# Patient Record
Sex: FEMALE | Race: WHITE | NOT HISPANIC OR LATINO | Employment: OTHER | URBAN - METROPOLITAN AREA
[De-identification: names, ages, dates, MRNs, and addresses within clinical notes are randomized per-mention and may not be internally consistent; named-entity substitution may affect disease eponyms.]

---

## 2017-03-08 ENCOUNTER — AMBULATORY - HEALTHEAST (OUTPATIENT)
Dept: CARDIOLOGY | Facility: CLINIC | Age: 82
End: 2017-03-08

## 2017-03-08 DIAGNOSIS — Z95.0 PACEMAKER: ICD-10-CM

## 2017-04-03 ENCOUNTER — COMMUNICATION - HEALTHEAST (OUTPATIENT)
Dept: INTERNAL MEDICINE | Facility: CLINIC | Age: 82
End: 2017-04-03

## 2017-04-03 DIAGNOSIS — Z51.81 MEDICATION MONITORING ENCOUNTER: ICD-10-CM

## 2017-04-06 ENCOUNTER — COMMUNICATION - HEALTHEAST (OUTPATIENT)
Dept: INTERNAL MEDICINE | Facility: CLINIC | Age: 82
End: 2017-04-06

## 2017-04-09 ENCOUNTER — AMBULATORY - HEALTHEAST (OUTPATIENT)
Dept: INTERNAL MEDICINE | Facility: CLINIC | Age: 82
End: 2017-04-09

## 2017-04-09 DIAGNOSIS — Z51.81 MEDICATION MONITORING ENCOUNTER: ICD-10-CM

## 2017-04-09 DIAGNOSIS — E03.9 HYPOTHYROIDISM: ICD-10-CM

## 2017-04-09 DIAGNOSIS — N18.30 CHRONIC RENAL INSUFFICIENCY, STAGE 3 (MODERATE) (H): ICD-10-CM

## 2017-04-09 DIAGNOSIS — E11.9 TYPE 2 DIABETES MELLITUS (H): ICD-10-CM

## 2017-04-09 DIAGNOSIS — E78.2 MIXED HYPERLIPIDEMIA: ICD-10-CM

## 2017-04-20 ENCOUNTER — AMBULATORY - HEALTHEAST (OUTPATIENT)
Dept: LAB | Facility: CLINIC | Age: 82
End: 2017-04-20

## 2017-04-20 DIAGNOSIS — E03.9 HYPOTHYROIDISM: ICD-10-CM

## 2017-04-20 DIAGNOSIS — E11.9 TYPE 2 DIABETES MELLITUS (H): ICD-10-CM

## 2017-04-20 DIAGNOSIS — Z51.81 MEDICATION MONITORING ENCOUNTER: ICD-10-CM

## 2017-04-20 DIAGNOSIS — E78.2 MIXED HYPERLIPIDEMIA: ICD-10-CM

## 2017-04-20 DIAGNOSIS — N18.30 CHRONIC RENAL INSUFFICIENCY, STAGE 3 (MODERATE) (H): ICD-10-CM

## 2017-04-20 LAB
CHOLEST SERPL-MCNC: 136 MG/DL
FASTING STATUS PATIENT QL REPORTED: YES
HBA1C MFR BLD: 6.2 % (ref 3.5–6)
HDLC SERPL-MCNC: 29 MG/DL
LDLC SERPL CALC-MCNC: 76 MG/DL
TRIGL SERPL-MCNC: 153 MG/DL

## 2017-04-21 ENCOUNTER — COMMUNICATION - HEALTHEAST (OUTPATIENT)
Dept: INTERNAL MEDICINE | Facility: CLINIC | Age: 82
End: 2017-04-21

## 2017-04-27 ENCOUNTER — RECORDS - HEALTHEAST (OUTPATIENT)
Dept: MAMMOGRAPHY | Facility: CLINIC | Age: 82
End: 2017-04-27

## 2017-04-27 ENCOUNTER — OFFICE VISIT - HEALTHEAST (OUTPATIENT)
Dept: INTERNAL MEDICINE | Facility: CLINIC | Age: 82
End: 2017-04-27

## 2017-04-27 DIAGNOSIS — Z51.81 MEDICATION MONITORING ENCOUNTER: ICD-10-CM

## 2017-04-27 DIAGNOSIS — J30.0 VASOMOTOR RHINITIS: ICD-10-CM

## 2017-04-27 DIAGNOSIS — Z12.31 ENCOUNTER FOR SCREENING MAMMOGRAM FOR MALIGNANT NEOPLASM OF BREAST: ICD-10-CM

## 2017-04-27 ASSESSMENT — MIFFLIN-ST. JEOR: SCORE: 1059.72

## 2017-05-01 ENCOUNTER — COMMUNICATION - HEALTHEAST (OUTPATIENT)
Dept: INTERNAL MEDICINE | Facility: CLINIC | Age: 82
End: 2017-05-01

## 2017-05-01 DIAGNOSIS — C50.919 BREAST CA (H): ICD-10-CM

## 2017-05-01 DIAGNOSIS — M85.80 OSTEOPENIA: ICD-10-CM

## 2017-05-01 DIAGNOSIS — R25.1 TREMOR: ICD-10-CM

## 2017-05-01 DIAGNOSIS — I10 UNSPECIFIED ESSENTIAL HYPERTENSION: ICD-10-CM

## 2017-06-14 ENCOUNTER — AMBULATORY - HEALTHEAST (OUTPATIENT)
Dept: CARDIOLOGY | Facility: CLINIC | Age: 82
End: 2017-06-14

## 2017-06-14 DIAGNOSIS — Z95.0 CARDIAC PACEMAKER IN SITU: ICD-10-CM

## 2017-06-14 LAB — HCC DEVICE COMMENTS: NORMAL

## 2017-09-19 ENCOUNTER — COMMUNICATION - HEALTHEAST (OUTPATIENT)
Dept: LAB | Facility: CLINIC | Age: 82
End: 2017-09-19

## 2017-09-19 DIAGNOSIS — E78.5 HYPERLIPIDEMIA: ICD-10-CM

## 2017-09-19 DIAGNOSIS — E11.9 TYPE 2 DIABETES MELLITUS (H): ICD-10-CM

## 2017-09-20 ENCOUNTER — AMBULATORY - HEALTHEAST (OUTPATIENT)
Dept: CARDIOLOGY | Facility: CLINIC | Age: 82
End: 2017-09-20

## 2017-09-20 DIAGNOSIS — Z95.0 CARDIAC PACEMAKER IN SITU: ICD-10-CM

## 2017-09-21 ENCOUNTER — AMBULATORY - HEALTHEAST (OUTPATIENT)
Dept: LAB | Facility: CLINIC | Age: 82
End: 2017-09-21

## 2017-09-21 DIAGNOSIS — E78.5 HYPERLIPIDEMIA: ICD-10-CM

## 2017-09-21 DIAGNOSIS — E11.9 TYPE 2 DIABETES MELLITUS (H): ICD-10-CM

## 2017-09-21 LAB
CHOLEST SERPL-MCNC: 136 MG/DL
FASTING STATUS PATIENT QL REPORTED: YES
HBA1C MFR BLD: 6.1 % (ref 3.5–6)
HDLC SERPL-MCNC: 28 MG/DL
LDLC SERPL CALC-MCNC: 76 MG/DL
TRIGL SERPL-MCNC: 158 MG/DL

## 2017-09-22 LAB — HCC DEVICE COMMENTS: NORMAL

## 2017-09-27 ENCOUNTER — COMMUNICATION - HEALTHEAST (OUTPATIENT)
Dept: INTERNAL MEDICINE | Facility: CLINIC | Age: 82
End: 2017-09-27

## 2017-09-28 ENCOUNTER — OFFICE VISIT - HEALTHEAST (OUTPATIENT)
Dept: INTERNAL MEDICINE | Facility: CLINIC | Age: 82
End: 2017-09-28

## 2017-09-28 DIAGNOSIS — Z23 NEED FOR INFLUENZA VACCINATION: ICD-10-CM

## 2017-09-28 ASSESSMENT — MIFFLIN-ST. JEOR: SCORE: 1052.46

## 2017-12-07 ENCOUNTER — AMBULATORY - HEALTHEAST (OUTPATIENT)
Dept: CARDIOLOGY | Facility: CLINIC | Age: 82
End: 2017-12-07

## 2017-12-07 DIAGNOSIS — Z95.0 CARDIAC PACEMAKER IN SITU: ICD-10-CM

## 2017-12-07 LAB — HCC DEVICE COMMENTS: NORMAL

## 2017-12-07 ASSESSMENT — MIFFLIN-ST. JEOR: SCORE: 1058.36

## 2018-02-13 ENCOUNTER — COMMUNICATION - HEALTHEAST (OUTPATIENT)
Dept: INTERNAL MEDICINE | Facility: CLINIC | Age: 83
End: 2018-02-13

## 2018-02-13 DIAGNOSIS — C50.919 BREAST CA (H): ICD-10-CM

## 2018-02-13 DIAGNOSIS — Z51.81 MEDICATION MONITORING ENCOUNTER: ICD-10-CM

## 2018-02-13 DIAGNOSIS — M85.80 OSTEOPENIA: ICD-10-CM

## 2018-02-13 DIAGNOSIS — R25.1 TREMOR: ICD-10-CM

## 2018-02-13 DIAGNOSIS — I10 ESSENTIAL HYPERTENSION: ICD-10-CM

## 2018-03-08 ENCOUNTER — AMBULATORY - HEALTHEAST (OUTPATIENT)
Dept: CARDIOLOGY | Facility: CLINIC | Age: 83
End: 2018-03-08

## 2018-03-08 DIAGNOSIS — Z95.0 CARDIAC PACEMAKER IN SITU: ICD-10-CM

## 2018-03-08 LAB — HCC DEVICE COMMENTS: NORMAL

## 2018-04-03 ENCOUNTER — AMBULATORY - HEALTHEAST (OUTPATIENT)
Dept: INTERNAL MEDICINE | Facility: CLINIC | Age: 83
End: 2018-04-03

## 2018-04-03 ENCOUNTER — COMMUNICATION - HEALTHEAST (OUTPATIENT)
Dept: LAB | Facility: CLINIC | Age: 83
End: 2018-04-03

## 2018-04-03 DIAGNOSIS — E78.1 HYPERTRIGLYCERIDEMIA WITHOUT HYPERCHOLESTEROLEMIA: ICD-10-CM

## 2018-04-03 DIAGNOSIS — N18.30 CHRONIC RENAL INSUFFICIENCY, STAGE III (MODERATE) (H): ICD-10-CM

## 2018-04-03 DIAGNOSIS — C50.919 BREAST CANCER IN FEMALE (H): ICD-10-CM

## 2018-04-03 DIAGNOSIS — E04.2 MULTINODULAR NON-TOXIC GOITER: ICD-10-CM

## 2018-04-25 ENCOUNTER — AMBULATORY - HEALTHEAST (OUTPATIENT)
Dept: LAB | Facility: CLINIC | Age: 83
End: 2018-04-25

## 2018-04-25 ENCOUNTER — HOSPITAL ENCOUNTER (OUTPATIENT)
Dept: LAB | Age: 83
Setting detail: SPECIMEN
Discharge: HOME | End: 2018-04-25

## 2018-04-25 DIAGNOSIS — E78.1 HYPERTRIGLYCERIDEMIA WITHOUT HYPERCHOLESTEROLEMIA: ICD-10-CM

## 2018-04-25 DIAGNOSIS — N18.30 CHRONIC RENAL INSUFFICIENCY, STAGE III (MODERATE) (H): ICD-10-CM

## 2018-04-25 DIAGNOSIS — E04.2 MULTINODULAR NON-TOXIC GOITER: ICD-10-CM

## 2018-04-25 DIAGNOSIS — C50.919 BREAST CANCER IN FEMALE (H): ICD-10-CM

## 2018-04-25 LAB
ALBUMIN SERPL-MCNC: 3.8 G/DL (ref 3.5–5)
ALBUMIN UR-MCNC: NEGATIVE MG/DL
ALP SERPL-CCNC: 36 U/L (ref 45–120)
ALT SERPL W P-5'-P-CCNC: 12 U/L (ref 0–45)
ANION GAP SERPL CALCULATED.3IONS-SCNC: 9 MMOL/L (ref 5–18)
APPEARANCE UR: CLEAR
AST SERPL W P-5'-P-CCNC: 20 U/L (ref 0–40)
BILIRUB SERPL-MCNC: 0.5 MG/DL (ref 0–1)
BILIRUB UR QL STRIP: NEGATIVE
BUN SERPL-MCNC: 34 MG/DL (ref 8–28)
CALCIUM SERPL-MCNC: 9.7 MG/DL (ref 8.5–10.5)
CHLORIDE BLD-SCNC: 106 MMOL/L (ref 98–107)
CHOLEST SERPL-MCNC: 156 MG/DL
CO2 SERPL-SCNC: 25 MMOL/L (ref 22–31)
COLOR UR AUTO: YELLOW
CREAT SERPL-MCNC: 1.21 MG/DL (ref 0.6–1.1)
ERYTHROCYTE [DISTWIDTH] IN BLOOD BY AUTOMATED COUNT: 11.4 % (ref 11–14.5)
FASTING STATUS PATIENT QL REPORTED: YES
GFR SERPL CREATININE-BSD FRML MDRD: 42 ML/MIN/1.73M2
GLUCOSE BLD-MCNC: 90 MG/DL (ref 70–125)
GLUCOSE UR STRIP-MCNC: NEGATIVE MG/DL
HCT VFR BLD AUTO: 37.4 % (ref 35–47)
HDLC SERPL-MCNC: 36 MG/DL
HGB BLD-MCNC: 12.6 G/DL (ref 12–16)
HGB UR QL STRIP: NEGATIVE
KETONES UR STRIP-MCNC: NEGATIVE MG/DL
LDLC SERPL CALC-MCNC: 91 MG/DL
LEUKOCYTE ESTERASE UR QL STRIP: NEGATIVE
MCH RBC QN AUTO: 31.9 PG (ref 27–34)
MCHC RBC AUTO-ENTMCNC: 33.6 G/DL (ref 32–36)
MCV RBC AUTO: 95 FL (ref 80–100)
NITRATE UR QL: NEGATIVE
PH UR STRIP: 6.5 [PH] (ref 5–8)
PLATELET # BLD AUTO: 256 THOU/UL (ref 140–440)
PMV BLD AUTO: 6.7 FL (ref 7–10)
POTASSIUM BLD-SCNC: 5.5 MMOL/L (ref 3.5–5)
PROT SERPL-MCNC: 7.4 G/DL (ref 6–8)
RBC # BLD AUTO: 3.93 MILL/UL (ref 3.8–5.4)
SODIUM SERPL-SCNC: 140 MMOL/L (ref 136–145)
SP GR UR STRIP: 1.01 (ref 1–1.03)
TRIGL SERPL-MCNC: 145 MG/DL
TSH SERPL DL<=0.005 MIU/L-ACNC: 1.11 UIU/ML (ref 0.3–5)
UROBILINOGEN UR STRIP-ACNC: NORMAL
WBC: 5.6 THOU/UL (ref 4–11)

## 2018-05-02 ENCOUNTER — COMMUNICATION - HEALTHEAST (OUTPATIENT)
Dept: INTERNAL MEDICINE | Facility: CLINIC | Age: 83
End: 2018-05-02

## 2018-05-03 ENCOUNTER — OFFICE VISIT - HEALTHEAST (OUTPATIENT)
Dept: INTERNAL MEDICINE | Facility: CLINIC | Age: 83
End: 2018-05-03

## 2018-05-03 ENCOUNTER — RECORDS - HEALTHEAST (OUTPATIENT)
Dept: MAMMOGRAPHY | Facility: CLINIC | Age: 83
End: 2018-05-03

## 2018-05-03 ENCOUNTER — COMMUNICATION - HEALTHEAST (OUTPATIENT)
Dept: INTERNAL MEDICINE | Facility: CLINIC | Age: 83
End: 2018-05-03

## 2018-05-03 DIAGNOSIS — Z12.31 ENCOUNTER FOR SCREENING MAMMOGRAM FOR MALIGNANT NEOPLASM OF BREAST: ICD-10-CM

## 2018-05-03 DIAGNOSIS — E11.9 TYPE 2 DIABETES MELLITUS (H): ICD-10-CM

## 2018-05-03 DIAGNOSIS — Z12.31 VISIT FOR SCREENING MAMMOGRAM: ICD-10-CM

## 2018-05-03 LAB — HBA1C MFR BLD: 6 % (ref 3.5–6)

## 2018-05-11 ENCOUNTER — COMMUNICATION - HEALTHEAST (OUTPATIENT)
Dept: INTERNAL MEDICINE | Facility: CLINIC | Age: 83
End: 2018-05-11

## 2018-06-12 ENCOUNTER — AMBULATORY - HEALTHEAST (OUTPATIENT)
Dept: CARDIOLOGY | Facility: CLINIC | Age: 83
End: 2018-06-12

## 2018-06-12 DIAGNOSIS — Z95.0 CARDIAC PACEMAKER IN SITU: ICD-10-CM

## 2018-06-12 LAB
HCC DEVICE COMMENTS: NORMAL
HCC DEVICE IMPLANTING PROVIDER: NORMAL
HCC DEVICE MANUFACTURE: NORMAL
HCC DEVICE MODEL: NORMAL
HCC DEVICE SERIAL NUMBER: NORMAL
HCC DEVICE TYPE: NORMAL

## 2018-09-17 ENCOUNTER — AMBULATORY - HEALTHEAST (OUTPATIENT)
Dept: CARDIOLOGY | Facility: CLINIC | Age: 83
End: 2018-09-17

## 2018-09-17 DIAGNOSIS — Z95.0 CARDIAC PACEMAKER IN SITU: ICD-10-CM

## 2018-10-01 ENCOUNTER — COMMUNICATION - HEALTHEAST (OUTPATIENT)
Dept: INTERNAL MEDICINE | Facility: CLINIC | Age: 83
End: 2018-10-01

## 2018-10-01 DIAGNOSIS — I10 ESSENTIAL HYPERTENSION: ICD-10-CM

## 2018-10-12 ENCOUNTER — AMBULATORY - HEALTHEAST (OUTPATIENT)
Dept: INTERNAL MEDICINE | Facility: CLINIC | Age: 83
End: 2018-10-12

## 2018-10-12 ENCOUNTER — COMMUNICATION - HEALTHEAST (OUTPATIENT)
Dept: LAB | Facility: CLINIC | Age: 83
End: 2018-10-12

## 2018-10-12 DIAGNOSIS — E11.9 DIABETES MELLITUS, TYPE 2 (H): ICD-10-CM

## 2018-10-12 DIAGNOSIS — E78.2 MIXED HYPERLIPIDEMIA: ICD-10-CM

## 2018-10-31 ENCOUNTER — AMBULATORY - HEALTHEAST (OUTPATIENT)
Dept: LAB | Facility: CLINIC | Age: 83
End: 2018-10-31

## 2018-10-31 DIAGNOSIS — E78.2 MIXED HYPERLIPIDEMIA: ICD-10-CM

## 2018-10-31 DIAGNOSIS — E11.9 DIABETES MELLITUS, TYPE 2 (H): ICD-10-CM

## 2018-10-31 LAB
ANION GAP SERPL CALCULATED.3IONS-SCNC: 11 MMOL/L (ref 5–18)
BUN SERPL-MCNC: 34 MG/DL (ref 8–28)
CALCIUM SERPL-MCNC: 9.8 MG/DL (ref 8.5–10.5)
CHLORIDE BLD-SCNC: 106 MMOL/L (ref 98–107)
CHOLEST SERPL-MCNC: 151 MG/DL
CO2 SERPL-SCNC: 21 MMOL/L (ref 22–31)
CREAT SERPL-MCNC: 1.16 MG/DL (ref 0.6–1.1)
FASTING STATUS PATIENT QL REPORTED: YES
GFR SERPL CREATININE-BSD FRML MDRD: 44 ML/MIN/1.73M2
GLUCOSE BLD-MCNC: 97 MG/DL (ref 70–125)
HBA1C MFR BLD: 6.3 % (ref 3.5–6)
HDLC SERPL-MCNC: 33 MG/DL
LDLC SERPL CALC-MCNC: 88 MG/DL
POTASSIUM BLD-SCNC: 5.1 MMOL/L (ref 3.5–5)
SODIUM SERPL-SCNC: 138 MMOL/L (ref 136–145)
TRIGL SERPL-MCNC: 152 MG/DL

## 2018-11-02 ENCOUNTER — COMMUNICATION - HEALTHEAST (OUTPATIENT)
Dept: INTERNAL MEDICINE | Facility: CLINIC | Age: 83
End: 2018-11-02

## 2018-11-08 ENCOUNTER — OFFICE VISIT - HEALTHEAST (OUTPATIENT)
Dept: INTERNAL MEDICINE | Facility: CLINIC | Age: 83
End: 2018-11-08

## 2018-11-08 DIAGNOSIS — Z95.0 CARDIAC PACEMAKER IN SITU: ICD-10-CM

## 2018-11-08 DIAGNOSIS — E11.9 TYPE 2 DIABETES MELLITUS WITHOUT COMPLICATION, WITHOUT LONG-TERM CURRENT USE OF INSULIN (H): ICD-10-CM

## 2018-11-08 DIAGNOSIS — E78.1 HYPERTRIGLYCERIDEMIA WITHOUT HYPERCHOLESTEROLEMIA: ICD-10-CM

## 2018-11-08 DIAGNOSIS — M81.0 AGE-RELATED OSTEOPOROSIS WITHOUT CURRENT PATHOLOGICAL FRACTURE: ICD-10-CM

## 2018-11-08 DIAGNOSIS — I10 ESSENTIAL HYPERTENSION: ICD-10-CM

## 2018-11-08 DIAGNOSIS — M85.80 OSTEOPENIA: ICD-10-CM

## 2018-11-08 DIAGNOSIS — N18.30 CHRONIC RENAL INSUFFICIENCY, STAGE III (MODERATE) (H): ICD-10-CM

## 2018-11-08 DIAGNOSIS — C50.919 MALIGNANT NEOPLASM OF FEMALE BREAST (H): ICD-10-CM

## 2018-11-08 DIAGNOSIS — R25.1 TREMOR: ICD-10-CM

## 2018-12-04 ENCOUNTER — AMBULATORY - HEALTHEAST (OUTPATIENT)
Dept: CARDIOLOGY | Facility: CLINIC | Age: 83
End: 2018-12-04

## 2018-12-04 DIAGNOSIS — Z95.0 CARDIAC PACEMAKER IN SITU: ICD-10-CM

## 2018-12-04 ASSESSMENT — MIFFLIN-ST. JEOR: SCORE: 1050.65

## 2019-02-07 ENCOUNTER — COMMUNICATION - HEALTHEAST (OUTPATIENT)
Dept: INTERNAL MEDICINE | Facility: CLINIC | Age: 84
End: 2019-02-07

## 2019-02-07 DIAGNOSIS — M85.80 OSTEOPENIA: ICD-10-CM

## 2019-02-07 DIAGNOSIS — Z51.81 MEDICATION MONITORING ENCOUNTER: ICD-10-CM

## 2019-02-07 DIAGNOSIS — C50.919 BREAST CA (H): ICD-10-CM

## 2019-03-06 ENCOUNTER — AMBULATORY - HEALTHEAST (OUTPATIENT)
Dept: CARDIOLOGY | Facility: CLINIC | Age: 84
End: 2019-03-06

## 2019-03-06 DIAGNOSIS — Z95.0 CARDIAC PACEMAKER IN SITU: ICD-10-CM

## 2019-04-22 ENCOUNTER — COMMUNICATION - HEALTHEAST (OUTPATIENT)
Dept: LAB | Facility: CLINIC | Age: 84
End: 2019-04-22

## 2019-04-22 DIAGNOSIS — E11.9 TYPE 2 DIABETES MELLITUS WITHOUT COMPLICATION, WITH LONG-TERM CURRENT USE OF INSULIN (H): ICD-10-CM

## 2019-04-22 DIAGNOSIS — N18.30 CHRONIC RENAL INSUFFICIENCY, STAGE III (MODERATE) (H): ICD-10-CM

## 2019-04-22 DIAGNOSIS — E78.1 HYPERTRIGLYCERIDEMIA WITHOUT HYPERCHOLESTEROLEMIA: ICD-10-CM

## 2019-04-22 DIAGNOSIS — Z17.0 MALIGNANT NEOPLASM OF RIGHT BREAST IN FEMALE, ESTROGEN RECEPTOR POSITIVE, UNSPECIFIED SITE OF BREAST (H): ICD-10-CM

## 2019-04-22 DIAGNOSIS — Z79.4 TYPE 2 DIABETES MELLITUS WITHOUT COMPLICATION, WITH LONG-TERM CURRENT USE OF INSULIN (H): ICD-10-CM

## 2019-04-22 DIAGNOSIS — C50.911 MALIGNANT NEOPLASM OF RIGHT BREAST IN FEMALE, ESTROGEN RECEPTOR POSITIVE, UNSPECIFIED SITE OF BREAST (H): ICD-10-CM

## 2019-05-08 ENCOUNTER — RECORDS - HEALTHEAST (OUTPATIENT)
Dept: BONE DENSITY | Facility: CLINIC | Age: 84
End: 2019-05-08

## 2019-05-08 ENCOUNTER — RECORDS - HEALTHEAST (OUTPATIENT)
Dept: ADMINISTRATIVE | Facility: OTHER | Age: 84
End: 2019-05-08

## 2019-05-08 ENCOUNTER — COMMUNICATION - HEALTHEAST (OUTPATIENT)
Dept: INTERNAL MEDICINE | Facility: CLINIC | Age: 84
End: 2019-05-08

## 2019-05-08 ENCOUNTER — RECORDS - HEALTHEAST (OUTPATIENT)
Dept: MAMMOGRAPHY | Facility: CLINIC | Age: 84
End: 2019-05-08

## 2019-05-08 ENCOUNTER — AMBULATORY - HEALTHEAST (OUTPATIENT)
Dept: LAB | Facility: CLINIC | Age: 84
End: 2019-05-08

## 2019-05-08 DIAGNOSIS — E11.9 TYPE 2 DIABETES MELLITUS WITHOUT COMPLICATION, WITH LONG-TERM CURRENT USE OF INSULIN (H): ICD-10-CM

## 2019-05-08 DIAGNOSIS — Z17.0 MALIGNANT NEOPLASM OF RIGHT BREAST IN FEMALE, ESTROGEN RECEPTOR POSITIVE, UNSPECIFIED SITE OF BREAST (H): ICD-10-CM

## 2019-05-08 DIAGNOSIS — M81.0 AGE-RELATED OSTEOPOROSIS WITHOUT CURRENT PATHOLOGICAL FRACTURE: ICD-10-CM

## 2019-05-08 DIAGNOSIS — C50.911 MALIGNANT NEOPLASM OF RIGHT BREAST IN FEMALE, ESTROGEN RECEPTOR POSITIVE, UNSPECIFIED SITE OF BREAST (H): ICD-10-CM

## 2019-05-08 DIAGNOSIS — M85.80 OTHER SPECIFIED DISORDERS OF BONE DENSITY AND STRUCTURE, UNSPECIFIED SITE: ICD-10-CM

## 2019-05-08 DIAGNOSIS — Z79.4 TYPE 2 DIABETES MELLITUS WITHOUT COMPLICATION, WITH LONG-TERM CURRENT USE OF INSULIN (H): ICD-10-CM

## 2019-05-08 DIAGNOSIS — E78.1 HYPERTRIGLYCERIDEMIA WITHOUT HYPERCHOLESTEROLEMIA: ICD-10-CM

## 2019-05-08 DIAGNOSIS — N18.30 CHRONIC RENAL INSUFFICIENCY, STAGE III (MODERATE) (H): ICD-10-CM

## 2019-05-08 DIAGNOSIS — Z12.31 ENCOUNTER FOR SCREENING MAMMOGRAM FOR MALIGNANT NEOPLASM OF BREAST: ICD-10-CM

## 2019-05-08 LAB
ALBUMIN SERPL-MCNC: 3.9 G/DL (ref 3.5–5)
ALP SERPL-CCNC: 36 U/L (ref 45–120)
ALT SERPL W P-5'-P-CCNC: 11 U/L (ref 0–45)
ANION GAP SERPL CALCULATED.3IONS-SCNC: 11 MMOL/L (ref 5–18)
AST SERPL W P-5'-P-CCNC: 18 U/L (ref 0–40)
BILIRUB SERPL-MCNC: 0.4 MG/DL (ref 0–1)
BUN SERPL-MCNC: 54 MG/DL (ref 8–28)
CALCIUM SERPL-MCNC: 10.1 MG/DL (ref 8.5–10.5)
CHLORIDE BLD-SCNC: 108 MMOL/L (ref 98–107)
CHOLEST SERPL-MCNC: 144 MG/DL
CO2 SERPL-SCNC: 23 MMOL/L (ref 22–31)
CREAT SERPL-MCNC: 1.48 MG/DL (ref 0.6–1.1)
CREAT UR-MCNC: 68 MG/DL
ERYTHROCYTE [DISTWIDTH] IN BLOOD BY AUTOMATED COUNT: 11.2 % (ref 11–14.5)
FASTING STATUS PATIENT QL REPORTED: YES
GFR SERPL CREATININE-BSD FRML MDRD: 33 ML/MIN/1.73M2
GLUCOSE BLD-MCNC: 108 MG/DL (ref 70–125)
HBA1C MFR BLD: 6.1 % (ref 3.5–6)
HCT VFR BLD AUTO: 37 % (ref 35–47)
HDLC SERPL-MCNC: 27 MG/DL
HGB BLD-MCNC: 12.2 G/DL (ref 12–16)
LDLC SERPL CALC-MCNC: 85 MG/DL
MCH RBC QN AUTO: 31.8 PG (ref 27–34)
MCHC RBC AUTO-ENTMCNC: 33 G/DL (ref 32–36)
MCV RBC AUTO: 96 FL (ref 80–100)
MICROALBUMIN UR-MCNC: 1.36 MG/DL (ref 0–1.99)
MICROALBUMIN/CREAT UR: 20 MG/G
PLATELET # BLD AUTO: 247 THOU/UL (ref 140–440)
PMV BLD AUTO: 6.5 FL (ref 7–10)
POTASSIUM BLD-SCNC: 5 MMOL/L (ref 3.5–5)
PROT SERPL-MCNC: 7.3 G/DL (ref 6–8)
RBC # BLD AUTO: 3.84 MILL/UL (ref 3.8–5.4)
SODIUM SERPL-SCNC: 142 MMOL/L (ref 136–145)
TRIGL SERPL-MCNC: 162 MG/DL
WBC: 4 THOU/UL (ref 4–11)

## 2019-05-13 ENCOUNTER — COMMUNICATION - HEALTHEAST (OUTPATIENT)
Dept: INTERNAL MEDICINE | Facility: CLINIC | Age: 84
End: 2019-05-13

## 2019-05-14 ENCOUNTER — OFFICE VISIT - HEALTHEAST (OUTPATIENT)
Dept: INTERNAL MEDICINE | Facility: CLINIC | Age: 84
End: 2019-05-14

## 2019-05-14 DIAGNOSIS — N18.30 CHRONIC RENAL INSUFFICIENCY, STAGE III (MODERATE) (H): ICD-10-CM

## 2019-05-14 DIAGNOSIS — M94.9 DISORDER OF BONE AND CARTILAGE: ICD-10-CM

## 2019-05-14 DIAGNOSIS — E11.9 TYPE 2 DIABETES MELLITUS WITHOUT COMPLICATION, WITHOUT LONG-TERM CURRENT USE OF INSULIN (H): ICD-10-CM

## 2019-05-14 DIAGNOSIS — Z95.0 CARDIAC PACEMAKER IN SITU: ICD-10-CM

## 2019-05-14 DIAGNOSIS — E78.1 HYPERTRIGLYCERIDEMIA WITHOUT HYPERCHOLESTEROLEMIA: ICD-10-CM

## 2019-05-14 DIAGNOSIS — M89.9 DISORDER OF BONE AND CARTILAGE: ICD-10-CM

## 2019-05-14 DIAGNOSIS — I10 ESSENTIAL HYPERTENSION: ICD-10-CM

## 2019-05-14 DIAGNOSIS — E04.9 GOITER: ICD-10-CM

## 2019-05-14 ASSESSMENT — MIFFLIN-ST. JEOR: SCORE: 1032.05

## 2019-06-10 ENCOUNTER — AMBULATORY - HEALTHEAST (OUTPATIENT)
Dept: CARDIOLOGY | Facility: CLINIC | Age: 84
End: 2019-06-10

## 2019-06-10 ENCOUNTER — COMMUNICATION - HEALTHEAST (OUTPATIENT)
Dept: INTERNAL MEDICINE | Facility: CLINIC | Age: 84
End: 2019-06-10

## 2019-06-10 DIAGNOSIS — Z95.0 CARDIAC PACEMAKER IN SITU: ICD-10-CM

## 2019-09-11 ENCOUNTER — AMBULATORY - HEALTHEAST (OUTPATIENT)
Dept: CARDIOLOGY | Facility: CLINIC | Age: 84
End: 2019-09-11

## 2019-09-11 DIAGNOSIS — Z95.0 CARDIAC PACEMAKER IN SITU: ICD-10-CM

## 2019-11-04 ENCOUNTER — COMMUNICATION - HEALTHEAST (OUTPATIENT)
Dept: LAB | Facility: CLINIC | Age: 84
End: 2019-11-04

## 2019-11-04 DIAGNOSIS — N18.30 CHRONIC RENAL INSUFFICIENCY, STAGE III (MODERATE) (H): ICD-10-CM

## 2019-11-04 DIAGNOSIS — E11.9 TYPE 2 DIABETES MELLITUS WITHOUT COMPLICATION, WITHOUT LONG-TERM CURRENT USE OF INSULIN (H): ICD-10-CM

## 2019-11-04 DIAGNOSIS — C50.912 MALIGNANT NEOPLASM OF LEFT BREAST IN FEMALE, ESTROGEN RECEPTOR POSITIVE, UNSPECIFIED SITE OF BREAST (H): ICD-10-CM

## 2019-11-04 DIAGNOSIS — Z17.0 MALIGNANT NEOPLASM OF LEFT BREAST IN FEMALE, ESTROGEN RECEPTOR POSITIVE, UNSPECIFIED SITE OF BREAST (H): ICD-10-CM

## 2019-11-04 DIAGNOSIS — E78.1 HYPERTRIGLYCERIDEMIA WITHOUT HYPERCHOLESTEROLEMIA: ICD-10-CM

## 2019-11-04 DIAGNOSIS — I10 ESSENTIAL HYPERTENSION: ICD-10-CM

## 2019-11-20 ENCOUNTER — AMBULATORY - HEALTHEAST (OUTPATIENT)
Dept: LAB | Facility: CLINIC | Age: 84
End: 2019-11-20

## 2019-11-20 DIAGNOSIS — E11.9 TYPE 2 DIABETES MELLITUS WITHOUT COMPLICATION, WITHOUT LONG-TERM CURRENT USE OF INSULIN (H): ICD-10-CM

## 2019-11-20 DIAGNOSIS — I10 ESSENTIAL HYPERTENSION: ICD-10-CM

## 2019-11-20 DIAGNOSIS — N18.30 CHRONIC RENAL INSUFFICIENCY, STAGE III (MODERATE) (H): ICD-10-CM

## 2019-11-20 DIAGNOSIS — E78.1 HYPERTRIGLYCERIDEMIA WITHOUT HYPERCHOLESTEROLEMIA: ICD-10-CM

## 2019-11-20 LAB
ANION GAP SERPL CALCULATED.3IONS-SCNC: 10 MMOL/L (ref 5–18)
BUN SERPL-MCNC: 42 MG/DL (ref 8–28)
CALCIUM SERPL-MCNC: 9.9 MG/DL (ref 8.5–10.5)
CHLORIDE BLD-SCNC: 106 MMOL/L (ref 98–107)
CHOLEST SERPL-MCNC: 139 MG/DL
CO2 SERPL-SCNC: 25 MMOL/L (ref 22–31)
CREAT SERPL-MCNC: 1.28 MG/DL (ref 0.6–1.1)
FASTING STATUS PATIENT QL REPORTED: YES
GFR SERPL CREATININE-BSD FRML MDRD: 39 ML/MIN/1.73M2
GLUCOSE BLD-MCNC: 102 MG/DL (ref 70–125)
HBA1C MFR BLD: 6.1 % (ref 3.5–6)
HDLC SERPL-MCNC: 28 MG/DL
LDLC SERPL CALC-MCNC: 78 MG/DL
POTASSIUM BLD-SCNC: 5 MMOL/L (ref 3.5–5)
SODIUM SERPL-SCNC: 141 MMOL/L (ref 136–145)
TRIGL SERPL-MCNC: 165 MG/DL

## 2019-11-25 ENCOUNTER — COMMUNICATION - HEALTHEAST (OUTPATIENT)
Dept: INTERNAL MEDICINE | Facility: CLINIC | Age: 84
End: 2019-11-25

## 2019-11-27 ENCOUNTER — AMBULATORY - HEALTHEAST (OUTPATIENT)
Dept: CARDIOLOGY | Facility: CLINIC | Age: 84
End: 2019-11-27

## 2019-11-27 DIAGNOSIS — I49.5 SSS (SICK SINUS SYNDROME) (H): ICD-10-CM

## 2019-11-27 DIAGNOSIS — Z95.0 CARDIAC PACEMAKER IN SITU: ICD-10-CM

## 2019-11-27 DIAGNOSIS — I49.5 BRADY-TACHY SYNDROME (H): ICD-10-CM

## 2019-11-27 ASSESSMENT — MIFFLIN-ST. JEOR: SCORE: 1049.74

## 2019-11-29 ENCOUNTER — SURGERY - HEALTHEAST (OUTPATIENT)
Dept: CARDIOLOGY | Facility: CLINIC | Age: 84
End: 2019-11-29

## 2019-11-29 ENCOUNTER — AMBULATORY - HEALTHEAST (OUTPATIENT)
Dept: CARDIOLOGY | Facility: CLINIC | Age: 84
End: 2019-11-29

## 2019-11-29 DIAGNOSIS — I49.5 SICK SINUS SYNDROME (H): ICD-10-CM

## 2019-11-29 DIAGNOSIS — Z45.010 PACEMAKER BATTERY DEPLETION: ICD-10-CM

## 2019-12-03 ENCOUNTER — COMMUNICATION - HEALTHEAST (OUTPATIENT)
Dept: CARDIOLOGY | Facility: CLINIC | Age: 84
End: 2019-12-03

## 2019-12-06 ENCOUNTER — OFFICE VISIT - HEALTHEAST (OUTPATIENT)
Dept: INTERNAL MEDICINE | Facility: CLINIC | Age: 84
End: 2019-12-06

## 2019-12-06 DIAGNOSIS — Z95.0 CARDIAC PACEMAKER IN SITU: ICD-10-CM

## 2019-12-06 DIAGNOSIS — E78.1 HYPERTRIGLYCERIDEMIA WITHOUT HYPERCHOLESTEROLEMIA: ICD-10-CM

## 2019-12-06 DIAGNOSIS — E11.9 TYPE 2 DIABETES MELLITUS WITHOUT COMPLICATION, WITHOUT LONG-TERM CURRENT USE OF INSULIN (H): ICD-10-CM

## 2019-12-06 DIAGNOSIS — I10 ESSENTIAL HYPERTENSION: ICD-10-CM

## 2019-12-06 DIAGNOSIS — N18.30 CHRONIC RENAL INSUFFICIENCY, STAGE III (MODERATE) (H): ICD-10-CM

## 2019-12-06 DIAGNOSIS — R25.1 TREMOR: ICD-10-CM

## 2019-12-06 ASSESSMENT — MIFFLIN-ST. JEOR: SCORE: 1036.58

## 2019-12-17 ENCOUNTER — SURGERY - HEALTHEAST (OUTPATIENT)
Dept: CARDIOLOGY | Facility: CLINIC | Age: 84
End: 2019-12-17

## 2019-12-17 ASSESSMENT — MIFFLIN-ST. JEOR: SCORE: 1030.69

## 2019-12-26 ENCOUNTER — AMBULATORY - HEALTHEAST (OUTPATIENT)
Dept: CARDIOLOGY | Facility: CLINIC | Age: 84
End: 2019-12-26

## 2019-12-26 DIAGNOSIS — Z95.0 CARDIAC PACEMAKER IN SITU: ICD-10-CM

## 2019-12-26 DIAGNOSIS — I49.5 SICK SINUS SYNDROME (H): ICD-10-CM

## 2019-12-26 ASSESSMENT — MIFFLIN-ST. JEOR: SCORE: 1041.12

## 2020-02-13 ENCOUNTER — COMMUNICATION - HEALTHEAST (OUTPATIENT)
Dept: INTERNAL MEDICINE | Facility: CLINIC | Age: 85
End: 2020-02-13

## 2020-02-13 DIAGNOSIS — M85.80 OSTEOPENIA: ICD-10-CM

## 2020-02-13 DIAGNOSIS — C50.919 BREAST CA (H): ICD-10-CM

## 2020-03-25 ENCOUNTER — AMBULATORY - HEALTHEAST (OUTPATIENT)
Dept: CARDIOLOGY | Facility: CLINIC | Age: 85
End: 2020-03-25

## 2020-03-25 DIAGNOSIS — Z95.0 CARDIAC PACEMAKER IN SITU: ICD-10-CM

## 2020-03-25 DIAGNOSIS — I49.5 SICK SINUS SYNDROME (H): ICD-10-CM

## 2020-06-11 ENCOUNTER — COMMUNICATION - HEALTHEAST (OUTPATIENT)
Dept: SCHEDULING | Facility: CLINIC | Age: 85
End: 2020-06-11

## 2020-06-11 ENCOUNTER — AMBULATORY - HEALTHEAST (OUTPATIENT)
Dept: INTERNAL MEDICINE | Facility: CLINIC | Age: 85
End: 2020-06-11

## 2020-06-11 ENCOUNTER — COMMUNICATION - HEALTHEAST (OUTPATIENT)
Dept: LAB | Facility: CLINIC | Age: 85
End: 2020-06-11

## 2020-06-11 DIAGNOSIS — E11.9 TYPE 2 DIABETES MELLITUS WITHOUT COMPLICATION, WITHOUT LONG-TERM CURRENT USE OF INSULIN (H): ICD-10-CM

## 2020-06-11 DIAGNOSIS — E04.2 MULTINODULAR GOITER (NONTOXIC): ICD-10-CM

## 2020-06-11 DIAGNOSIS — N18.30 CHRONIC RENAL INSUFFICIENCY, STAGE 3 (MODERATE) (H): ICD-10-CM

## 2020-06-11 DIAGNOSIS — C50.912: ICD-10-CM

## 2020-06-11 DIAGNOSIS — E78.1 HYPERTRIGLYCERIDEMIA: ICD-10-CM

## 2020-06-17 ENCOUNTER — AMBULATORY - HEALTHEAST (OUTPATIENT)
Dept: LAB | Facility: CLINIC | Age: 85
End: 2020-06-17

## 2020-06-17 DIAGNOSIS — E04.2 MULTINODULAR GOITER (NONTOXIC): ICD-10-CM

## 2020-06-17 DIAGNOSIS — E78.1 HYPERTRIGLYCERIDEMIA: ICD-10-CM

## 2020-06-17 DIAGNOSIS — C50.912: ICD-10-CM

## 2020-06-17 DIAGNOSIS — N18.30 CHRONIC RENAL INSUFFICIENCY, STAGE 3 (MODERATE) (H): ICD-10-CM

## 2020-06-17 DIAGNOSIS — E11.9 TYPE 2 DIABETES MELLITUS WITHOUT COMPLICATION, WITHOUT LONG-TERM CURRENT USE OF INSULIN (H): ICD-10-CM

## 2020-06-17 LAB
ALBUMIN SERPL-MCNC: 3.7 G/DL (ref 3.5–5)
ALP SERPL-CCNC: 75 U/L (ref 45–120)
ALT SERPL W P-5'-P-CCNC: 29 U/L (ref 0–45)
ANION GAP SERPL CALCULATED.3IONS-SCNC: 11 MMOL/L (ref 5–18)
AST SERPL W P-5'-P-CCNC: 30 U/L (ref 0–40)
BILIRUB SERPL-MCNC: 0.3 MG/DL (ref 0–1)
BUN SERPL-MCNC: 29 MG/DL (ref 8–28)
CALCIUM SERPL-MCNC: 9.6 MG/DL (ref 8.5–10.5)
CHLORIDE BLD-SCNC: 106 MMOL/L (ref 98–107)
CHOLEST SERPL-MCNC: 178 MG/DL
CO2 SERPL-SCNC: 23 MMOL/L (ref 22–31)
CREAT SERPL-MCNC: 1.03 MG/DL (ref 0.6–1.1)
CREAT UR-MCNC: 117.2 MG/DL
ERYTHROCYTE [DISTWIDTH] IN BLOOD BY AUTOMATED COUNT: 13 % (ref 11–14.5)
FASTING STATUS PATIENT QL REPORTED: YES
GFR SERPL CREATININE-BSD FRML MDRD: 50 ML/MIN/1.73M2
GLUCOSE BLD-MCNC: 103 MG/DL (ref 70–125)
HBA1C MFR BLD: 6.1 % (ref 3.5–6)
HCT VFR BLD AUTO: 37.5 % (ref 35–47)
HDLC SERPL-MCNC: 37 MG/DL
HGB BLD-MCNC: 12.8 G/DL (ref 12–16)
LDLC SERPL CALC-MCNC: 102 MG/DL
MCH RBC QN AUTO: 31.4 PG (ref 27–34)
MCHC RBC AUTO-ENTMCNC: 34.1 G/DL (ref 32–36)
MCV RBC AUTO: 92 FL (ref 80–100)
MICROALBUMIN UR-MCNC: 2.32 MG/DL (ref 0–1.99)
MICROALBUMIN/CREAT UR: 19.8 MG/G
PLATELET # BLD AUTO: 263 THOU/UL (ref 140–440)
PMV BLD AUTO: 7 FL (ref 7–10)
POTASSIUM BLD-SCNC: 4.8 MMOL/L (ref 3.5–5)
PROT SERPL-MCNC: 7.1 G/DL (ref 6–8)
RBC # BLD AUTO: 4.07 MILL/UL (ref 3.8–5.4)
SODIUM SERPL-SCNC: 140 MMOL/L (ref 136–145)
TRIGL SERPL-MCNC: 194 MG/DL
TSH SERPL DL<=0.005 MIU/L-ACNC: 0.77 UIU/ML (ref 0.3–5)
WBC: 5.2 THOU/UL (ref 4–11)

## 2020-06-24 ENCOUNTER — AMBULATORY - HEALTHEAST (OUTPATIENT)
Dept: CARDIOLOGY | Facility: CLINIC | Age: 85
End: 2020-06-24

## 2020-06-24 DIAGNOSIS — I49.5 SICK SINUS SYNDROME (H): ICD-10-CM

## 2020-06-24 DIAGNOSIS — Z95.0 CARDIAC PACEMAKER IN SITU: ICD-10-CM

## 2020-06-29 ENCOUNTER — OFFICE VISIT - HEALTHEAST (OUTPATIENT)
Dept: INTERNAL MEDICINE | Facility: CLINIC | Age: 85
End: 2020-06-29

## 2020-06-29 ENCOUNTER — COMMUNICATION - HEALTHEAST (OUTPATIENT)
Dept: INTERNAL MEDICINE | Facility: CLINIC | Age: 85
End: 2020-06-29

## 2020-06-29 DIAGNOSIS — E11.9 TYPE 2 DIABETES MELLITUS WITHOUT COMPLICATION, WITHOUT LONG-TERM CURRENT USE OF INSULIN (H): ICD-10-CM

## 2020-06-29 DIAGNOSIS — Z17.0 MALIGNANT NEOPLASM OF LEFT BREAST IN FEMALE, ESTROGEN RECEPTOR POSITIVE, UNSPECIFIED SITE OF BREAST (H): ICD-10-CM

## 2020-06-29 DIAGNOSIS — E04.9 GOITER: ICD-10-CM

## 2020-06-29 DIAGNOSIS — E78.1 HYPERTRIGLYCERIDEMIA WITHOUT HYPERCHOLESTEROLEMIA: ICD-10-CM

## 2020-06-29 DIAGNOSIS — Z95.0 CARDIAC PACEMAKER IN SITU: ICD-10-CM

## 2020-06-29 DIAGNOSIS — C50.912 MALIGNANT NEOPLASM OF LEFT BREAST IN FEMALE, ESTROGEN RECEPTOR POSITIVE, UNSPECIFIED SITE OF BREAST (H): ICD-10-CM

## 2020-06-29 DIAGNOSIS — N18.30 CHRONIC RENAL INSUFFICIENCY, STAGE III (MODERATE) (H): ICD-10-CM

## 2020-06-29 DIAGNOSIS — I10 ESSENTIAL HYPERTENSION: ICD-10-CM

## 2020-09-23 ENCOUNTER — AMBULATORY - HEALTHEAST (OUTPATIENT)
Dept: CARDIOLOGY | Facility: CLINIC | Age: 85
End: 2020-09-23

## 2020-09-23 DIAGNOSIS — I49.5 SICK SINUS SYNDROME (H): ICD-10-CM

## 2020-09-23 DIAGNOSIS — Z95.0 CARDIAC PACEMAKER IN SITU: ICD-10-CM

## 2020-09-26 ENCOUNTER — COMMUNICATION - HEALTHEAST (OUTPATIENT)
Dept: INTERNAL MEDICINE | Facility: CLINIC | Age: 85
End: 2020-09-26

## 2020-09-26 DIAGNOSIS — I10 ESSENTIAL HYPERTENSION: ICD-10-CM

## 2020-09-26 DIAGNOSIS — R25.1 TREMOR: ICD-10-CM

## 2020-09-28 RX ORDER — LISINOPRIL 20 MG/1
20 TABLET ORAL DAILY
Qty: 90 TABLET | Refills: 3 | Status: SHIPPED | OUTPATIENT
Start: 2020-09-28 | End: 2021-09-23

## 2020-09-28 RX ORDER — PROPRANOLOL HYDROCHLORIDE 20 MG/1
20 TABLET ORAL 2 TIMES DAILY
Qty: 180 TABLET | Refills: 3 | Status: SHIPPED | OUTPATIENT
Start: 2020-09-28 | End: 2021-12-03 | Stop reason: DRUGHIGH

## 2020-12-15 ENCOUNTER — AMBULATORY - HEALTHEAST (OUTPATIENT)
Dept: CARDIOLOGY | Facility: CLINIC | Age: 85
End: 2020-12-15

## 2020-12-15 ENCOUNTER — OFFICE VISIT - HEALTHEAST (OUTPATIENT)
Dept: CARDIOLOGY | Facility: CLINIC | Age: 85
End: 2020-12-15

## 2020-12-15 DIAGNOSIS — I10 ESSENTIAL HYPERTENSION: ICD-10-CM

## 2020-12-15 DIAGNOSIS — N18.30 CHRONIC RENAL INSUFFICIENCY, STAGE III (MODERATE) (H): ICD-10-CM

## 2020-12-15 DIAGNOSIS — I49.5 SICK SINUS SYNDROME (H): ICD-10-CM

## 2020-12-15 DIAGNOSIS — E78.1 HYPERTRIGLYCERIDEMIA WITHOUT HYPERCHOLESTEROLEMIA: ICD-10-CM

## 2020-12-15 DIAGNOSIS — Z95.0 CARDIAC PACEMAKER IN SITU: ICD-10-CM

## 2020-12-15 DIAGNOSIS — Z17.0 MALIGNANT NEOPLASM OF LEFT BREAST IN FEMALE, ESTROGEN RECEPTOR POSITIVE, UNSPECIFIED SITE OF BREAST (H): ICD-10-CM

## 2020-12-15 DIAGNOSIS — E11.9 TYPE 2 DIABETES MELLITUS WITHOUT COMPLICATION, WITHOUT LONG-TERM CURRENT USE OF INSULIN (H): ICD-10-CM

## 2020-12-15 DIAGNOSIS — C50.912 MALIGNANT NEOPLASM OF LEFT BREAST IN FEMALE, ESTROGEN RECEPTOR POSITIVE, UNSPECIFIED SITE OF BREAST (H): ICD-10-CM

## 2020-12-28 ENCOUNTER — COMMUNICATION - HEALTHEAST (OUTPATIENT)
Dept: INTERNAL MEDICINE | Facility: CLINIC | Age: 85
End: 2020-12-28

## 2020-12-28 DIAGNOSIS — C50.919 BREAST CA (H): ICD-10-CM

## 2020-12-28 DIAGNOSIS — M85.80 OSTEOPENIA: ICD-10-CM

## 2021-03-15 ENCOUNTER — AMBULATORY - HEALTHEAST (OUTPATIENT)
Dept: CARDIOLOGY | Facility: CLINIC | Age: 86
End: 2021-03-15

## 2021-03-15 DIAGNOSIS — I49.5 SICK SINUS SYNDROME (H): ICD-10-CM

## 2021-03-15 DIAGNOSIS — Z95.0 CARDIAC PACEMAKER IN SITU: ICD-10-CM

## 2021-04-26 ENCOUNTER — COMMUNICATION - HEALTHEAST (OUTPATIENT)
Dept: INTERNAL MEDICINE | Facility: CLINIC | Age: 86
End: 2021-04-26

## 2021-05-04 ENCOUNTER — OFFICE VISIT - HEALTHEAST (OUTPATIENT)
Dept: INTERNAL MEDICINE | Facility: CLINIC | Age: 86
End: 2021-05-04

## 2021-05-04 DIAGNOSIS — N18.32 CHRONIC RENAL IMPAIRMENT, STAGE 3B (H): ICD-10-CM

## 2021-05-04 DIAGNOSIS — C50.912 MALIGNANT NEOPLASM OF LEFT BREAST IN FEMALE, ESTROGEN RECEPTOR POSITIVE, UNSPECIFIED SITE OF BREAST (H): ICD-10-CM

## 2021-05-04 DIAGNOSIS — E78.1 HYPERTRIGLYCERIDEMIA WITHOUT HYPERCHOLESTEROLEMIA: ICD-10-CM

## 2021-05-04 DIAGNOSIS — Z95.0 CARDIAC PACEMAKER IN SITU: ICD-10-CM

## 2021-05-04 DIAGNOSIS — M94.9 DISORDER OF BONE AND CARTILAGE: ICD-10-CM

## 2021-05-04 DIAGNOSIS — E04.9 GOITER: ICD-10-CM

## 2021-05-04 DIAGNOSIS — Z17.0 MALIGNANT NEOPLASM OF LEFT BREAST IN FEMALE, ESTROGEN RECEPTOR POSITIVE, UNSPECIFIED SITE OF BREAST (H): ICD-10-CM

## 2021-05-04 DIAGNOSIS — M89.9 DISORDER OF BONE AND CARTILAGE: ICD-10-CM

## 2021-05-04 DIAGNOSIS — R25.9 ABNORMAL INVOLUNTARY MOVEMENT: ICD-10-CM

## 2021-05-04 DIAGNOSIS — Z78.0 MENOPAUSE PRESENT: ICD-10-CM

## 2021-05-04 DIAGNOSIS — E11.9 TYPE 2 DIABETES MELLITUS WITHOUT COMPLICATION, WITHOUT LONG-TERM CURRENT USE OF INSULIN (H): ICD-10-CM

## 2021-05-04 LAB
ANION GAP SERPL CALCULATED.3IONS-SCNC: 13 MMOL/L (ref 5–18)
BUN SERPL-MCNC: 31 MG/DL (ref 8–28)
CALCIUM SERPL-MCNC: 9.3 MG/DL (ref 8.5–10.5)
CHLORIDE BLD-SCNC: 104 MMOL/L (ref 98–107)
CHOLEST SERPL-MCNC: 202 MG/DL
CO2 SERPL-SCNC: 24 MMOL/L (ref 22–31)
CREAT SERPL-MCNC: 0.9 MG/DL (ref 0.6–1.1)
ERYTHROCYTE [DISTWIDTH] IN BLOOD BY AUTOMATED COUNT: 12.2 % (ref 11–14.5)
FASTING STATUS PATIENT QL REPORTED: YES
GFR SERPL CREATININE-BSD FRML MDRD: 58 ML/MIN/1.73M2
GLUCOSE BLD-MCNC: 92 MG/DL (ref 70–125)
HBA1C MFR BLD: 6 %
HCT VFR BLD AUTO: 39.9 % (ref 35–47)
HDLC SERPL-MCNC: 39 MG/DL
HGB BLD-MCNC: 13.2 G/DL (ref 12–16)
LDLC SERPL CALC-MCNC: 125 MG/DL
MCH RBC QN AUTO: 31.1 PG (ref 27–34)
MCHC RBC AUTO-ENTMCNC: 33.1 G/DL (ref 32–36)
MCV RBC AUTO: 94 FL (ref 80–100)
PLATELET # BLD AUTO: 197 THOU/UL (ref 140–440)
PMV BLD AUTO: 8.3 FL (ref 7–10)
POTASSIUM BLD-SCNC: 4.7 MMOL/L (ref 3.5–5)
RBC # BLD AUTO: 4.25 MILL/UL (ref 3.8–5.4)
SODIUM SERPL-SCNC: 141 MMOL/L (ref 136–145)
TRIGL SERPL-MCNC: 192 MG/DL
TSH SERPL DL<=0.005 MIU/L-ACNC: 0.71 UIU/ML (ref 0.3–5)
WBC: 5.9 THOU/UL (ref 4–11)

## 2021-05-05 ENCOUNTER — COMMUNICATION - HEALTHEAST (OUTPATIENT)
Dept: INTERNAL MEDICINE | Facility: CLINIC | Age: 86
End: 2021-05-05

## 2021-05-10 ENCOUNTER — RECORDS - HEALTHEAST (OUTPATIENT)
Dept: ADMINISTRATIVE | Facility: OTHER | Age: 86
End: 2021-05-10

## 2021-05-10 ENCOUNTER — RECORDS - HEALTHEAST (OUTPATIENT)
Dept: BONE DENSITY | Facility: CLINIC | Age: 86
End: 2021-05-10

## 2021-05-10 DIAGNOSIS — M89.9 DISORDER OF BONE, UNSPECIFIED: ICD-10-CM

## 2021-05-10 DIAGNOSIS — Z78.0 ASYMPTOMATIC MENOPAUSAL STATE: ICD-10-CM

## 2021-05-10 DIAGNOSIS — M94.9 DISORDER OF CARTILAGE, UNSPECIFIED: ICD-10-CM

## 2021-05-24 ENCOUNTER — RECORDS - HEALTHEAST (OUTPATIENT)
Dept: ADMINISTRATIVE | Facility: CLINIC | Age: 86
End: 2021-05-24

## 2021-05-25 ENCOUNTER — RECORDS - HEALTHEAST (OUTPATIENT)
Dept: ADMINISTRATIVE | Facility: CLINIC | Age: 86
End: 2021-05-25

## 2021-05-26 ENCOUNTER — RECORDS - HEALTHEAST (OUTPATIENT)
Dept: ADMINISTRATIVE | Facility: CLINIC | Age: 86
End: 2021-05-26

## 2021-05-27 ENCOUNTER — RECORDS - HEALTHEAST (OUTPATIENT)
Dept: ADMINISTRATIVE | Facility: CLINIC | Age: 86
End: 2021-05-27

## 2021-05-27 VITALS
SYSTOLIC BLOOD PRESSURE: 138 MMHG | DIASTOLIC BLOOD PRESSURE: 68 MMHG | WEIGHT: 143.9 LBS | OXYGEN SATURATION: 94 % | TEMPERATURE: 97.6 F | HEART RATE: 64 BPM

## 2021-05-28 NOTE — TELEPHONE ENCOUNTER
Patient has lab appt on 5/8/19 for fasting labs prior to OV on 5/14/19. No orders in, please advise.    Thanks

## 2021-05-28 NOTE — PATIENT INSTRUCTIONS - HE
1.  Check insurance for Prolia as an alternate to Evista.    2.  Eye exam advised    3.  See in six months or as needed

## 2021-05-28 NOTE — PROGRESS NOTES
ASSESSMENT:  1. Type 2 diabetes mellitus without complication, without long-term current use of insulin (H)  Diabetic control is excellent with a previsit glucose of 108 and hemoglobin A1c of 6.1    2. Chronic renal insufficiency, stage III (moderate) (H)   BUN had increased to 54 with creatinine of 1.48.  Renal function will be rechecked again at next appointment    3. Osteopenia  She has experienced a significant decline in bone density despite taking raloxifene.  Predicted future fracture risk is high.  She is not a good candidate for bisphosphonate since her GFR is under 35.  Insurance coverage for Prolia will be investigated    4. Hypertriglyceridemia without hypercholesterolemia  She is on fenofibrate.  Triglycerides are acceptable at 162.  Total cholesterol is good at 144    5. Essential hypertension  Blood pressure is good on current regimen.    6. Colloid Nontoxic Multinodular Goiter  She is clinically euthyroid.  TSH should be monitored yearly    7. Cardiac pacemaker, dual, in situ  Doing well.  Cardiac visits were reviewed    8.  History of right mastectomy for breast cancer  No evidence for recurrence.  She has continued getting left-sided mammograms    PLAN:  Patient Instructions   1.  Check insurance for Prolia as an alternate to Evista.    2.  Eye exam advised    3.  See in six months or as needed    4.  Continue other medications at current doses    There are no discontinued medications.    Return in about 6 months (around 11/14/2019) for Recheck.      ASSESSED PROBLEMS:  1. Type 2 diabetes mellitus without complication, without long-term current use of insulin (H)     2. Chronic renal insufficiency, stage III (moderate) (H)     3. Osteopenia     4. Hypertriglyceridemia without hypercholesterolemia     5. Essential hypertension     6. Colloid Nontoxic Multinodular Goiter     7. Cardiac pacemaker, dual, in situ         CHIEF COMPLAINT:  Chief Complaint   Patient presents with     Follow-up     6  "months       HISTORY OF PRESENT ILLNESS:  Vickie is a 89 y.o. female presenting to the clinic today to follow up on her osteopenia and diabetes.     Osteopenia: She had a DXA on 5/8/019 that showed low bone density with a low T-score of -1.9 in the left femoral neck with a decrease in bone density from her 2016 scan. She has been taking Evista, but it was discussed that therapy could be changed to something like Prolia.     Diabetes: Her hemoglobin A1c was 6.1% when checked on 5/8/2019. She thinks it has been quite a while since she last had her eyes checked.     Renal Insufficiency: There was a worsening of renal function seen on recent labs with her creatinine increasing from 1.16 to 1.48.     REVIEW OF SYSTEMS:   She tolerates her medications well. All other systems are negative.    PFSH:  She does not have plans to go anywhere this summer. She is no longer driving.     TOBACCO USE:  Social History     Tobacco Use   Smoking Status Never Smoker   Smokeless Tobacco Never Used       VITALS:  Vitals:    05/14/19 1046   BP: 124/72   Patient Site: Left Arm   Patient Position: Sitting   Cuff Size: Adult Large   Pulse: 60   SpO2: 97%   Weight: 142 lb (64.4 kg)   Height: 5' 4.5\" (1.638 m)     Wt Readings from Last 3 Encounters:   05/14/19 142 lb (64.4 kg)   12/04/18 146 lb 1.6 oz (66.3 kg)   11/08/18 144 lb (65.3 kg)     Body mass index is 24 kg/m .    PHYSICAL EXAM:  Constitutional:   Reveals an alert, talkative, elderly woman. Affect appropriate. Does not appear acutely ill.  Vitals: per nursing notes.  HEENT: Atraumatic.   Neck:  No carotid bruits, thyromegaly, or adenopathy.  Back:  No spine or CVA pain.  Thorax: Pacer left upper chest.  Lungs: Clear to A&P without rales or wheezes.  Respiratory effort normal.  Cardiac:   Regular rate and rhythm, normal S1, S2, no murmur or gallop.  Abdomen:  Soft, moderately obese, active bowel sounds without bruits, mass, or tenderness.  Extremities:   No peripheral edema, pulses " in the feet intact.    Skin:  No lesions to suggest malignancy.  Neuro:  Prominent resting tremor. Ambulates easily without assistance. No gross focal deficits. Detailed exam not done  Psychiatric:  Memory intact, mood appropriate.    ADDITIONAL HISTORY SUMMARIZED (2): None.  DECISION TO OBTAIN EXTRA INFORMATION (1): None.   RADIOLOGY TESTS (1): Reviewed 5/8/2019 DXA showing low bone density with decrease from previous  LABS (1): Labs from 5/8/2019 reviewed - creatinine 1.48, A1c 6.1%  MEDICINE TESTS (1): None.  INDEPENDENT REVIEW (2 each): None.     The visit lasted a total of 12 minutes face to face with the patient. Over 50% of the time was spent counseling and educating the patient about her osteopenia.    IYousif, am scribing for and in the presence of, Dr. Boswell.    IJj, personally performed the services described in this documentation, as scribed by Yousif Samano in my presence, and it is both accurate and complete.    Dragon dictation was used for this note.  Speech recognition errors are a possibility.    MEDICATIONS:  Current Outpatient Medications   Medication Sig Dispense Refill     aspirin 81 MG EC tablet Take 81 mg by mouth daily.       calcium carbonate-vitamin D3 (CALCIUM 600 + D,3,) 600 mg(1,500mg) -200 unit per tablet Take 1 tablet by mouth daily. As directed.       cyanocobalamin (VITAMIN B-12) 2000 MCG tablet Take 2,000 mcg by mouth daily.       fenofibrate (TRIGLIDE) 160 MG tablet TAKE 1 TABLET BY MOUTH  DAILY 90 tablet 3     lisinopril (PRINIVIL,ZESTRIL) 20 MG tablet Take 1 tablet (20 mg total) by mouth daily. 90 tablet 3     multivitamin-iron, hematinic, Tab Take 1 tablet by mouth once daily.       propranolol (INDERAL) 20 MG tablet Take 1 tablet (20 mg total) by mouth 2 (two) times a day. 180 tablet 3     raloxifene (EVISTA) 60 mg tablet TAKE 1 TABLET BY MOUTH  DAILY AS DIRECTED 90 tablet 3     No current facility-administered medications for this visit.        Total  data points: 2

## 2021-05-29 NOTE — TELEPHONE ENCOUNTER
Prolia insurance check mentioned in last OV note, but was not sent to Yasmin to be checked.    Current diagnosis: M80.0  Last Prolia Injection Date: New  Requested action for Yasmin:  Insurance verification and schedule appt

## 2021-05-29 NOTE — TELEPHONE ENCOUNTER
Please notify Lety of lack of coverage.  I would not advise a bisphosphonate since her calculated GFR is under 35.  We will just recheck her bone density in 2 years.

## 2021-05-29 NOTE — TELEPHONE ENCOUNTER
There isn't a dx for Osteoporosis (M81.0).  If Osteopenia is the only dx, can you provide the code and I will submit it to [employer]?

## 2021-05-29 NOTE — TELEPHONE ENCOUNTER
Please check on coverage and let me know.  She is not a good candidate for alendronate or Reclast since her GFR is under 35

## 2021-05-29 NOTE — TELEPHONE ENCOUNTER
Who is calling:  The patient  Reason for Call:  The patient would like to know the status of starting the prolia that she discussed with Dr. Boswell at her last visit. The patient would like a return phone call.  Date of last appointment with primary care: 5/14/2019  Okay to leave a detailed message: No

## 2021-05-30 ENCOUNTER — RECORDS - HEALTHEAST (OUTPATIENT)
Dept: ADMINISTRATIVE | Facility: CLINIC | Age: 86
End: 2021-05-30

## 2021-05-30 VITALS — HEIGHT: 65 IN | BODY MASS INDEX: 24.68 KG/M2 | WEIGHT: 148.1 LBS

## 2021-05-31 VITALS — BODY MASS INDEX: 24.62 KG/M2 | HEIGHT: 65 IN | WEIGHT: 147.8 LBS

## 2021-05-31 VITALS — WEIGHT: 146.5 LBS | HEIGHT: 65 IN | BODY MASS INDEX: 24.41 KG/M2

## 2021-06-01 ENCOUNTER — RECORDS - HEALTHEAST (OUTPATIENT)
Dept: ADMINISTRATIVE | Facility: CLINIC | Age: 86
End: 2021-06-01

## 2021-06-01 VITALS — WEIGHT: 145 LBS | BODY MASS INDEX: 24.5 KG/M2

## 2021-06-02 VITALS — WEIGHT: 142 LBS | BODY MASS INDEX: 23.66 KG/M2 | HEIGHT: 65 IN

## 2021-06-02 VITALS — HEIGHT: 65 IN | WEIGHT: 146.1 LBS | BODY MASS INDEX: 24.34 KG/M2

## 2021-06-02 VITALS — BODY MASS INDEX: 24.34 KG/M2 | WEIGHT: 144 LBS

## 2021-06-03 VITALS
DIASTOLIC BLOOD PRESSURE: 58 MMHG | HEART RATE: 60 BPM | OXYGEN SATURATION: 98 % | WEIGHT: 143 LBS | SYSTOLIC BLOOD PRESSURE: 120 MMHG | BODY MASS INDEX: 23.82 KG/M2 | HEIGHT: 65 IN

## 2021-06-03 VITALS
BODY MASS INDEX: 24.31 KG/M2 | SYSTOLIC BLOOD PRESSURE: 140 MMHG | HEART RATE: 68 BPM | WEIGHT: 145.9 LBS | DIASTOLIC BLOOD PRESSURE: 60 MMHG | RESPIRATION RATE: 24 BRPM | HEIGHT: 65 IN

## 2021-06-03 VITALS — BODY MASS INDEX: 23.61 KG/M2 | WEIGHT: 141.7 LBS | HEIGHT: 65 IN

## 2021-06-03 VITALS
BODY MASS INDEX: 23.99 KG/M2 | WEIGHT: 144 LBS | RESPIRATION RATE: 16 BRPM | DIASTOLIC BLOOD PRESSURE: 70 MMHG | HEART RATE: 60 BPM | TEMPERATURE: 98.3 F | SYSTOLIC BLOOD PRESSURE: 134 MMHG | HEIGHT: 65 IN

## 2021-06-03 NOTE — PROGRESS NOTES
Patient called back, was informed of early signs of battery depletion. Agrees to having change out. Advised to call with any troublesome symptoms. Denies any other question/concerns.   Deborah Hoffmann RN

## 2021-06-03 NOTE — PROGRESS NOTES
In clinic device check.  Please see link for full device report.  Patient was informed of results and next follow up during today's visit.

## 2021-06-03 NOTE — PROGRESS NOTES
12/27/1925  Home:[phone] (home) Cell:There is no such number on file (mobile).  Emergency Contact: Lisa Sarkar [phone]    +++Important patient information for [facility]/Cath Lab staff : None+++    [facility] EP Cath Lab Procedure Order     Device Implant/Revision:  Procedure: Generator Change Out  Device Type: Dual Pacemaker  Device Company/Device Rep Needed for Procedure: [employer]    Diagnosis:  Generator Change- Device at BRENDA  Anticipated Case Duration:  Standard  Scheduling Needs/Timeframe:  Early battery depletion/Needs changeout by 1-25-19  Anesthesia:  Conscious Sedation  Research Protocol:  No    [facility] EP Cath Lab Prep   Ordering Provider: Dr Blankenship  Ordering Date: 11/28/2019  Orders Status: Intial order placed and Order set placed    H&P:  Pt to schedule with PMD to complete  PCP: Jj Boswell MD, [phone]    Pre-op Labs: Ordered AM of procedure    Medical Records Pertinent for Procedure:  Holter 8-14-15, Echo 8-26-15 and Device Implant Record 9-2-15 implant w/ DND    Patient Education:    Teach with Patient: Will be completed via phone prior to procedure, and letter was also sent to pt via mail/[employer] with written pre-procedural instructions.    Risks Reviewed:     Pacemaker Insertion    <1% for each of the following:  infection, bleeding, hematoma, pneumothorax, subclavian vein thrombosis, cardiac perforation, cardiac tamponade, arrhythmias, pectoral or diaphragmatic stimulation, air embolus, pocket erosion, device interactions.    <4% lead dislodgment, <1% lead fracture or generator  malfunction.    <0.5% CVA or MI.    <0.1% death.    If external defibrillation or CV is needed, 25% risk for superficial burn.    For patients on anticoagulation, the risk of bleeding, hematoma and tamponade are increased.      Pre-Procedure Instructions that were Reviewed with Patient:  NPO after midnight, Remove all jewelry prior to coming in for procedure, Shower prior to arrival, Notified patient of time  and date of procedure by CV , Transportation arrangements needed s/p procedure, Post-procedure follow up process, Sedation plan/orders and Pre-procedure letter was sent to pt by CV     Pre-Procedure Medication Instructions:  Instructions given to pt regarding anticoagulants: Pt is not on an anticoagulant- N/A  Instructions given to pt regarding antiarrhythmic medication: N/A for this type of procedure; N/A  Instructions for medication, other than anticoagulants/antiarrhythmics listed above, given to pt: to take all morning medications with small sips of water, with the exception of OTC supplements and MVI      Allergies   Allergen Reactions     Tamoxifen Analogues      Annotation: hepatitis         Current Outpatient Medications:      aspirin 81 MG EC tablet, Take 81 mg by mouth daily., Disp: , Rfl:      calcium carbonate-vitamin D3 (CALCIUM 600 + D,3,) 600 mg(1,500mg) -200 unit per tablet, Take 1 tablet by mouth daily. As directed., Disp: , Rfl:      cyanocobalamin (VITAMIN B-12) 2000 MCG tablet, Take 2,000 mcg by mouth daily., Disp: , Rfl:      fenofibrate (TRIGLIDE) 160 MG tablet, TAKE 1 TABLET BY MOUTH  DAILY, Disp: 90 tablet, Rfl: 3     lisinopril (PRINIVIL,ZESTRIL) 20 MG tablet, Take 1 tablet (20 mg total) by mouth daily., Disp: 90 tablet, Rfl: 3     multivitamin-iron, hematinic, Tab, Take 1 tablet by mouth once daily., Disp: , Rfl:      propranolol (INDERAL) 20 MG tablet, Take 1 tablet (20 mg total) by mouth 2 (two) times a day., Disp: 180 tablet, Rfl: 3     raloxifene (EVISTA) 60 mg tablet, TAKE 1 TABLET BY MOUTH  DAILY AS DIRECTED, Disp: 90 tablet, Rfl: 3    Documentation Date:11/29/2019 9:02 AM  Yuko Salas RN

## 2021-06-03 NOTE — PROGRESS NOTES
"Heart Care Device Change-Out Checklist (BRENDA Checklist)    Device Data  Pacemaker and Dual    :  [employer]  Model:  L212 Essentio EL  Serial Number:  [id number]    Implant Date: 9/2/16  BRENDA Date:  Premature battery depletion, hardware issue needing to be replaced within 60 days  Device Diagnosis:  SSS    Device Alert(s):  No    Lead Data  (Print Device History and attach to this document. Enter each lead  and model number.)  Last Interrogation Date: 11/27/19      Atrium: [employer] 4470   Lead Imp:  556Ohms, Pacing Threshold:  0.7V @ 0.4ms and Sensing Threshold:  8mV      Right Ventricle: [employer] 4469   Lead Imp:  670Ohms, Pacing Threshold:  0.8V @ 0.4ms and Sensing Threshold:  7.2mV      Old Leads Present/Abandoned: No and See scanned Device Summary Data sheets for Model & Serial Number    Lead Alert(s):  No    Diagnostic Information  Intrinsic Rhythm:  SB w/ pauses    Atrial Fibrillation:  None noted  Takes Anticoagulant? No    Pacing Percentages  Atrial Pacing 83%% and Ventricle Pacing <1%%  Pacemaker Dependent? Yes      Ejection Fraction  Last EF Date:  8/26/15    By Echocardiogram  Last EF Measurement:  \"normal\"      Special Instructions/Timeframe for change-out:  Due to premature battery depletion device needs changed out within 60 days    Routed to EP:  Yes, Dr. Sp Blankenship    [facility] for patient to call back and discuss need for change-out.       Device RN: Mariella Mix, RN BSN  [facility]       "

## 2021-06-04 NOTE — PROGRESS NOTES
Pre-Intra-Post education and instructions as listed below were reviewed with patient via phone.     Pt states she plans on taking a cab to and from the hospital.  Explained that a cab is not considered a safe  and that she needs to have a  bring her home.  She states she has no one to help and she doesn't feel comfortable asking her neighbors.  Reviewed with Dr. Blankenship.  Ok to do generator change with Lidocaine to site only, no sedation if unable to find a .    Phone call to patient and explained the above information from Dr. Blankenship.  She can have sedation with a reliable  available or no sedation with a cab ride home.  Pt states understanding.  Phone call to [facility] and reviewed the above information.    H&P w/ PMD on 12-6-19.    Yuko Salas RN 12/16/2019 2:40 PM

## 2021-06-04 NOTE — PATIENT INSTRUCTIONS - HE
1.  Pacer battery change as planned.    2.  Continue current medications    3.  Left mammogram due after 5/8/19    4.  See in six monrhs with fasting labs

## 2021-06-04 NOTE — PATIENT INSTRUCTIONS - HE
Pacemaker Post-operative Checklist      The Device Registered Nurse (RN) reviewed the pacemaker function.      The Device RN did a wound assessment and wound care teaching.    Please call the Device Nurses with any signs of infection or questions regarding wound healing. Device Nurse Line: [phone], Option #3      The Device RN demonstrated and displayed the specific remote monitoring system for your pacemaker.      The Device RN reviewed the Partnership Agreement Form.    Patient Instructions      To reduce the risk of infection, try to avoid any dental procedures for the first 6 weeks after your pacemaker implant. If you have an emergent or urgent dental need during this time, contact the device clinic for a prescription for an antibiotic. January 28th.      You will receive a device identification (ID) card in the mail from the device  within 6 weeks to replace the temporary ID card you were given in the hospital.      You may travel by any mode of transportation; just show your pacemaker ID card. You may be subject to a hand search or use of a handheld wand, but official should not keep the wand over the implant site for greater than 5-10 seconds.      For any surgery, let your doctor know you have a pacemaker.       Your pacemaker is MRI safe       Most household appliances, including a microwave, will not interfere with your pacemaker function. If you suspect interference, simply move away from the source. Cell phones do not cause a problem. Please refer to the device booklet from the  or their website under the section on electromagnetic interference (SEAN) for further guidelines on things that may interfere with your pacemaker.       Device Clinic Contact Information  Device Nurses: [phone], Option #3    Device Remote Specialists: [phone], Option #2. For questions about your Remote Transmission or Transmission Schedule  Device Schedulers: [phone], Option #1

## 2021-06-04 NOTE — PROGRESS NOTES
DEVICE CLINIC RN POST-OP NOTE    Reason for visit: Post-op dual chamber pacemaker check and wound assessment  S/p generator change.  Device: Personaling L331 Accolade MRI EL  Procedure date: 12/17/19  Implant Diagnosis: SSS  Implanting Physician: Dr. Sp Blankenship      Assessment  Subjective: Patient is feeling well since the implant and has no concerns or complaints.  Vitals:   Vitals:    12/26/19 1514   BP: 134/70   Pulse: 60   Resp: 16   Temp: 98.3  F (36.8  C)     Incision/device pocket: The steri-strips were removed from the incision and it was cleansed with adhesive remover and alcohol wipes. The incision is clean, dry and intact. There are no signs of infection present.      Device Findings  Interrogation of device reveals normal sensing and capture thresholds  See the Paceart Report for a full summary of the device information.      Patient Education  Vickie Gonzalez was unaccompanied today.    [facility]'s Partnership Agreement for Device Patients and Post-operative Checklist were presented and reviewed with patient at today's appointment.    Signs and symptoms of infection, poor wound healing, and device function were reviewed. She will use the same [employer] 2 [employer] NXT remote monitor as before.    Plan  Remote on 3/25/20    Mariella Mix RN BSN  [facility] [place]

## 2021-06-04 NOTE — PROGRESS NOTES
ASSESSMENT:  1. Essential hypertension  Pressure is under good control with lisinopril.  She denies cough or other adverse effects  - lisinopril (PRINIVIL,ZESTRIL) 20 MG tablet; Take 1 tablet (20 mg total) by mouth daily.  Dispense: 90 tablet; Refill: 3    2. Tremor  She takes propranolol 20 mg twice daily.  Tremor has gotten somewhat worse over the past few years, but she still manages well  - propranolol (INDERAL) 20 MG tablet; Take 1 tablet (20 mg total) by mouth 2 (two) times a day.  Dispense: 180 tablet; Refill: 3    3.  Type 2 diabetes mellitus  This is managed with diet and lifestyle.  Hemoglobin A1c is excellent at 6.1 with a blood sugar of 102.    4.  History of pacemaker placement:  She will require battery change out in the near future    5.  Chronic renal insufficiency:  Overall renal function is stable with a GFR of 39    6.  Hypertriglyceridemia:  Tolerates fenofibrate well.  Cholesterol is excellent at 139.  Triglycerides are acceptable at 165    7.  History of right mastectomy for breast cancer:  I still would advise getting periodic left mammograms.    8.  Low bone mass:  Insurance coverage was poor for Prolia.  She remains on raloxifene      PLAN:  Patient Instructions   1.  Pacer battery change as planned.    2.  Continue current medications    3.  Left mammogram due after 5/8/19    4.  See in six mons with fasting labs    5.  Previsit labs were reviewed      Medications Discontinued During This Encounter   Medication Reason     lisinopril (PRINIVIL,ZESTRIL) 20 MG tablet Reorder     propranolol (INDERAL) 20 MG tablet Reorder       Return in about 6 months (around 6/6/2020) for Recheck.      ASSESSED PROBLEMS:  1. Essential hypertension  lisinopril (PRINIVIL,ZESTRIL) 20 MG tablet   2. Tremor  propranolol (INDERAL) 20 MG tablet       CHIEF COMPLAINT:  Chief Complaint   Patient presents with     Follow-up     routine 6 months       HISTORY OF PRESENT ILLNESS:  Vickie is a 89 y.o. female presenting  "to the clinic today for a routine follow up.     Pacemaker In Situ: She is scheduled to have her pacemaker battery replaced later this month. She does not think she needs a pre-op appointment prior.     Tremor: Her tremor is getting worse, and she is not sure if the propranolol is helping or not. She never tries skipping the medication.     Health Maintenance: She got her flu shot this year.     REVIEW OF SYSTEMS:   She has been feeling well, overall. She tolerates her medications well. She had her eyes checked recently and was told they were doing well and that she did not need to follow up for a couple years. She has not had any falls or trouble with ambulation lately. All other systems are negative.    PFSH:  Reviewed, as below.     TOBACCO USE:  Social History     Tobacco Use   Smoking Status Never Smoker   Smokeless Tobacco Never Used       VITALS:  Vitals:    12/06/19 0937   BP: 120/58   Patient Site: Left Arm   Patient Position: Sitting   Cuff Size: Adult Regular   Pulse: 60   SpO2: 98%   Weight: 143 lb (64.9 kg)   Height: 5' 4.5\" (1.638 m)     Wt Readings from Last 3 Encounters:   12/06/19 143 lb (64.9 kg)   11/27/19 145 lb 14.4 oz (66.2 kg)   05/14/19 142 lb (64.4 kg)     Body mass index is 24.17 kg/m .    PHYSICAL EXAM:  Constitutional:   Reveals an alert, pleasant, elderly woman. Affect appropriate. Does not appear acutely ill. Prominent resting tremor.  Vitals: per nursing notes.  HEENT: Atraumatic.   Oropharynx:   Mouth and throat clear, no thrush or exudate.  Neck:  Supple, no carotid bruits or adenopathy.  Back:  No spine or CVA pain.  Thorax: Pacer left upper chest.   Breasts: Previous right mastectomy, no masses of chest wall. Left breast has no masses.   Lungs: Clear to A&P without rales or wheezes.  Respiratory effort normal.  Cardiac:   Regular rate and rhythm, normal S1, S2, no murmur or gallop.  Abdomen:  Soft, moderately obese, no bruits, mass, or tenderness.  Extremities:   No peripheral " edema.    Skin:  No lesions to suggest malignancy.  Neuro: Prominent resting tremor, no focal deficits, ambulates easily without assistance.   Psychiatric:  Memory intact, mood appropriate.      ADDITIONAL HISTORY SUMMARIZED (2): None.  DECISION TO OBTAIN EXTRA INFORMATION (1): None.   RADIOLOGY TESTS (1): Reviewed 5/8/2019 mammogram   LABS (1): Reviewed labs from 11/20/2019; A1c 6.1%  MEDICINE TESTS (1): None.  INDEPENDENT REVIEW (2 each): None.     The visit lasted a total of 23 minutes face to face with the patient. Over 50% of the time was spent counseling and educating the patient about her medications and general health.    IYousif, am scribing for and in the presence of, Dr. Boswell.    IJj, personally performed the services described in this documentation, as scribed by Yousif Samano in my presence, and it is both accurate and complete.    Dragon dictation was used for this note.  Speech recognition errors are a possibility.    MEDICATIONS:  Current Outpatient Medications   Medication Sig Dispense Refill     aspirin 81 MG EC tablet Take 81 mg by mouth daily.       calcium carbonate-vitamin D3 (CALCIUM 600 + D,3,) 600 mg(1,500mg) -200 unit per tablet Take 1 tablet by mouth daily. As directed.       cyanocobalamin (VITAMIN B-12) 2000 MCG tablet Take 2,000 mcg by mouth daily.       fenofibrate (TRIGLIDE) 160 MG tablet TAKE 1 TABLET BY MOUTH  DAILY 90 tablet 3     lisinopril (PRINIVIL,ZESTRIL) 20 MG tablet Take 1 tablet (20 mg total) by mouth daily. 90 tablet 3     multivitamin-iron, hematinic, Tab Take 1 tablet by mouth once daily.       propranolol (INDERAL) 20 MG tablet Take 1 tablet (20 mg total) by mouth 2 (two) times a day. 180 tablet 3     raloxifene (EVISTA) 60 mg tablet TAKE 1 TABLET BY MOUTH  DAILY AS DIRECTED 90 tablet 3     No current facility-administered medications for this visit.        Total data points: 2

## 2021-06-05 VITALS
SYSTOLIC BLOOD PRESSURE: 130 MMHG | WEIGHT: 142 LBS | RESPIRATION RATE: 18 BRPM | BODY MASS INDEX: 24 KG/M2 | HEART RATE: 63 BPM | OXYGEN SATURATION: 91 % | DIASTOLIC BLOOD PRESSURE: 56 MMHG

## 2021-06-05 VITALS
SYSTOLIC BLOOD PRESSURE: 130 MMHG | BODY MASS INDEX: 24 KG/M2 | HEART RATE: 63 BPM | OXYGEN SATURATION: 91 % | DIASTOLIC BLOOD PRESSURE: 56 MMHG | RESPIRATION RATE: 18 BRPM | WEIGHT: 142 LBS

## 2021-06-06 NOTE — TELEPHONE ENCOUNTER
RN cannot approve Refill Request    RN can NOT refill this medication med is not covered by policy/route to provider. Last office visit: 12/6/2019 Jj Boswell MD Last Physical: Visit date not found Last MTM visit: Visit date not found Last visit same specialty: 12/6/2019 Jj Boswell MD.  Next visit within 3 mo: Visit date not found  Next physical within 3 mo: Visit date not found      Jami Hutchison, [facility] Triage/Med Refill 2/15/2020    Requested Prescriptions   Pending Prescriptions Disp Refills     raloxifene (EVISTA) 60 mg tablet [Pharmacy Med Name: RALOXIFENE  60MG  TAB] 90 tablet 3     Sig: TAKE 1 TABLET BY MOUTH  DAILY AS DIRECTED       There is no refill protocol information for this order

## 2021-06-08 NOTE — TELEPHONE ENCOUNTER
Referral Request  Type of referral: Mammogram & Bone Density  Who s requesting: Vickie Gonzalez  Why the request: Patient requested appointments for screenings  Have you been seen for this request: Yes, Last Mammogram was in 2019 and Last Bone Density a few years ago  Does patient have a preference on a group/provider? Patient requested to  Have Mammogram and Bone Density both at [facility]  Okay to leave a detailed message? Yes

## 2021-06-09 NOTE — PROGRESS NOTES
Patient would like to be contacted via the following phone number [phone]    ASSESSMENT:  1. Type 2 diabetes mellitus without complication, without long-term current use of insulin (H)  This is now managed with diet and lifestyle.  Previsit labs were excellent with hemoglobin A1c of 6.1 and a blood sugar of 103    2. Chronic renal insufficiency, stage III (moderate) (H)  Renal function actually is better now than it was a number of years ago with a BUN of 29 and creatinine of 1.03    3. Colloid Nontoxic Multinodular Goiter  TSH remains in the normal range at 0.77    4. Essential hypertension  She reports recent blood pressures of 122/67 and 135/60.  She remains on lisinopril 20 mg daily without adverse effects.  She has also on propranolol 20 mg twice daily primarily for tremor    5. Hypertriglyceridemia without hypercholesterolemia  She stopped taking fenofibrate after discussing with Dr. Blankenship.  Triglycerides have increased mildly to 194 with total cholesterol 178 and an LDL of 102.  She will continue diet and lifestyle management    6. Cardiac pacemaker, dual, in situ  Most recently had a pacer check on 6/24 with normal function noted    7. Malignant neoplasm of left breast in female, estrogen receptor positive, unspecified site of breast (H)  Has distant history of right mastectomy with no evidence for recurrence.  Last left-sided mammogram was in 5/19    8.  Low bone mass:  She has been on raloxifene largely due to the history of breast cancer.  Consideration could be given to going off of this in the future        Preventive Health Care: Flu shot in the fall is advised    PLAN:  1.  Previsit labs were reviewed.  All were excellent    2.  See in 6 months with labs prior to appointment    3.  Flu shot in the fall      CHIEF COMPLAINT:  Chief Complaint   Patient presents with     Diabetes     6 mo f/u - pt reports she's doing well      Lab Result Follow Up     discuss recent lab work completed on 6/17/20        HISTORY OF PRESENT ILLNESS:  Vickie is a 89 y.o. female calling in to the clinic today for follow-up of type 2 diabetes and other concerns.  Diabetes now is managed with diet and lifestyle.  Hemoglobin A1c pre-visit was excellent at 6.1.  She recently saw Dr. Blankenship for follow-up of pacemaker placement.  She was advised to stop aspirin and fenofibrate.  Triglycerides have increased mildly to 194.  Total cholesterol is slightly higher but acceptable at 178    She has a history of renal insufficiency.  Renal function studies are actually improved compared to years ago with a BUN of 29 and creatinine 1.03.    TSH was checked due to history of multinodular goiter.   This remains in the normal range.    She has been raloxifene due to a history of low bone mass and breast cancer.    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  She feels the tremor is under acceptable control        REVIEW OF SYSTEMS:    All other systems are negative.    PFSH:  She lives in independent senior living at the [facility].  She reports that she went to [facility]'s last night.  Otherwise, she has not been out of her apartment for months aside from medical appointments.    TOBACCO USE:  Social History     Tobacco Use   Smoking Status Never Smoker   Smokeless Tobacco Never Used       VITALS:  Stated Blood [id number] /67  Stated Pulse  60  Stated Weight N/A     PHYSICAL EXAM:  (observations via phone)  Alert pleasant talkative woman who is in good spirits.  Affect is appropriate.        The visit lasted a total of 18 minutes   CA intake time  2 minutes    Telephone Consent was completed by  staff and confirmed by BRANDY Murray    I have reviewed and updated the patient's Past Medical History, Social History, Family History as appropriate.    MEDICATIONS: Reviewed and updated per CA and MD  Current Outpatient Medications   Medication Sig Dispense Refill     calcium carbonate-vitamin D3 (CALCIUM 600 + D,3,) 600 mg(1,500mg) -200  "unit per tablet Take 1 tablet by mouth daily. As directed.       cyanocobalamin (VITAMIN B-12) 2000 MCG tablet Take 2,000 mcg by mouth daily.       lisinopril (PRINIVIL,ZESTRIL) 20 MG tablet Take 1 tablet (20 mg total) by mouth daily. 90 tablet 3     multivitamin-iron, hematinic, Tab Take 1 tablet by mouth once daily.       propranolol (INDERAL) 20 MG tablet Take 1 tablet (20 mg total) by mouth 2 (two) times a day. 180 tablet 3     raloxifene (EVISTA) 60 mg tablet TAKE 1 TABLET BY MOUTH  DAILY AS DIRECTED 90 tablet 3     No current facility-administered medications for this visit.            The patient has been notified of following:     \"This telephone visit will be conducted via a call between you and your physician/provider. We have found that certain health care needs can be provided without the need for a physical exam.  This service lets us provide the care you need with a short phone conversation.  If a prescription is necessary we can send it directly to your pharmacy.  If lab work is needed we can place an order for that and you can then stop by our lab to have the test done at a later time.    If during the course of the call the physician/provider feels a telephone visit is not appropriate, you will not be charged for this service.\"     "

## 2021-06-09 NOTE — PATIENT INSTRUCTIONS - HE
1.  Pre-visit labs from 6/17 were reviewed.  All look good.    2.  Continue current medications.  OK to stay off fenofibrate and aspirin.    3.  Flu shot in the fall.  See in six months or as needed.

## 2021-06-10 NOTE — PROGRESS NOTES
ASSESSMENT:  1.  Type 2 diabetes mellitus: Control is excellent with a hemoglobin A1c of 6.2 on dietary management  2.  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  TSH is in the desired range  3.  History of breast cancer: She has had a previous right mastectomy noted.  No masses over the chest wall or in the left breast are noted.  She does have a mammogram of the left breast scheduled for today.  Since she has had a malignancy and has a high functional status, I would advise continuing yearly mammograms.  4.  Renal insufficiency: Renal function studies are in the desired range.  They actually are lower than years ago.  Kidney function will be rechecked in 1 year  5.  Morning rhinitis: I suspect this is vasomotor in etiology rather than allergic.  She was given a prescription of fluticasone to try if helpful this could be continued.  If not helpful she should discontinue    PLAN:  1.  Previsit labs were reviewed.  All were asked  2.  Try fluticasone nasal spray.  2 sprays in the a.m.  Call if symptoms do not improve  3.  Continue other medications at current doses.  Follow-up in 6 months or as needed.      There are no discontinued medications.        ASSESSED PROBLEMS:  1. Vasomotor rhinitis  fluticasone (FLONASE) 50 mcg/actuation nasal spray   2. Medication monitoring encounter         CHIEF COMPLAINT:  Chief Complaint   Patient presents with     Follow-up     6 month follow up       HISTORY OF PRESENT ILLNESS:  Vickie is a 89 y.o. female presenting to the clinic today for a diabetic check up. She now manages diabetes with diet alone. Her last A1c was 6.1 in October. She denies any low blood sugar reactions.     Osteopenia:  She is on raloxifene both for low bone mass and a history of breast cancer. Her last DXA showed a low T-score of -1.6 as measured at the left femoral neck.     Health maintenance:  She wonders if she should be getting an annual mammogram. She was advised to have a yearly mammogram  "given her history of breast cancer.     Hypertension:  She continues lisinopril 20 mg and propanolol 20 mg twice daily. She denies any low heart rate or light headedness.     REVIEW OF SYSTEMS:   Hypertriglyceridemia: She is on fenofibrate.   All other systems are negative.    PFSH:  She has no upcoming vacations.     TOBACCO USE:  History   Smoking Status     Never Smoker   Smokeless Tobacco     Not on file       VITALS:  Vitals:    04/27/17 0903   BP: 122/60   Patient Site: Left Arm   Patient Position: Sitting   Cuff Size: Adult Regular   Pulse: 64   Weight: 148 lb 1.6 oz (67.2 kg)   Height: 5' 4.5\" (1.638 m)     Wt Readings from Last 3 Encounters:   04/27/17 148 lb 1.6 oz (67.2 kg)   10/06/16 152 lb 4.8 oz (69.1 kg)   04/19/16 151 lb 3.2 oz (68.6 kg)       PHYSICAL EXAM:  Constitutional:   Reveals an alert, pleasant, elderly woman with fairly prominent resting tremor. Ambulates without assistance. Does not appear acutely ill. Affect appropriate  Vitals: per nursing notes.  HEENT: Atraumatic.  Ears:  External canals, TMs clear.    Eyes:  EOMs full, PERRL.  Oropharynx:   Mouth and throat clear, no thrush or exudate.  Neck:  Thyroid is fairly prominent. No carotid bruits. No adenopathy.  Back:  No spine or CVA pain.  Thorax:  Pacer left upper chest.   Breasts: Right breast surgically absent, left breast normal.   Lungs: Clear to A&P without rales or wheezes.  Respiratory effort normal.  Cardiac:   Regular rate and rhythm, normal S1, S2, no murmur or gallop.  Abdomen:  Soft, moderately obese  active bowel sounds without bruits, mass, or tenderness.  Extremities:   No peripheral edema, pulses in the feet intact.    Skin:  No jaundice, peripheral cyanosis or lesions to suggest malignancy.  Neuro:  Alert and oriented. Tremor, as noted above.   Psychiatric:  Memory intact, mood appropriate.    QUALITY MEASURES:  The following high BMI interventions were performed this visit: weight monitoring    ADDITIONAL HISTORY " SUMMARIZED (2): None.  DECISION TO OBTAIN EXTRA INFORMATION (1): None.   RADIOLOGY TESTS (1): DXA from 4/2016 reviewed.  LABS (1): Labs reviewed from 4/20/2017.  MEDICINE TESTS (1): None.  INDEPENDENT REVIEW (2 each): None.     The visit lasted a total of 18 minutes face to face with the patient. Over 50% of the time was spent counseling and educating the patient about diabetic management.    IQuinn, am scribing for and in the presence of, Dr. Boswell.    I, Dr. Boswell, personally performed the services described in this documentation, as scribed by Quinn Lemus in my presence, and it is both accurate and complete.    Dragon dictation was used for this note.  Speech recognition errors are a possibility.    MEDICATIONS:  Current Outpatient Prescriptions   Medication Sig Dispense Refill     aspirin 81 MG EC tablet Take 81 mg by mouth daily.       calcium carbonate-vitamin D3 (CALCIUM 600 + D,3,) 600 mg(1,500mg) -200 unit per tablet Take 1 tablet by mouth daily. As directed.       cyanocobalamin (VITAMIN B-12) 2000 MCG tablet Take 2,000 mcg by mouth daily.       fenofibrate (TRIGLIDE) 160 MG tablet Take 1 tablet by mouth  daily 90 tablet 3     lisinopril (PRINIVIL,ZESTRIL) 20 MG tablet Take 1 tablet (20 mg total) by mouth daily. 90 tablet 3     multivitamin-iron, hematinic, Tab Take 1 tablet by mouth once daily.       propranolol (INDERAL) 20 MG tablet Take 1 tablet (20 mg total) by mouth 2 (two) times a day. 180 tablet 3     raloxifene (EVISTA) 60 mg tablet Take 1 tablet (60 mg total) by mouth daily. As directed. 90 tablet 3     fluticasone (FLONASE) 50 mcg/actuation nasal spray 2 sprays into each nostril daily. 10 g 11     No current facility-administered medications for this visit.        Total data points: 2

## 2021-06-11 NOTE — TELEPHONE ENCOUNTER
Refill Approved    Rx renewed per Medication Renewal Policy. Medication was last renewed on 12/6/19, last OV 6/29/20.    Jami Hutchison, [facility] Triage/Med Refill 9/28/2020     Requested Prescriptions   Pending Prescriptions Disp Refills     lisinopriL (PRINIVIL,ZESTRIL) 20 MG tablet [Pharmacy Med Name: LISINOPRIL  20MG  TAB] 90 tablet 3     Sig: TAKE 1 TABLET BY MOUTH  DAILY       Ace Inhibitors Refill Protocol Passed - 9/26/2020  3:19 AM        Passed - PCP or prescribing provider visit in past 12 months       Last office visit with prescriber/PCP: 12/6/2019 Jj Boswell MD OR same dept: 12/6/2019 Jj Boswell MD OR same specialty: 12/6/2019 Jj Boswell MD  Last physical: Visit date not found Last MTM visit: Visit date not found   Next visit within 3 mo: Visit date not found  Next physical within 3 mo: Visit date not found  Prescriber OR PCP: Jj Boswell MD  Last diagnosis associated with med order: 1. Essential hypertension  - lisinopriL (PRINIVIL,ZESTRIL) 20 MG tablet [Pharmacy Med Name: LISINOPRIL  20MG  TAB]; TAKE 1 TABLET BY MOUTH  DAILY  Dispense: 90 tablet; Refill: 3    2. Tremor  - propranoloL (INDERAL) 20 MG tablet [Pharmacy Med Name: PROPRANOLOL  20MG  TAB]; TAKE 1 TABLET BY MOUTH TWO  TIMES DAILY  Dispense: 180 tablet; Refill: 3    If protocol passes may refill for 12 months if within 3 months of last provider visit (or a total of 15 months).             Passed - Serum Potassium in past 12 months     Lab Results   Component Value Date    Potassium 4.8 06/17/2020             Passed - Blood pressure filed in past 12 months     BP Readings from Last 1 Encounters:   12/26/19 134/70             Passed - Serum Creatinine in past 12 months     Creatinine   Date Value Ref Range Status   06/17/2020 1.03 0.60 - 1.10 mg/dL Final                propranoloL (INDERAL) 20 MG tablet [Pharmacy Med Name: PROPRANOLOL  20MG  TAB] 180 tablet 3     Sig: TAKE 1 TABLET BY MOUTH TWO  TIMES DAILY        Beta-Blockers Refill Protocol Passed - 9/26/2020  3:19 AM        Passed - PCP or prescribing provider visit in past 12 months or next 3 months     Last office visit with prescriber/PCP: 12/6/2019 Jj Boswell MD OR same dept: 12/6/2019 Jj Boswell MD OR same specialty: 12/6/2019 Jj Boswell MD  Last physical: Visit date not found Last MTM visit: Visit date not found   Next visit within 3 mo: Visit date not found  Next physical within 3 mo: Visit date not found  Prescriber OR PCP: Jj Boswell MD  Last diagnosis associated with med order: 1. Essential hypertension  - lisinopriL (PRINIVIL,ZESTRIL) 20 MG tablet [Pharmacy Med Name: LISINOPRIL  20MG  TAB]; TAKE 1 TABLET BY MOUTH  DAILY  Dispense: 90 tablet; Refill: 3    2. Tremor  - propranoloL (INDERAL) 20 MG tablet [Pharmacy Med Name: PROPRANOLOL  20MG  TAB]; TAKE 1 TABLET BY MOUTH TWO  TIMES DAILY  Dispense: 180 tablet; Refill: 3    If protocol passes may refill for 12 months if within 3 months of last provider visit (or a total of 15 months).             Passed - Blood pressure filed in past 12 months     BP Readings from Last 1 Encounters:   12/26/19 134/70

## 2021-06-13 NOTE — PATIENT INSTRUCTIONS - HE
Ms. Vickie S Lisa,  It certainly was nice to meet you today.  Per our conversation you're doing real well.   I will plan on seeing you in 1 year or sooner if need be.  Margarito Fernández

## 2021-06-13 NOTE — PROGRESS NOTES
In clinic device check with Device RN and consult with Dr. Fernández.  Please see link for full device report.  Patient was informed of results and next follow up during today's visit.

## 2021-06-14 NOTE — TELEPHONE ENCOUNTER
RN cannot approve Refill Request    RN can NOT refill this medication med is not covered by policy/route to provider. Last office visit: 12/6/2019 Jj Boswell MD Last Physical: Visit date not found Last MTM visit: Visit date not found Last visit same specialty: 12/6/2019 Jj Boswell MD.  Next visit within 3 mo: Visit date not found  Next physical within 3 mo: Visit date not found      Rhea Nina, [facility] Triage/Med Refill 12/28/2020    Requested Prescriptions   Pending Prescriptions Disp Refills     raloxifene (EVISTA) 60 mg tablet [Pharmacy Med Name: RALOXIFENE  60MG  TAB] 90 tablet 3     Sig: TAKE 1 TABLET BY MOUTH  DAILY AS DIRECTED       There is no refill protocol information for this order

## 2021-06-15 ENCOUNTER — AMBULATORY - HEALTHEAST (OUTPATIENT)
Dept: CARDIOLOGY | Facility: CLINIC | Age: 86
End: 2021-06-15

## 2021-06-15 DIAGNOSIS — Z95.0 CARDIAC PACEMAKER IN SITU: ICD-10-CM

## 2021-06-15 DIAGNOSIS — I49.5 SICK SINUS SYNDROME (H): ICD-10-CM

## 2021-06-17 NOTE — TELEPHONE ENCOUNTER
She could do lab the day of her appointment if this is more convenient.  Otherwise, I could put in orders for previsit labs as in the past.

## 2021-06-17 NOTE — TELEPHONE ENCOUNTER
FYI - Status Update  Who is Calling: Patient  Update: Patient has scheduled an appointment for Monday 5/03/21, resuming in-person f/u visits with Dr Boswell. In the past he would have her come in before the appointment to have blood drawn. Please call back to patient to let her know if she should come in before this appointment for bloodwork.  Okay to leave a detailed message?:  Yes

## 2021-06-17 NOTE — PROGRESS NOTES
ASSESSMENT:  1.  Tremor:    Vickie has noted worsening tremor despite propranolol 20 mg twice daily.  After discussion, we will increase propranolol to 40 mg daily.  She should report the effect on symptoms in 1 week.  He does have a pacemaker so we are not worried about bradycardia    2.  Low bone mass:  She has been on raloxifene for years due to low bone mass with a history of breast cancer.  I feel it would be reasonable to go off the raloxifene at this time.  Bone density is actually fairly good for age    3.  Multinodular goiter:  TSH will be checked    4.  Type 2 diabetes mellitus:  Hemoglobin A1c was 6.1 when checked last June.  She is currently managed only with diet and lifestyle    5.  Hypertriglyceridemia:   Now off of lipid-lowering medications    6.  Chronic renal insufficiency:  Renal function will be rechecked    PLAN:  1.  Increase propranolol to 40 mg twice daily.  Report effect on tremor in 1 week.  If tremor is less troublesome a prescription for higher dose propranolol will be sent.    2.  Stop raloxifene when out of current supply    3.  Recheck bone density later this year    4.  See in 6 months or as needed        Orders Placed This Encounter   Procedures     DXA Bone Density Scan     Standing Status:   Future     Standing Expiration Date:   5/4/2022     Order Specific Question:   Can the procedure be changed per Radiologist protocol?     Answer:   Yes     Glycosylated Hemoglobin A1c     Lipid Cascade     Order Specific Question:   Fasting is required?     Answer:   Unknown     Basic Metabolic Panel     Thyroid Stimulating Hormone (TSH)     HM2(CBC w/o Differential)     Medications Discontinued During This Encounter   Medication Reason     raloxifene (EVISTA) 60 mg tablet Therapy completed       Return in about 6 months (around 11/4/2021) for Recheck.    ASSESSED PROBLEMS:  1. Colloid Nontoxic Multinodular Goiter  Thyroid Stimulating Hormone (TSH)   2. Type 2 diabetes mellitus without  complication, without long-term current use of insulin (H)  Glycosylated Hemoglobin A1c    Basic Metabolic Panel   3. Chronic renal impairment, stage 3b     4. Hypertriglyceridemia without hypercholesterolemia  Lipid Cascade   5. Osteopenia  DXA Bone Density Scan   6. Malignant neoplasm of left breast in female, estrogen receptor positive, unspecified site of breast (H)  HM2(CBC w/o Differential)   7. Menopause present  DXA Bone Density Scan   8. Cardiac pacemaker, dual, in situ     9. Abnormal involuntary movement         CHIEF COMPLAINT:  Chief Complaint   Patient presents with     Diabetes     6 mo f/u- pt is fasting       HISTORY OF PRESENT ILLNESS:  Vickie is a 89 y.o. female seen for follow-up of tremor, type 2 diabetes, and other concerns.  She lives in an independent living senior building.  She makes her own meals.  She does denies any problems with this.  She no longer drives a car.  She had 2 sisters who  at 99, 1 sister several days before her 89th birthday.    She has a past history of right mastectomy for breast cancer.    Last hemoglobin A1c was 6.1.  She is no longer on hypoglycemic agents, and manages with diet alone    She formerly was on an agent for hypertriglyceridemia, this has been discontinued.  She has no known history of atherosclerotic disease    Has been on raloxifene due to a history of low bone mass and breast cancer.  Her bone density study from 2019 was reviewed.  It actually is in a fairly good range for her age    REVIEW OF SYSTEMS:    Comprehensive review of systems is otherwise negative.    PFSH:  Lives in independent senior living.  She reports her niece, who was her major traveling , has  of ovarian cancer.    TOBACCO USE:  Social History     Tobacco Use   Smoking Status Never Smoker   Smokeless Tobacco Never Used       VITALS:  Vitals:    21 1236   BP: 138/68   Patient Site: Left Arm   Patient Position: Sitting   Cuff Size: Adult Regular   Pulse: 64    Temp: 97.6  F (36.4  C)   TempSrc: Tympanic   SpO2: 94%   Weight: 143 lb 14.4 oz (65.3 kg)     Wt Readings from Last 3 Encounters:   05/04/21 143 lb 14.4 oz (65.3 kg)   12/15/20 142 lb (64.4 kg)   12/15/20 142 lb (64.4 kg)       PHYSICAL EXAM:  Constitutional:   Reveals an alert pleasant elderly woman who has a prominent resting tremor.  She does not seem in acute distress.  She ambulates easily without use of a cane, and can get on the exam table without assistance.   Vitals: per nursing notes.  HEENT: Atraumatic  Eyes:  EOMs full, PERRL.  Oropharynx:   Mouth and throat clear, no thrush or exudate.  Neck:  Supple, no carotid bruits or adenopathy.  Back:  No spine or CVA pain.  Thorax:  No bony deformities.  Pacer left chest  Lungs: Clear to A&P without rales or wheezes.  Respiratory effort normal.  Cardiac:   Regular rate and rhythm, normal S1, S2, no murmur or gallop.  Breasts: Devious right mastectomy.  No masses over the chest wall.  No left breast masses  Abdomen:  Soft, active bowel sounds without bruits, mass, or tenderness.  Extremities:   No peripheral edema, pulses in the feet intact.    Skin:  No jaundice, peripheral cyanosis or lesions to suggest malignancy.  Neuro:  Alert and oriented.  Prominent resting tremor.  No gross focal deficits.  Psychiatric:  Memory intact, mood appropriate.    DATA REVIEWED:  Additional History from Old Records Summarized (2): Chart reviewed  Decision to Obtain Records (1): None.   Radiology Tests Summarized or Ordered (1): DEXA scan and mammogram reviewed  Labs Reviewed or Ordered (1): Labs reviewed and ordered.  Medicine Test Summarized or Ordered (1): None.   Independent Review of EKG or X-RAY(2 each): None.    The visit lasted a total of 25 minutes face to face with the patient. Over 50% of the time was spent counseling and educating the patient about tremor and chronic medical issues.    Dragon dictation was used for this note. Speech recognition errors are a  possibility.     MEDICATIONS:  Current Outpatient Medications   Medication Sig Dispense Refill     calcium carbonate-vitamin D3 (CALCIUM 600 + D,3,) 600 mg(1,500mg) -200 unit per tablet Take 1 tablet by mouth daily. As directed.       cyanocobalamin (VITAMIN B-12) 2000 MCG tablet Take 2,000 mcg by mouth daily.       lisinopriL (PRINIVIL,ZESTRIL) 20 MG tablet Take 1 tablet (20 mg total) by mouth daily. 90 tablet 3     multivitamin-iron, hematinic, Tab Take 1 tablet by mouth once daily.       propranoloL (INDERAL) 20 MG tablet Take 1 tablet (20 mg total) by mouth 2 (two) times a day. 180 tablet 3     No current facility-administered medications for this visit.

## 2021-06-17 NOTE — PATIENT INSTRUCTIONS - HE
1.  Increase Propranolol to 40 mg twice daily.      2.  Note if tremor is less troublesome on the higher dose after one week.    3. Stop Raloxifene when out of current supply.    4.  Bone density later in year    5.  I will notify you of test results.  See in six months or as needed.

## 2021-06-17 NOTE — PROGRESS NOTES
ASSESSMENT:  1.  Type 2 diabetes mellitus managed with diet and lifestyle: Blood sugar is excellent at 90.  Hemoglobin A1c is pending.  Overall, she has done very well    2.  Essential hypertension: Good control on current regimen.  Her potassium is mildly elevated at 5.5.  Will be monitored.  If it gets higher, she would need to switch off of an ACE inhibitor to an alternate agent.    3.  History of right mastectomy for breast cancer: She wonders if she should continue left-sided mammograms.  Since she is overall in good health, and does have a past history of cancer this seems reasonable.    4.  Low bone mass: Her bone density study from 2016 was reviewed.  She is on raloxifene.  This will be continued    5.  Renal insufficiency stage III: Renal function is stable    6.  Hypertriglyceridemia: Lipids are excellent with fenofibrate    7.  Health maintenance:  Shingrix vaccine was advised.    8.  Euthyroid multinodular goiter: No change in size by exam.  TSH is in the desired range    9.  History of pacemaker placement for sick sinus syndrome: Doing well    PLAN:  1.  Previsit labs were reviewed.  As noted above, if potassium gets higher, she should switch from an ACE inhibitor to an alternate agent.  2.  Check insurance for Shingrix vaccine  3.  Glycosylated hemoglobin today  4.  Left mammogram  5.  Continue current medications with clinic follow-up in 6 months    Orders Placed This Encounter   Procedures     Mammo Screening Left     Standing Status:   Future     Standing Expiration Date:   8/3/2019     Order Specific Question:   Patient's previous breast density:     Answer:   Scattered fibroglandular density [2]     Order Specific Question:   Can the procedure be changed per Radiologist protocol?     Answer:   Yes     Glycosylated Hemoglobin A1c     There are no discontinued medications.        ASSESSED PROBLEMS:  1. Visit for screening mammogram  Mammo Screening Left   2. Type 2 diabetes mellitus  Glycosylated  Hemoglobin A1c       CHIEF COMPLAINT:  Chief Complaint   Patient presents with     Follow-up     6 month       HISTORY OF PRESENT ILLNESS:  Vickie is a 89 y.o. female seen for follow-up of hypertension, type 2 diabetes, and hypertriglyceridemia.  She has felt well since last visit.  He visited a friend [facility] over the winter.  She lives in a seniors residence.  She is no longer driving.  She has no major health concerns.  She denies adverse effects from medication    REVIEW OF SYSTEMS:    Comprehensive review of systems is negative.  She does have a tremor.  This does not trouble her greatly.  She denies shortness of breath, palpitations, GI upset, or numbness in her feet.  Spirits are good    PFSH:  .  Lives independently.  No longer is driving.    TOBACCO USE:  History   Smoking Status     Never Smoker   Smokeless Tobacco     Never Used       VITALS:  Vitals:    05/03/18 0935   BP: 122/58   Patient Site: Left Arm   Patient Position: Sitting   Cuff Size: Adult Regular   Pulse: 60   SpO2: 90%   Weight: 145 lb (65.8 kg)     Wt Readings from Last 3 Encounters:   05/03/18 145 lb (65.8 kg)   12/07/17 147 lb 12.8 oz (67 kg)   09/28/17 146 lb 8 oz (66.5 kg)       PHYSICAL EXAM:  Constitutional:   Reveals an alert, pleasant elderly female she has a prominent resting tremor.  She does not appear in acute distress.  Vitals: per nursing notes.  HEENT: Atraumatic  Eyes:  EOMs full, PERRL.  Oropharynx:   Mouth and throat clear, no thrush or exudate.  Neck:  Supple, no carotid bruits or adenopathy.  Thyroid does not seem larger than when last checked  Back:  No spine or CVA pain.  Thorax: Pacer left chest   lungs: Clear to A&P without rales or wheezes.  Respiratory effort normal.  Cardiac:   Regular rate and rhythm, normal S1, S2, no murmur or gallop.  Breasts:   Right breast surgically absent.  No masses on the left  Abdomen:  Soft, active bowel sounds without bruits, mass, or tenderness..  Extremities:    No peripheral edema, pulses in the feet intact.    Skin:  No jaundice, peripheral cyanosis or lesions to suggest malignancy.  Neuro:  Alert and oriented. Cranial nerves, motor, sensory exams are intact.  Fairly prominent resting tremor  Psychiatric:  Memory intact, mood appropriate.    DATA REVIEWED:  Additional History from Old Records Summarized (2): None.  Decision to Obtain Records (1): None.   Radiology Tests Summarized or Ordered (1): None.  Labs Reviewed or Ordered (1): None.  Medicine Test Summarized or Ordered (1): None.   Independent Review of EKG or X-RAY(2 each): None.    The visit lasted a total of 23 minutes face to face with the patient. Over 50% of the time was spent counseling and educating the patient about hypertension and diabetic management.        I, Dr. Boswell, personally performed the services described in this documentation.    Dragon dictation was used for this note. Speech recognition errors are a possibility.     MEDICATIONS:  Current Outpatient Prescriptions   Medication Sig Dispense Refill     aspirin 81 MG EC tablet Take 81 mg by mouth daily.       calcium carbonate-vitamin D3 (CALCIUM 600 + D,3,) 600 mg(1,500mg) -200 unit per tablet Take 1 tablet by mouth daily. As directed.       cyanocobalamin (VITAMIN B-12) 2000 MCG tablet Take 2,000 mcg by mouth daily.       fenofibrate (TRIGLIDE) 160 MG tablet TAKE 1 TABLET BY MOUTH  DAILY 90 tablet 3     lisinopril (PRINIVIL,ZESTRIL) 20 MG tablet TAKE 1 TABLET BY MOUTH  DAILY 90 tablet 1     multivitamin-iron, hematinic, Tab Take 1 tablet by mouth once daily.       propranolol (INDERAL) 20 MG tablet TAKE 1 TABLET BY MOUTH TWO  TIMES DAILY 180 tablet 1     raloxifene (EVISTA) 60 mg tablet TAKE 1 TABLET BY MOUTH  DAILY AS DIRECTED 90 tablet 3     fluticasone (FLONASE) 50 mcg/actuation nasal spray 2 sprays into each nostril daily. (Patient not taking: Reported on 5/3/2018) 10 g 11     No current facility-administered medications for this visit.

## 2021-06-18 NOTE — LETTER
Letter by Shirley Odom RDCS at      Author: Shirley Odom RDCS Service: -- Author Type: --    Filed:  Encounter Date: 3/6/2019 Status: (Other)       Vickie Gonzalez  [address]  [place] [address]      March 6, 2019      Dear MsSvetlana Carmichaele,    RE: Remote Results    We are writing to you regarding your recent Remote Pacemaker check from home. Your transmission was received successfully. Battery status is satisfactory at this time.     Your results are within normal limits.    Your next device appointment will be a remote check on Danitza 10, 2019; this will occur automatically.    To schedule or reschedule, please call [phone] and press 1.    NOTE: If you would like to do an extra transmission, please call [phone] and press 3 to speak to a nurse BEFORE transmitting. This ensures that the Device Clinic staff is aware of the reason you are sending a transmission, and can follow-up with you after it has been reviewed.    We will be checking your implanted device from home (remotely) every three months unless otherwise instructed. We will need to see you in the clinic at least once a year. You may need to be seen in the clinic sooner depending on the results of your check.    Please be aware:    The follow-up schedule is like a Physician prescription.    Your remote monitor is paired to your specific implanted device.      Sincerely,    [facility]

## 2021-06-19 NOTE — LETTER
Letter by Yuko Salas RN at      Author: Yuko Salas RN Service: -- Author Type: --    Filed:  Encounter Date: 12/3/2019 Status: Signed       Vickie Gonzalez  [address]  [place] [address]                          Dear Vickie Gonzalez,    Important Information for Your Procedure    You are having a Pacemaker Battery Change Procedure:    Your procedure is scheduled for Tuesday 12-17-19    Please arrive at 7:00 am for registration and preporation for your procedure.   Getting Ready for Your Procedure:    You will need to contact your primary doctor Jj Boswell MD ([phone]), and schedule a pre-operative history within 30 days of your procedure.    You will need to bring a current list of your medications with you for our admissions process the day of your procedure, please do not bring any of your own medications  with you to the hospital    The hospital cannot store your valuables, so please leave them at home    You will need to take off your jewelry prior to your procedure, we advise leaving your jewelry at home, as we cannot store your valuables    Please shower the morning of your procedure, or the night before    This is a same day procedure, in which you can expect to go home the same day. In any unforseen circumstances this plan can change, but is unlikely.    Please make arrangements for transportation home, as you will not be able to drive following your procedure  The Night Prior to Your Procedure:    Please do not have anything to eat or drink after Midnight (12:00am) prior to your procedure    It is important to stay well hydrated, and consume balanced meals the day prior to your procedure  Arriving for Your Procedure:    Please arrive at [facility], located at: [address] [address]      parking is available after 5:00am for your convenience, parking is also available in the parking ramp located off of [address] street attached to  the hospital    If you park in the ramp located on [address], take the elevator to level one. Enter the doors to the atrium/lobby area and check in at the main reception desk.     Please check in at the main reception desk in the lobby    You will be assisted to [facility] on the third floor.  Going Home:    The physician and/or nurse will review any restrictions, follow-up requirements, and medication instructions prior to going home from the hospital in detail    Listed below are the restrictions that you can expect after your procedure:  o You will not be able to shower for 3 days after your procedure, to reduce the risk for infection and disrupting your incision site.  o You will not be allowed to drive for 24 hours after your procedure.  Clinic Contact Information:    You can contact our main clinic line at any time with questions, concerns, or if you need further information: [facility] [phone]    If you have further questions in regards to the scheduling of you procedure, please contact [facility] Proced[facility] Schedulers at [phone]    Medication prior to your procedure:    Please take your morning medications the day of your procedure with sips of water, except any multivitamins or supplements.    If you have any questions regarding your medication prior to your procedure please contact me at the number listed below.        Sincerely,  Yuko Salas RN  Please call with any questions or concerns regarding the procedure at : [phone]

## 2021-06-19 NOTE — LETTER
Letter by Jj Boswell MD at      Author: Jj Boswell MD Service: -- Author Type: --    Filed:  Encounter Date: 5/13/2019 Status: (Other)         Vickie Gonzalez  [address]  [place] [address]             May 13, 2019         Dear Ms. Gonzalez,    Below are the results from your recent visit:    Resulted Orders   Glycosylated Hemoglobin A1c   Result Value Ref Range    Hemoglobin A1c 6.1 (H) 3.5 - 6.0 %   Lipid Cascade   Result Value Ref Range    Cholesterol 144 <=199 mg/dL    Triglycerides 162 (H) <=149 mg/dL    HDL Cholesterol 27 (L) >=50 mg/dL    LDL Calculated 85 <=129 mg/dL    Patient Fasting > 8hrs? Yes    Comprehensive Metabolic Panel   Result Value Ref Range    Sodium 142 136 - 145 mmol/L    Potassium 5.0 3.5 - 5.0 mmol/L    Chloride 108 (H) 98 - 107 mmol/L    CO2 23 22 - 31 mmol/L    Anion Gap, Calculation 11 5 - 18 mmol/L    Glucose 108 70 - 125 mg/dL    BUN 54 (H) 8 - 28 mg/dL    Creatinine 1.48 (H) 0.60 - 1.10 mg/dL    GFR MDRD Af Amer 40 (L) >60 mL/min/1.73m2    GFR MDRD Non Af Amer 33 (L) >60 mL/min/1.73m2    Bilirubin, Total 0.4 0.0 - 1.0 mg/dL    Calcium 10.1 8.5 - 10.5 mg/dL    Protein, Total 7.3 6.0 - 8.0 g/dL    Albumin 3.9 3.5 - 5.0 g/dL    Alkaline Phosphatase 36 (L) 45 - 120 U/L    AST 18 0 - 40 U/L    ALT 11 0 - 45 U/L    Narrative    Fasting Glucose reference range is 70-99 mg/dL per  American Diabetes Association (ADA) guidelines.   HM2(CBC w/o Differential)   Result Value Ref Range    WBC 4.0 4.0 - 11.0 thou/uL    RBC 3.84 3.80 - 5.40 mill/uL    Hemoglobin 12.2 12.0 - 16.0 g/dL    Hematocrit 37.0 35.0 - 47.0 %    MCV 96 80 - 100 fL    MCH 31.8 27.0 - 34.0 pg    MCHC 33.0 32.0 - 36.0 g/dL    RDW 11.2 11.0 - 14.5 %    Platelets 247 140 - 440 thou/uL    MPV 6.5 (L) 7.0 - 10.0 fL   Microalbumin, Random Urine   Result Value Ref Range    Microalbumin, Random Urine 1.36 0.00 - 1.99 mg/dL    Creatinine, Urine 68.0 mg/dL    Microalbumin/Creatinine Ratio Random Urine 20.0  (H) <=19.9 mg/g    Narrative    Microalbumin, Random Urine  <2.0 mg/dL . . . . . . . . Normal  3.0-30.0 mg/dL . . . . . . Microalbuminuria  >30.0 mg/dL . . . . . .  . Clinical Proteinuria  Microalbumin/Creatinine Ratio, Random Urine  <20 mg/g . . . . .. . . . Normal   mg/g . . . . . . . Microalbuminuria  >300 mg/g . . . . . . . . Clinical Proteinuria       Vickie: Diabetic control remains good with a blood sugar 108 and a hemoglobin A1c of 6.1.  Kidney function is slightly worse than when last checked with a BUN of 54 and a creatinine of 1.48.  This will be monitored in the future.  Your cholesterol is good with a total value of 144 and an LDL of 85.  Triglycerides are mildly elevated for fasting at 162.  Other labs including your hemoglobin, potassium, and liver tests are normal.  Results will be reviewed in greater detail at your upcoming appointment.    Please call with questions or contact us using [employer]    Sincerely,        Electronically signed by Jj Boswell MD

## 2021-06-19 NOTE — LETTER
Letter by Eladia Edwards EPS at      Author: Eladia Edwards EPS Service: -- Author Type: --    Filed:  Encounter Date: 6/10/2019 Status: (Other)         Vickie Gonzalez  [address]  [place] [address]      Danitza 10, 2019      Dear MsSvetlana Lisa,    RE: Remote Results    We are writing to you regarding your recent Remote Pacemaker check from home. Your transmission was received successfully. Battery status is satisfactory at this time.     Your results are within normal limits.    Your next device appointment will be a remote check on September 11th, 2019; this will occur automatically.    To schedule or reschedule, please call [phone] and press 1.    NOTE: If you would like to do an extra transmission, please call [phone] and press 3 to speak to a nurse BEFORE transmitting. This ensures that the Device Clinic staff is aware of the reason you are sending a transmission, and can follow-up with you after it has been reviewed.    We will be checking your implanted device from home (remotely) every three months unless otherwise instructed. We will need to see you in the clinic at least once a year. You may need to be seen in the clinic sooner depending on the results of your check.    Please be aware:    The follow-up schedule is like a Physician prescription.    Your remote monitor is paired to your specific implanted device.      Sincerely,    [facility]

## 2021-06-19 NOTE — LETTER
Letter by Jj Boswell MD at      Author: Jj Boswell MD Service: -- Author Type: --    Filed:  Encounter Date: 11/25/2019 Status: Signed         Vickie Gonzalez  [address]  [place] [address]             November 25, 2019         Dear Ms. Gonzalez,    Below are the results from your recent visit:    Resulted Orders   Lipid Cascade   Result Value Ref Range    Cholesterol 139 <=199 mg/dL    Triglycerides 165 (H) <=149 mg/dL    HDL Cholesterol 28 (L) >=50 mg/dL    LDL Calculated 78 <=129 mg/dL    Patient Fasting > 8hrs? Yes    Basic Metabolic Panel   Result Value Ref Range    Sodium 141 136 - 145 mmol/L    Potassium 5.0 3.5 - 5.0 mmol/L    Chloride 106 98 - 107 mmol/L    CO2 25 22 - 31 mmol/L    Anion Gap, Calculation 10 5 - 18 mmol/L    Glucose 102 70 - 125 mg/dL    Calcium 9.9 8.5 - 10.5 mg/dL    BUN 42 (H) 8 - 28 mg/dL    Creatinine 1.28 (H) 0.60 - 1.10 mg/dL    GFR MDRD Af Amer 47 (L) >60 mL/min/1.73m2    GFR MDRD Non Af Amer 39 (L) >60 mL/min/1.73m2    Narrative    Fasting Glucose reference range is 70-99 mg/dL per  American Diabetes Association (ADA) guidelines.   Glycosylated Hemoglobin A1c   Result Value Ref Range    Hemoglobin A1c 6.1 (H) 3.5 - 6.0 %       Vickie: Diabetic control is great with a blood sugar of 102 and a hemoglobin A1c of 6.1.  Kidney function is diminished, but in the same range as previous checks with a BUN of 42 and a creatinine of 1.28.  Your cholesterol is good with a total value of 139 and fraction of 178.  Triglycerides remain mildly elevated at 165.  Other labs all are good.  Results will be reviewed in greater detail at your upcoming appointment.    Please call with questions or contact us using [employer].    Sincerely,        Electronically signed by Jj Boswell MD

## 2021-06-19 NOTE — LETTER
Letter by Jj Boswell MD at      Author: Jj Boswell MD Service: -- Author Type: --    Filed:  Encounter Date: 5/8/2019 Status: (Other)         Vickie Gonzalez  [address]  [place] [address]             May 8, 2019         Dear Ms. Gonzalez,    Below are the results from your recent visit:    Resulted Orders   DXA Bone Density Scan    Narrative    5/8/2019      RE: Vickie Gonzalez  YOB: 1925        Dear Jj Boswell,    Patient Profile:  89 y.o. female, postmenopausal, is here for the follow up bone density   test.   History of fractures - Yes;  Radius. Family history of osteoporosis -   None.  Family history of hip fracture: None. Smoking history - No.   Osteoporosis treatment past -  No. Osteoporosis treatment current - Yes;    [facility].  Chronic medical problems - Breast cancer, Height loss and   Malignancy. High risk medications -  Chemotherapy;  No.    Assessment:    1. The spine bone density is best assessed using L1-L3 with T-score of   -1.0.  2. Femoral bone densities show low bone mass with a left femoral neck T-   score of -1.9, and right total hip T-score of -1.8.  3.  Since the scan in 2016, bone density has decreased a significant 4.8%   in the left total hip, 9.9% in the right total hip.    89 y.o. female with LOW BONE DENSITY (OSTEOPENIA) and HIGH predicted   fracture risk based on age and measured bone density.      Recommendations:  Given the decline in bone density in the hip since last scan, a   reassessment of current management would be advised with a recheck in 2   years.      Bone densitometry was performed on your patient using our [employer]   densitometer. The results are summarized and a copy of the actual scans   are included for your review. In conformity with the International Society   of Clinical Densitometry's most recent position statement for DXA   interpretation (2015), the diagnosis will be made on the lowest measured   T-score of the  lumbar spine, femoral neck, total proximal femur or 33%   radius. Note the change in terminology for diagnostic classification from   OSTEOPENIA to LOW BONE MASS. All trending for sequential exams will be   done using multiple vertebrae or the total proximal femur. Fracture risk   is based on the WHO Fracture Risk Assessment Tool (FRAX). If additional   information is needed or if you would like to discuss the results, please   do not hesitate to call me.       Thank you for referring this patient to [facility].   We are happy to be of service in support of you and your practice. If you   have any questions or suggestions to improve our service, please call me   at [phone].     Sincerely,     Jj Boswell M.D. C.C.D.  Osteoporosis Services, [facility]: Bone density has declined in the hip since last scan.  We will discuss further management options at your next appointment.    Please call with questions or contact us using [employer].    Sincerely,        Electronically signed by Jj Boswell MD

## 2021-06-19 NOTE — LETTER
Letter by Taryn Hodgson at      Author: Taryn Hodgson Service: -- Author Type: --    Filed:  Encounter Date: 9/11/2019 Status: (Other)         Vickie Gonzalez  [address]  [place] [address]      September 11, 2019      Dear Ms. Carmichaele,    RE: Remote Results    We are writing to you regarding your recent Remote Pacemaker check from home. Your transmission was received successfully. Battery status is satisfactory at this time.     Your results are within normal limits.    Your next device appointment will be a clinic visit on November 27, 2019 at 10:20am at [facility], [address]    To schedule or reschedule, please call [phone] and press 1.    NOTE: If you would like to do an extra transmission, please call [phone] and press 3 to speak to a nurse BEFORE transmitting. This ensures that the Device Clinic staff is aware of the reason you are sending a transmission, and can follow-up with you after it has been reviewed.    We will be checking your implanted device from home (remotely) every three months unless otherwise instructed. We will need to see you in the clinic at least once a year. You may need to be seen in the clinic sooner depending on the results of your check.    Please be aware:    The follow-up schedule is like a Physician prescription.    Your remote monitor is paired to your specific implanted device.      Sincerely,    [facility]

## 2021-06-20 NOTE — LETTER
Letter by Jj Boswell MD at      Author: Jj Boswell MD Service: -- Author Type: --    Filed:  Encounter Date: 6/29/2020 Status: (Other)         June 29, 2020     Patient: Vickie Gonzalez   YOB: 1925   Date of Visit: 6/29/2020       Dear Vickie:      Your labs are great!   Diabetic control is good with a blood sugar of 103 and a hemoglobin A1c of 6.1.  Lipids are acceptable off fenofibrate with a total cholesterol of 178 and triglycerides of 194.  The TSH, (thyroid stimulating hormone), is in the normal range.  Kidney function is mildly reduced, but stable.  Other labs all are good.  I am glad that you are feeling well.    If you have any questions or concerns, please don't hesitate to call.    Sincerely,        Electronically signed by Jj Boswell MD

## 2021-06-20 NOTE — LETTER
Letter by Shirley Odom RDCS at      Author: Shirley Odom RDCS Service: -- Author Type: --    Filed:  Encounter Date: 9/23/2020 Status: (Other)         Vickie Gonzalez  [address]  [place] [address]      September 23, 2020      Dear MsSvetlana Carmichaele,    RE: Remote Results    We are writing to you regarding your recent Remote Pacemaker check from home. Your transmission was received successfully. Battery status is satisfactory at this time.     Your results are showing no significant changes.    Your next device appointment will be a clinic visit.  Please call in late October to schedule.      To schedule or reschedule, please call [phone] and press 1.    NOTE: If you would like to do an extra transmission, please call [phone] and press 3 to speak to a nurse BEFORE transmitting. This ensures that the Device Clinic staff is aware of the reason you are sending a transmission, and can follow-up with you after it has been reviewed.    We will be checking your implanted device from home (remotely) every three months unless otherwise instructed. We will need to see you in the clinic at least once a year. You may need to be seen in the clinic sooner depending on the results of your check.    Please be aware:    The follow-up schedule is like a Physician prescription.    Your remote monitor is paired to your specific implanted device.      Sincerely,    [facility]

## 2021-06-20 NOTE — LETTER
Letter by Shirley Odom RDCS at      Author: Shirley Odom RDCS Service: -- Author Type: --    Filed:  Encounter Date: 3/25/2020 Status: (Other)         Vickie Gonzalez  [address]  [place] [address]      March 25, 2020      Dear MsSvetlana Carmichaele,    RE: Remote Results    We are writing to you regarding your recent Remote Pacemaker check from home. Your transmission was received successfully. Battery status is satisfactory at this time.     Your results are within normal limits.    Your next device appointment will be a remote check on June 24, 2020; this will occur automatically.    To schedule or reschedule, please call [phone] and press 1.    NOTE: If you would like to do an extra transmission, please call [phone] and press 3 to speak to a nurse BEFORE transmitting. This ensures that the Device Clinic staff is aware of the reason you are sending a transmission, and can follow-up with you after it has been reviewed.    We will be checking your implanted device from home (remotely) every three months unless otherwise instructed. We will need to see you in the clinic at least once a year. You may need to be seen in the clinic sooner depending on the results of your check.    Please be aware:    The follow-up schedule is like a Physician prescription.    Your remote monitor is paired to your specific implanted device.      Sincerely,    [facility]

## 2021-06-20 NOTE — LETTER
Letter by Shirley Odom RDCS at      Author: Shirley Odom RDCS Service: -- Author Type: --    Filed:  Encounter Date: 6/24/2020 Status: (Other)         Vickie Gonzalez  [address]  [place] [address]      June 24, 2020      Dear MsSvetlana Carmichaele,    RE: Remote Results    We are writing to you regarding your recent Remote Pacemaker check from home. Your transmission was received successfully. Battery status is satisfactory at this time.     Your results are showing no significant changes.    Your next device appointment will be a remote check on September 23, 2020; this will occur automatically.    To schedule or reschedule, please call [phone] and press 1.    NOTE: If you would like to do an extra transmission, please call [phone] and press 3 to speak to a nurse BEFORE transmitting. This ensures that the Device Clinic staff is aware of the reason you are sending a transmission, and can follow-up with you after it has been reviewed.    We will be checking your implanted device from home (remotely) every three months unless otherwise instructed. We will need to see you in the clinic at least once a year. You may need to be seen in the clinic sooner depending on the results of your check.    Please be aware:    The follow-up schedule is like a Physician prescription.    Your remote monitor is paired to your specific implanted device.      Sincerely,    [facility]

## 2021-06-21 NOTE — PROGRESS NOTES
ASSESSMENT:  1. Essential hypertension  Control is excellent with lisinopril.  - lisinopril (PRINIVIL,ZESTRIL) 20 MG tablet; Take 1 tablet (20 mg total) by mouth daily.  Dispense: 90 tablet; Refill: 3    2. Tremor  She uses propranolol 20 mg twice daily.  She is not sure if it is truly of much benefit.  - propranolol (INDERAL) 20 MG tablet; Take 1 tablet (20 mg total) by mouth 2 (two) times a day.  Dispense: 180 tablet; Refill: 3    3. Osteopenia  She is currently on raloxifene.  A recheck of bone density would be advised  - DXA Bone Density Scan; Future    4. Age-related osteoporosis without current pathological fracture  See above  - DXA Bone Density Scan; Future    5.  History of right mastectomy for breast cancer  Left-sided mammogram would be advised after 5/3/19    6.  Type 2 diabetes mellitus:  Previsit hemoglobin A1c was excellent at 6.3    7.  Mixed hyperlipidemia:  On fenofibrate.  Previsit lipids were good with total cholesterol 151 triglycerides 152    8.  Renal insufficiency stage III:  Renal function is stable with a GFR of 44    9.  History of pacemaker placement  Doing well    PLAN:  Patient Instructions   1.  Schedule eye exam for diabetic eye check    2.  Schedule bone density check    3.  Same medications    $.  See in six months with fasting labs      Orders Placed This Encounter   Procedures     DXA Bone Density Scan     Standing Status:   Future     Standing Expiration Date:   11/8/2019     Order Specific Question:   Can the procedure be changed per Radiologist protocol?     Answer:   Yes     Medications Discontinued During This Encounter   Medication Reason     propranolol (INDERAL) 20 MG tablet Duplicate order     lisinopril (PRINIVIL,ZESTRIL) 20 MG tablet Reorder     propranolol (INDERAL) 20 MG tablet Reorder             ASSESSED PROBLEMS:  1. Osteopenia  DXA Bone Density Scan   2. Essential hypertension  lisinopril (PRINIVIL,ZESTRIL) 20 MG tablet   3. Tremor  propranolol (INDERAL) 20 MG  tablet   4. Age-related osteoporosis without current pathological fracture  DXA Bone Density Scan       CHIEF COMPLAINT:  Chief Complaint   Patient presents with     Diabetes     6 months follow up     Routine Health Maintenance     Due for DXa. CA pend the order     Medication Refill       HISTORY OF PRESENT ILLNESS:  Vickie is a 89 y.o. female presenting to the clinic today for her 6 month DM check up.     DM: Her last hemoglobin A1c was 6.3% on 10/31/2018. She hasn't had a diabetic eye check in years.    She is seeking counseling to see if it is okay for her to take low-dose aspirin.     Patient has a continuous tremor and has not noticed a difference in symptoms while taking Propranolol.     Health Maintenance: She has had her first Shringrix vaccine and her flu shot this year. Setting up an eye exam discussed. Setting up another bone density scan later this year discussed.     REVIEW OF SYSTEMS:   Numbness/burning in feet negative  Lower extremity edema negative  Tremor positive  All other systems are negative.    PFSH:  SHx: Vickie goes to yoga and another exercise group twice weekly. Patient may be going to [place] this winter.    TOBACCO USE:  History   Smoking Status     Never Smoker   Smokeless Tobacco     Never Used       VITALS:  Vitals:    11/08/18 1048   BP: 110/60   Patient Site: Left Arm   Patient Position: Sitting   Cuff Size: Adult Regular   Pulse: 60   SpO2: 96%   Weight: 144 lb (65.3 kg)     Wt Readings from Last 3 Encounters:   11/08/18 144 lb (65.3 kg)   05/03/18 145 lb (65.8 kg)   12/07/17 147 lb 12.8 oz (67 kg)     Body mass index is 24.34 kg/(m^2).    PHYSICAL EXAM:    Constitutional:   Reveals an alert, pleasant, elderly woman. Affect appropriate. Does not appear acutely ill.  Vitals: per nursing notes.  HEENT: unremarkable.    Neck: multinodular goiter unchanged from past. No carotid bruits or adenopathy.  Back:  No spine or CVA pain.  Lungs: Clear to A&P without rales or wheezes.   Respiratory effort normal.  Cardiac:   Regular rate and rhythm, normal S1, S2, no murmur or gallop.  Abdomen:  Soft, active bowel sounds without bruits, mass, or tenderness.  Extremities:   No peripheral edema.    Skin:  No ulcers or lesions to suggest malignancy.  Neuro: Prominent resting tremor.  Alert and pleasant. Ambulates easily.  Psychiatric:  Memory intact, mood appropriate.          ADDITIONAL HISTORY SUMMARIZED (2): None.  DECISION TO OBTAIN EXTRA INFORMATION (1): None.   RADIOLOGY TESTS (1): Reviewed April 2016 Dexa showing low bone mass. Dexa ordered.   LABS (1): Labs reviewed from 10/31/2018.  MEDICINE TESTS (1): None.  INDEPENDENT REVIEW (2 each): None.     The visit lasted a total of 11  minutes face to face with the patient. Over 50% of the time was spent counseling and educating the patient about patient's DM and tremor.    Erum LEWIS, am scribing for and in the presence of, Dr. Boswell.    Jj LEWIS, personally performed the services described in this documentation, as scribed by Erum Salas in my presence, and it is both accurate and complete.    Dragon dictation was used for this note.  Speech recognition errors are a possibility.    MEDICATIONS:  Current Outpatient Prescriptions   Medication Sig Dispense Refill     aspirin 81 MG EC tablet Take 81 mg by mouth daily.       calcium carbonate-vitamin D3 (CALCIUM 600 + D,3,) 600 mg(1,500mg) -200 unit per tablet Take 1 tablet by mouth daily. As directed.       cyanocobalamin (VITAMIN B-12) 2000 MCG tablet Take 2,000 mcg by mouth daily.       fenofibrate (TRIGLIDE) 160 MG tablet TAKE 1 TABLET BY MOUTH  DAILY 90 tablet 3     lisinopril (PRINIVIL,ZESTRIL) 20 MG tablet Take 1 tablet (20 mg total) by mouth daily. 90 tablet 3     multivitamin-iron, hematinic, Tab Take 1 tablet by mouth once daily.       propranolol (INDERAL) 20 MG tablet Take 1 tablet (20 mg total) by mouth 2 (two) times a day. 180 tablet 3     raloxifene (EVISTA) 60 mg tablet  TAKE 1 TABLET BY MOUTH  DAILY AS DIRECTED 90 tablet 3     No current facility-administered medications for this visit.        Total data points: 2

## 2021-06-21 NOTE — LETTER
Letter by Taryn Hodgson at      Author: Taryn Hodgson Service: -- Author Type: --    Filed:  Encounter Date: 3/15/2021 Status: (Other)         Vickie Gonzalez  [address]  [place] [address]      March 15, 2021      Dear MsSvetlana Carmichaele,    RE: Remote Results    We are writing to you regarding your recent Remote Pacemaker check from home. Your transmission was received successfully. Battery status is satisfactory at this time.     Your results are within normal limits.    Your next device appointment will be a remote check on Danitza 15, 2021; this will occur automatically.    To schedule or reschedule, please call [phone] and press 1.    NOTE: If you would like to do an extra transmission, please call [phone] and press 3 to speak to a nurse BEFORE transmitting. This ensures that the Device Clinic staff is aware of the reason you are sending a transmission, and can follow-up with you after it has been reviewed.    We will be checking your implanted device from home (remotely) every three months unless otherwise instructed. We will need to see you in the clinic at least once a year. You may need to be seen in the clinic sooner depending on the results of your check.    Please be aware:    The follow-up schedule is like a Physician prescription.    Your remote monitor is paired to your specific implanted device.      Sincerely,    [facility]

## 2021-06-21 NOTE — LETTER
Letter by Jj Boswell MD at      Author: Jj Boswell MD Service: -- Author Type: --    Filed:  Encounter Date: 5/5/2021 Status: (Other)         Vickie Gonzalez  [address]  [place] [address]             May 5, 2021         Dear Ms. Gonzalez,    Below are the results from your recent visit:    Resulted Orders   Glycosylated Hemoglobin A1c   Result Value Ref Range    Hemoglobin A1c 6.0 (H) <=5.6 %   Lipid Cascade   Result Value Ref Range    Cholesterol 202 (H) <=199 mg/dL    Triglycerides 192 (H) <=149 mg/dL    HDL Cholesterol 39 (L) >=50 mg/dL    LDL Calculated 125 <=129 mg/dL    Patient Fasting > 8hrs? Yes    Basic Metabolic Panel   Result Value Ref Range    Sodium 141 136 - 145 mmol/L    Potassium 4.7 3.5 - 5.0 mmol/L    Chloride 104 98 - 107 mmol/L    CO2 24 22 - 31 mmol/L    Anion Gap, Calculation 13 5 - 18 mmol/L    Glucose 92 70 - 125 mg/dL    Calcium 9.3 8.5 - 10.5 mg/dL    BUN 31 (H) 8 - 28 mg/dL    Creatinine 0.90 0.60 - 1.10 mg/dL    GFR MDRD Af Amer >60 >60 mL/min/1.73m2    GFR MDRD Non Af Amer 58 (L) >60 mL/min/1.73m2    Narrative    Fasting Glucose reference range is 70-99 mg/dL per  American Diabetes Association (ADA) guidelines.   Thyroid Stimulating Hormone (TSH)   Result Value Ref Range    TSH 0.71 0.30 - 5.00 uIU/mL   HM2(CBC w/o Differential)   Result Value Ref Range    WBC 5.9 4.0 - 11.0 thou/uL    RBC 4.25 3.80 - 5.40 mill/uL    Hemoglobin 13.2 12.0 - 16.0 g/dL    Hematocrit 39.9 35.0 - 47.0 %    MCV 94 80 - 100 fL    MCH 31.1 27.0 - 34.0 pg    MCHC 33.1 32.0 - 36.0 g/dL    RDW 12.2 11.0 - 14.5 %    Platelets 197 140 - 440 thou/uL    MPV 8.3 7.0 - 10.0 fL       Vickie: Diabetic control remains good with a blood sugar 92 and a hemoglobin A1c of 6.0.  The TSH, (thyroid-stimulating hormone), is in the normal range at 0.71.  Cholesterol slightly higher than last check with a total value of 202 and an LDL fraction of 125.  Kidney function is mildly reduced with a BUN of 31  and a creatinine of 0.9.  This is actually better than many of your readings from years ago.  Other labs including your hemoglobin and potassium are normal.  It was nice to see you.    Please call with questions or contact us using [employer].    Sincerely,        Electronically signed by Jj Boswell MD

## 2021-06-23 NOTE — TELEPHONE ENCOUNTER
RN cannot approve Refill Request    RN can NOT refill this medication med is not covered by policy/route to provider. Last office visit: 11/8/2018 Jj Boswell MD Last Physical: Visit date not found Last MTM visit: Visit date not found Last visit same specialty: 11/8/2018 Jj Boswell MD.  Next visit within 3 mo: Visit date not found  Next physical within 3 mo: Visit date not found      Jose Daniel Calle, [facility] Triage/Med Refill 2/9/2019    Requested Prescriptions   Pending Prescriptions Disp Refills     raloxifene (EVISTA) 60 mg tablet [Pharmacy Med Name: RALOXIFENE  60MG  TAB] 90 tablet 3     Sig: TAKE 1 TABLET BY MOUTH  DAILY AS DIRECTED    There is no refill protocol information for this order      Signed Prescriptions Disp Refills     fenofibrate (TRIGLIDE) 160 MG tablet 90 tablet 3     Sig: TAKE 1 TABLET BY MOUTH  DAILY    Fenofibrate Refill Protocol Passed - 2/7/2019  3:18 AM       Passed - Renal status in last 6 months    Creatinine   Date Value Ref Range Status   10/31/2018 1.16 (H) 0.60 - 1.10 mg/dL Final            Passed - Fasting lipid cascade in last 12 months    Cholesterol   Date Value Ref Range Status   10/31/2018 151 <=199 mg/dL Final     Triglycerides   Date Value Ref Range Status   10/31/2018 152 (H) <=149 mg/dL Final     HDL Cholesterol   Date Value Ref Range Status   10/31/2018 33 (L) >=50 mg/dL Final     LDL Calculated   Date Value Ref Range Status   10/31/2018 88 <=129 mg/dL Final     Patient Fasting > 8hrs?   Date Value Ref Range Status   10/31/2018 Yes  Final            Passed - AST or ALT in last 12 months    AST   Date Value Ref Range Status   04/25/2018 20 0 - 40 U/L Final     ALT   Date Value Ref Range Status   04/25/2018 12 0 - 45 U/L Final              Passed - PCP or prescribing provider visit in past 12 months      Last office visit with prescriber/PCP: 11/8/2018 Jj Boswell MD OR same dept: 11/8/2018 Jj Boswell MD OR same specialty: 11/8/2018 Andreia  Jj BOWERS MD  Last physical: Visit date not found Last MTM visit: Visit date not found   Next visit within 3 mo: Visit date not found  Next physical within 3 mo: Visit date not found  Prescriber OR PCP: Jj Boswell MD  Last diagnosis associated with med order: 1. Breast CA (H)  - raloxifene (EVISTA) 60 mg tablet [Pharmacy Med Name: RALOXIFENE  60MG  TAB]; TAKE 1 TABLET BY MOUTH  DAILY AS DIRECTED  Dispense: 90 tablet; Refill: 3    2. Osteopenia  - raloxifene (EVISTA) 60 mg tablet [Pharmacy Med Name: RALOXIFENE  60MG  TAB]; TAKE 1 TABLET BY MOUTH  DAILY AS DIRECTED  Dispense: 90 tablet; Refill: 3    3. Medication monitoring encounter  - fenofibrate (TRIGLIDE) 160 MG tablet; TAKE 1 TABLET BY MOUTH  DAILY  Dispense: 90 tablet; Refill: 3    If protocol passes may refill for 12 months if within 3 months of last provider visit (or a total of 15 months).

## 2021-06-23 NOTE — TELEPHONE ENCOUNTER
Refill Approved (fenofibrate)     Rx renewed per Medication Renewal Policy. Medication was last renewed on 2/13/18    Jose Daniel Calle, [facility] Triage/Med Refill 2/9/2019     Requested Prescriptions   Pending Prescriptions Disp Refills     raloxifene (EVISTA) 60 mg tablet [Pharmacy Med Name: RALOXIFENE  60MG  TAB] 90 tablet 3     Sig: TAKE 1 TABLET BY MOUTH  DAILY AS DIRECTED    There is no refill protocol information for this order        fenofibrate (TRIGLIDE) 160 MG tablet [Pharmacy Med Name: FENOFIBRATE  160MG  TAB] 90 tablet 3     Sig: TAKE 1 TABLET BY MOUTH  DAILY    Fenofibrate Refill Protocol Passed - 2/7/2019  3:18 AM       Passed - Renal status in last 6 months    Creatinine   Date Value Ref Range Status   10/31/2018 1.16 (H) 0.60 - 1.10 mg/dL Final            Passed - Fasting lipid cascade in last 12 months    Cholesterol   Date Value Ref Range Status   10/31/2018 151 <=199 mg/dL Final     Triglycerides   Date Value Ref Range Status   10/31/2018 152 (H) <=149 mg/dL Final     HDL Cholesterol   Date Value Ref Range Status   10/31/2018 33 (L) >=50 mg/dL Final     LDL Calculated   Date Value Ref Range Status   10/31/2018 88 <=129 mg/dL Final     Patient Fasting > 8hrs?   Date Value Ref Range Status   10/31/2018 Yes  Final            Passed - AST or ALT in last 12 months    AST   Date Value Ref Range Status   04/25/2018 20 0 - 40 U/L Final     ALT   Date Value Ref Range Status   04/25/2018 12 0 - 45 U/L Final              Passed - PCP or prescribing provider visit in past 12 months      Last office visit with prescriber/PCP: 11/8/2018 Jj Boswell MD OR same dept: 11/8/2018 jJ Boswell MD OR same specialty: 11/8/2018 Jj Boswell MD  Last physical: Visit date not found Last MTM visit: Visit date not found   Next visit within 3 mo: Visit date not found  Next physical within 3 mo: Visit date not found  Prescriber OR PCP: Jj Boswell MD  Last diagnosis associated with med order: 1. Breast  CA (H)  - raloxifene (EVISTA) 60 mg tablet [Pharmacy Med Name: RALOXIFENE  60MG  TAB]; TAKE 1 TABLET BY MOUTH  DAILY AS DIRECTED  Dispense: 90 tablet; Refill: 3    2. Osteopenia  - raloxifene (EVISTA) 60 mg tablet [Pharmacy Med Name: RALOXIFENE  60MG  TAB]; TAKE 1 TABLET BY MOUTH  DAILY AS DIRECTED  Dispense: 90 tablet; Refill: 3    3. Medication monitoring encounter  - fenofibrate (TRIGLIDE) 160 MG tablet [Pharmacy Med Name: FENOFIBRATE  160MG  TAB]; TAKE 1 TABLET BY MOUTH  DAILY  Dispense: 90 tablet; Refill: 3    If protocol passes may refill for 12 months if within 3 months of last provider visit (or a total of 15 months).

## 2021-06-28 NOTE — PROGRESS NOTES
Progress Notes by Yuko Salas, RN at 11/27/2019 12:22 PM     Author: Yuko Salas, RN Service: -- Author Type: Registered Nurse    Filed: 11/29/2019  8:56 AM Encounter Date: 11/27/2019 Status: Signed    : Yuko Salas, RN (Registered Nurse)       Sp Blankenship MD  P [facility] Ep Rn Support Pool             Ok for replacement.  dd

## 2021-06-30 NOTE — PROGRESS NOTES
Progress Notes by Kristy Fernández MD at 12/15/2020  1:30 PM     Author: Kristy Fernández MD Service: -- Author Type: Physician    Filed: 12/15/2020  1:55 PM Encounter Date: 12/15/2020 Status: Signed    : Kristy Fernández MD (Physician)         [facility] Office Consult     Assessment:     1. Sick sinus syndrome (H) -secondary to symptomatic bradycardia she had a [employer] device placed in 2015 with [employer] right atrial right ventricular leads.  She had premature battery depletion and had generator change out December 2019.  Current device function shows 83% atrial pacing with less than 1% ventricular pacing and greater than 85% of heart rates at 60 to 70 bpm.  Rate response was updated for better rate support.  Follow-up with me in 1 year or sooner if needed in our [place] office.   2. Essential hypertension -blood pressure running very good on current medications.  She is on lisinopril and propranolol.   3. Chronic renal insufficiency, stage III (moderate) -creatinine stable at 1.03.   4. Malignant neoplasm of left breast in female, estrogen receptor positive, unspecified site of breast (H) -still getting E Vista.   5. Type 2 diabetes mellitus without complication, without long-term current use of insulin (H) -diet controlled.  Most recent hemoglobin A1c 6.1.   6. Hypertriglyceridemia without hypercholesterolemia -fairly good levels with LDL of 102 and cholesterol 178 without any medications.      Plan:   1.  Continue current medications.  2.  If symptoms worsen following right adjustment of responsive pacemaker let us know.  3.  Follow-up me in 1 year or sooner if needed.        History of Present Illness:   Thank you for asking the [facility] team to see Vickie Gonzalez a 89 y.o.  female  in consultation  to evaluate sick sinus syndrome.   Patient lives alone independently in senior housing, she does not drive anymore and has a nephew and niece who  look in on her occasionally.  Niece is dealing with cancer and nephew is blind.  She states the senior housing does her food shopping for her, she is however self-sufficient.  She is getting along well without any syncope, dizziness, chest discomfort, palpitations, shortness of breath, PND, with apnea or peripheral edema.    Past Medical History:     Past Medical History:   Diagnosis Date   ? Chronic kidney disease    ? Diabetes (H) diet-controlled    ? Goiter, nontoxic, multinodular    ? HTN (hypertension)    ? Hx antineoplastic chemotherapy     breast cancer    ? Hypertriglyceridemia without hypercholesterolemia    ? Osteopenia      Past history is negative for tuberculosis, myocardial infarction,  rheumatic fever, cerebrovascular accident, peptic ulcer disease, chronic obstructive pulmonary disease.    Past Surgical History:     Past Surgical History:   Procedure Laterality Date   ? BREAST BIOPSY Right    ? MASTECTOMY Right      Family History:   Family history positive for coronary artery bypass grafting in her brother in his 60s.    Social History:   She is , lives independently in senior housing, has no children.  Reports that she has never smoked. She has never used smokeless tobacco. She reports current light alcohol use. She reports that she does not use drugs. The primary care physician is Jj Boswell MD    Meds:   Scheduled Meds:  Current Outpatient Medications   Medication Sig Dispense Refill   ? calcium carbonate-vitamin D3 (CALCIUM 600 + D,3,) 600 mg(1,500mg) -200 unit per tablet Take 1 tablet by mouth daily. As directed.     ? cyanocobalamin (VITAMIN B-12) 2000 MCG tablet Take 2,000 mcg by mouth daily.     ? lisinopriL (PRINIVIL,ZESTRIL) 20 MG tablet Take 1 tablet (20 mg total) by mouth daily. 90 tablet 3   ? multivitamin-iron, hematinic, Tab Take 1 tablet by mouth once daily.     ? propranoloL (INDERAL) 20 MG tablet Take 1 tablet (20 mg total) by mouth 2 (two) times a day. 180 tablet 3    ? raloxifene (EVISTA) 60 mg tablet TAKE 1 TABLET BY MOUTH  DAILY AS DIRECTED 90 tablet 3     No current facility-administered medications for this visit.      PRN Meds:.    Allergies:   Tamoxifen analogues    Objective:      Physical Exam  142 lb (64.4 kg)     Body mass index is 24 kg/m .  /56 (Patient Site: Left Arm, Patient Position: Sitting, Cuff Size: Adult Regular)   Pulse 63   Resp 18   Wt 142 lb (64.4 kg)   SpO2 91%   BMI 24.00 kg/m      General Appearance:   Alert, cooperative and in no acute distress.   HEENT:  No scleral icterus; the mucous membranes were pink and moist.   Neck: JVP normal. No thyromegaly. No HJR   Chest: The spine was straight. The chest was symmetric.  Left-sided pacemaker   Lungs:   Respirations unlabored; the lungs are clear to auscultation.   Cardiovascular:   S1 and S2 without murmur, clicks or rubs. Brachial, radial, carotid and posterior tibial pulses are intact and symetrical.  No carotid bruits noted   Abdomen:  No organomegaly, masses, bruits, or tenderness. Bowels sounds are present   Extremities: No cyanosis, clubbing, or edema.   Skin: No xanthelasma.   Neurologic: Mood and affect are appropriate.         Lab Reviewed Personally by myself  Lab Results   Component Value Date     06/17/2020    K 4.8 06/17/2020     06/17/2020    CO2 23 06/17/2020    BUN 29 (H) 06/17/2020    CREATININE 1.03 06/17/2020    CALCIUM 9.6 06/17/2020     Lab Results   Component Value Date    WBC 5.2 06/17/2020    WBC 5.1 09/17/2015    HGB 12.8 06/17/2020    HCT 37.5 06/17/2020    MCV 92 06/17/2020     06/17/2020     Lab Results   Component Value Date    CHOL 178 06/17/2020    TRIG 194 (H) 06/17/2020    HDL 37 (L) 06/17/2020    LDLDIRECT 78.0 04/05/2010     No results found for: BNP    ECG personally reviewed by myself shows sinus rhythm.     Review of Systems:     Review of Systems:   General: WNL  Eyes: WNL  Ears/Nose/Throat: WNL  Lungs: WNL  Heart: WNL  Stomach:  WNL  Bladder: WNL  Muscle/Joints: WNL  Skin: WNL  Nervous System: WNL  Mental Health: WNL     Blood: WNL

## 2021-07-04 NOTE — LETTER
Letter by Taryn Hodgson at      Author: Taryn Hodgson Service: -- Author Type: --    Filed:  Encounter Date: 6/15/2021 Status: (Other)         Vickie Gonzalez  [address]  [place] [address]      Danitza 15, 2021      Dear MsSvetlana Carmichaele,    RE: Remote Results    We are writing to you regarding your recent Remote Pacemaker check from home. Your transmission was received successfully. Battery status is satisfactory at this time.     Your results are within normal limits.    Your next device appointment will be a remote check on September 21, 2021; this will occur automatically.    To schedule or reschedule, please call [phone] and press 1.    NOTE: If you would like to do an extra transmission, please call [phone] and press 3 to speak to a nurse BEFORE transmitting. This ensures that the Device Clinic staff is aware of the reason you are sending a transmission, and can follow-up with you after it has been reviewed.    We will be checking your implanted device from home (remotely) every three months unless otherwise instructed. We will need to see you in the clinic at least once a year. You may need to be seen in the clinic sooner depending on the results of your check.    Please be aware:    The follow-up schedule is like a Physician prescription.    Your remote monitor is paired to your specific implanted device.      Sincerely,    [facility]

## 2021-07-09 ENCOUNTER — COMMUNICATION - HEALTHEAST (OUTPATIENT)
Dept: INTERNAL MEDICINE | Facility: CLINIC | Age: 86
End: 2021-07-09

## 2021-07-13 ENCOUNTER — RECORDS - HEALTHEAST (OUTPATIENT)
Dept: ADMINISTRATIVE | Facility: CLINIC | Age: 86
End: 2021-07-13

## 2021-07-22 NOTE — LETTER
Letter by Jj Boswell MD at      Author: Jj Boswell MD Service: -- Author Type: --    Filed:  Encounter Date: 7/9/2021 Status: (Other)         Vickie Gonzalez  [address]  [place] [address]             July 9, 2021         Dear Ms. Gonzalez,    Below are the results from your recent visit:    Resulted Orders   DXA Bone Density Scan    Narrative    5/10/2021      RE: Vickie Gonzalez  YOB: 1925        Dear Jj Boswell,    Patient Profile:  89 y.o. female, postmenopausal, is here for the follow up bone density   test.   History of fractures - None. Family history of osteoporosis - None.    Family history of hip fracture: None. Smoking history - No. Osteoporosis   treatment past -  Yes;  Bisphosphonates. Osteoporosis treatment current -   Raloxifene.  Chronic medical problems - Breast cancer. High risk   medications -  Chemotherapy;  Yes, in the Past.    Assessment:    1. The spine bone density is assessed using L1-L3 with T-score of -1.7.  2. Femoral bone densities show left total hip T- score of -1.8, and right   total hip T-score of -1.9..  3.  Since the scan in 2019, bone density has declined a significant 8.7%   at L1-L3 of the AP spine, and 4.4% in the left total hip.  Bone density   has not significantly changed in the right total hip.  4.  The trabecular bone score suggests moderate micro architecture.    89 y.o. female with LOW BONE DENSITY (OSTEOPENIA) .  FRAX cannot be   calculated due to age.    Recommendations:  Given the significant decline in bone density of 2 of 3 measured sites, a   reassessment of current management is advised with a recheck in 2 years.      Bone densitometry was performed on your patient using our "[employer]   densitometer. The results are summarized and a copy of the actual scans   are included for your review. In conformity with the International Society   of Clinical Densitometry's most recent position statement for DXA    interpretation (2015), the diagnosis will be made on the lowest measured   T-score of the lumbar spine, femoral neck, total proximal femur or 33%   radius. Note the change in terminology for diagnostic classification from   OSTEOPENIA to LOW BONE MASS. All trending for sequential exams will be   done using multiple vertebrae or the total proximal femur. Fracture risk   is based on the WHO Fracture Risk Assessment Tool (FRAX). If additional   information is needed or if you would like to discuss the results, please   do not hesitate to call me.       Thank you for referring this patient to [facility]   [facility]. We are happy to be of service in support of you and your   practice. If you have any questions or suggestions to improve our service,   please call me at [phone].     Sincerely,     Jj Boswell M.D. C.C.RENEE.  [facility]       Vickie:   Sorry about the delay in getting back to you and your bone density test.  Bone density has declined at 2 3 measured sites, but is actually fairly good for age.  We will discuss further management at your next appointment.    Please call with questions or contact us using [employer].    Sincerely,        Electronically signed by Jj Boswell MD

## 2021-09-21 ENCOUNTER — ANCILLARY PROCEDURE (OUTPATIENT)
Dept: CARDIOLOGY | Facility: CLINIC | Age: 86
End: 2021-09-21
Attending: INTERNAL MEDICINE
Payer: MEDICARE

## 2021-09-21 DIAGNOSIS — Z95.0 CARDIAC PACEMAKER IN SITU: ICD-10-CM

## 2021-09-21 LAB
MDC_IDC_EPISODE_DTM: NORMAL
MDC_IDC_EPISODE_ID: NORMAL
MDC_IDC_EPISODE_TYPE: NORMAL
MDC_IDC_LEAD_IMPLANT_DT: NORMAL
MDC_IDC_LEAD_IMPLANT_DT: NORMAL
MDC_IDC_LEAD_LOCATION: NORMAL
MDC_IDC_LEAD_LOCATION: NORMAL
MDC_IDC_LEAD_LOCATION_DETAIL_1: NORMAL
MDC_IDC_LEAD_LOCATION_DETAIL_1: NORMAL
MDC_IDC_LEAD_MFG: NORMAL
MDC_IDC_LEAD_MFG: NORMAL
MDC_IDC_LEAD_MODEL: NORMAL
MDC_IDC_LEAD_MODEL: NORMAL
MDC_IDC_LEAD_POLARITY_TYPE: NORMAL
MDC_IDC_LEAD_POLARITY_TYPE: NORMAL
MDC_IDC_LEAD_SERIAL: NORMAL
MDC_IDC_LEAD_SERIAL: NORMAL
MDC_IDC_MSMT_BATTERY_DTM: NORMAL
MDC_IDC_MSMT_BATTERY_REMAINING_LONGEVITY: 156 MO
MDC_IDC_MSMT_BATTERY_REMAINING_PERCENTAGE: 100 %
MDC_IDC_MSMT_BATTERY_STATUS: NORMAL
MDC_IDC_MSMT_LEADCHNL_RA_IMPEDANCE_VALUE: 547 OHM
MDC_IDC_MSMT_LEADCHNL_RA_PACING_THRESHOLD_AMPLITUDE: 0.4 V
MDC_IDC_MSMT_LEADCHNL_RA_PACING_THRESHOLD_PULSEWIDTH: 0.4 MS
MDC_IDC_MSMT_LEADCHNL_RV_IMPEDANCE_VALUE: 657 OHM
MDC_IDC_MSMT_LEADCHNL_RV_PACING_THRESHOLD_AMPLITUDE: 0.8 V
MDC_IDC_MSMT_LEADCHNL_RV_PACING_THRESHOLD_PULSEWIDTH: 0.4 MS
MDC_IDC_PG_IMPLANT_DTM: NORMAL
MDC_IDC_PG_MFG: NORMAL
MDC_IDC_PG_MODEL: NORMAL
MDC_IDC_PG_SERIAL: NORMAL
MDC_IDC_PG_TYPE: NORMAL
MDC_IDC_SESS_CLINIC_NAME: NORMAL
MDC_IDC_SESS_DTM: NORMAL
MDC_IDC_SESS_TYPE: NORMAL
MDC_IDC_SET_BRADY_AT_MODE_SWITCH_MODE: NORMAL
MDC_IDC_SET_BRADY_AT_MODE_SWITCH_RATE: 170 {BEATS}/MIN
MDC_IDC_SET_BRADY_LOWRATE: 60 {BEATS}/MIN
MDC_IDC_SET_BRADY_MAX_SENSOR_RATE: 130 {BEATS}/MIN
MDC_IDC_SET_BRADY_MAX_TRACKING_RATE: 130 {BEATS}/MIN
MDC_IDC_SET_BRADY_MODE: NORMAL
MDC_IDC_SET_BRADY_PAV_DELAY_HIGH: 150 MS
MDC_IDC_SET_BRADY_PAV_DELAY_LOW: 180 MS
MDC_IDC_SET_BRADY_SAV_DELAY_HIGH: 150 MS
MDC_IDC_SET_BRADY_SAV_DELAY_LOW: 180 MS
MDC_IDC_SET_LEADCHNL_RA_PACING_AMPLITUDE: 1.5 V
MDC_IDC_SET_LEADCHNL_RA_PACING_CAPTURE_MODE: NORMAL
MDC_IDC_SET_LEADCHNL_RA_PACING_POLARITY: NORMAL
MDC_IDC_SET_LEADCHNL_RA_PACING_PULSEWIDTH: 0.4 MS
MDC_IDC_SET_LEADCHNL_RA_SENSING_ADAPTATION_MODE: NORMAL
MDC_IDC_SET_LEADCHNL_RA_SENSING_POLARITY: NORMAL
MDC_IDC_SET_LEADCHNL_RA_SENSING_SENSITIVITY: 0.25 MV
MDC_IDC_SET_LEADCHNL_RV_PACING_AMPLITUDE: 1.3 V
MDC_IDC_SET_LEADCHNL_RV_PACING_CAPTURE_MODE: NORMAL
MDC_IDC_SET_LEADCHNL_RV_PACING_POLARITY: NORMAL
MDC_IDC_SET_LEADCHNL_RV_PACING_PULSEWIDTH: 0.4 MS
MDC_IDC_SET_LEADCHNL_RV_SENSING_ADAPTATION_MODE: NORMAL
MDC_IDC_SET_LEADCHNL_RV_SENSING_POLARITY: NORMAL
MDC_IDC_SET_LEADCHNL_RV_SENSING_SENSITIVITY: 1.5 MV
MDC_IDC_SET_ZONE_DETECTION_INTERVAL: 375 MS
MDC_IDC_SET_ZONE_TYPE: NORMAL
MDC_IDC_SET_ZONE_VENDOR_TYPE: NORMAL
MDC_IDC_STAT_AT_BURDEN_PERCENT: 0 %
MDC_IDC_STAT_AT_DTM_END: NORMAL
MDC_IDC_STAT_AT_DTM_START: NORMAL
MDC_IDC_STAT_BRADY_DTM_END: NORMAL
MDC_IDC_STAT_BRADY_DTM_START: NORMAL
MDC_IDC_STAT_BRADY_RA_PERCENT_PACED: 84 %
MDC_IDC_STAT_BRADY_RV_PERCENT_PACED: 0 %
MDC_IDC_STAT_EPISODE_RECENT_COUNT: 0
MDC_IDC_STAT_EPISODE_RECENT_COUNT_DTM_END: NORMAL
MDC_IDC_STAT_EPISODE_RECENT_COUNT_DTM_START: NORMAL
MDC_IDC_STAT_EPISODE_TYPE: NORMAL
MDC_IDC_STAT_EPISODE_VENDOR_TYPE: NORMAL

## 2021-09-21 PROCEDURE — 93294 REM INTERROG EVL PM/LDLS PM: CPT | Performed by: INTERNAL MEDICINE

## 2021-09-21 PROCEDURE — 93296 REM INTERROG EVL PM/IDS: CPT | Performed by: INTERNAL MEDICINE

## 2021-09-23 DIAGNOSIS — I10 ESSENTIAL HYPERTENSION: ICD-10-CM

## 2021-09-23 RX ORDER — LISINOPRIL 20 MG/1
20 TABLET ORAL DAILY
Qty: 90 TABLET | Refills: 3 | Status: SHIPPED | OUTPATIENT
Start: 2021-09-23 | End: 2022-09-12

## 2021-09-23 NOTE — TELEPHONE ENCOUNTER
Reason for Call:  Medication or medication refill:    Do you use a [facility]?  Name of the pharmacy and phone number for the current request:  [facility]    Name of the medication requested:     New script as it is working for her per 5/4/21 note  PLAN:  1.  Increase propranolol to 40 mg twice daily    and    lisinopriL (PRINIVIL,ZESTRIL) 20 MG tablet 90 tablet 3 9/28/2020  No   Sig - Route: [LISINOPRIL (PRINIVIL,ZESTRIL) 20 MG TABLET] Take 1 tablet (20 mg total) by mouth daily. - Oral         Other request: needs new scripts    Can we leave a detailed message on this number? YES    Phone number patient can be reached at: Home number on file [phone] (home)    Best Time: any    Call taken on 9/23/2021 at 9:51 AM by Pam J. Behr

## 2021-10-25 ENCOUNTER — TELEPHONE (OUTPATIENT)
Dept: INTERNAL MEDICINE | Facility: CLINIC | Age: 86
End: 2021-10-25

## 2021-10-25 DIAGNOSIS — I10 ESSENTIAL HYPERTENSION: Primary | ICD-10-CM

## 2021-10-25 NOTE — TELEPHONE ENCOUNTER
Reason for Call:  Other call back and prescription    Detailed comments: Patient has a question about propranoloL (INDERAL) 20 MG tablet - per patient Dr. Boswell told patient to increase to 40mg twice daily. She was told by pharmacy ([facility]) that the medication propranoloL has been taken off her list of medications.     Please call patient back and advise.      Phone Number Patient can be reached at: Home number on file [phone] (home)    Best Time: ANY    Can we leave a detailed message on this number? YES    Call taken on 10/25/2021 at 8:25 AM by Bartolo Brooks

## 2021-10-27 RX ORDER — PROPRANOLOL HYDROCHLORIDE 40 MG/1
40 TABLET ORAL 2 TIMES DAILY
Qty: 180 TABLET | Refills: 3 | Status: SHIPPED | OUTPATIENT
Start: 2021-10-27 | End: 2022-09-12

## 2021-11-24 ENCOUNTER — TELEPHONE (OUTPATIENT)
Dept: INTERNAL MEDICINE | Facility: CLINIC | Age: 86
End: 2021-11-24
Payer: MEDICARE

## 2021-11-26 DIAGNOSIS — E78.1 HYPERTRIGLYCERIDEMIA WITHOUT HYPERCHOLESTEROLEMIA: Primary | ICD-10-CM

## 2021-11-26 DIAGNOSIS — E11.9 TYPE 2 DIABETES MELLITUS WITHOUT COMPLICATION, WITHOUT LONG-TERM CURRENT USE OF INSULIN (H): ICD-10-CM

## 2021-11-29 ENCOUNTER — LAB (OUTPATIENT)
Dept: LAB | Facility: CLINIC | Age: 86
End: 2021-11-29
Payer: MEDICARE

## 2021-11-29 DIAGNOSIS — E78.1 HYPERTRIGLYCERIDEMIA WITHOUT HYPERCHOLESTEROLEMIA: ICD-10-CM

## 2021-11-29 DIAGNOSIS — E11.9 TYPE 2 DIABETES MELLITUS WITHOUT COMPLICATION, WITHOUT LONG-TERM CURRENT USE OF INSULIN (H): ICD-10-CM

## 2021-11-29 LAB
ALBUMIN SERPL-MCNC: 4.2 G/DL (ref 3.5–5)
ALP SERPL-CCNC: 61 U/L (ref 45–120)
ALT SERPL W P-5'-P-CCNC: 14 U/L (ref 0–45)
ANION GAP SERPL CALCULATED.3IONS-SCNC: 11 MMOL/L (ref 5–18)
AST SERPL W P-5'-P-CCNC: 19 U/L (ref 0–40)
BILIRUB SERPL-MCNC: 0.4 MG/DL (ref 0–1)
BUN SERPL-MCNC: 39 MG/DL (ref 8–28)
CALCIUM SERPL-MCNC: 10.1 MG/DL (ref 8.5–10.5)
CHLORIDE BLD-SCNC: 103 MMOL/L (ref 98–107)
CHOLEST SERPL-MCNC: 234 MG/DL
CO2 SERPL-SCNC: 26 MMOL/L (ref 22–31)
CREAT SERPL-MCNC: 1.23 MG/DL (ref 0.6–1.1)
CREAT UR-MCNC: 121 MG/DL
FASTING STATUS PATIENT QL REPORTED: YES
GFR SERPL CREATININE-BSD FRML MDRD: 37 ML/MIN/1.73M2
GLUCOSE BLD-MCNC: 102 MG/DL (ref 70–125)
HBA1C MFR BLD: 6.1 % (ref 0–5.6)
HDLC SERPL-MCNC: 38 MG/DL
LDLC SERPL CALC-MCNC: 138 MG/DL
MICROALBUMIN UR-MCNC: 3.63 MG/DL (ref 0–1.99)
MICROALBUMIN/CREAT UR: 30 MG/G CR
POTASSIUM BLD-SCNC: 5.4 MMOL/L (ref 3.5–5)
PROT SERPL-MCNC: 8.1 G/DL (ref 6–8)
SODIUM SERPL-SCNC: 140 MMOL/L (ref 136–145)
TRIGL SERPL-MCNC: 291 MG/DL

## 2021-11-29 PROCEDURE — 80053 COMPREHEN METABOLIC PANEL: CPT

## 2021-11-29 PROCEDURE — 80061 LIPID PANEL: CPT

## 2021-11-29 PROCEDURE — 83036 HEMOGLOBIN GLYCOSYLATED A1C: CPT

## 2021-11-29 PROCEDURE — 36415 COLL VENOUS BLD VENIPUNCTURE: CPT

## 2021-11-29 PROCEDURE — 82043 UR ALBUMIN QUANTITATIVE: CPT

## 2021-12-03 ENCOUNTER — OFFICE VISIT (OUTPATIENT)
Dept: INTERNAL MEDICINE | Facility: CLINIC | Age: 86
End: 2021-12-03
Payer: MEDICARE

## 2021-12-03 VITALS — HEART RATE: 70 BPM | DIASTOLIC BLOOD PRESSURE: 60 MMHG | TEMPERATURE: 97.3 F | SYSTOLIC BLOOD PRESSURE: 152 MMHG

## 2021-12-03 DIAGNOSIS — R25.9 ABNORMAL INVOLUNTARY MOVEMENT: ICD-10-CM

## 2021-12-03 DIAGNOSIS — I10 ESSENTIAL HYPERTENSION: ICD-10-CM

## 2021-12-03 DIAGNOSIS — Z95.0 CARDIAC PACEMAKER IN SITU: ICD-10-CM

## 2021-12-03 DIAGNOSIS — N18.31 CHRONIC RENAL IMPAIRMENT, STAGE 3A (H): ICD-10-CM

## 2021-12-03 DIAGNOSIS — E11.9 TYPE 2 DIABETES MELLITUS WITHOUT COMPLICATION, WITHOUT LONG-TERM CURRENT USE OF INSULIN (H): Primary | ICD-10-CM

## 2021-12-03 DIAGNOSIS — E78.2 MIXED HYPERLIPIDEMIA: ICD-10-CM

## 2021-12-03 PROCEDURE — 99214 OFFICE O/P EST MOD 30 MIN: CPT | Performed by: INTERNAL MEDICINE

## 2021-12-03 NOTE — PATIENT INSTRUCTIONS
1.  Continue propranolol and lisinopril.    2.  See in six months or as needed    3.  Up to date on flu shot and Covid vaccines

## 2021-12-04 PROBLEM — E78.2 MIXED HYPERLIPIDEMIA: Status: ACTIVE | Noted: 2021-12-04

## 2021-12-04 NOTE — PROGRESS NOTES
ASSESSMENT:  1. Mixed hyperlipidemia  Lety previously was on fenofibrate for hypertriglyceridemia.  This was discontinued since lipids were fairly good.  She now has an elevation in total cholesterol at 234 with triglycerides of 291.  Since she has 89 with no history of atherosclerotic disease, I would favor conservative management at the present.    2. Type 2 diabetes mellitus without complication, without long-term current use of insulin (H)  Hemoglobin A1c is excellent at 6.1.  She is not on diabetic medications, and acknowledges that she has not been watching diet closely lately    3. Essential hypertension  Blood pressure is acceptable on current regimen    4. Cardiac pacemaker, dual, in situ  She has a pacemaker which has been functioning well    5. Abnormal involuntary movement  She has a prominent tremor.  She has been on propranolol but recently discontinued it.  She was encouraged to restart    6.  Chronic renal insufficiency stage III:  GFR was 37 on previsit labs.  This will be monitored    PLAN:  Patient Instructions   1.  Continue propranolol and lisinopril.    2.  See in six months or as needed    3.  Up to date on flu shot and Covid vaccines    4.   Previsit labs were reviewed  Medications Discontinued During This Encounter   Medication Reason     propranoloL (INDERAL) 20 MG tablet Dose adjustment           ASSESSED PROBLEMS:    See above      CHIEF COMPLAINT:  Follow-up type 2 diabetes, essential hypertension, and other concerns    HISTORY OF PRESENT ILLNESS:  Vickie is a 89 year old female 89-year-old woman seen for follow-up of type 2 diabetes, essential hypertension, and other concerns.  She reports that overall she feels well.  She plays cards and Scrabble with friends to keep busy.    Previsit labs were reviewed.  Hemoglobin A1c was 6.1.  She acknowledges she has not been very careful with diet.  She has not any diabetic medications.    Lipids were notable for elevated total cholesterol and  triglycerides.  She formerly was on fenofibrate but is not now on any lipid-lowering medications.  She has no known history of atherosclerotic disease.    She is on lisinopril for essential hypertension.  She also had been on propranolol for hypertension and tremor.  She has been out of this recently.  She has not noticed any major change in tremor off the medication.    She has a distant history of mastectomy for breast cancer with no evidence for recurrence    REVIEW OF SYSTEMS:  See above  Comprehensive review of systems is otherwise negative.    PFSH:  .  Lives independently in a seniors apartment.  Had 2 sisters who lived to [facility]    TOBACCO USE:  History   Smoking Status     Never Smoker   Smokeless Tobacco     Never Used       VITALS:  Vitals:    12/03/21 1145   BP: (!) 152/60    132/64   BP Location: Right arm   right arm   Patient Position: Sitting          sitting   Cuff Size: Adult Regular   Pulse: 70   Temp: 97.3  F (36.3  C)     Wt Readings from Last 3 Encounters:   05/04/21 65.3 kg (143 lb 14.4 oz)   12/15/20 64.4 kg (142 lb)   12/15/20 64.4 kg (142 lb)       PHYSICAL EXAM:  Constitutional:   Reveals an alert pleasant talkative woman with appropriate affect.  She does not seem in acute distress.  She has a prominent resting tremor.  She can get on the exam table quite easily.    Vitals: per nursing notes.  HEENT:  Ears:  External canals, TMs clear.    Eyes:  EOMs full, PERRL.  Oropharynx:   Mouth and throat clear, no thrush or exudate.  Neck:  Supple, no carotid bruits or adenopathy.  Multinodular goiter unchanged from the past.  Back:  No spine or CVA pain.  Thorax: Pacemaker in left  Lungs: Clear to A&P without rales or wheezes.  Respiratory effort normal.  Cardiac:   Regular rate and rhythm, normal S1, S2, no murmur or gallop.  Breasts:   Not examined  Abdomen:  Soft, active bowel sounds without bruits, mass, or tenderness.  .  Extremities:   No peripheral edema, pulses in the feet intact.     Skin:  No jaundice, peripheral cyanosis or lesions to suggest malignancy.  Neuro:  Alert and oriented.  prominent resting tremor, otherwise no gross focal deficits.  Psychiatric:  Memory intact, mood appropriate.    DATA REVIEWED:  Additional History from Old Records Summarized (2): None.  Decision to Obtain Records (1): None.   Radiology Tests Summarized or Ordered (1): Previous DEXA scan reviewed.  Labs Reviewed or Ordered (1): Previsit labs reviewed  Medicine Test Summarized or Ordered (1): None.   Independent Review of EKG or X-RAY(2 each): None.    The visit lasted a total of 30 minutes face to face with the patient. Over 50% of the time was spent counseling and educating the patient about management of type 2 diabetes, essential hypertension, mixed hyperlipidemia, and other concerns.      Dragon dictation was used for this note. Speech recognition errors are a possibility.     MEDICATIONS:  Current Outpatient Medications   Medication Sig Dispense Refill     calcium carbonate-vitamin D3 (CALCIUM 600 + D,3,) 600 mg(1,500mg) -200 unit per tablet [CALCIUM CARBONATE-VITAMIN D3 (CALCIUM 600 + D,3,) 600 MG(1,500MG) -200 UNIT PER TABLET] Take 1 tablet by mouth daily. As directed.       cyanocobalamin (VITAMIN B-12) 2000 MCG tablet [CYANOCOBALAMIN (VITAMIN B-12) 2000 MCG TABLET] Take 2,000 mcg by mouth daily.       lisinopril (ZESTRIL) 20 MG tablet Take 1 tablet (20 mg) by mouth daily 90 tablet 3     propranolol (INDERAL) 40 MG tablet Take 1 tablet (40 mg) by mouth 2 times daily 180 tablet 3     MEDICATION CANNOT BE REORDERED - PLEASE MANUALLY REORDER AND DISCONTINUE THE OLD ORDER [MULTIVITAMIN-IRON, HEMATINIC, TAB] Take 1 tablet by mouth once daily.

## 2021-12-29 ENCOUNTER — TRANSCRIBE ORDERS (OUTPATIENT)
Dept: CARDIOLOGY | Facility: CLINIC | Age: 86
End: 2021-12-29
Payer: MEDICARE

## 2021-12-29 DIAGNOSIS — Z95.0 CARDIAC PACEMAKER IN SITU: Primary | ICD-10-CM

## 2022-01-06 PROBLEM — C50.919 ADENOCARCINOMA OF BREAST (H): Status: ACTIVE | Noted: 2022-01-06

## 2022-01-18 ENCOUNTER — OFFICE VISIT (OUTPATIENT)
Dept: CARDIOLOGY | Facility: CLINIC | Age: 87
End: 2022-01-18
Attending: INTERNAL MEDICINE
Payer: MEDICARE

## 2022-01-18 VITALS
DIASTOLIC BLOOD PRESSURE: 66 MMHG | HEART RATE: 61 BPM | HEIGHT: 65 IN | OXYGEN SATURATION: 92 % | WEIGHT: 150.1 LBS | BODY MASS INDEX: 25.01 KG/M2 | SYSTOLIC BLOOD PRESSURE: 162 MMHG

## 2022-01-18 DIAGNOSIS — I10 ESSENTIAL HYPERTENSION: ICD-10-CM

## 2022-01-18 DIAGNOSIS — I49.5 SICK SINUS SYNDROME (H): Primary | ICD-10-CM

## 2022-01-18 DIAGNOSIS — Z95.0 CARDIAC PACEMAKER IN SITU: ICD-10-CM

## 2022-01-18 PROCEDURE — 99204 OFFICE O/P NEW MOD 45 MIN: CPT | Mod: 25 | Performed by: INTERNAL MEDICINE

## 2022-01-18 PROCEDURE — G0463 HOSPITAL OUTPT CLINIC VISIT: HCPCS | Mod: 25

## 2022-01-18 PROCEDURE — 93280 PM DEVICE PROGR EVAL DUAL: CPT | Performed by: INTERNAL MEDICINE

## 2022-01-18 ASSESSMENT — MIFFLIN-ST. JEOR: SCORE: 1075.69

## 2022-01-18 ASSESSMENT — PAIN SCALES - GENERAL: PAINLEVEL: NO PAIN (0)

## 2022-01-18 NOTE — PATIENT INSTRUCTIONS
"You were seen today in the Cardiovascular Clinic at the [facility].     Cardiology Providers you saw during your visit: Dr. Britt Frausto    Diagnosis:  Pacemaker     Results: discussed with patient    Orders:   None    Medication Changes:   None    Recommendations:   None    Follow-up:   Remote monitor for pacemaker every 3 months      Please follow up with primary care provider for medication refills         Please feel free to call me with any questions or concerns.       Joann Arellano RN     Questions and [phone].   First press #1 for the [employer] and then press #3 for \"Medical Questions\" to reach us Cardiology Nurses.      On Call Cardiologist for after hours or on weekends: [phone]   option #4 and ask to speak to the on-call Cardiologist.          If you need a medication refill please contact your pharmacy.  Please allow 3 business days for your refill to be completed.    "

## 2022-01-18 NOTE — LETTER
"1/18/2022      RE: Vickie Gonzalez  [address]  [place] [address]       Dear Colleague,    Thank you for the opportunity to participate in the care of your patient, Vickie Gonzalez, at the [facility] [place] [facility]. Please see a copy of my visit note below.    I am delighted to see Vickie Gonzalez as a new patient in cardiology clinic to establish follow up of her pacemaker.    History of Present Illness:  The patient is a 89 year old  Female with sick sinus syndrome s/p [employer] pacemaker, previously followed at [place]. Switching care here due to location. She lives in an assisted living facility but is independent in her own apartment, does her own household chores. She denies chest discomfort, palpitations, dyspnea. She did not get Covid, has completed vaccinations, and fortunately covid rate is low in her building. She spends her time with friends and her family of nieces/nephews. She has no children.      Past Medical History:  1. Sick sinus syndrome s/p [employer] dual chamber pacemaker initially 9/2/2015; generator change 12/17/2019  2. Hypertension  3. Essential tremors     Medications:   Lisinopril 20 every day  Propranolol 40 bid    Allergies:    Allergies   Allergen Reactions     Tamoxifen Analogues [Tamoxifen] Unknown     Annotation: hepatitis       Family History: sisters lived to be over 100 yo    Psychosocial history:   Smoke: never  Alcohol: rarely  Recreational drugs: none    Physical examination  Vitals: BP (!) 162/66 (BP Location: Left arm, Patient Position: Chair, Cuff Size: Adult Regular)   Pulse 61   Ht 1.657 m (5' 5.25\")   Wt 68.1 kg (150 lb 1.6 oz)   SpO2 92%   BMI 24.79 kg/m    BMI= Body mass index is 24.79 kg/m .    Constitutional: In general, the patient is a pleasant female in no acute distress.  Looks much younger than 89 yo.  Cardiovascular: Carotids +2/2 bilaterally " without bruits.  No jugular venous distension. Regular rate and rhythm. Normal S1, S2. No murmur, rub, click, or gallop.   Extremities: Pulses are normal bilaterally throughout. No peripheral edema.  Respiratory: Clear to asculation.  No ronchi, wheezes, rales.  No dullness to percussion.   Chest area: left pectoral pacemaker incision clean    I have personally and independently reviewed the following:  Labs:   11/29/2021: chol 234, HDL 38, , , Na 140, K 5.4, cr 1.23, A1C 6.1  5/4/2021: hgb 13.2, plt 197K    Echo 8/26/2015: normal EF    EKG 8/13/2015; sinus, RBBB    Device interrogation today 1/18/2022:  [employer] L331 ACCOLADE MRI EL implanted 12/17/2019; AP 86%,  <1%; longevity 12 years.   RA: 4469 Fineline II  RV: 4470 Fineline II  Underlying rhythm sinus elzbieta with prolonged ID  All parameters stable    Assessment :  SSS s/p pacemaker. Pacemaker in good working function. No changes are needed. Longevity ~ 12 years. Not pacemaker dependent.    Plan:  Remote monitor follow up every 3 months.  See back in clinic only if programming changes are required.        I spent a total of 30 minutes face to face with  Vickie Gonzalez during today's office visit. I have spend an additional 15 minutes today on chart review and documentation.    The patient is to return as above . The patient understood the treatment plan as outlined above.  There were no barriers to learning.      Britt Frausto MD

## 2022-01-18 NOTE — NURSING NOTE
No medication changes at this time.     Continue with remote device checks every 3 months.     Follow up with Dr. Frausto as needed.    Delmer Shen, RN   Cardiology Nurse Coordinator

## 2022-01-18 NOTE — PATIENT INSTRUCTIONS
It was a pleasure to see you in clinic today.  Please do not hesitate to call with any questions or concerns.  You are scheduled for remote transmissions on 4/20/22, 7/22/22 and 10/24/22.  We look forward to seeing you in clinic at your next device check in 1 year.    Amanda Medina, RN, MS, CCRN  Electrophysiology Nurse Clinician  [facility]    During Business Hours Please Call:  [phone]  After Hours Please Call:  [phone] - select option #4 and ask for job code [phone]

## 2022-01-18 NOTE — PROGRESS NOTES
"I am delighted to see Vickie Gonzalez as a new patient in cardiology clinic to establish follow up of her pacemaker.    History of Present Illness:  The patient is a 89 year old  Female with sick sinus syndrome s/p [employer] pacemaker, previously followed at [place]. Switching care here due to location. She lives in an assisted living facility but is independent in her own apartment, does her own household chores. She denies chest discomfort, palpitations, dyspnea. She did not get Covid, has completed vaccinations, and fortunately covid rate is low in her building. She spends her time with friends and her family of nieces/nephews. She has no children.      Past Medical History:  1. Sick sinus syndrome s/p [employer] dual chamber pacemaker initially 9/2/2015; generator change 12/17/2019  2. Hypertension  3. Essential tremors     Medications:   Lisinopril 20 every day  Propranolol 40 bid    Allergies:    Allergies   Allergen Reactions     Tamoxifen Analogues [Tamoxifen] Unknown     Annotation: hepatitis       Family History: sisters lived to be over 100 yo    Psychosocial history:   Smoke: never  Alcohol: rarely  Recreational drugs: none    Physical examination  Vitals: BP (!) 162/66 (BP Location: Left arm, Patient Position: Chair, Cuff Size: Adult Regular)   Pulse 61   Ht 1.657 m (5' 5.25\")   Wt 68.1 kg (150 lb 1.6 oz)   SpO2 92%   BMI 24.79 kg/m    BMI= Body mass index is 24.79 kg/m .    Constitutional: In general, the patient is a pleasant female in no acute distress.  Looks much younger than 89 yo.  Cardiovascular: Carotids +2/2 bilaterally without bruits.  No jugular venous distension. Regular rate and rhythm. Normal S1, S2. No murmur, rub, click, or gallop.   Extremities: Pulses are normal bilaterally throughout. No peripheral edema.  Respiratory: Clear to asculation.  No ronchi, wheezes, rales.  No dullness to percussion.   Chest area: left pectoral pacemaker incision clean    I have " personally and independently reviewed the following:  Labs:   11/29/2021: chol 234, HDL 38, , , Na 140, K 5.4, cr 1.23, A1C 6.1  5/4/2021: hgb 13.2, plt 197K    Echo 8/26/2015: normal EF    EKG 8/13/2015; sinus, RBBB    Device interrogation today 1/18/2022:  [employer] L331 ACCOLADE MRI EL implanted 12/17/2019; AP 86%,  <1%; longevity 12 years.   RA: 4469 Fineline II  RV: 4470 Fineline II  Underlying rhythm sinus elzbieta with prolonged CA  All parameters stable    Assessment :  SSS s/p pacemaker. Pacemaker in good working function. No changes are needed. Longevity ~ 12 years. Not pacemaker dependent.    Plan:  Remote monitor follow up every 3 months.  See back in clinic only if programming changes are required.        I spent a total of 30 minutes face to face with  Vickie Gonzalez during today's office visit. I have spend an additional 15 minutes today on chart review and documentation.    The patient is to return as above . The patient understood the treatment plan as outlined above.  There were no barriers to learning.      Britt Frausto MD

## 2022-01-18 NOTE — NURSING NOTE
Chief Complaint   Patient presents with     New Patient     NEW self referral for PM check     Vitals were taken and medications reconciled.    Roe Plata, KI  3:27 PM

## 2022-01-27 LAB
MDC_IDC_LEAD_IMPLANT_DT: NORMAL
MDC_IDC_LEAD_IMPLANT_DT: NORMAL
MDC_IDC_LEAD_LOCATION: NORMAL
MDC_IDC_LEAD_LOCATION: NORMAL
MDC_IDC_LEAD_LOCATION_DETAIL_1: NORMAL
MDC_IDC_LEAD_LOCATION_DETAIL_1: NORMAL
MDC_IDC_LEAD_MFG: NORMAL
MDC_IDC_LEAD_MFG: NORMAL
MDC_IDC_LEAD_MODEL: NORMAL
MDC_IDC_LEAD_MODEL: NORMAL
MDC_IDC_LEAD_POLARITY_TYPE: NORMAL
MDC_IDC_LEAD_POLARITY_TYPE: NORMAL
MDC_IDC_LEAD_SERIAL: NORMAL
MDC_IDC_LEAD_SERIAL: NORMAL
MDC_IDC_MSMT_BATTERY_DTM: NORMAL
MDC_IDC_MSMT_BATTERY_REMAINING_LONGEVITY: 144 MO
MDC_IDC_MSMT_BATTERY_REMAINING_PERCENTAGE: 100 %
MDC_IDC_MSMT_BATTERY_STATUS: NORMAL
MDC_IDC_MSMT_LEADCHNL_RA_IMPEDANCE_VALUE: 537 OHM
MDC_IDC_MSMT_LEADCHNL_RA_PACING_THRESHOLD_AMPLITUDE: 0.5 V
MDC_IDC_MSMT_LEADCHNL_RA_PACING_THRESHOLD_PULSEWIDTH: 0.4 MS
MDC_IDC_MSMT_LEADCHNL_RV_IMPEDANCE_VALUE: 651 OHM
MDC_IDC_MSMT_LEADCHNL_RV_PACING_THRESHOLD_AMPLITUDE: 0.8 V
MDC_IDC_MSMT_LEADCHNL_RV_PACING_THRESHOLD_PULSEWIDTH: 0.4 MS
MDC_IDC_PG_IMPLANT_DTM: NORMAL
MDC_IDC_PG_MFG: NORMAL
MDC_IDC_PG_MODEL: NORMAL
MDC_IDC_PG_SERIAL: NORMAL
MDC_IDC_PG_TYPE: NORMAL
MDC_IDC_SESS_CLINIC_NAME: NORMAL
MDC_IDC_SESS_DTM: NORMAL
MDC_IDC_SESS_TYPE: NORMAL
MDC_IDC_SET_BRADY_AT_MODE_SWITCH_MODE: NORMAL
MDC_IDC_SET_BRADY_AT_MODE_SWITCH_RATE: 170 {BEATS}/MIN
MDC_IDC_SET_BRADY_LOWRATE: 60 {BEATS}/MIN
MDC_IDC_SET_BRADY_MAX_SENSOR_RATE: 130 {BEATS}/MIN
MDC_IDC_SET_BRADY_MAX_TRACKING_RATE: 130 {BEATS}/MIN
MDC_IDC_SET_BRADY_MODE: NORMAL
MDC_IDC_SET_BRADY_PAV_DELAY_HIGH: 150 MS
MDC_IDC_SET_BRADY_PAV_DELAY_LOW: 180 MS
MDC_IDC_SET_BRADY_SAV_DELAY_HIGH: 150 MS
MDC_IDC_SET_BRADY_SAV_DELAY_LOW: 180 MS
MDC_IDC_SET_LEADCHNL_RA_PACING_AMPLITUDE: 2 V
MDC_IDC_SET_LEADCHNL_RA_PACING_CAPTURE_MODE: NORMAL
MDC_IDC_SET_LEADCHNL_RA_PACING_POLARITY: NORMAL
MDC_IDC_SET_LEADCHNL_RA_PACING_PULSEWIDTH: 0.4 MS
MDC_IDC_SET_LEADCHNL_RA_SENSING_ADAPTATION_MODE: NORMAL
MDC_IDC_SET_LEADCHNL_RA_SENSING_POLARITY: NORMAL
MDC_IDC_SET_LEADCHNL_RA_SENSING_SENSITIVITY: 0.25 MV
MDC_IDC_SET_LEADCHNL_RV_PACING_AMPLITUDE: 1.3 V
MDC_IDC_SET_LEADCHNL_RV_PACING_CAPTURE_MODE: NORMAL
MDC_IDC_SET_LEADCHNL_RV_PACING_POLARITY: NORMAL
MDC_IDC_SET_LEADCHNL_RV_PACING_PULSEWIDTH: 0.4 MS
MDC_IDC_SET_LEADCHNL_RV_SENSING_ADAPTATION_MODE: NORMAL
MDC_IDC_SET_LEADCHNL_RV_SENSING_POLARITY: NORMAL
MDC_IDC_SET_LEADCHNL_RV_SENSING_SENSITIVITY: 1.5 MV
MDC_IDC_SET_ZONE_DETECTION_INTERVAL: 375 MS
MDC_IDC_SET_ZONE_TYPE: NORMAL
MDC_IDC_SET_ZONE_VENDOR_TYPE: NORMAL
MDC_IDC_STAT_AT_BURDEN_PERCENT: 0 %
MDC_IDC_STAT_AT_DTM_END: NORMAL
MDC_IDC_STAT_AT_DTM_START: NORMAL
MDC_IDC_STAT_BRADY_DTM_END: NORMAL
MDC_IDC_STAT_BRADY_DTM_START: NORMAL
MDC_IDC_STAT_BRADY_RA_PERCENT_PACED: 86 %
MDC_IDC_STAT_BRADY_RV_PERCENT_PACED: 0 %
MDC_IDC_STAT_EPISODE_RECENT_COUNT: 0
MDC_IDC_STAT_EPISODE_RECENT_COUNT_DTM_END: NORMAL
MDC_IDC_STAT_EPISODE_RECENT_COUNT_DTM_START: NORMAL
MDC_IDC_STAT_EPISODE_TYPE: NORMAL
MDC_IDC_STAT_EPISODE_VENDOR_TYPE: NORMAL

## 2022-04-20 ENCOUNTER — ANCILLARY PROCEDURE (OUTPATIENT)
Dept: CARDIOLOGY | Facility: CLINIC | Age: 87
End: 2022-04-20
Attending: INTERNAL MEDICINE
Payer: MEDICARE

## 2022-04-20 DIAGNOSIS — I49.5 SICK SINUS SYNDROME (H): ICD-10-CM

## 2022-04-20 DIAGNOSIS — Z95.0 CARDIAC PACEMAKER IN SITU: ICD-10-CM

## 2022-04-20 PROCEDURE — 93294 REM INTERROG EVL PM/LDLS PM: CPT | Performed by: INTERNAL MEDICINE

## 2022-04-20 PROCEDURE — 93296 REM INTERROG EVL PM/IDS: CPT

## 2022-05-08 DIAGNOSIS — E78.2 MIXED HYPERLIPIDEMIA: ICD-10-CM

## 2022-05-08 DIAGNOSIS — E11.9 TYPE 2 DIABETES MELLITUS WITHOUT COMPLICATION, WITHOUT LONG-TERM CURRENT USE OF INSULIN (H): ICD-10-CM

## 2022-05-08 DIAGNOSIS — E03.9 ACQUIRED HYPOTHYROIDISM: Primary | ICD-10-CM

## 2022-05-16 ENCOUNTER — LAB (OUTPATIENT)
Dept: LAB | Facility: CLINIC | Age: 87
End: 2022-05-16
Payer: MEDICARE

## 2022-05-16 DIAGNOSIS — E03.9 ACQUIRED HYPOTHYROIDISM: ICD-10-CM

## 2022-05-16 DIAGNOSIS — E78.2 MIXED HYPERLIPIDEMIA: ICD-10-CM

## 2022-05-16 DIAGNOSIS — E11.9 TYPE 2 DIABETES MELLITUS WITHOUT COMPLICATION, WITHOUT LONG-TERM CURRENT USE OF INSULIN (H): ICD-10-CM

## 2022-05-16 LAB
ANION GAP SERPL CALCULATED.3IONS-SCNC: 10 MMOL/L (ref 5–18)
BUN SERPL-MCNC: 28 MG/DL (ref 8–28)
CALCIUM SERPL-MCNC: 9.7 MG/DL (ref 8.5–10.5)
CHLORIDE BLD-SCNC: 105 MMOL/L (ref 98–107)
CHOLEST SERPL-MCNC: 215 MG/DL
CO2 SERPL-SCNC: 25 MMOL/L (ref 22–31)
CREAT SERPL-MCNC: 0.85 MG/DL (ref 0.6–1.1)
FASTING STATUS PATIENT QL REPORTED: YES
GFR SERPL CREATININE-BSD FRML MDRD: 62 ML/MIN/1.73M2
GLUCOSE BLD-MCNC: 101 MG/DL (ref 70–125)
HBA1C MFR BLD: 6.2 % (ref 0–5.6)
HDLC SERPL-MCNC: 42 MG/DL
LDLC SERPL CALC-MCNC: 138 MG/DL
POTASSIUM BLD-SCNC: 4.5 MMOL/L (ref 3.5–5)
SODIUM SERPL-SCNC: 140 MMOL/L (ref 136–145)
TRIGL SERPL-MCNC: 173 MG/DL
TSH SERPL DL<=0.005 MIU/L-ACNC: 0.72 UIU/ML (ref 0.3–5)

## 2022-05-16 PROCEDURE — 80048 BASIC METABOLIC PNL TOTAL CA: CPT

## 2022-05-16 PROCEDURE — 80061 LIPID PANEL: CPT

## 2022-05-16 PROCEDURE — 36415 COLL VENOUS BLD VENIPUNCTURE: CPT

## 2022-05-16 PROCEDURE — 83036 HEMOGLOBIN GLYCOSYLATED A1C: CPT

## 2022-05-16 PROCEDURE — 84443 ASSAY THYROID STIM HORMONE: CPT

## 2022-05-23 ENCOUNTER — OFFICE VISIT (OUTPATIENT)
Dept: INTERNAL MEDICINE | Facility: CLINIC | Age: 87
End: 2022-05-23
Payer: MEDICARE

## 2022-05-23 VITALS
OXYGEN SATURATION: 93 % | BODY MASS INDEX: 24.18 KG/M2 | HEIGHT: 65 IN | SYSTOLIC BLOOD PRESSURE: 146 MMHG | WEIGHT: 145.1 LBS | RESPIRATION RATE: 17 BRPM | HEART RATE: 66 BPM | DIASTOLIC BLOOD PRESSURE: 64 MMHG

## 2022-05-23 DIAGNOSIS — I10 ESSENTIAL HYPERTENSION: ICD-10-CM

## 2022-05-23 DIAGNOSIS — Z12.31 VISIT FOR SCREENING MAMMOGRAM: Primary | ICD-10-CM

## 2022-05-23 DIAGNOSIS — C50.911 MALIGNANT NEOPLASM OF RIGHT BREAST IN FEMALE, ESTROGEN RECEPTOR POSITIVE, UNSPECIFIED SITE OF BREAST (H): ICD-10-CM

## 2022-05-23 DIAGNOSIS — Z23 NEED FOR COVID-19 VACCINE: ICD-10-CM

## 2022-05-23 DIAGNOSIS — E11.9 TYPE 2 DIABETES MELLITUS WITHOUT COMPLICATION, WITHOUT LONG-TERM CURRENT USE OF INSULIN (H): ICD-10-CM

## 2022-05-23 DIAGNOSIS — E04.9 GOITER: ICD-10-CM

## 2022-05-23 DIAGNOSIS — M89.9 DISORDER OF BONE AND CARTILAGE: ICD-10-CM

## 2022-05-23 DIAGNOSIS — Z17.0 MALIGNANT NEOPLASM OF RIGHT BREAST IN FEMALE, ESTROGEN RECEPTOR POSITIVE, UNSPECIFIED SITE OF BREAST (H): ICD-10-CM

## 2022-05-23 DIAGNOSIS — M94.9 DISORDER OF BONE AND CARTILAGE: ICD-10-CM

## 2022-05-23 DIAGNOSIS — N18.31 CHRONIC RENAL IMPAIRMENT, STAGE 3A (H): ICD-10-CM

## 2022-05-23 DIAGNOSIS — Z95.0 CARDIAC PACEMAKER IN SITU: ICD-10-CM

## 2022-05-23 DIAGNOSIS — E78.2 MIXED HYPERLIPIDEMIA: ICD-10-CM

## 2022-05-23 PROCEDURE — 91306 COVID-19,PF,MODERNA (18+ YRS BOOSTER .25ML): CPT | Performed by: INTERNAL MEDICINE

## 2022-05-23 PROCEDURE — 99214 OFFICE O/P EST MOD 30 MIN: CPT | Mod: 25 | Performed by: INTERNAL MEDICINE

## 2022-05-23 PROCEDURE — 0064A COVID-19,PF,MODERNA (18+ YRS BOOSTER .25ML): CPT | Performed by: INTERNAL MEDICINE

## 2022-05-23 ASSESSMENT — PAIN SCALES - GENERAL: PAINLEVEL: NO PAIN (0)

## 2022-05-23 NOTE — PROGRESS NOTES
Answers for HPI/ROS submitted by the patient on 5/23/2022  What is the reason for your visit today? : followup  How many servings of fruits and vegetables do you eat daily?: 2-3  On average, how many sweetened beverages do you drink each day (Examples: soda, juice, sweet tea, etc.  Do NOT count diet or artificially sweetened beverages)?: 1  How many minutes a day do you exercise enough to make your heart beat faster?: 9 or less  How many days a week do you exercise enough to make your heart beat faster?: 3 or less  How many days per week do you miss taking your medication?: 0  ASSESSMENT:  1. Type 2 diabetes mellitus without complication, without long-term current use of insulin (H)  This is now managed with diet and lifestyle.  Previsit hemoglobin A1c was excellent at 6.2 with a blood sugar of 101.  She will continue current management  - OPTOMETRY REFERRAL; Future    2. Colloid Nontoxic Multinodular Goiter  Previsit TSH remains in the normal range.    3. Essential hypertension  She is on lisinopril 20 mg daily.  Propranolol is used for tremor and also to lower blood pressure.  Blood pressure is acceptable on recheck    4. Cardiac pacemaker, dual, in situ  She does have regular pacemaker monitoring    5. Mixed hyperlipidemia  She formerly was on lipid-lowering treatment.  This was discontinued to simplify her medical regimen, since she has no known coronary artery disease.  Total cholesterol is increased to 234 with triglycerides of 291.    6. Chronic renal impairment, stage 3a (H)  She has an elevated microalbumin.  She is on lisinopril.  Blood tests of kidney function are actually better than they were several years ago    7. Malignant neoplasm of right breast in female, estrogen receptor positive, unspecified site of breast (H)  She has a distant history of right mastectomy.  There is no history of recurrent disease.    8. Visit for screening mammogram  Last mammogram was in 2019.  She could skip future studies  if desired given her advanced age    9. Osteopenia  Bone density from 2021 revealed a lowest T score of -1.9 in the right total hip.  She previously had been on raloxifene which has been discontinued    10. Need for COVID-19 vaccine  She will receive a fourth Moderna vaccine today  - COVID-19,PF,MODERNA (18+ YRS BOOSTER .25ML)    PLAN:  Patient Instructions   1.  Moderna booster today    2.  Labs were reviewed and overall are good.    3.  Flu shot in the fall.  See in six months or as needed.    4.  Eye check this year with Dr. Rothman    Orders Placed This Encounter   Procedures     REVIEW OF HEALTH MAINTENANCE PROTOCOL ORDERS     COVID-19,PF,MODERNA (18+ YRS BOOSTER .25ML)     OPTOMETRY REFERRAL     There are no discontinued medications.    Return in about 6 months (around 11/23/2022) for Routine preventive.    ASSESSED PROBLEMS:    See above    CHIEF COMPLAINT:  Follow-up type 2 diabetes, essential hypertension, and other concerns    HISTORY OF PRESENT ILLNESS:  Vickie is a 89 year old female seen for follow-up of type 2 diabetes, essential hypertension, and other concerns.  Overall she has felt well since last visit.  She lives in an independent living seniors apartment.  She would have assisted living available if she needed it.  Previsit labs were reviewed.  Hemoglobin A1c is excellent at 6.2 with a blood sugar of 101.  She now manages diabetes with diet and lifestyle only.    She remains on lisinopril 20 mg daily due to essential hypertension and microalbuminuria.  Propranolol is used for tremor and for hypertension.  Blood pressure has been acceptable.  She has a history of multinodular goiter which has been euthyroid.  Previsit TSH was in the desired range    She has a history of low bone mass.  She previously was on raloxifene due to a history of breast cancer.  This subsequently was discontinued.  Lowest T score was -1.9 in the hip last year.    She was taken off of lipid-lowering medications since she has  "no known history of coronary artery disease and wish to simplify her regimen.  Previsit total cholesterol had increased to 234.      She has a history of stage III chronic renal insufficiency.  Previsit labs showed kidney function actually better than several years ago      She feels tremor has gotten worse.  She is not sure if propranolol is of much benefit  REVIEW OF SYSTEMS:  See above  Comprehensive review of systems is otherwise negative.    PFSH:  No longer drives.  Lives in an independent living seniors apartment.  Remains quite active    TOBACCO USE:  History   Smoking Status     Never Smoker   Smokeless Tobacco     Never Used       VITALS:  Vitals:    05/23/22 1211   BP: (!) 146/64        134/68   BP Location: Left arm         left arm   Patient Position: Sitting             sitting, by MD   Cuff Size: Adult Large   Pulse: 66   Resp: 17   SpO2: 93%   Weight: 65.8 kg (145 lb 1.6 oz)   Height: 1.651 m (5' 5\")     Wt Readings from Last 3 Encounters:   05/23/22 65.8 kg (145 lb 1.6 oz)   01/18/22 68.1 kg (150 lb 1.6 oz)   05/04/21 65.3 kg (143 lb 14.4 oz)       PHYSICAL EXAM:  Constitutional:   Reveals an alert pleasant elderly woman with appropriate affect.  She has a prominent resting tremor.  She can ambulate fairly easily without assistance vitals: per nursing notes.  HEENT: Atraumatic  Eyes:   no conjunctival hyperemia  Neck:  Supple, no carotid bruits or adenopathy.  Thyroid is somewhat enlarged  Back:  No spine or CVA pain.  Thorax:  No bony deformities.  Pacer left chest  Lungs: Clear to A&P without rales or wheezes.  Respiratory effort normal.  Cardiac:   Regular rate and rhythm, normal S1, S2, no murmur or gallop.  Breasts: Previous right mastectomy.  No masses over the chest wall or axillary area.  No left breast masses  Abdomen:  Soft, active bowel sounds without bruits, mass, or tenderness.  Extremities:   No peripheral edema, pulses in the feet intact.    Skin:  No jaundice, peripheral cyanosis or " lesions to suggest malignancy.  Neuro:  Alert and oriented.   No gross focal deficits.  Resting tremor  Psychiatric:  Memory intact, mood appropriate.    DATA REVIEWED:  Additional History from Old Records Summarized (2): None.  Decision to Obtain Records (1): None.   Radiology Tests Summarized or Ordered (1):  DEXA test reviewed  Labs Reviewed or Ordered (1): Previsit labs reviewed.  Medicine Test Summarized or Ordered (1): None.   Independent Review of EKG or X-RAY(2 each): None.    The visit lasted a total of 30 minutes face to face with the patient. Over 50% of the time was spent counseling and educating the patient about type 2 diabetes, essential hypertension, and other concerns.    Dragon dictation was used for this note. Speech recognition errors are a possibility.     MEDICATIONS:  Current Outpatient Medications   Medication Sig Dispense Refill     calcium carbonate-vitamin D3 (CALCIUM 600 + D,3,) 600 mg(1,500mg) -200 unit per tablet [CALCIUM CARBONATE-VITAMIN D3 (CALCIUM 600 + D,3,) 600 MG(1,500MG) -200 UNIT PER TABLET] Take 1 tablet by mouth daily. As directed.       cyanocobalamin (VITAMIN B-12) 2000 MCG tablet [CYANOCOBALAMIN (VITAMIN B-12) 2000 MCG TABLET] Take 2,000 mcg by mouth daily.       lisinopril (ZESTRIL) 20 MG tablet Take 1 tablet (20 mg) by mouth daily 90 tablet 3     propranolol (INDERAL) 40 MG tablet Take 1 tablet (40 mg) by mouth 2 times daily 180 tablet 3     MEDICATION CANNOT BE REORDERED - PLEASE MANUALLY REORDER AND DISCONTINUE THE OLD ORDER [MULTIVITAMIN-IRON, HEMATINIC, TAB] Take 1 tablet by mouth once daily.

## 2022-05-23 NOTE — PATIENT INSTRUCTIONS
Moderna booster today    2.  Labs were reviewed and overall are good.    3.  Flu shot in the fall.  See in six months or as needed.    4.  Eye check this year with Dr. Rothman

## 2022-05-31 LAB
MDC_IDC_EPISODE_DTM: NORMAL
MDC_IDC_EPISODE_ID: NORMAL
MDC_IDC_EPISODE_TYPE: NORMAL
MDC_IDC_LEAD_IMPLANT_DT: NORMAL
MDC_IDC_LEAD_IMPLANT_DT: NORMAL
MDC_IDC_LEAD_LOCATION: NORMAL
MDC_IDC_LEAD_LOCATION: NORMAL
MDC_IDC_LEAD_LOCATION_DETAIL_1: NORMAL
MDC_IDC_LEAD_LOCATION_DETAIL_1: NORMAL
MDC_IDC_LEAD_MFG: NORMAL
MDC_IDC_LEAD_MFG: NORMAL
MDC_IDC_LEAD_MODEL: NORMAL
MDC_IDC_LEAD_MODEL: NORMAL
MDC_IDC_LEAD_POLARITY_TYPE: NORMAL
MDC_IDC_LEAD_POLARITY_TYPE: NORMAL
MDC_IDC_LEAD_SERIAL: NORMAL
MDC_IDC_LEAD_SERIAL: NORMAL
MDC_IDC_MSMT_BATTERY_DTM: NORMAL
MDC_IDC_MSMT_BATTERY_REMAINING_LONGEVITY: 144 MO
MDC_IDC_MSMT_BATTERY_REMAINING_PERCENTAGE: 100 %
MDC_IDC_MSMT_BATTERY_STATUS: NORMAL
MDC_IDC_MSMT_LEADCHNL_RA_IMPEDANCE_VALUE: 543 OHM
MDC_IDC_MSMT_LEADCHNL_RA_PACING_THRESHOLD_AMPLITUDE: 0.4 V
MDC_IDC_MSMT_LEADCHNL_RA_PACING_THRESHOLD_PULSEWIDTH: 0.4 MS
MDC_IDC_MSMT_LEADCHNL_RV_IMPEDANCE_VALUE: 659 OHM
MDC_IDC_MSMT_LEADCHNL_RV_PACING_THRESHOLD_AMPLITUDE: 0.9 V
MDC_IDC_MSMT_LEADCHNL_RV_PACING_THRESHOLD_PULSEWIDTH: 0.4 MS
MDC_IDC_PG_IMPLANT_DTM: NORMAL
MDC_IDC_PG_MFG: NORMAL
MDC_IDC_PG_MODEL: NORMAL
MDC_IDC_PG_SERIAL: NORMAL
MDC_IDC_PG_TYPE: NORMAL
MDC_IDC_SESS_CLINIC_NAME: NORMAL
MDC_IDC_SESS_DTM: NORMAL
MDC_IDC_SESS_TYPE: NORMAL
MDC_IDC_SET_BRADY_AT_MODE_SWITCH_MODE: NORMAL
MDC_IDC_SET_BRADY_AT_MODE_SWITCH_RATE: 170 {BEATS}/MIN
MDC_IDC_SET_BRADY_LOWRATE: 60 {BEATS}/MIN
MDC_IDC_SET_BRADY_MAX_SENSOR_RATE: 130 {BEATS}/MIN
MDC_IDC_SET_BRADY_MAX_TRACKING_RATE: 130 {BEATS}/MIN
MDC_IDC_SET_BRADY_MODE: NORMAL
MDC_IDC_SET_BRADY_PAV_DELAY_HIGH: 150 MS
MDC_IDC_SET_BRADY_PAV_DELAY_LOW: 180 MS
MDC_IDC_SET_BRADY_SAV_DELAY_HIGH: 150 MS
MDC_IDC_SET_BRADY_SAV_DELAY_LOW: 180 MS
MDC_IDC_SET_LEADCHNL_RA_PACING_AMPLITUDE: 2 V
MDC_IDC_SET_LEADCHNL_RA_PACING_CAPTURE_MODE: NORMAL
MDC_IDC_SET_LEADCHNL_RA_PACING_POLARITY: NORMAL
MDC_IDC_SET_LEADCHNL_RA_PACING_PULSEWIDTH: 0.4 MS
MDC_IDC_SET_LEADCHNL_RA_SENSING_ADAPTATION_MODE: NORMAL
MDC_IDC_SET_LEADCHNL_RA_SENSING_POLARITY: NORMAL
MDC_IDC_SET_LEADCHNL_RA_SENSING_SENSITIVITY: 0.25 MV
MDC_IDC_SET_LEADCHNL_RV_PACING_AMPLITUDE: 1.4 V
MDC_IDC_SET_LEADCHNL_RV_PACING_CAPTURE_MODE: NORMAL
MDC_IDC_SET_LEADCHNL_RV_PACING_POLARITY: NORMAL
MDC_IDC_SET_LEADCHNL_RV_PACING_PULSEWIDTH: 0.4 MS
MDC_IDC_SET_LEADCHNL_RV_SENSING_ADAPTATION_MODE: NORMAL
MDC_IDC_SET_LEADCHNL_RV_SENSING_POLARITY: NORMAL
MDC_IDC_SET_LEADCHNL_RV_SENSING_SENSITIVITY: 1.5 MV
MDC_IDC_SET_ZONE_DETECTION_INTERVAL: 375 MS
MDC_IDC_SET_ZONE_TYPE: NORMAL
MDC_IDC_SET_ZONE_VENDOR_TYPE: NORMAL
MDC_IDC_STAT_AT_BURDEN_PERCENT: 0 %
MDC_IDC_STAT_AT_DTM_END: NORMAL
MDC_IDC_STAT_AT_DTM_START: NORMAL
MDC_IDC_STAT_BRADY_DTM_END: NORMAL
MDC_IDC_STAT_BRADY_DTM_START: NORMAL
MDC_IDC_STAT_BRADY_RA_PERCENT_PACED: 93 %
MDC_IDC_STAT_BRADY_RV_PERCENT_PACED: 1 %
MDC_IDC_STAT_EPISODE_RECENT_COUNT: 0
MDC_IDC_STAT_EPISODE_RECENT_COUNT_DTM_END: NORMAL
MDC_IDC_STAT_EPISODE_RECENT_COUNT_DTM_START: NORMAL
MDC_IDC_STAT_EPISODE_TYPE: NORMAL
MDC_IDC_STAT_EPISODE_VENDOR_TYPE: NORMAL

## 2022-07-22 ENCOUNTER — ANCILLARY PROCEDURE (OUTPATIENT)
Dept: CARDIOLOGY | Facility: CLINIC | Age: 87
End: 2022-07-22
Attending: INTERNAL MEDICINE
Payer: MEDICARE

## 2022-07-22 DIAGNOSIS — I49.5 SICK SINUS SYNDROME (H): ICD-10-CM

## 2022-07-22 DIAGNOSIS — Z95.0 CARDIAC PACEMAKER IN SITU: ICD-10-CM

## 2022-07-22 PROCEDURE — 93294 REM INTERROG EVL PM/LDLS PM: CPT | Performed by: INTERNAL MEDICINE

## 2022-07-22 PROCEDURE — 93296 REM INTERROG EVL PM/IDS: CPT

## 2022-08-08 LAB
MDC_IDC_EPISODE_DTM: NORMAL
MDC_IDC_EPISODE_ID: NORMAL
MDC_IDC_EPISODE_TYPE: NORMAL
MDC_IDC_LEAD_IMPLANT_DT: NORMAL
MDC_IDC_LEAD_IMPLANT_DT: NORMAL
MDC_IDC_LEAD_LOCATION: NORMAL
MDC_IDC_LEAD_LOCATION: NORMAL
MDC_IDC_LEAD_LOCATION_DETAIL_1: NORMAL
MDC_IDC_LEAD_LOCATION_DETAIL_1: NORMAL
MDC_IDC_LEAD_MFG: NORMAL
MDC_IDC_LEAD_MFG: NORMAL
MDC_IDC_LEAD_MODEL: NORMAL
MDC_IDC_LEAD_MODEL: NORMAL
MDC_IDC_LEAD_POLARITY_TYPE: NORMAL
MDC_IDC_LEAD_POLARITY_TYPE: NORMAL
MDC_IDC_LEAD_SERIAL: NORMAL
MDC_IDC_LEAD_SERIAL: NORMAL
MDC_IDC_MSMT_BATTERY_DTM: NORMAL
MDC_IDC_MSMT_BATTERY_REMAINING_LONGEVITY: 138 MO
MDC_IDC_MSMT_BATTERY_REMAINING_PERCENTAGE: 100 %
MDC_IDC_MSMT_BATTERY_STATUS: NORMAL
MDC_IDC_MSMT_LEADCHNL_RA_IMPEDANCE_VALUE: 512 OHM
MDC_IDC_MSMT_LEADCHNL_RA_PACING_THRESHOLD_AMPLITUDE: 0.4 V
MDC_IDC_MSMT_LEADCHNL_RA_PACING_THRESHOLD_PULSEWIDTH: 0.4 MS
MDC_IDC_MSMT_LEADCHNL_RV_IMPEDANCE_VALUE: 625 OHM
MDC_IDC_MSMT_LEADCHNL_RV_PACING_THRESHOLD_AMPLITUDE: 0.9 V
MDC_IDC_MSMT_LEADCHNL_RV_PACING_THRESHOLD_PULSEWIDTH: 0.4 MS
MDC_IDC_PG_IMPLANT_DTM: NORMAL
MDC_IDC_PG_MFG: NORMAL
MDC_IDC_PG_MODEL: NORMAL
MDC_IDC_PG_SERIAL: NORMAL
MDC_IDC_PG_TYPE: NORMAL
MDC_IDC_SESS_CLINIC_NAME: NORMAL
MDC_IDC_SESS_DTM: NORMAL
MDC_IDC_SESS_TYPE: NORMAL
MDC_IDC_SET_BRADY_AT_MODE_SWITCH_MODE: NORMAL
MDC_IDC_SET_BRADY_AT_MODE_SWITCH_RATE: 170 {BEATS}/MIN
MDC_IDC_SET_BRADY_LOWRATE: 60 {BEATS}/MIN
MDC_IDC_SET_BRADY_MAX_SENSOR_RATE: 130 {BEATS}/MIN
MDC_IDC_SET_BRADY_MAX_TRACKING_RATE: 130 {BEATS}/MIN
MDC_IDC_SET_BRADY_MODE: NORMAL
MDC_IDC_SET_BRADY_PAV_DELAY_HIGH: 150 MS
MDC_IDC_SET_BRADY_PAV_DELAY_LOW: 180 MS
MDC_IDC_SET_BRADY_SAV_DELAY_HIGH: 150 MS
MDC_IDC_SET_BRADY_SAV_DELAY_LOW: 180 MS
MDC_IDC_SET_LEADCHNL_RA_PACING_AMPLITUDE: 2 V
MDC_IDC_SET_LEADCHNL_RA_PACING_CAPTURE_MODE: NORMAL
MDC_IDC_SET_LEADCHNL_RA_PACING_POLARITY: NORMAL
MDC_IDC_SET_LEADCHNL_RA_PACING_PULSEWIDTH: 0.4 MS
MDC_IDC_SET_LEADCHNL_RA_SENSING_ADAPTATION_MODE: NORMAL
MDC_IDC_SET_LEADCHNL_RA_SENSING_POLARITY: NORMAL
MDC_IDC_SET_LEADCHNL_RA_SENSING_SENSITIVITY: 0.25 MV
MDC_IDC_SET_LEADCHNL_RV_PACING_AMPLITUDE: 1.4 V
MDC_IDC_SET_LEADCHNL_RV_PACING_CAPTURE_MODE: NORMAL
MDC_IDC_SET_LEADCHNL_RV_PACING_POLARITY: NORMAL
MDC_IDC_SET_LEADCHNL_RV_PACING_PULSEWIDTH: 0.4 MS
MDC_IDC_SET_LEADCHNL_RV_SENSING_ADAPTATION_MODE: NORMAL
MDC_IDC_SET_LEADCHNL_RV_SENSING_POLARITY: NORMAL
MDC_IDC_SET_LEADCHNL_RV_SENSING_SENSITIVITY: 1.5 MV
MDC_IDC_SET_ZONE_DETECTION_INTERVAL: 375 MS
MDC_IDC_SET_ZONE_TYPE: NORMAL
MDC_IDC_SET_ZONE_VENDOR_TYPE: NORMAL
MDC_IDC_STAT_AT_BURDEN_PERCENT: 0 %
MDC_IDC_STAT_AT_DTM_END: NORMAL
MDC_IDC_STAT_AT_DTM_START: NORMAL
MDC_IDC_STAT_BRADY_DTM_END: NORMAL
MDC_IDC_STAT_BRADY_DTM_START: NORMAL
MDC_IDC_STAT_BRADY_RA_PERCENT_PACED: 94 %
MDC_IDC_STAT_BRADY_RV_PERCENT_PACED: 1 %
MDC_IDC_STAT_EPISODE_RECENT_COUNT: 0
MDC_IDC_STAT_EPISODE_RECENT_COUNT_DTM_END: NORMAL
MDC_IDC_STAT_EPISODE_RECENT_COUNT_DTM_START: NORMAL
MDC_IDC_STAT_EPISODE_TYPE: NORMAL
MDC_IDC_STAT_EPISODE_VENDOR_TYPE: NORMAL

## 2022-10-24 ENCOUNTER — ANCILLARY PROCEDURE (OUTPATIENT)
Dept: CARDIOLOGY | Facility: CLINIC | Age: 87
End: 2022-10-24
Attending: INTERNAL MEDICINE
Payer: MEDICARE

## 2022-10-24 DIAGNOSIS — Z95.0 CARDIAC PACEMAKER IN SITU: ICD-10-CM

## 2022-10-24 DIAGNOSIS — I49.5 SICK SINUS SYNDROME (H): ICD-10-CM

## 2022-10-24 PROCEDURE — 93294 REM INTERROG EVL PM/LDLS PM: CPT | Performed by: INTERNAL MEDICINE

## 2022-10-24 PROCEDURE — 93296 REM INTERROG EVL PM/IDS: CPT

## 2022-11-02 LAB
MDC_IDC_EPISODE_DTM: NORMAL
MDC_IDC_EPISODE_ID: NORMAL
MDC_IDC_EPISODE_TYPE: NORMAL
MDC_IDC_LEAD_IMPLANT_DT: NORMAL
MDC_IDC_LEAD_IMPLANT_DT: NORMAL
MDC_IDC_LEAD_LOCATION: NORMAL
MDC_IDC_LEAD_LOCATION: NORMAL
MDC_IDC_LEAD_LOCATION_DETAIL_1: NORMAL
MDC_IDC_LEAD_LOCATION_DETAIL_1: NORMAL
MDC_IDC_LEAD_MFG: NORMAL
MDC_IDC_LEAD_MFG: NORMAL
MDC_IDC_LEAD_MODEL: NORMAL
MDC_IDC_LEAD_MODEL: NORMAL
MDC_IDC_LEAD_POLARITY_TYPE: NORMAL
MDC_IDC_LEAD_POLARITY_TYPE: NORMAL
MDC_IDC_LEAD_SERIAL: NORMAL
MDC_IDC_LEAD_SERIAL: NORMAL
MDC_IDC_MSMT_BATTERY_DTM: NORMAL
MDC_IDC_MSMT_BATTERY_REMAINING_LONGEVITY: 138 MO
MDC_IDC_MSMT_BATTERY_REMAINING_PERCENTAGE: 100 %
MDC_IDC_MSMT_BATTERY_STATUS: NORMAL
MDC_IDC_MSMT_LEADCHNL_RA_IMPEDANCE_VALUE: 535 OHM
MDC_IDC_MSMT_LEADCHNL_RA_PACING_THRESHOLD_AMPLITUDE: 0.4 V
MDC_IDC_MSMT_LEADCHNL_RA_PACING_THRESHOLD_PULSEWIDTH: 0.4 MS
MDC_IDC_MSMT_LEADCHNL_RV_IMPEDANCE_VALUE: 601 OHM
MDC_IDC_MSMT_LEADCHNL_RV_PACING_THRESHOLD_AMPLITUDE: 0.8 V
MDC_IDC_MSMT_LEADCHNL_RV_PACING_THRESHOLD_PULSEWIDTH: 0.4 MS
MDC_IDC_PG_IMPLANT_DTM: NORMAL
MDC_IDC_PG_MFG: NORMAL
MDC_IDC_PG_MODEL: NORMAL
MDC_IDC_PG_SERIAL: NORMAL
MDC_IDC_PG_TYPE: NORMAL
MDC_IDC_SESS_CLINIC_NAME: NORMAL
MDC_IDC_SESS_DTM: NORMAL
MDC_IDC_SESS_TYPE: NORMAL
MDC_IDC_SET_BRADY_AT_MODE_SWITCH_MODE: NORMAL
MDC_IDC_SET_BRADY_AT_MODE_SWITCH_RATE: 170 {BEATS}/MIN
MDC_IDC_SET_BRADY_LOWRATE: 60 {BEATS}/MIN
MDC_IDC_SET_BRADY_MAX_SENSOR_RATE: 130 {BEATS}/MIN
MDC_IDC_SET_BRADY_MAX_TRACKING_RATE: 130 {BEATS}/MIN
MDC_IDC_SET_BRADY_MODE: NORMAL
MDC_IDC_SET_BRADY_PAV_DELAY_HIGH: 150 MS
MDC_IDC_SET_BRADY_PAV_DELAY_LOW: 180 MS
MDC_IDC_SET_BRADY_SAV_DELAY_HIGH: 150 MS
MDC_IDC_SET_BRADY_SAV_DELAY_LOW: 180 MS
MDC_IDC_SET_LEADCHNL_RA_PACING_AMPLITUDE: 2 V
MDC_IDC_SET_LEADCHNL_RA_PACING_CAPTURE_MODE: NORMAL
MDC_IDC_SET_LEADCHNL_RA_PACING_POLARITY: NORMAL
MDC_IDC_SET_LEADCHNL_RA_PACING_PULSEWIDTH: 0.4 MS
MDC_IDC_SET_LEADCHNL_RA_SENSING_ADAPTATION_MODE: NORMAL
MDC_IDC_SET_LEADCHNL_RA_SENSING_POLARITY: NORMAL
MDC_IDC_SET_LEADCHNL_RA_SENSING_SENSITIVITY: 0.25 MV
MDC_IDC_SET_LEADCHNL_RV_PACING_AMPLITUDE: 1.3 V
MDC_IDC_SET_LEADCHNL_RV_PACING_CAPTURE_MODE: NORMAL
MDC_IDC_SET_LEADCHNL_RV_PACING_POLARITY: NORMAL
MDC_IDC_SET_LEADCHNL_RV_PACING_PULSEWIDTH: 0.4 MS
MDC_IDC_SET_LEADCHNL_RV_SENSING_ADAPTATION_MODE: NORMAL
MDC_IDC_SET_LEADCHNL_RV_SENSING_POLARITY: NORMAL
MDC_IDC_SET_LEADCHNL_RV_SENSING_SENSITIVITY: 1.5 MV
MDC_IDC_SET_ZONE_DETECTION_INTERVAL: 375 MS
MDC_IDC_SET_ZONE_TYPE: NORMAL
MDC_IDC_SET_ZONE_VENDOR_TYPE: NORMAL
MDC_IDC_STAT_AT_BURDEN_PERCENT: 0 %
MDC_IDC_STAT_AT_DTM_END: NORMAL
MDC_IDC_STAT_AT_DTM_START: NORMAL
MDC_IDC_STAT_BRADY_DTM_END: NORMAL
MDC_IDC_STAT_BRADY_DTM_START: NORMAL
MDC_IDC_STAT_BRADY_RA_PERCENT_PACED: 95 %
MDC_IDC_STAT_BRADY_RV_PERCENT_PACED: 4 %
MDC_IDC_STAT_EPISODE_RECENT_COUNT: 0
MDC_IDC_STAT_EPISODE_RECENT_COUNT_DTM_END: NORMAL
MDC_IDC_STAT_EPISODE_RECENT_COUNT_DTM_START: NORMAL
MDC_IDC_STAT_EPISODE_TYPE: NORMAL
MDC_IDC_STAT_EPISODE_VENDOR_TYPE: NORMAL

## 2022-11-28 ENCOUNTER — LAB (OUTPATIENT)
Dept: LAB | Facility: CLINIC | Age: 87
End: 2022-11-28
Payer: MEDICARE

## 2022-11-28 DIAGNOSIS — N18.31 CHRONIC RENAL IMPAIRMENT, STAGE 3A (H): ICD-10-CM

## 2022-11-28 DIAGNOSIS — Z17.0 MALIGNANT NEOPLASM OF RIGHT BREAST IN FEMALE, ESTROGEN RECEPTOR POSITIVE, UNSPECIFIED SITE OF BREAST (H): ICD-10-CM

## 2022-11-28 DIAGNOSIS — C50.911 MALIGNANT NEOPLASM OF RIGHT BREAST IN FEMALE, ESTROGEN RECEPTOR POSITIVE, UNSPECIFIED SITE OF BREAST (H): ICD-10-CM

## 2022-11-28 DIAGNOSIS — E78.1 HYPERTRIGLYCERIDEMIA WITHOUT HYPERCHOLESTEROLEMIA: ICD-10-CM

## 2022-11-28 DIAGNOSIS — E11.9 TYPE 2 DIABETES MELLITUS WITHOUT COMPLICATION, WITHOUT LONG-TERM CURRENT USE OF INSULIN (H): ICD-10-CM

## 2022-11-28 DIAGNOSIS — I10 ESSENTIAL HYPERTENSION: ICD-10-CM

## 2022-11-28 LAB
ALBUMIN SERPL BCG-MCNC: 4.3 G/DL (ref 3.5–5.2)
ALP SERPL-CCNC: 60 U/L (ref 35–104)
ALT SERPL W P-5'-P-CCNC: 14 U/L (ref 10–35)
ANION GAP SERPL CALCULATED.3IONS-SCNC: 13 MMOL/L (ref 7–15)
AST SERPL W P-5'-P-CCNC: 23 U/L (ref 10–35)
BILIRUB SERPL-MCNC: 0.4 MG/DL
BUN SERPL-MCNC: 30 MG/DL (ref 8–23)
CALCIUM SERPL-MCNC: 10.1 MG/DL (ref 8.2–9.6)
CHLORIDE SERPL-SCNC: 103 MMOL/L (ref 98–107)
CHOLEST SERPL-MCNC: 205 MG/DL
CREAT SERPL-MCNC: 0.91 MG/DL (ref 0.51–0.95)
CREAT UR-MCNC: 85.6 MG/DL
DEPRECATED HCO3 PLAS-SCNC: 24 MMOL/L (ref 22–29)
ERYTHROCYTE [DISTWIDTH] IN BLOOD BY AUTOMATED COUNT: 12.2 % (ref 10–15)
GFR SERPL CREATININE-BSD FRML MDRD: 57 ML/MIN/1.73M2
GLUCOSE SERPL-MCNC: 104 MG/DL (ref 70–99)
HBA1C MFR BLD: 6.1 % (ref 0–5.6)
HCT VFR BLD AUTO: 40.6 % (ref 35–47)
HDLC SERPL-MCNC: 40 MG/DL
HGB BLD-MCNC: 13.1 G/DL (ref 11.7–15.7)
LDLC SERPL CALC-MCNC: 132 MG/DL
MCH RBC QN AUTO: 30 PG (ref 26.5–33)
MCHC RBC AUTO-ENTMCNC: 32.3 G/DL (ref 31.5–36.5)
MCV RBC AUTO: 93 FL (ref 78–100)
MICROALBUMIN UR-MCNC: 32.1 MG/L
MICROALBUMIN/CREAT UR: 37.5 MG/G CR (ref 0–25)
NONHDLC SERPL-MCNC: 165 MG/DL
PLATELET # BLD AUTO: 176 10E3/UL (ref 150–450)
POTASSIUM SERPL-SCNC: 5.2 MMOL/L (ref 3.4–5.3)
PROT SERPL-MCNC: 7.7 G/DL (ref 6.4–8.3)
RBC # BLD AUTO: 4.36 10E6/UL (ref 3.8–5.2)
SODIUM SERPL-SCNC: 140 MMOL/L (ref 136–145)
TRIGL SERPL-MCNC: 163 MG/DL
WBC # BLD AUTO: 5.8 10E3/UL (ref 4–11)

## 2022-11-28 PROCEDURE — 80053 COMPREHEN METABOLIC PANEL: CPT

## 2022-11-28 PROCEDURE — 80061 LIPID PANEL: CPT

## 2022-11-28 PROCEDURE — 83036 HEMOGLOBIN GLYCOSYLATED A1C: CPT

## 2022-11-28 PROCEDURE — 36415 COLL VENOUS BLD VENIPUNCTURE: CPT

## 2022-11-28 PROCEDURE — 82043 UR ALBUMIN QUANTITATIVE: CPT

## 2022-11-28 PROCEDURE — 85027 COMPLETE CBC AUTOMATED: CPT

## 2022-12-05 ENCOUNTER — OFFICE VISIT (OUTPATIENT)
Dept: INTERNAL MEDICINE | Facility: CLINIC | Age: 87
End: 2022-12-05
Payer: MEDICARE

## 2022-12-05 VITALS
WEIGHT: 144 LBS | BODY MASS INDEX: 24.59 KG/M2 | OXYGEN SATURATION: 90 % | TEMPERATURE: 98.6 F | HEIGHT: 64 IN | SYSTOLIC BLOOD PRESSURE: 172 MMHG | HEART RATE: 60 BPM | DIASTOLIC BLOOD PRESSURE: 67 MMHG

## 2022-12-05 DIAGNOSIS — Z17.0 MALIGNANT NEOPLASM OF RIGHT BREAST IN FEMALE, ESTROGEN RECEPTOR POSITIVE, UNSPECIFIED SITE OF BREAST (H): ICD-10-CM

## 2022-12-05 DIAGNOSIS — E78.2 MIXED HYPERLIPIDEMIA: ICD-10-CM

## 2022-12-05 DIAGNOSIS — Z95.0 CARDIAC PACEMAKER IN SITU: ICD-10-CM

## 2022-12-05 DIAGNOSIS — E04.9 GOITER: ICD-10-CM

## 2022-12-05 DIAGNOSIS — M94.9 DISORDER OF BONE AND CARTILAGE: ICD-10-CM

## 2022-12-05 DIAGNOSIS — R25.9 ABNORMAL INVOLUNTARY MOVEMENT: ICD-10-CM

## 2022-12-05 DIAGNOSIS — E11.9 TYPE 2 DIABETES MELLITUS WITHOUT COMPLICATION, WITHOUT LONG-TERM CURRENT USE OF INSULIN (H): Primary | ICD-10-CM

## 2022-12-05 DIAGNOSIS — I10 ESSENTIAL HYPERTENSION: ICD-10-CM

## 2022-12-05 DIAGNOSIS — C50.911 ADENOCARCINOMA OF RIGHT BREAST (H): ICD-10-CM

## 2022-12-05 DIAGNOSIS — M89.9 DISORDER OF BONE AND CARTILAGE: ICD-10-CM

## 2022-12-05 DIAGNOSIS — C50.911 MALIGNANT NEOPLASM OF RIGHT BREAST IN FEMALE, ESTROGEN RECEPTOR POSITIVE, UNSPECIFIED SITE OF BREAST (H): ICD-10-CM

## 2022-12-05 DIAGNOSIS — N18.31 CHRONIC RENAL IMPAIRMENT, STAGE 3A (H): ICD-10-CM

## 2022-12-05 PROCEDURE — 99214 OFFICE O/P EST MOD 30 MIN: CPT | Performed by: INTERNAL MEDICINE

## 2022-12-05 NOTE — PROGRESS NOTES
1. Type 2 diabetes mellitus without complication, without long-term current use of insulin (H)  Previsit labs were reviewed.  Vickie now manages this with diet and lifestyle without medications.  Hemoglobin A1c is excellent at 6.1 with a blood sugar of 104    2. Mixed hyperlipidemia  She formerly was on fenofibrate for hypertriglyceridemia.  She discontinued this several years ago since she has no known coronary artery disease.  Total cholesterol is increased to 205 compared to 139 in 2019.  Triglycerides are only mildly elevated to 163.  Labs seem reasonable to continue conservative management    3. Essential hypertension  Blood pressure is above goal with a value of 156/80 by MD.  She states readings at home have generally been in the normal range.  She is on lisinopril and propranolol.  We will report readings in several weeks.  If systolics remain elevated, an adjustment in medications would be required    4. Cardiac pacemaker, dual, in situ  She has had previous pacemaker placement.  She formally saw Dr. Blankenship    5. Osteopenia  Last DEXA scan in 5/21 revealed a lowest T score of -1.9 in the right total hip.  She did have a decrease in bone density compared to earlier studies.  She had been on raloxifene at that time largely due to her past history of breast cancer    6. Chronic renal impairment, stage 3a (H)  Renal function is somewhat reduced with a GFR of 57.  However, she frequently has had lower GFR's in the past    7. Malignant neoplasm of right breast in female, estrogen receptor positive, unspecified site of breast (H)  She has a distant history of right mastectomy for adenocarcinoma.  There has been no evidence for recurrent disease    9. Colloid Nontoxic Multinodular Goiter  TSH remained in the desired range when checked in May.  No change in thyroid size has been noted    10. 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  She feels it has gotten somewhat  worse.    Patient Instructions   1. CONSIDER TETANUS VACCINE.  CHEAPER AT PHARMACY    2.  MONITOR BLOOD PRESSURE.  REPORT READINGS IN SEVERAL WEEKS.  GOAL IS UNDER 140 SYSTOLIC    3.  SEE IN SIX MONTHS OR IF NEEDED.    4.  Previsit labs were reviewed.  All look good.    Jarvis Johnston is a 89 year old accompanied by her ALONE, presenting for the following health issues:  Recheck Medication and RECHECK  Lety has a history of type 2 diabetes.  She is now managed with diet and lifestyle only.  Previsit hemoglobin A1c looks good at 6.1.    She has a history of essential hypertension.  She is treated with lisinopril 20 mg daily.  She is also on propranolol 40 mg twice daily for tremor.  She has not checked her home blood pressure recently.  It is high in clinic today    She has a distant history of right mastectomy for breast cancer.  There is been no evidence for recurrence.  He previously was on E [place] both due to the history of breast cancer and for treatment of low bone mass.  She previously had hepatitis on tamoxifen.    Her bone density study from 5/21 was reviewed.  She did have a moderate decline compared to earlier study    She has a pacemaker.    She formerly was on fenofibrate for hypertriglyceridemia.  She has now been off of this for several years.  Triglycerides remain only mildly elevated.  She did have an increase in cholesterol from the 150s to 205, but has no known history of coronary artery disease    HPI   Social history:  for many years.  Lives in an independent senior apartment.  No longer drives    Review of Systems   Constitutional, HEENT, cardiovascular, pulmonary, GI, , musculoskeletal, neuro, skin, endocrine and psych systems are negative, except as otherwise noted.      Objective    There were no vitals taken for this visit.  There is no height or weight on file to calculate BMI.  Physical Exam   BP (!) 172/67 (BP Location: Left arm, Patient Position: Sitting, Cuff Size:  "Adult Large)   Pulse 60   Temp 98.6  F (37  C) (Tympanic)   Ht 1.613 m (5' 3.5\")   Wt 65.3 kg (144 lb)   SpO2 90%   BMI 25.11 kg/m      General Appearance:  Alert, cooperative, no distress, appears younger than stated age   Head:  Normocephalic, without obvious abnormality, atraumatic   Eyes:  PERRL, conjunctiva/corneas clear, EOM's intact   Ears:  Normal TM's and external ear canals, both ears   Nose: Nares normal, septum midline,mucosa normal, no drainage    Throat: Lips, mucosa, and tongue normal; teeth and gums normal   Neck: Supple, symmetrical, trachea midline, no adenopathy;  thyroid moderately enlarged, unchanged from the past., symmetric, no tenderness/mass/nodules; no carotid bruit or JVD   Back:   Symmetric, no curvature, ROM normal, no CVA tenderness   Lungs:   Clear to auscultation bilaterally, respirations unlabored   Breasts:  Not examined   Heart:  Regular rate and rhythm, S1 and S2 normal, no murmur, rub, or gallop   Abdomen:   Soft, non-tender, bowel sounds active all four quadrants,  no masses, no organomegaly   Genitalia:  Exam deferred   Pelvic: Deferred   Extremities:  No peripheral edema.  Pulses intact.  No foot ulcers   Skin: Skin color, texture, turgor normal, no rashes or lesions suspicious for malignancy   Lymph nodes: Cervical, supraclavicular, and axillary nodes normal   Neurologic: No dysarthria or aphasia.  Prominent coarse tremor.  Cranial nerves, motor or sensory exams otherwise intact         Previsit labs were reviewed    Visit time 30 minutes            "

## 2022-12-21 ENCOUNTER — TELEPHONE (OUTPATIENT)
Dept: NURSING | Facility: CLINIC | Age: 87
End: 2022-12-21

## 2022-12-21 NOTE — TELEPHONE ENCOUNTER
Provider Update    Is this a 2nd Level Triage? NO    Situation: Patient calling to update PCP on blood pressure readings.  Consent: not needed    Background: Blood pressure high when she saw him last on 12/5/2022     Assessment:   12/17/22 - blood pressure 115/58, pulse 60  12/21/22 - blood pressure 112/52, pulse 60    No symptoms, feeling well    Protocol Recommended Disposition:   N/A    Routed to provider to inform.  Joanna Stroud RN [facility] 12/21/2022 10:07 AM

## 2023-01-01 ENCOUNTER — APPOINTMENT (OUTPATIENT)
Dept: SPEECH THERAPY | Facility: CLINIC | Age: 88
DRG: 480 | End: 2023-01-01
Attending: STUDENT IN AN ORGANIZED HEALTH CARE EDUCATION/TRAINING PROGRAM
Payer: MEDICARE

## 2023-01-01 ENCOUNTER — APPOINTMENT (OUTPATIENT)
Dept: OCCUPATIONAL THERAPY | Facility: CLINIC | Age: 88
DRG: 480 | End: 2023-01-01
Attending: NURSE PRACTITIONER
Payer: MEDICARE

## 2023-01-01 ENCOUNTER — TELEPHONE (OUTPATIENT)
Dept: FAMILY MEDICINE | Facility: CLINIC | Age: 88
End: 2023-01-01
Payer: MEDICARE

## 2023-01-01 ENCOUNTER — ANCILLARY PROCEDURE (OUTPATIENT)
Dept: CARDIOLOGY | Facility: CLINIC | Age: 88
End: 2023-01-01
Attending: INTERNAL MEDICINE
Payer: MEDICARE

## 2023-01-01 ENCOUNTER — ANCILLARY PROCEDURE (OUTPATIENT)
Dept: CARDIOLOGY | Facility: CLINIC | Age: 88
DRG: 480 | End: 2023-01-01
Attending: INTERNAL MEDICINE
Payer: MEDICARE

## 2023-01-01 ENCOUNTER — APPOINTMENT (OUTPATIENT)
Dept: GENERAL RADIOLOGY | Facility: CLINIC | Age: 88
DRG: 480 | End: 2023-01-01
Attending: INTERNAL MEDICINE
Payer: MEDICARE

## 2023-01-01 ENCOUNTER — APPOINTMENT (OUTPATIENT)
Dept: CT IMAGING | Facility: CLINIC | Age: 88
DRG: 480 | End: 2023-01-01
Attending: STUDENT IN AN ORGANIZED HEALTH CARE EDUCATION/TRAINING PROGRAM
Payer: MEDICARE

## 2023-01-01 ENCOUNTER — OFFICE VISIT (OUTPATIENT)
Dept: FAMILY MEDICINE | Facility: CLINIC | Age: 88
End: 2023-01-01
Payer: MEDICARE

## 2023-01-01 ENCOUNTER — ANCILLARY PROCEDURE (OUTPATIENT)
Dept: BONE DENSITY | Facility: CLINIC | Age: 88
End: 2023-01-01
Payer: MEDICARE

## 2023-01-01 ENCOUNTER — APPOINTMENT (OUTPATIENT)
Dept: PHYSICAL THERAPY | Facility: CLINIC | Age: 88
DRG: 480 | End: 2023-01-01
Attending: NURSE PRACTITIONER
Payer: MEDICARE

## 2023-01-01 ENCOUNTER — APPOINTMENT (OUTPATIENT)
Dept: CT IMAGING | Facility: CLINIC | Age: 88
DRG: 480 | End: 2023-01-01
Attending: PSYCHIATRY & NEUROLOGY
Payer: MEDICARE

## 2023-01-01 ENCOUNTER — APPOINTMENT (OUTPATIENT)
Dept: SPEECH THERAPY | Facility: CLINIC | Age: 88
DRG: 480 | End: 2023-01-01
Attending: NURSE PRACTITIONER
Payer: MEDICARE

## 2023-01-01 ENCOUNTER — APPOINTMENT (OUTPATIENT)
Dept: GENERAL RADIOLOGY | Facility: CLINIC | Age: 88
DRG: 480 | End: 2023-01-01
Attending: STUDENT IN AN ORGANIZED HEALTH CARE EDUCATION/TRAINING PROGRAM
Payer: MEDICARE

## 2023-01-01 ENCOUNTER — TRANSITIONAL CARE UNIT VISIT (OUTPATIENT)
Dept: GERIATRICS | Facility: CLINIC | Age: 88
End: 2023-01-01
Payer: MEDICARE

## 2023-01-01 ENCOUNTER — OFFICE VISIT (OUTPATIENT)
Dept: ORTHOPEDICS | Facility: CLINIC | Age: 88
End: 2023-01-01
Payer: COMMERCIAL

## 2023-01-01 ENCOUNTER — TRANSFERRED RECORDS (OUTPATIENT)
Dept: HEALTH INFORMATION MANAGEMENT | Facility: CLINIC | Age: 88
End: 2023-01-01
Payer: MEDICARE

## 2023-01-01 ENCOUNTER — APPOINTMENT (OUTPATIENT)
Dept: PHYSICAL THERAPY | Facility: CLINIC | Age: 88
DRG: 480 | End: 2023-01-01
Payer: MEDICARE

## 2023-01-01 ENCOUNTER — TELEPHONE (OUTPATIENT)
Dept: OTHER | Age: 88
End: 2023-01-01

## 2023-01-01 ENCOUNTER — HOSPITAL ENCOUNTER (INPATIENT)
Facility: CLINIC | Age: 88
LOS: 8 days | Discharge: NURSING, REHAB OR LONG-TERM CARE | DRG: 480 | End: 2023-12-08
Attending: STUDENT IN AN ORGANIZED HEALTH CARE EDUCATION/TRAINING PROGRAM | Admitting: STUDENT IN AN ORGANIZED HEALTH CARE EDUCATION/TRAINING PROGRAM
Payer: MEDICARE

## 2023-01-01 ENCOUNTER — APPOINTMENT (OUTPATIENT)
Dept: SPEECH THERAPY | Facility: CLINIC | Age: 88
DRG: 480 | End: 2023-01-01
Attending: INTERNAL MEDICINE
Payer: MEDICARE

## 2023-01-01 ENCOUNTER — APPOINTMENT (OUTPATIENT)
Dept: OCCUPATIONAL THERAPY | Facility: CLINIC | Age: 88
DRG: 480 | End: 2023-01-01
Attending: INTERNAL MEDICINE
Payer: MEDICARE

## 2023-01-01 ENCOUNTER — LAB REQUISITION (OUTPATIENT)
Dept: LAB | Facility: CLINIC | Age: 88
End: 2023-01-01

## 2023-01-01 ENCOUNTER — LAB REQUISITION (OUTPATIENT)
Dept: LAB | Facility: CLINIC | Age: 88
End: 2023-01-01
Payer: MEDICARE

## 2023-01-01 ENCOUNTER — APPOINTMENT (OUTPATIENT)
Dept: ULTRASOUND IMAGING | Facility: CLINIC | Age: 88
DRG: 480 | End: 2023-01-01
Attending: INTERNAL MEDICINE
Payer: MEDICARE

## 2023-01-01 ENCOUNTER — APPOINTMENT (OUTPATIENT)
Dept: INTERVENTIONAL RADIOLOGY/VASCULAR | Facility: CLINIC | Age: 88
DRG: 480 | End: 2023-01-01
Attending: STUDENT IN AN ORGANIZED HEALTH CARE EDUCATION/TRAINING PROGRAM
Payer: MEDICARE

## 2023-01-01 ENCOUNTER — ANESTHESIA (OUTPATIENT)
Dept: SURGERY | Facility: CLINIC | Age: 88
DRG: 480 | End: 2023-01-01
Payer: MEDICARE

## 2023-01-01 ENCOUNTER — APPOINTMENT (OUTPATIENT)
Dept: CT IMAGING | Facility: CLINIC | Age: 88
DRG: 480 | End: 2023-01-01
Attending: INTERNAL MEDICINE
Payer: MEDICARE

## 2023-01-01 ENCOUNTER — APPOINTMENT (OUTPATIENT)
Dept: GENERAL RADIOLOGY | Facility: CLINIC | Age: 88
DRG: 480 | End: 2023-01-01
Attending: ORTHOPAEDIC SURGERY
Payer: MEDICARE

## 2023-01-01 ENCOUNTER — APPOINTMENT (OUTPATIENT)
Dept: OCCUPATIONAL THERAPY | Facility: CLINIC | Age: 88
DRG: 480 | End: 2023-01-01
Payer: MEDICARE

## 2023-01-01 ENCOUNTER — APPOINTMENT (OUTPATIENT)
Dept: CARDIOLOGY | Facility: CLINIC | Age: 88
DRG: 480 | End: 2023-01-01
Attending: NURSE PRACTITIONER
Payer: MEDICARE

## 2023-01-01 ENCOUNTER — ANESTHESIA EVENT (OUTPATIENT)
Dept: SURGERY | Facility: CLINIC | Age: 88
DRG: 480 | End: 2023-01-01
Payer: MEDICARE

## 2023-01-01 ENCOUNTER — TELEPHONE (OUTPATIENT)
Dept: GERIATRICS | Facility: CLINIC | Age: 88
End: 2023-01-01

## 2023-01-01 ENCOUNTER — TELEPHONE (OUTPATIENT)
Dept: ORTHOPEDICS | Facility: CLINIC | Age: 88
End: 2023-01-01
Payer: MEDICARE

## 2023-01-01 ENCOUNTER — APPOINTMENT (OUTPATIENT)
Dept: PHYSICAL THERAPY | Facility: CLINIC | Age: 88
DRG: 480 | End: 2023-01-01
Attending: INTERNAL MEDICINE
Payer: MEDICARE

## 2023-01-01 ENCOUNTER — APPOINTMENT (OUTPATIENT)
Dept: MRI IMAGING | Facility: CLINIC | Age: 88
DRG: 480 | End: 2023-01-01
Attending: INTERNAL MEDICINE
Payer: MEDICARE

## 2023-01-01 ENCOUNTER — ANCILLARY PROCEDURE (OUTPATIENT)
Dept: GENERAL RADIOLOGY | Facility: CLINIC | Age: 88
End: 2023-01-01
Attending: PHYSICIAN ASSISTANT
Payer: COMMERCIAL

## 2023-01-01 VITALS
SYSTOLIC BLOOD PRESSURE: 120 MMHG | BODY MASS INDEX: 24.92 KG/M2 | HEART RATE: 67 BPM | HEIGHT: 64 IN | DIASTOLIC BLOOD PRESSURE: 88 MMHG | RESPIRATION RATE: 16 BRPM | WEIGHT: 146 LBS | OXYGEN SATURATION: 93 % | TEMPERATURE: 97.1 F

## 2023-01-01 VITALS
WEIGHT: 138.2 LBS | HEART RATE: 70 BPM | OXYGEN SATURATION: 94 % | DIASTOLIC BLOOD PRESSURE: 60 MMHG | SYSTOLIC BLOOD PRESSURE: 137 MMHG | RESPIRATION RATE: 18 BRPM | BODY MASS INDEX: 23.6 KG/M2 | TEMPERATURE: 97.8 F | HEIGHT: 64 IN

## 2023-01-01 VITALS
HEART RATE: 62 BPM | RESPIRATION RATE: 20 BRPM | SYSTOLIC BLOOD PRESSURE: 130 MMHG | HEIGHT: 64 IN | BODY MASS INDEX: 24.92 KG/M2 | OXYGEN SATURATION: 91 % | DIASTOLIC BLOOD PRESSURE: 67 MMHG | WEIGHT: 146 LBS | TEMPERATURE: 98.2 F

## 2023-01-01 VITALS
SYSTOLIC BLOOD PRESSURE: 140 MMHG | OXYGEN SATURATION: 96 % | HEIGHT: 64 IN | WEIGHT: 146 LBS | BODY MASS INDEX: 24.92 KG/M2 | TEMPERATURE: 98 F | HEART RATE: 77 BPM | RESPIRATION RATE: 18 BRPM | DIASTOLIC BLOOD PRESSURE: 70 MMHG

## 2023-01-01 VITALS
HEIGHT: 64 IN | SYSTOLIC BLOOD PRESSURE: 137 MMHG | DIASTOLIC BLOOD PRESSURE: 50 MMHG | WEIGHT: 155.42 LBS | TEMPERATURE: 98 F | BODY MASS INDEX: 26.53 KG/M2 | RESPIRATION RATE: 17 BRPM | OXYGEN SATURATION: 95 % | HEART RATE: 61 BPM

## 2023-01-01 VITALS
SYSTOLIC BLOOD PRESSURE: 122 MMHG | HEIGHT: 64 IN | OXYGEN SATURATION: 95 % | RESPIRATION RATE: 14 BRPM | BODY MASS INDEX: 24.07 KG/M2 | TEMPERATURE: 97.2 F | WEIGHT: 141 LBS | DIASTOLIC BLOOD PRESSURE: 70 MMHG | HEART RATE: 60 BPM

## 2023-01-01 DIAGNOSIS — S72.351A CLOSED DISPLACED COMMINUTED FRACTURE OF SHAFT OF RIGHT FEMUR, INITIAL ENCOUNTER (H): Primary | ICD-10-CM

## 2023-01-01 DIAGNOSIS — Z45.02 ENCOUNTER FOR ADJUSTMENT AND MANAGEMENT OF AUTOMATIC IMPLANTABLE CARDIAC DEFIBRILLATOR: ICD-10-CM

## 2023-01-01 DIAGNOSIS — I49.5 SICK SINUS SYNDROME (H): ICD-10-CM

## 2023-01-01 DIAGNOSIS — S72.351A CLOSED DISPLACED COMMINUTED FRACTURE OF SHAFT OF RIGHT FEMUR, INITIAL ENCOUNTER (H): ICD-10-CM

## 2023-01-01 DIAGNOSIS — S72.141D CLOSED DISPLACED INTERTROCHANTERIC FRACTURE OF RIGHT FEMUR WITH ROUTINE HEALING, SUBSEQUENT ENCOUNTER: ICD-10-CM

## 2023-01-01 DIAGNOSIS — Z78.9 IMPAIRED MOBILITY AND ADLS: ICD-10-CM

## 2023-01-01 DIAGNOSIS — M80.00XD AGE-RELATED OSTEOPOROSIS WITH CURRENT PATHOLOGICAL FRACTURE WITH ROUTINE HEALING, SUBSEQUENT ENCOUNTER: ICD-10-CM

## 2023-01-01 DIAGNOSIS — I10 ESSENTIAL HYPERTENSION: ICD-10-CM

## 2023-01-01 DIAGNOSIS — M89.9 DISORDER OF BONE AND CARTILAGE: Primary | ICD-10-CM

## 2023-01-01 DIAGNOSIS — Z95.0 CARDIAC PACEMAKER IN SITU: ICD-10-CM

## 2023-01-01 DIAGNOSIS — R41.0 DELIRIUM: ICD-10-CM

## 2023-01-01 DIAGNOSIS — K59.01 SLOW TRANSIT CONSTIPATION: ICD-10-CM

## 2023-01-01 DIAGNOSIS — Z00.00 ENCOUNTER FOR MEDICAL EXAMINATION TO ESTABLISH CARE: Primary | ICD-10-CM

## 2023-01-01 DIAGNOSIS — I63.411 CEREBROVASCULAR ACCIDENT (CVA) DUE TO EMBOLISM OF RIGHT MIDDLE CEREBRAL ARTERY (H): ICD-10-CM

## 2023-01-01 DIAGNOSIS — Z74.09 IMPAIRED MOBILITY AND ADLS: ICD-10-CM

## 2023-01-01 DIAGNOSIS — M94.9 DISORDER OF BONE AND CARTILAGE: Primary | ICD-10-CM

## 2023-01-01 DIAGNOSIS — D62 ABLA (ACUTE BLOOD LOSS ANEMIA): ICD-10-CM

## 2023-01-01 DIAGNOSIS — Z78.0 POST-MENOPAUSAL: ICD-10-CM

## 2023-01-01 DIAGNOSIS — G81.94 LEFT HEMIPLEGIA (H): ICD-10-CM

## 2023-01-01 DIAGNOSIS — R53.81 PHYSICAL DECONDITIONING: ICD-10-CM

## 2023-01-01 DIAGNOSIS — I60.9 SAH (SUBARACHNOID HEMORRHAGE) (H): ICD-10-CM

## 2023-01-01 DIAGNOSIS — I27.20 PULMONARY HYPERTENSION (H): ICD-10-CM

## 2023-01-01 DIAGNOSIS — N18.31 CHRONIC RENAL IMPAIRMENT, STAGE 3A (H): ICD-10-CM

## 2023-01-01 DIAGNOSIS — I49.5 SICK SINUS SYNDROME (H): Primary | ICD-10-CM

## 2023-01-01 DIAGNOSIS — M89.9 DISORDER OF BONE AND CARTILAGE: ICD-10-CM

## 2023-01-01 DIAGNOSIS — H61.23 BILATERAL IMPACTED CERUMEN: Primary | ICD-10-CM

## 2023-01-01 DIAGNOSIS — M94.9 DISORDER OF BONE AND CARTILAGE: ICD-10-CM

## 2023-01-01 DIAGNOSIS — S72.141A CLOSED DISPLACED INTERTROCHANTERIC FRACTURE OF RIGHT FEMUR, INITIAL ENCOUNTER (H): Primary | ICD-10-CM

## 2023-01-01 DIAGNOSIS — I63.411 CEREBROVASCULAR ACCIDENT (CVA) DUE TO EMBOLISM OF RIGHT MIDDLE CEREBRAL ARTERY (H): Primary | ICD-10-CM

## 2023-01-01 DIAGNOSIS — R33.9 URINARY RETENTION: ICD-10-CM

## 2023-01-01 DIAGNOSIS — R05.1 ACUTE COUGH: ICD-10-CM
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
## 2023-01-01 DIAGNOSIS — E11.9 TYPE 2 DIABETES MELLITUS WITHOUT COMPLICATION, WITHOUT LONG-TERM CURRENT USE OF INSULIN (H): ICD-10-CM

## 2023-01-01 DIAGNOSIS — I63.511 ACUTE ISCHEMIC RIGHT MCA STROKE (H): ICD-10-CM
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
## 2023-01-01 DIAGNOSIS — B97.4 RESPIRATORY SYNCYTIAL VIRUS AS THE CAUSE OF DISEASES CLASSIFIED ELSEWHERE: ICD-10-CM

## 2023-01-01 DIAGNOSIS — R05.9 COUGH, UNSPECIFIED: ICD-10-CM

## 2023-01-01 DIAGNOSIS — I63.9 CEREBRAL INFARCTION, UNSPECIFIED (H): ICD-10-CM

## 2023-01-01 DIAGNOSIS — C50.911 ADENOCARCINOMA OF RIGHT BREAST (H): Primary | ICD-10-CM

## 2023-01-01 LAB
ABO/RH(D): NORMAL
ABO/RH(D): NORMAL
ALBUMIN SERPL BCG-MCNC: 2.9 G/DL (ref 3.5–5.2)
ALBUMIN SERPL BCG-MCNC: 3.2 G/DL (ref 3.5–5.2)
ALBUMIN SERPL BCG-MCNC: 4.6 G/DL (ref 3.5–5.2)
ALP SERPL-CCNC: 42 U/L (ref 40–150)
ALP SERPL-CCNC: 60 U/L (ref 35–104)
ALP SERPL-CCNC: 94 U/L (ref 40–150)
ALT SERPL W P-5'-P-CCNC: 14 U/L (ref 10–35)
ALT SERPL W P-5'-P-CCNC: 17 U/L (ref 0–50)
ALT SERPL W P-5'-P-CCNC: <5 U/L (ref 0–50)
ANION GAP SERPL CALCULATED.3IONS-SCNC: 10 MMOL/L (ref 7–15)
ANION GAP SERPL CALCULATED.3IONS-SCNC: 10 MMOL/L (ref 7–15)
ANION GAP SERPL CALCULATED.3IONS-SCNC: 11 MMOL/L (ref 7–15)
ANION GAP SERPL CALCULATED.3IONS-SCNC: 12 MMOL/L (ref 7–15)
ANION GAP SERPL CALCULATED.3IONS-SCNC: 12 MMOL/L (ref 7–15)
ANION GAP SERPL CALCULATED.3IONS-SCNC: 7 MMOL/L (ref 7–15)
ANION GAP SERPL CALCULATED.3IONS-SCNC: 7 MMOL/L (ref 7–15)
ANION GAP SERPL CALCULATED.3IONS-SCNC: 9 MMOL/L (ref 7–15)
ANION GAP SERPL CALCULATED.3IONS-SCNC: 9 MMOL/L (ref 7–15)
ANTIBODY SCREEN: NEGATIVE
ANTIBODY SCREEN: NEGATIVE
APTT PPP: 22 SECONDS (ref 22–38)
APTT PPP: 27 SECONDS (ref 22–38)
APTT PPP: 33 SECONDS (ref 22–38)
AST SERPL W P-5'-P-CCNC: 24 U/L (ref 10–35)
AST SERPL W P-5'-P-CCNC: 29 U/L (ref 0–45)
AST SERPL W P-5'-P-CCNC: 37 U/L (ref 0–45)
ATRIAL RATE - MUSE: 60 BPM
ATRIAL RATE - MUSE: 71 BPM
ATRIAL RATE - MUSE: 72 BPM
BASOPHILS # BLD AUTO: 0 10E3/UL (ref 0–0.2)
BASOPHILS NFR BLD AUTO: 0 %
BILIRUB SERPL-MCNC: 0.3 MG/DL
BILIRUB SERPL-MCNC: 0.5 MG/DL
BILIRUB SERPL-MCNC: 0.5 MG/DL
BLD PROD TYP BPU: NORMAL
BLOOD COMPONENT TYPE: NORMAL
BUN SERPL-MCNC: 21.6 MG/DL (ref 8–23)
BUN SERPL-MCNC: 21.8 MG/DL (ref 8–23)
BUN SERPL-MCNC: 22.1 MG/DL (ref 8–23)
BUN SERPL-MCNC: 23.5 MG/DL (ref 8–23)
BUN SERPL-MCNC: 24.5 MG/DL (ref 8–23)
BUN SERPL-MCNC: 24.9 MG/DL (ref 8–23)
BUN SERPL-MCNC: 25.4 MG/DL (ref 8–23)
BUN SERPL-MCNC: 25.9 MG/DL (ref 8–23)
BUN SERPL-MCNC: 26.1 MG/DL (ref 8–23)
BUN SERPL-MCNC: 36.6 MG/DL (ref 8–23)
BUN SERPL-MCNC: 40.3 MG/DL (ref 8–23)
CALCIUM SERPL-MCNC: 10.3 MG/DL (ref 8.2–9.6)
CALCIUM SERPL-MCNC: 8.3 MG/DL (ref 8.2–9.6)
CALCIUM SERPL-MCNC: 8.5 MG/DL (ref 8.2–9.6)
CALCIUM SERPL-MCNC: 8.5 MG/DL (ref 8.2–9.6)
CALCIUM SERPL-MCNC: 8.8 MG/DL (ref 8.2–9.6)
CALCIUM SERPL-MCNC: 8.9 MG/DL (ref 8.2–9.6)
CALCIUM SERPL-MCNC: 9.3 MG/DL (ref 8.2–9.6)
CALCIUM SERPL-MCNC: 9.7 MG/DL (ref 8.2–9.6)
CALCIUM SERPL-MCNC: 9.9 MG/DL (ref 8.2–9.6)
CHLORIDE SERPL-SCNC: 100 MMOL/L (ref 98–107)
CHLORIDE SERPL-SCNC: 101 MMOL/L (ref 98–107)
CHLORIDE SERPL-SCNC: 101 MMOL/L (ref 98–107)
CHLORIDE SERPL-SCNC: 106 MMOL/L (ref 98–107)
CHLORIDE SERPL-SCNC: 108 MMOL/L (ref 98–107)
CHLORIDE SERPL-SCNC: 111 MMOL/L (ref 98–107)
CHLORIDE SERPL-SCNC: 111 MMOL/L (ref 98–107)
CHLORIDE SERPL-SCNC: 112 MMOL/L (ref 98–107)
CHLORIDE SERPL-SCNC: 99 MMOL/L (ref 98–107)
CODING SYSTEM: NORMAL
CREAT SERPL-MCNC: 0.58 MG/DL (ref 0.51–0.95)
CREAT SERPL-MCNC: 0.58 MG/DL (ref 0.51–0.95)
CREAT SERPL-MCNC: 0.59 MG/DL (ref 0.51–0.95)
CREAT SERPL-MCNC: 0.62 MG/DL (ref 0.51–0.95)
CREAT SERPL-MCNC: 0.62 MG/DL (ref 0.51–0.95)
CREAT SERPL-MCNC: 0.63 MG/DL (ref 0.51–0.95)
CREAT SERPL-MCNC: 0.78 MG/DL (ref 0.51–0.95)
CREAT SERPL-MCNC: 0.79 MG/DL (ref 0.51–0.95)
CREAT SERPL-MCNC: 0.84 MG/DL (ref 0.51–0.95)
CREAT SERPL-MCNC: 0.87 MG/DL (ref 0.51–0.95)
CREAT SERPL-MCNC: 1.02 MG/DL (ref 0.51–0.95)
CREAT UR-MCNC: 52.5 MG/DL
CROSSMATCH: NORMAL
DEPRECATED HCO3 PLAS-SCNC: 22 MMOL/L (ref 22–29)
DEPRECATED HCO3 PLAS-SCNC: 23 MMOL/L (ref 22–29)
DEPRECATED HCO3 PLAS-SCNC: 24 MMOL/L (ref 22–29)
DEPRECATED HCO3 PLAS-SCNC: 25 MMOL/L (ref 22–29)
DEPRECATED HCO3 PLAS-SCNC: 26 MMOL/L (ref 22–29)
DEPRECATED HCO3 PLAS-SCNC: 28 MMOL/L (ref 22–29)
DEPRECATED HCO3 PLAS-SCNC: 28 MMOL/L (ref 22–29)
DIASTOLIC BLOOD PRESSURE - MUSE: NORMAL MMHG
EGFRCR SERPLBLD CKD-EPI 2021: 50 ML/MIN/1.73M2
EGFRCR SERPLBLD CKD-EPI 2021: 60 ML/MIN/1.73M2
EGFRCR SERPLBLD CKD-EPI 2021: 63 ML/MIN/1.73M2
EGFRCR SERPLBLD CKD-EPI 2021: 69 ML/MIN/1.73M2
EGFRCR SERPLBLD CKD-EPI 2021: 80 ML/MIN/1.73M2
EGFRCR SERPLBLD CKD-EPI 2021: 81 ML/MIN/1.73M2
EGFRCR SERPLBLD CKD-EPI 2021: 81 ML/MIN/1.73M2
EGFRCR SERPLBLD CKD-EPI 2021: 82 ML/MIN/1.73M2
EOSINOPHIL # BLD AUTO: 0 10E3/UL (ref 0–0.7)
EOSINOPHIL # BLD AUTO: 0.1 10E3/UL (ref 0–0.7)
EOSINOPHIL # BLD AUTO: 0.1 10E3/UL (ref 0–0.7)
EOSINOPHIL NFR BLD AUTO: 0 %
EOSINOPHIL NFR BLD AUTO: 1 %
EOSINOPHIL NFR BLD AUTO: 1 %
ERYTHROCYTE [DISTWIDTH] IN BLOOD BY AUTOMATED COUNT: 12.6 % (ref 10–15)
ERYTHROCYTE [DISTWIDTH] IN BLOOD BY AUTOMATED COUNT: 12.7 % (ref 10–15)
ERYTHROCYTE [DISTWIDTH] IN BLOOD BY AUTOMATED COUNT: 13 % (ref 10–15)
ERYTHROCYTE [DISTWIDTH] IN BLOOD BY AUTOMATED COUNT: 13.1 % (ref 10–15)
ERYTHROCYTE [DISTWIDTH] IN BLOOD BY AUTOMATED COUNT: 13.5 % (ref 10–15)
ERYTHROCYTE [DISTWIDTH] IN BLOOD BY AUTOMATED COUNT: 13.7 % (ref 10–15)
ERYTHROCYTE [DISTWIDTH] IN BLOOD BY AUTOMATED COUNT: 13.9 % (ref 10–15)
ERYTHROCYTE [DISTWIDTH] IN BLOOD BY AUTOMATED COUNT: 14.2 % (ref 10–15)
ERYTHROCYTE [DISTWIDTH] IN BLOOD BY AUTOMATED COUNT: 14.3 % (ref 10–15)
ERYTHROCYTE [DISTWIDTH] IN BLOOD BY AUTOMATED COUNT: 14.3 % (ref 10–15)
ERYTHROCYTE [DISTWIDTH] IN BLOOD BY AUTOMATED COUNT: 14.4 % (ref 10–15)
FLUAV AG SPEC QL IA: NEGATIVE
FLUBV AG SPEC QL IA: NEGATIVE
GFR SERPL CREATININE-BSD FRML MDRD: 68 ML/MIN/1.73M2
GLUCOSE BLDC GLUCOMTR-MCNC: 100 MG/DL (ref 70–99)
GLUCOSE BLDC GLUCOMTR-MCNC: 119 MG/DL (ref 70–99)
GLUCOSE BLDC GLUCOMTR-MCNC: 120 MG/DL (ref 70–99)
GLUCOSE BLDC GLUCOMTR-MCNC: 125 MG/DL (ref 70–99)
GLUCOSE BLDC GLUCOMTR-MCNC: 131 MG/DL (ref 70–99)
GLUCOSE BLDC GLUCOMTR-MCNC: 148 MG/DL (ref 70–99)
GLUCOSE BLDC GLUCOMTR-MCNC: 210 MG/DL (ref 70–99)
GLUCOSE BLDC GLUCOMTR-MCNC: 94 MG/DL (ref 70–99)
GLUCOSE SERPL-MCNC: 100 MG/DL (ref 70–99)
GLUCOSE SERPL-MCNC: 110 MG/DL (ref 70–99)
GLUCOSE SERPL-MCNC: 114 MG/DL (ref 70–99)
GLUCOSE SERPL-MCNC: 121 MG/DL (ref 70–99)
GLUCOSE SERPL-MCNC: 132 MG/DL (ref 70–99)
GLUCOSE SERPL-MCNC: 135 MG/DL (ref 70–99)
GLUCOSE SERPL-MCNC: 138 MG/DL (ref 70–99)
GLUCOSE SERPL-MCNC: 157 MG/DL (ref 70–99)
GLUCOSE SERPL-MCNC: 184 MG/DL (ref 70–99)
GLUCOSE SERPL-MCNC: 89 MG/DL (ref 70–99)
GLUCOSE SERPL-MCNC: 95 MG/DL (ref 70–99)
HBA1C MFR BLD: 5.8 %
HBA1C MFR BLD: 5.9 % (ref 0–5.6)
HCT VFR BLD AUTO: 21.9 % (ref 35–47)
HCT VFR BLD AUTO: 23.9 % (ref 35–47)
HCT VFR BLD AUTO: 24.8 % (ref 35–47)
HCT VFR BLD AUTO: 25.8 % (ref 35–47)
HCT VFR BLD AUTO: 26.4 % (ref 35–47)
HCT VFR BLD AUTO: 26.7 % (ref 35–47)
HCT VFR BLD AUTO: 28.7 % (ref 35–47)
HCT VFR BLD AUTO: 30.6 % (ref 35–47)
HCT VFR BLD AUTO: 34.1 % (ref 35–47)
HCT VFR BLD AUTO: 34.4 % (ref 35–47)
HCT VFR BLD AUTO: 35.4 % (ref 35–47)
HGB BLD-MCNC: 10.9 G/DL (ref 11.7–15.7)
HGB BLD-MCNC: 11.6 G/DL (ref 11.7–15.7)
HGB BLD-MCNC: 11.8 G/DL (ref 11.7–15.7)
HGB BLD-MCNC: 6.9 G/DL (ref 11.7–15.7)
HGB BLD-MCNC: 7.7 G/DL (ref 11.7–15.7)
HGB BLD-MCNC: 7.9 G/DL (ref 11.7–15.7)
HGB BLD-MCNC: 7.9 G/DL (ref 11.7–15.7)
HGB BLD-MCNC: 8.6 G/DL (ref 11.7–15.7)
HGB BLD-MCNC: 8.9 G/DL (ref 11.7–15.7)
HGB BLD-MCNC: 9.1 G/DL (ref 11.7–15.7)
HGB BLD-MCNC: 9.8 G/DL (ref 11.7–15.7)
HOLD SPECIMEN: NORMAL
HOLD SPECIMEN: NORMAL
IMM GRANULOCYTES # BLD: 0 10E3/UL
IMM GRANULOCYTES # BLD: 0 10E3/UL
IMM GRANULOCYTES # BLD: 0.1 10E3/UL
IMM GRANULOCYTES NFR BLD: 0 %
IMM GRANULOCYTES NFR BLD: 0 %
IMM GRANULOCYTES NFR BLD: 1 %
INR PPP: 1.01 (ref 0.85–1.15)
INR PPP: 1.05 (ref 0.85–1.15)
INR PPP: 1.08 (ref 0.85–1.15)
INTERPRETATION ECG - MUSE: NORMAL
ISSUE DATE AND TIME: NORMAL
LDLC SERPL DIRECT ASSAY-MCNC: 73 MG/DL
LVEF ECHO: NORMAL
LYMPHOCYTES # BLD AUTO: 1 10E3/UL (ref 0.8–5.3)
LYMPHOCYTES # BLD AUTO: 1.2 10E3/UL (ref 0.8–5.3)
LYMPHOCYTES # BLD AUTO: 1.7 10E3/UL (ref 0.8–5.3)
LYMPHOCYTES NFR BLD AUTO: 11 %
LYMPHOCYTES NFR BLD AUTO: 12 %
LYMPHOCYTES NFR BLD AUTO: 16 %
MAGNESIUM SERPL-MCNC: 1.9 MG/DL (ref 1.7–2.3)
MAGNESIUM SERPL-MCNC: 2 MG/DL (ref 1.7–2.3)
MAGNESIUM SERPL-MCNC: 2.1 MG/DL (ref 1.7–2.3)
MAGNESIUM SERPL-MCNC: 2.3 MG/DL (ref 1.7–2.3)
MCH RBC QN AUTO: 30.1 PG (ref 26.5–33)
MCH RBC QN AUTO: 31 PG (ref 26.5–33)
MCH RBC QN AUTO: 31.2 PG (ref 26.5–33)
MCH RBC QN AUTO: 31.3 PG (ref 26.5–33)
MCH RBC QN AUTO: 31.3 PG (ref 26.5–33)
MCH RBC QN AUTO: 31.4 PG (ref 26.5–33)
MCH RBC QN AUTO: 31.4 PG (ref 26.5–33)
MCH RBC QN AUTO: 31.5 PG (ref 26.5–33)
MCH RBC QN AUTO: 31.9 PG (ref 26.5–33)
MCH RBC QN AUTO: 32 PG (ref 26.5–33)
MCH RBC QN AUTO: 32.2 PG (ref 26.5–33)
MCHC RBC AUTO-ENTMCNC: 30.8 G/DL (ref 31.5–36.5)
MCHC RBC AUTO-ENTMCNC: 31.5 G/DL (ref 31.5–36.5)
MCHC RBC AUTO-ENTMCNC: 31.7 G/DL (ref 31.5–36.5)
MCHC RBC AUTO-ENTMCNC: 31.9 G/DL (ref 31.5–36.5)
MCHC RBC AUTO-ENTMCNC: 32 G/DL (ref 31.5–36.5)
MCHC RBC AUTO-ENTMCNC: 32.2 G/DL (ref 31.5–36.5)
MCHC RBC AUTO-ENTMCNC: 32.2 G/DL (ref 31.5–36.5)
MCHC RBC AUTO-ENTMCNC: 32.6 G/DL (ref 31.5–36.5)
MCHC RBC AUTO-ENTMCNC: 33.3 G/DL (ref 31.5–36.5)
MCHC RBC AUTO-ENTMCNC: 33.7 G/DL (ref 31.5–36.5)
MCHC RBC AUTO-ENTMCNC: 34.6 G/DL (ref 31.5–36.5)
MCV RBC AUTO: 101 FL (ref 78–100)
MCV RBC AUTO: 92 FL (ref 78–100)
MCV RBC AUTO: 93 FL (ref 78–100)
MCV RBC AUTO: 96 FL (ref 78–100)
MCV RBC AUTO: 97 FL (ref 78–100)
MCV RBC AUTO: 98 FL (ref 78–100)
MCV RBC AUTO: 98 FL (ref 78–100)
MCV RBC AUTO: 99 FL (ref 78–100)
MDC_IDC_EPISODE_DTM: NORMAL
MDC_IDC_EPISODE_ID: NORMAL
MDC_IDC_EPISODE_TYPE: NORMAL
MDC_IDC_LEAD_CONNECTION_STATUS: NORMAL
MDC_IDC_LEAD_IMPLANT_DT: NORMAL
MDC_IDC_LEAD_LOCATION: NORMAL
MDC_IDC_LEAD_LOCATION_DETAIL_1: NORMAL
MDC_IDC_LEAD_MFG: NORMAL
MDC_IDC_LEAD_MODEL: NORMAL
MDC_IDC_LEAD_POLARITY_TYPE: NORMAL
MDC_IDC_LEAD_SERIAL: NORMAL
MDC_IDC_MSMT_BATTERY_DTM: NORMAL
MDC_IDC_MSMT_BATTERY_REMAINING_LONGEVITY: 114 MO
MDC_IDC_MSMT_BATTERY_REMAINING_LONGEVITY: 120 MO
MDC_IDC_MSMT_BATTERY_REMAINING_LONGEVITY: 126 MO
MDC_IDC_MSMT_BATTERY_REMAINING_PERCENTAGE: 100 %
MDC_IDC_MSMT_BATTERY_STATUS: NORMAL
MDC_IDC_MSMT_LEADCHNL_RA_IMPEDANCE_VALUE: 374 OHM
MDC_IDC_MSMT_LEADCHNL_RA_IMPEDANCE_VALUE: 452 OHM
MDC_IDC_MSMT_LEADCHNL_RA_IMPEDANCE_VALUE: 502 OHM
MDC_IDC_MSMT_LEADCHNL_RA_IMPEDANCE_VALUE: 503 OHM
MDC_IDC_MSMT_LEADCHNL_RA_IMPEDANCE_VALUE: 535 OHM
MDC_IDC_MSMT_LEADCHNL_RA_PACING_THRESHOLD_AMPLITUDE: 0.4 V
MDC_IDC_MSMT_LEADCHNL_RA_PACING_THRESHOLD_AMPLITUDE: 0.7 V
MDC_IDC_MSMT_LEADCHNL_RA_PACING_THRESHOLD_PULSEWIDTH: 0.4 MS
MDC_IDC_MSMT_LEADCHNL_RA_SENSING_INTR_AMPL: 6.2 MV
MDC_IDC_MSMT_LEADCHNL_RV_IMPEDANCE_VALUE: 446 OHM
MDC_IDC_MSMT_LEADCHNL_RV_IMPEDANCE_VALUE: 522 OHM
MDC_IDC_MSMT_LEADCHNL_RV_IMPEDANCE_VALUE: 600 OHM
MDC_IDC_MSMT_LEADCHNL_RV_IMPEDANCE_VALUE: 603 OHM
MDC_IDC_MSMT_LEADCHNL_RV_IMPEDANCE_VALUE: 680 OHM
MDC_IDC_MSMT_LEADCHNL_RV_PACING_THRESHOLD_AMPLITUDE: 0.7 V
MDC_IDC_MSMT_LEADCHNL_RV_PACING_THRESHOLD_AMPLITUDE: 0.8 V
MDC_IDC_MSMT_LEADCHNL_RV_PACING_THRESHOLD_AMPLITUDE: 0.9 V
MDC_IDC_MSMT_LEADCHNL_RV_PACING_THRESHOLD_AMPLITUDE: 0.9 V
MDC_IDC_MSMT_LEADCHNL_RV_PACING_THRESHOLD_AMPLITUDE: 1 V
MDC_IDC_MSMT_LEADCHNL_RV_PACING_THRESHOLD_PULSEWIDTH: 0.4 MS
MDC_IDC_MSMT_LEADCHNL_RV_SENSING_INTR_AMPL: NORMAL
MDC_IDC_PG_IMPLANT_DTM: NORMAL
MDC_IDC_PG_MFG: NORMAL
MDC_IDC_PG_MODEL: NORMAL
MDC_IDC_PG_SERIAL: NORMAL
MDC_IDC_PG_TYPE: NORMAL
MDC_IDC_SESS_CLINIC_NAME: NORMAL
MDC_IDC_SESS_DTM: NORMAL
MDC_IDC_SESS_TYPE: NORMAL
MDC_IDC_SET_BRADY_AT_MODE_SWITCH_MODE: NORMAL
MDC_IDC_SET_BRADY_AT_MODE_SWITCH_RATE: 170 {BEATS}/MIN
MDC_IDC_SET_BRADY_LOWRATE: 60 {BEATS}/MIN
MDC_IDC_SET_BRADY_MAX_SENSOR_RATE: 130 {BEATS}/MIN
MDC_IDC_SET_BRADY_MAX_TRACKING_RATE: 130 {BEATS}/MIN
MDC_IDC_SET_BRADY_MODE: NORMAL
MDC_IDC_SET_BRADY_PAV_DELAY_HIGH: 180 MS
MDC_IDC_SET_BRADY_PAV_DELAY_LOW: 230 MS
MDC_IDC_SET_BRADY_SAV_DELAY_HIGH: 180 MS
MDC_IDC_SET_BRADY_SAV_DELAY_LOW: 230 MS
MDC_IDC_SET_LEADCHNL_RA_PACING_AMPLITUDE: 2 V
MDC_IDC_SET_LEADCHNL_RA_PACING_CAPTURE_MODE: NORMAL
MDC_IDC_SET_LEADCHNL_RA_PACING_POLARITY: NORMAL
MDC_IDC_SET_LEADCHNL_RA_PACING_PULSEWIDTH: 0.4 MS
MDC_IDC_SET_LEADCHNL_RA_SENSING_ADAPTATION_MODE: NORMAL
MDC_IDC_SET_LEADCHNL_RA_SENSING_POLARITY: NORMAL
MDC_IDC_SET_LEADCHNL_RA_SENSING_SENSITIVITY: 0.25 MV
MDC_IDC_SET_LEADCHNL_RV_PACING_AMPLITUDE: 1.4 V
MDC_IDC_SET_LEADCHNL_RV_PACING_AMPLITUDE: 1.5 V
MDC_IDC_SET_LEADCHNL_RV_PACING_AMPLITUDE: 3.5 V
MDC_IDC_SET_LEADCHNL_RV_PACING_CAPTURE_MODE: NORMAL
MDC_IDC_SET_LEADCHNL_RV_PACING_POLARITY: NORMAL
MDC_IDC_SET_LEADCHNL_RV_PACING_PULSEWIDTH: 0.4 MS
MDC_IDC_SET_LEADCHNL_RV_SENSING_ADAPTATION_MODE: NORMAL
MDC_IDC_SET_LEADCHNL_RV_SENSING_POLARITY: NORMAL
MDC_IDC_SET_LEADCHNL_RV_SENSING_SENSITIVITY: 1.5 MV
MDC_IDC_SET_ZONE_DETECTION_INTERVAL: 375 MS
MDC_IDC_SET_ZONE_STATUS: NORMAL
MDC_IDC_SET_ZONE_TYPE: NORMAL
MDC_IDC_SET_ZONE_VENDOR_TYPE: NORMAL
MDC_IDC_STAT_AT_BURDEN_PERCENT: 0 %
MDC_IDC_STAT_AT_DTM_END: NORMAL
MDC_IDC_STAT_AT_DTM_START: NORMAL
MDC_IDC_STAT_BRADY_DTM_END: NORMAL
MDC_IDC_STAT_BRADY_DTM_START: NORMAL
MDC_IDC_STAT_BRADY_RA_PERCENT_PACED: 93 %
MDC_IDC_STAT_BRADY_RA_PERCENT_PACED: 93 %
MDC_IDC_STAT_BRADY_RA_PERCENT_PACED: 94 %
MDC_IDC_STAT_BRADY_RV_PERCENT_PACED: 20 %
MDC_IDC_STAT_BRADY_RV_PERCENT_PACED: 26 %
MDC_IDC_STAT_BRADY_RV_PERCENT_PACED: 39 %
MDC_IDC_STAT_BRADY_RV_PERCENT_PACED: 46 %
MDC_IDC_STAT_BRADY_RV_PERCENT_PACED: 46 %
MDC_IDC_STAT_EPISODE_RECENT_COUNT: 0
MDC_IDC_STAT_EPISODE_RECENT_COUNT: 1
MDC_IDC_STAT_EPISODE_RECENT_COUNT_DTM_END: NORMAL
MDC_IDC_STAT_EPISODE_RECENT_COUNT_DTM_START: NORMAL
MDC_IDC_STAT_EPISODE_TOTAL_COUNT: 0
MDC_IDC_STAT_EPISODE_TOTAL_COUNT_DTM_END: NORMAL
MDC_IDC_STAT_EPISODE_TYPE: NORMAL
MDC_IDC_STAT_EPISODE_VENDOR_TYPE: NORMAL
MICROALBUMIN UR-MCNC: 326 MG/L
MICROALBUMIN/CREAT UR: 620.95 MG/G CR (ref 0–25)
MONOCYTES # BLD AUTO: 0.7 10E3/UL (ref 0–1.3)
MONOCYTES # BLD AUTO: 0.9 10E3/UL (ref 0–1.3)
MONOCYTES # BLD AUTO: 0.9 10E3/UL (ref 0–1.3)
MONOCYTES NFR BLD AUTO: 6 %
MONOCYTES NFR BLD AUTO: 8 %
MONOCYTES NFR BLD AUTO: 9 %
NEUTROPHILS # BLD AUTO: 7.6 10E3/UL (ref 1.6–8.3)
NEUTROPHILS # BLD AUTO: 7.9 10E3/UL (ref 1.6–8.3)
NEUTROPHILS # BLD AUTO: 8.5 10E3/UL (ref 1.6–8.3)
NEUTROPHILS NFR BLD AUTO: 74 %
NEUTROPHILS NFR BLD AUTO: 80 %
NEUTROPHILS NFR BLD AUTO: 81 %
NRBC # BLD AUTO: 0 10E3/UL
NRBC BLD AUTO-RTO: 0 /100
P AXIS - MUSE: 53 DEGREES
P AXIS - MUSE: 61 DEGREES
P AXIS - MUSE: 73 DEGREES
PHOSPHATE SERPL-MCNC: 2.2 MG/DL (ref 2.5–4.5)
PHOSPHATE SERPL-MCNC: 2.5 MG/DL (ref 2.5–4.5)
PHOSPHATE SERPL-MCNC: 3 MG/DL (ref 2.5–4.5)
PHOSPHATE SERPL-MCNC: 3.2 MG/DL (ref 2.5–4.5)
PLATELET # BLD AUTO: 141 10E3/UL (ref 150–450)
PLATELET # BLD AUTO: 148 10E3/UL (ref 150–450)
PLATELET # BLD AUTO: 152 10E3/UL (ref 150–450)
PLATELET # BLD AUTO: 165 10E3/UL (ref 150–450)
PLATELET # BLD AUTO: 171 10E3/UL (ref 150–450)
PLATELET # BLD AUTO: 173 10E3/UL (ref 150–450)
PLATELET # BLD AUTO: 178 10E3/UL (ref 150–450)
PLATELET # BLD AUTO: 189 10E3/UL (ref 150–450)
PLATELET # BLD AUTO: 198 10E3/UL (ref 150–450)
PLATELET # BLD AUTO: 219 10E3/UL (ref 150–450)
PLATELET # BLD AUTO: 287 10E3/UL (ref 150–450)
PLATELET # BLD AUTO: 365 10E3/UL (ref 150–450)
POTASSIUM SERPL-SCNC: 3.9 MMOL/L (ref 3.4–5.3)
POTASSIUM SERPL-SCNC: 4 MMOL/L (ref 3.4–5.3)
POTASSIUM SERPL-SCNC: 4.1 MMOL/L (ref 3.4–5.3)
POTASSIUM SERPL-SCNC: 4.1 MMOL/L (ref 3.4–5.3)
POTASSIUM SERPL-SCNC: 4.2 MMOL/L (ref 3.4–5.3)
POTASSIUM SERPL-SCNC: 4.2 MMOL/L (ref 3.4–5.3)
POTASSIUM SERPL-SCNC: 4.3 MMOL/L (ref 3.4–5.3)
POTASSIUM SERPL-SCNC: 4.3 MMOL/L (ref 3.4–5.3)
POTASSIUM SERPL-SCNC: 4.5 MMOL/L (ref 3.4–5.3)
POTASSIUM SERPL-SCNC: 4.6 MMOL/L (ref 3.4–5.3)
POTASSIUM SERPL-SCNC: 4.6 MMOL/L (ref 3.4–5.3)
PR INTERVAL - MUSE: 224 MS
PR INTERVAL - MUSE: 244 MS
PR INTERVAL - MUSE: 416 MS
PROT SERPL-MCNC: 5.6 G/DL (ref 6.4–8.3)
PROT SERPL-MCNC: 6.3 G/DL (ref 6.4–8.3)
PROT SERPL-MCNC: 8.1 G/DL (ref 6.4–8.3)
QRS DURATION - MUSE: 154 MS
QRS DURATION - MUSE: 160 MS
QRS DURATION - MUSE: 160 MS
QT - MUSE: 452 MS
QT - MUSE: 468 MS
QT - MUSE: 490 MS
QTC - MUSE: 490 MS
QTC - MUSE: 494 MS
QTC - MUSE: 508 MS
R AXIS - MUSE: -14 DEGREES
R AXIS - MUSE: -26 DEGREES
R AXIS - MUSE: -51 DEGREES
RADIOLOGIST FLAGS: ABNORMAL
RBC # BLD AUTO: 2.21 10E6/UL (ref 3.8–5.2)
RBC # BLD AUTO: 2.46 10E6/UL (ref 3.8–5.2)
RBC # BLD AUTO: 2.52 10E6/UL (ref 3.8–5.2)
RBC # BLD AUTO: 2.69 10E6/UL (ref 3.8–5.2)
RBC # BLD AUTO: 2.73 10E6/UL (ref 3.8–5.2)
RBC # BLD AUTO: 2.74 10E6/UL (ref 3.8–5.2)
RBC # BLD AUTO: 2.83 10E6/UL (ref 3.8–5.2)
RBC # BLD AUTO: 3.16 10E6/UL (ref 3.8–5.2)
RBC # BLD AUTO: 3.62 10E6/UL (ref 3.8–5.2)
RBC # BLD AUTO: 3.7 10E6/UL (ref 3.8–5.2)
RBC # BLD AUTO: 3.7 10E6/UL (ref 3.8–5.2)
SODIUM SERPL-SCNC: 135 MMOL/L (ref 135–145)
SODIUM SERPL-SCNC: 136 MMOL/L (ref 135–145)
SODIUM SERPL-SCNC: 137 MMOL/L (ref 136–145)
SODIUM SERPL-SCNC: 139 MMOL/L (ref 135–145)
SODIUM SERPL-SCNC: 141 MMOL/L (ref 135–145)
SODIUM SERPL-SCNC: 142 MMOL/L (ref 135–145)
SODIUM SERPL-SCNC: 144 MMOL/L (ref 135–145)
SODIUM SERPL-SCNC: 145 MMOL/L (ref 135–145)
SODIUM SERPL-SCNC: 145 MMOL/L (ref 135–145)
SPECIMEN EXPIRATION DATE: NORMAL
SPECIMEN EXPIRATION DATE: NORMAL
SYSTOLIC BLOOD PRESSURE - MUSE: NORMAL MMHG
T AXIS - MUSE: 100 DEGREES
T AXIS - MUSE: 115 DEGREES
T AXIS - MUSE: 49 DEGREES
TROPONIN T SERPL HS-MCNC: 24 NG/L
TROPONIN T SERPL HS-MCNC: 28 NG/L
TROPONIN T SERPL HS-MCNC: 30 NG/L
TROPONIN T SERPL HS-MCNC: 43 NG/L
TROPONIN T SERPL HS-MCNC: 51 NG/L
UNIT ABO/RH: NORMAL
UNIT NUMBER: NORMAL
UNIT STATUS: NORMAL
UNIT TYPE ISBT: 6200
VENTRICULAR RATE- MUSE: 60 BPM
VENTRICULAR RATE- MUSE: 71 BPM
VENTRICULAR RATE- MUSE: 72 BPM
WBC # BLD AUTO: 10.5 10E3/UL (ref 4–11)
WBC # BLD AUTO: 10.6 10E3/UL (ref 4–11)
WBC # BLD AUTO: 5.5 10E3/UL (ref 4–11)
WBC # BLD AUTO: 6 10E3/UL (ref 4–11)
WBC # BLD AUTO: 6.7 10E3/UL (ref 4–11)
WBC # BLD AUTO: 6.8 10E3/UL (ref 4–11)
WBC # BLD AUTO: 6.9 10E3/UL (ref 4–11)
WBC # BLD AUTO: 7.4 10E3/UL (ref 4–11)
WBC # BLD AUTO: 9.5 10E3/UL (ref 4–11)
WBC # BLD AUTO: 9.5 10E3/UL (ref 4–11)
WBC # BLD AUTO: 9.8 10E3/UL (ref 4–11)

## 2023-01-01 PROCEDURE — 258N000003 HC RX IP 258 OP 636: Performed by: STUDENT IN AN ORGANIZED HEALTH CARE EDUCATION/TRAINING PROGRAM

## 2023-01-01 PROCEDURE — 120N000002 HC R&B MED SURG/OB UMMC

## 2023-01-01 PROCEDURE — 83036 HEMOGLOBIN GLYCOSYLATED A1C: CPT

## 2023-01-01 PROCEDURE — 93286 PERI-PX EVAL PM/LDLS PM IP: CPT

## 2023-01-01 PROCEDURE — 82040 ASSAY OF SERUM ALBUMIN: CPT

## 2023-01-01 PROCEDURE — 250N000013 HC RX MED GY IP 250 OP 250 PS 637

## 2023-01-01 PROCEDURE — 999N000180 XR SURGERY CARM FLUORO LESS THAN 5 MIN: Mod: TC

## 2023-01-01 PROCEDURE — 82374 ASSAY BLOOD CARBON DIOXIDE: CPT | Performed by: FAMILY MEDICINE

## 2023-01-01 PROCEDURE — 250N000013 HC RX MED GY IP 250 OP 250 PS 637: Performed by: INTERNAL MEDICINE

## 2023-01-01 PROCEDURE — 36415 COLL VENOUS BLD VENIPUNCTURE: CPT

## 2023-01-01 PROCEDURE — 85730 THROMBOPLASTIN TIME PARTIAL: CPT | Performed by: STUDENT IN AN ORGANIZED HEALTH CARE EDUCATION/TRAINING PROGRAM

## 2023-01-01 PROCEDURE — 999N000248 HC STATISTIC IV INSERT WITH US BY RN

## 2023-01-01 PROCEDURE — 250N000011 HC RX IP 250 OP 636

## 2023-01-01 PROCEDURE — P9604 ONE-WAY ALLOW PRORATED TRIP: HCPCS | Performed by: FAMILY MEDICINE

## 2023-01-01 PROCEDURE — C1769 GUIDE WIRE: HCPCS

## 2023-01-01 PROCEDURE — 70498 CT ANGIOGRAPHY NECK: CPT | Mod: MA

## 2023-01-01 PROCEDURE — 97110 THERAPEUTIC EXERCISES: CPT | Mod: GO

## 2023-01-01 PROCEDURE — 99152 MOD SED SAME PHYS/QHP 5/>YRS: CPT

## 2023-01-01 PROCEDURE — 36415 COLL VENOUS BLD VENIPUNCTURE: CPT | Performed by: NURSE PRACTITIONER

## 2023-01-01 PROCEDURE — 97530 THERAPEUTIC ACTIVITIES: CPT | Mod: GO | Performed by: OCCUPATIONAL THERAPIST

## 2023-01-01 PROCEDURE — 70496 CT ANGIOGRAPHY HEAD: CPT | Mod: 26 | Performed by: STUDENT IN AN ORGANIZED HEALTH CARE EDUCATION/TRAINING PROGRAM

## 2023-01-01 PROCEDURE — 74174 CTA ABD&PLVS W/CONTRAST: CPT | Mod: 26 | Performed by: RADIOLOGY

## 2023-01-01 PROCEDURE — P9604 ONE-WAY ALLOW PRORATED TRIP: HCPCS

## 2023-01-01 PROCEDURE — 36415 COLL VENOUS BLD VENIPUNCTURE: CPT | Performed by: INTERNAL MEDICINE

## 2023-01-01 PROCEDURE — 97161 PT EVAL LOW COMPLEX 20 MIN: CPT | Mod: GP

## 2023-01-01 PROCEDURE — 99232 SBSQ HOSP IP/OBS MODERATE 35: CPT | Performed by: INTERNAL MEDICINE

## 2023-01-01 PROCEDURE — 70498 CT ANGIOGRAPHY NECK: CPT | Mod: 26 | Performed by: STUDENT IN AN ORGANIZED HEALTH CARE EDUCATION/TRAINING PROGRAM

## 2023-01-01 PROCEDURE — G1010 CDSM STANSON: HCPCS | Mod: GC | Performed by: RADIOLOGY

## 2023-01-01 PROCEDURE — P9016 RBC LEUKOCYTES REDUCED: HCPCS

## 2023-01-01 PROCEDURE — 85730 THROMBOPLASTIN TIME PARTIAL: CPT | Performed by: INTERNAL MEDICINE

## 2023-01-01 PROCEDURE — 70450 CT HEAD/BRAIN W/O DYE: CPT | Mod: 26 | Performed by: RADIOLOGY

## 2023-01-01 PROCEDURE — 93005 ELECTROCARDIOGRAM TRACING: CPT

## 2023-01-01 PROCEDURE — C1760 CLOSURE DEV, VASC: HCPCS

## 2023-01-01 PROCEDURE — 97530 THERAPEUTIC ACTIVITIES: CPT | Mod: GP | Performed by: REHABILITATION PRACTITIONER

## 2023-01-01 PROCEDURE — 272N000002 HC OR SUPPLY OTHER OPNP: Performed by: ORTHOPAEDIC SURGERY

## 2023-01-01 PROCEDURE — 250N000013 HC RX MED GY IP 250 OP 250 PS 637: Performed by: NURSE PRACTITIONER

## 2023-01-01 PROCEDURE — 2894A US LOWER EXTREMITY VENOUS DUPLEX RIGHT: CPT | Mod: 26 | Performed by: RADIOLOGY

## 2023-01-01 PROCEDURE — 93294 REM INTERROG EVL PM/LDLS PM: CPT | Performed by: INTERNAL MEDICINE

## 2023-01-01 PROCEDURE — 72170 X-RAY EXAM OF PELVIS: CPT | Mod: 26 | Performed by: RADIOLOGY

## 2023-01-01 PROCEDURE — 84484 ASSAY OF TROPONIN QUANT: CPT | Performed by: INTERNAL MEDICINE

## 2023-01-01 PROCEDURE — 86900 BLOOD TYPING SEROLOGIC ABO: CPT | Performed by: STUDENT IN AN ORGANIZED HEALTH CARE EDUCATION/TRAINING PROGRAM

## 2023-01-01 PROCEDURE — 93286 PERI-PX EVAL PM/LDLS PM IP: CPT | Mod: 26 | Performed by: INTERNAL MEDICINE

## 2023-01-01 PROCEDURE — 250N000013 HC RX MED GY IP 250 OP 250 PS 637: Performed by: STUDENT IN AN ORGANIZED HEALTH CARE EDUCATION/TRAINING PROGRAM

## 2023-01-01 PROCEDURE — 73552 X-RAY EXAM OF FEMUR 2/>: CPT | Mod: RT | Performed by: RADIOLOGY

## 2023-01-01 PROCEDURE — 250N000009 HC RX 250

## 2023-01-01 PROCEDURE — 120N000003 HC R&B IMCU UMMC

## 2023-01-01 PROCEDURE — 250N000013 HC RX MED GY IP 250 OP 250 PS 637: Performed by: PSYCHIATRY & NEUROLOGY

## 2023-01-01 PROCEDURE — 85049 AUTOMATED PLATELET COUNT: CPT

## 2023-01-01 PROCEDURE — 97110 THERAPEUTIC EXERCISES: CPT | Mod: GP | Performed by: REHABILITATION PRACTITIONER

## 2023-01-01 PROCEDURE — 85018 HEMOGLOBIN: CPT

## 2023-01-01 PROCEDURE — 85027 COMPLETE CBC AUTOMATED: CPT

## 2023-01-01 PROCEDURE — 250N000011 HC RX IP 250 OP 636: Mod: JZ

## 2023-01-01 PROCEDURE — 97112 NEUROMUSCULAR REEDUCATION: CPT | Mod: GO

## 2023-01-01 PROCEDURE — 200N000002 HC R&B ICU UMMC

## 2023-01-01 PROCEDURE — C1757 CATH, THROMBECTOMY/EMBOLECT: HCPCS

## 2023-01-01 PROCEDURE — 93010 ELECTROCARDIOGRAM REPORT: CPT | Performed by: STUDENT IN AN ORGANIZED HEALTH CARE EDUCATION/TRAINING PROGRAM

## 2023-01-01 PROCEDURE — 999N000208 ECHOCARDIOGRAM COMPLETE

## 2023-01-01 PROCEDURE — 710N000010 HC RECOVERY PHASE 1, LEVEL 2, PER MIN: Performed by: ORTHOPAEDIC SURGERY

## 2023-01-01 PROCEDURE — 0042T CT HEAD PERFUSION W CONTRAST: CPT

## 2023-01-01 PROCEDURE — 73552 X-RAY EXAM OF FEMUR 2/>: CPT | Mod: RT

## 2023-01-01 PROCEDURE — 82374 ASSAY BLOOD CARBON DIOXIDE: CPT

## 2023-01-01 PROCEDURE — 82043 UR ALBUMIN QUANTITATIVE: CPT

## 2023-01-01 PROCEDURE — 70553 MRI BRAIN STEM W/O & W/DYE: CPT | Mod: 26 | Performed by: RADIOLOGY

## 2023-01-01 PROCEDURE — B31R1ZZ FLUOROSCOPY OF INTRACRANIAL ARTERIES USING LOW OSMOLAR CONTRAST: ICD-10-PCS | Performed by: RADIOLOGY

## 2023-01-01 PROCEDURE — 99222 1ST HOSP IP/OBS MODERATE 55: CPT | Mod: A1 | Performed by: STUDENT IN AN ORGANIZED HEALTH CARE EDUCATION/TRAINING PROGRAM

## 2023-01-01 PROCEDURE — 83735 ASSAY OF MAGNESIUM: CPT | Performed by: NURSE PRACTITIONER

## 2023-01-01 PROCEDURE — 97530 THERAPEUTIC ACTIVITIES: CPT | Mod: GP | Performed by: PHYSICAL THERAPIST

## 2023-01-01 PROCEDURE — 73552 X-RAY EXAM OF FEMUR 2/>: CPT | Mod: 26 | Performed by: RADIOLOGY

## 2023-01-01 PROCEDURE — 71045 X-RAY EXAM CHEST 1 VIEW: CPT | Mod: 26 | Performed by: RADIOLOGY

## 2023-01-01 PROCEDURE — C1887 CATHETER, GUIDING: HCPCS

## 2023-01-01 PROCEDURE — 272N000001 HC OR GENERAL SUPPLY STERILE: Performed by: ORTHOPAEDIC SURGERY

## 2023-01-01 PROCEDURE — G1010 CDSM STANSON: HCPCS

## 2023-01-01 PROCEDURE — 99207 PR NO BILLABLE SERVICE THIS VISIT: CPT

## 2023-01-01 PROCEDURE — 85025 COMPLETE CBC W/AUTO DIFF WBC: CPT

## 2023-01-01 PROCEDURE — 27245 TREAT THIGH FRACTURE: CPT | Mod: GC | Performed by: ORTHOPAEDIC SURGERY

## 2023-01-01 PROCEDURE — 99207 PR APP CREDIT; MD BILLING SHARED VISIT: CPT

## 2023-01-01 PROCEDURE — 80048 BASIC METABOLIC PNL TOTAL CA: CPT | Performed by: INTERNAL MEDICINE

## 2023-01-01 PROCEDURE — 84100 ASSAY OF PHOSPHORUS: CPT | Performed by: NURSE PRACTITIONER

## 2023-01-01 PROCEDURE — 92526 ORAL FUNCTION THERAPY: CPT | Mod: GN | Performed by: SPEECH-LANGUAGE PATHOLOGIST

## 2023-01-01 PROCEDURE — 36225 PLACE CATH SUBCLAVIAN ART: CPT

## 2023-01-01 PROCEDURE — 85027 COMPLETE CBC AUTOMATED: CPT | Performed by: NURSE PRACTITIONER

## 2023-01-01 PROCEDURE — 93306 TTE W/DOPPLER COMPLETE: CPT | Mod: 26 | Performed by: INTERNAL MEDICINE

## 2023-01-01 PROCEDURE — 258N000003 HC RX IP 258 OP 636: Performed by: PSYCHIATRY & NEUROLOGY

## 2023-01-01 PROCEDURE — 99024 POSTOP FOLLOW-UP VISIT: CPT | Performed by: PHYSICIAN ASSISTANT

## 2023-01-01 PROCEDURE — 255N000002 HC RX 255 OP 636: Performed by: STUDENT IN AN ORGANIZED HEALTH CARE EDUCATION/TRAINING PROGRAM

## 2023-01-01 PROCEDURE — 250N000009 HC RX 250: Performed by: STUDENT IN AN ORGANIZED HEALTH CARE EDUCATION/TRAINING PROGRAM

## 2023-01-01 PROCEDURE — 36224 PLACE CATH CAROTD ART: CPT

## 2023-01-01 PROCEDURE — 99310 SBSQ NF CARE HIGH MDM 45: CPT

## 2023-01-01 PROCEDURE — 70553 MRI BRAIN STEM W/O & W/DYE: CPT | Mod: MG

## 2023-01-01 PROCEDURE — 250N000011 HC RX IP 250 OP 636: Performed by: ANESTHESIOLOGY

## 2023-01-01 PROCEDURE — 92526 ORAL FUNCTION THERAPY: CPT | Mod: GN

## 2023-01-01 PROCEDURE — 70450 CT HEAD/BRAIN W/O DYE: CPT | Mod: MA

## 2023-01-01 PROCEDURE — 80048 BASIC METABOLIC PNL TOTAL CA: CPT

## 2023-01-01 PROCEDURE — 80048 BASIC METABOLIC PNL TOTAL CA: CPT | Performed by: NURSE PRACTITIONER

## 2023-01-01 PROCEDURE — 85018 HEMOGLOBIN: CPT | Performed by: INTERNAL MEDICINE

## 2023-01-01 PROCEDURE — 70496 CT ANGIOGRAPHY HEAD: CPT | Mod: MA

## 2023-01-01 PROCEDURE — C1713 ANCHOR/SCREW BN/BN,TIS/BN: HCPCS | Performed by: ORTHOPAEDIC SURGERY

## 2023-01-01 PROCEDURE — 250N000009 HC RX 250: Performed by: NURSE PRACTITIONER

## 2023-01-01 PROCEDURE — 97530 THERAPEUTIC ACTIVITIES: CPT | Mod: GO

## 2023-01-01 PROCEDURE — 250N000011 HC RX IP 250 OP 636: Mod: JZ | Performed by: NURSE PRACTITIONER

## 2023-01-01 PROCEDURE — 272N000192 HC ACCESSORY CR2

## 2023-01-01 PROCEDURE — 36415 COLL VENOUS BLD VENIPUNCTURE: CPT | Performed by: FAMILY MEDICINE

## 2023-01-01 PROCEDURE — 93296 REM INTERROG EVL PM/IDS: CPT

## 2023-01-01 PROCEDURE — 0042T CT HEAD PERFUSION W CONTRAST: CPT | Mod: GC | Performed by: STUDENT IN AN ORGANIZED HEALTH CARE EDUCATION/TRAINING PROGRAM

## 2023-01-01 PROCEDURE — 272N000124 HC CATH CR11

## 2023-01-01 PROCEDURE — 370N000017 HC ANESTHESIA TECHNICAL FEE, PER MIN: Performed by: ORTHOPAEDIC SURGERY

## 2023-01-01 PROCEDURE — 250N000009 HC RX 250: Performed by: ORTHOPAEDIC SURGERY

## 2023-01-01 PROCEDURE — 250N000011 HC RX IP 250 OP 636: Mod: JZ | Performed by: STUDENT IN AN ORGANIZED HEALTH CARE EDUCATION/TRAINING PROGRAM

## 2023-01-01 PROCEDURE — 77080 DXA BONE DENSITY AXIAL: CPT | Mod: TC | Performed by: PHYSICIAN ASSISTANT

## 2023-01-01 PROCEDURE — 93971 EXTREMITY STUDY: CPT | Mod: RT

## 2023-01-01 PROCEDURE — 99285 EMERGENCY DEPT VISIT HI MDM: CPT | Mod: 25 | Performed by: STUDENT IN AN ORGANIZED HEALTH CARE EDUCATION/TRAINING PROGRAM

## 2023-01-01 PROCEDURE — 250N000011 HC RX IP 250 OP 636: Performed by: PSYCHIATRY & NEUROLOGY

## 2023-01-01 PROCEDURE — 71045 X-RAY EXAM CHEST 1 VIEW: CPT

## 2023-01-01 PROCEDURE — 99239 HOSP IP/OBS DSCHRG MGMT >30: CPT | Mod: GC | Performed by: PSYCHIATRY & NEUROLOGY

## 2023-01-01 PROCEDURE — B3161ZZ FLUOROSCOPY OF RIGHT INTERNAL CAROTID ARTERY USING LOW OSMOLAR CONTRAST: ICD-10-PCS | Performed by: RADIOLOGY

## 2023-01-01 PROCEDURE — 85025 COMPLETE CBC W/AUTO DIFF WBC: CPT | Performed by: INTERNAL MEDICINE

## 2023-01-01 PROCEDURE — 82040 ASSAY OF SERUM ALBUMIN: CPT | Performed by: FAMILY MEDICINE

## 2023-01-01 PROCEDURE — 83036 HEMOGLOBIN GLYCOSYLATED A1C: CPT | Performed by: STUDENT IN AN ORGANIZED HEALTH CARE EDUCATION/TRAINING PROGRAM

## 2023-01-01 PROCEDURE — 69209 REMOVE IMPACTED EAR WAX UNI: CPT

## 2023-01-01 PROCEDURE — 0QS636Z REPOSITION RIGHT UPPER FEMUR WITH INTRAMEDULLARY INTERNAL FIXATION DEVICE, PERCUTANEOUS APPROACH: ICD-10-PCS | Performed by: ORTHOPAEDIC SURGERY

## 2023-01-01 PROCEDURE — 255N000002 HC RX 255 OP 636: Performed by: INTERNAL MEDICINE

## 2023-01-01 PROCEDURE — 83721 ASSAY OF BLOOD LIPOPROTEIN: CPT

## 2023-01-01 PROCEDURE — 272N000116 HC CATH CR1

## 2023-01-01 PROCEDURE — 97110 THERAPEUTIC EXERCISES: CPT | Mod: GP

## 2023-01-01 PROCEDURE — 99418 PROLNG IP/OBS E/M EA 15 MIN: CPT | Performed by: FAMILY MEDICINE

## 2023-01-01 PROCEDURE — 360N000084 HC SURGERY LEVEL 4 W/ FLUORO, PER MIN: Performed by: ORTHOPAEDIC SURGERY

## 2023-01-01 PROCEDURE — 93010 ELECTROCARDIOGRAM REPORT: CPT | Performed by: INTERNAL MEDICINE

## 2023-01-01 PROCEDURE — 36415 COLL VENOUS BLD VENIPUNCTURE: CPT | Performed by: STUDENT IN AN ORGANIZED HEALTH CARE EDUCATION/TRAINING PROGRAM

## 2023-01-01 PROCEDURE — 99152 MOD SED SAME PHYS/QHP 5/>YRS: CPT | Mod: GC | Performed by: RADIOLOGY

## 2023-01-01 PROCEDURE — 97530 THERAPEUTIC ACTIVITIES: CPT | Mod: GP

## 2023-01-01 PROCEDURE — 86900 BLOOD TYPING SEROLOGIC ABO: CPT | Performed by: ANESTHESIOLOGY

## 2023-01-01 PROCEDURE — 97166 OT EVAL MOD COMPLEX 45 MIN: CPT | Mod: GO | Performed by: OCCUPATIONAL THERAPIST

## 2023-01-01 PROCEDURE — 85610 PROTHROMBIN TIME: CPT | Performed by: INTERNAL MEDICINE

## 2023-01-01 PROCEDURE — 258N000003 HC RX IP 258 OP 636

## 2023-01-01 PROCEDURE — 99306 1ST NF CARE HIGH MDM 50: CPT | Performed by: FAMILY MEDICINE

## 2023-01-01 PROCEDURE — 84484 ASSAY OF TROPONIN QUANT: CPT | Performed by: STUDENT IN AN ORGANIZED HEALTH CARE EDUCATION/TRAINING PROGRAM

## 2023-01-01 PROCEDURE — 85730 THROMBOPLASTIN TIME PARTIAL: CPT

## 2023-01-01 PROCEDURE — 99418 PROLNG IP/OBS E/M EA 15 MIN: CPT | Performed by: INTERNAL MEDICINE

## 2023-01-01 PROCEDURE — 80048 BASIC METABOLIC PNL TOTAL CA: CPT | Performed by: STUDENT IN AN ORGANIZED HEALTH CARE EDUCATION/TRAINING PROGRAM

## 2023-01-01 PROCEDURE — 99309 SBSQ NF CARE MODERATE MDM 30: CPT | Performed by: FAMILY MEDICINE

## 2023-01-01 PROCEDURE — 92610 EVALUATE SWALLOWING FUNCTION: CPT | Mod: GN | Performed by: SPEECH-LANGUAGE PATHOLOGIST

## 2023-01-01 PROCEDURE — 61645 PERQ ART M-THROMBECT &/NFS: CPT

## 2023-01-01 PROCEDURE — 250N000009 HC RX 250: Performed by: PSYCHIATRY & NEUROLOGY

## 2023-01-01 PROCEDURE — 250N000025 HC SEVOFLURANE, PER MIN: Performed by: ORTHOPAEDIC SURGERY

## 2023-01-01 PROCEDURE — 99212 OFFICE O/P EST SF 10 MIN: CPT | Mod: 25

## 2023-01-01 PROCEDURE — 97535 SELF CARE MNGMENT TRAINING: CPT | Mod: GO

## 2023-01-01 PROCEDURE — 250N000011 HC RX IP 250 OP 636: Mod: JZ | Performed by: ANESTHESIOLOGY

## 2023-01-01 PROCEDURE — 255N000002 HC RX 255 OP 636: Mod: JZ

## 2023-01-01 PROCEDURE — 99233 SBSQ HOSP IP/OBS HIGH 50: CPT | Mod: GC | Performed by: PSYCHIATRY & NEUROLOGY

## 2023-01-01 PROCEDURE — 36415 COLL VENOUS BLD VENIPUNCTURE: CPT | Performed by: ANESTHESIOLOGY

## 2023-01-01 PROCEDURE — 99285 EMERGENCY DEPT VISIT HI MDM: CPT | Mod: 25

## 2023-01-01 PROCEDURE — 250N000009 HC RX 250: Performed by: ANESTHESIOLOGY

## 2023-01-01 PROCEDURE — 36217 PLACE CATHETER IN ARTERY: CPT | Mod: RT | Performed by: RADIOLOGY

## 2023-01-01 PROCEDURE — 85025 COMPLETE CBC W/AUTO DIFF WBC: CPT | Performed by: STUDENT IN AN ORGANIZED HEALTH CARE EDUCATION/TRAINING PROGRAM

## 2023-01-01 PROCEDURE — 85014 HEMATOCRIT: CPT | Performed by: FAMILY MEDICINE

## 2023-01-01 PROCEDURE — B3111ZZ FLUOROSCOPY OF RIGHT BRACHIOCEPHALIC-SUBCLAVIAN ARTERY USING LOW OSMOLAR CONTRAST: ICD-10-PCS | Performed by: RADIOLOGY

## 2023-01-01 PROCEDURE — 80053 COMPREHEN METABOLIC PANEL: CPT

## 2023-01-01 PROCEDURE — 97535 SELF CARE MNGMENT TRAINING: CPT | Mod: GO | Performed by: OCCUPATIONAL THERAPIST

## 2023-01-01 PROCEDURE — 99233 SBSQ HOSP IP/OBS HIGH 50: CPT | Performed by: INTERNAL MEDICINE

## 2023-01-01 PROCEDURE — 999N000141 HC STATISTIC PRE-PROCEDURE NURSING ASSESSMENT: Performed by: ORTHOPAEDIC SURGERY

## 2023-01-01 PROCEDURE — A9585 GADOBUTROL INJECTION: HCPCS | Mod: JZ

## 2023-01-01 PROCEDURE — 72170 X-RAY EXAM OF PELVIS: CPT

## 2023-01-01 PROCEDURE — 99214 OFFICE O/P EST MOD 30 MIN: CPT

## 2023-01-01 PROCEDURE — C1773 RET DEV, INSERTABLE: HCPCS

## 2023-01-01 PROCEDURE — 250N000011 HC RX IP 250 OP 636: Performed by: STUDENT IN AN ORGANIZED HEALTH CARE EDUCATION/TRAINING PROGRAM

## 2023-01-01 PROCEDURE — 85610 PROTHROMBIN TIME: CPT | Performed by: STUDENT IN AN ORGANIZED HEALTH CARE EDUCATION/TRAINING PROGRAM

## 2023-01-01 PROCEDURE — 99291 CRITICAL CARE FIRST HOUR: CPT | Performed by: PSYCHIATRY & NEUROLOGY

## 2023-01-01 PROCEDURE — 97164 PT RE-EVAL EST PLAN CARE: CPT | Mod: GP | Performed by: REHABILITATION PRACTITIONER

## 2023-01-01 PROCEDURE — 86923 COMPATIBILITY TEST ELECTRIC: CPT

## 2023-01-01 PROCEDURE — 999N000065 XR FEMUR PORT RIGHT 2 VIEWS: Mod: RT

## 2023-01-01 PROCEDURE — 99232 SBSQ HOSP IP/OBS MODERATE 35: CPT | Performed by: NURSE PRACTITIONER

## 2023-01-01 PROCEDURE — 97165 OT EVAL LOW COMPLEX 30 MIN: CPT | Mod: GO

## 2023-01-01 PROCEDURE — 87804 INFLUENZA ASSAY W/OPTIC: CPT | Performed by: FAMILY MEDICINE

## 2023-01-01 PROCEDURE — 82570 ASSAY OF URINE CREATININE: CPT

## 2023-01-01 PROCEDURE — 85610 PROTHROMBIN TIME: CPT

## 2023-01-01 PROCEDURE — 999N000065 XR PELVIS PORT 1/2 VIEWS

## 2023-01-01 DEVICE — LONG NAIL, RIGHT
Type: IMPLANTABLE DEVICE | Site: LEG | Status: FUNCTIONAL
Brand: GAMMA

## 2023-01-01 DEVICE — LAG SCREW
Type: IMPLANTABLE DEVICE | Site: LEG | Status: FUNCTIONAL
Brand: GAMMA

## 2023-01-01 DEVICE — K-WIRE: Type: IMPLANTABLE DEVICE | Site: LEG | Status: FUNCTIONAL

## 2023-01-01 DEVICE — LOCKING SCREW
Type: IMPLANTABLE DEVICE | Site: LEG | Status: FUNCTIONAL
Brand: T2 ALPHA

## 2023-01-01 RX ORDER — POLYETHYLENE GLYCOL 3350 17 G/17G
17 POWDER, FOR SOLUTION ORAL 2 TIMES DAILY
Status: DISCONTINUED | OUTPATIENT
Start: 2023-01-01 | End: 2023-01-01 | Stop reason: HOSPADM

## 2023-01-01 RX ORDER — ACETAMINOPHEN 650 MG/1
650 SUPPOSITORY RECTAL EVERY 4 HOURS PRN
Status: DISCONTINUED | OUTPATIENT
Start: 2023-01-01 | End: 2023-01-01 | Stop reason: HOSPADM

## 2023-01-01 RX ORDER — OXYCODONE HYDROCHLORIDE 5 MG/1
5 TABLET ORAL ONCE
Status: COMPLETED | OUTPATIENT
Start: 2023-01-01 | End: 2023-01-01

## 2023-01-01 RX ORDER — LISINOPRIL 20 MG/1
20 TABLET ORAL DAILY
Qty: 90 TABLET | Refills: 3 | Status: SHIPPED | OUTPATIENT
Start: 2023-01-01 | End: 2024-01-01

## 2023-01-01 RX ORDER — NALOXONE HYDROCHLORIDE 0.4 MG/ML
0.2 INJECTION, SOLUTION INTRAMUSCULAR; INTRAVENOUS; SUBCUTANEOUS
Status: DISCONTINUED | OUTPATIENT
Start: 2023-01-01 | End: 2023-01-01 | Stop reason: HOSPADM

## 2023-01-01 RX ORDER — SODIUM CHLORIDE, SODIUM LACTATE, POTASSIUM CHLORIDE, CALCIUM CHLORIDE 600; 310; 30; 20 MG/100ML; MG/100ML; MG/100ML; MG/100ML
INJECTION, SOLUTION INTRAVENOUS CONTINUOUS PRN
Status: DISCONTINUED | OUTPATIENT
Start: 2023-01-01 | End: 2023-01-01

## 2023-01-01 RX ORDER — SODIUM CHLORIDE 9 MG/ML
INJECTION, SOLUTION INTRAVENOUS CONTINUOUS
Status: DISCONTINUED | OUTPATIENT
Start: 2023-01-01 | End: 2023-01-01

## 2023-01-01 RX ORDER — ONDANSETRON 4 MG/1
4 TABLET, ORALLY DISINTEGRATING ORAL EVERY 30 MIN PRN
Status: DISCONTINUED | OUTPATIENT
Start: 2023-01-01 | End: 2023-01-01 | Stop reason: HOSPADM

## 2023-01-01 RX ORDER — IOPAMIDOL 755 MG/ML
90 INJECTION, SOLUTION INTRAVASCULAR ONCE
Status: COMPLETED | OUTPATIENT
Start: 2023-01-01 | End: 2023-01-01

## 2023-01-01 RX ORDER — PROPRANOLOL HYDROCHLORIDE 40 MG/1
40 TABLET ORAL 2 TIMES DAILY
Qty: 180 TABLET | Refills: 3 | Status: SHIPPED | OUTPATIENT
Start: 2023-01-01

## 2023-01-01 RX ORDER — HYDROMORPHONE HYDROCHLORIDE 1 MG/ML
0.2 INJECTION, SOLUTION INTRAMUSCULAR; INTRAVENOUS; SUBCUTANEOUS EVERY 5 MIN PRN
Status: CANCELLED | OUTPATIENT
Start: 2023-01-01

## 2023-01-01 RX ORDER — ENOXAPARIN SODIUM 100 MG/ML
40 INJECTION SUBCUTANEOUS EVERY 24 HOURS
Status: DISCONTINUED | OUTPATIENT
Start: 2023-01-01 | End: 2023-01-01

## 2023-01-01 RX ORDER — OXYCODONE HYDROCHLORIDE 10 MG/1
10 TABLET ORAL EVERY 4 HOURS PRN
Status: DISCONTINUED | OUTPATIENT
Start: 2023-01-01 | End: 2023-01-01

## 2023-01-01 RX ORDER — SODIUM CHLORIDE, SODIUM LACTATE, POTASSIUM CHLORIDE, CALCIUM CHLORIDE 600; 310; 30; 20 MG/100ML; MG/100ML; MG/100ML; MG/100ML
INJECTION, SOLUTION INTRAVENOUS CONTINUOUS
Status: DISCONTINUED | OUTPATIENT
Start: 2023-01-01 | End: 2023-01-01 | Stop reason: HOSPADM

## 2023-01-01 RX ORDER — HEPARIN SODIUM 200 [USP'U]/100ML
1 INJECTION, SOLUTION INTRAVENOUS CONTINUOUS PRN
Status: DISCONTINUED | OUTPATIENT
Start: 2023-01-01 | End: 2023-01-01 | Stop reason: HOSPADM

## 2023-01-01 RX ORDER — LISINOPRIL 20 MG/1
20 TABLET ORAL DAILY
Status: DISCONTINUED | OUTPATIENT
Start: 2023-01-01 | End: 2023-01-01 | Stop reason: HOSPADM

## 2023-01-01 RX ORDER — FENTANYL CITRATE 50 UG/ML
25 INJECTION, SOLUTION INTRAMUSCULAR; INTRAVENOUS
Status: DISCONTINUED | OUTPATIENT
Start: 2023-01-01 | End: 2023-01-01 | Stop reason: HOSPADM

## 2023-01-01 RX ORDER — ASPIRIN 81 MG/1
81 TABLET, CHEWABLE ORAL DAILY
Status: DISCONTINUED | OUTPATIENT
Start: 2023-01-01 | End: 2023-01-01 | Stop reason: HOSPADM

## 2023-01-01 RX ORDER — ACETAMINOPHEN 325 MG/1
975 TABLET ORAL EVERY 8 HOURS
Status: DISCONTINUED | OUTPATIENT
Start: 2023-01-01 | End: 2023-01-01

## 2023-01-01 RX ORDER — GADOBUTROL 604.72 MG/ML
7 INJECTION INTRAVENOUS ONCE
Status: COMPLETED | OUTPATIENT
Start: 2023-01-01 | End: 2023-01-01

## 2023-01-01 RX ORDER — HYDROMORPHONE HCL IN WATER/PF 6 MG/30 ML
0.2 PATIENT CONTROLLED ANALGESIA SYRINGE INTRAVENOUS
Status: DISCONTINUED | OUTPATIENT
Start: 2023-01-01 | End: 2023-01-01

## 2023-01-01 RX ORDER — OXYCODONE HYDROCHLORIDE 5 MG/1
5 TABLET ORAL EVERY 4 HOURS PRN
Status: DISCONTINUED | OUTPATIENT
Start: 2023-01-01 | End: 2023-01-01

## 2023-01-01 RX ORDER — ACETAMINOPHEN 325 MG/1
650 TABLET ORAL EVERY 4 HOURS PRN
DISCHARGE
Start: 2023-01-01 | End: 2024-01-01

## 2023-01-01 RX ORDER — FENTANYL CITRATE 50 UG/ML
25 INJECTION, SOLUTION INTRAMUSCULAR; INTRAVENOUS EVERY 5 MIN PRN
Status: DISCONTINUED | OUTPATIENT
Start: 2023-01-01 | End: 2023-01-01 | Stop reason: HOSPADM

## 2023-01-01 RX ORDER — ACETAMINOPHEN 325 MG/1
650 TABLET ORAL EVERY 4 HOURS PRN
Status: DISCONTINUED | OUTPATIENT
Start: 2023-01-01 | End: 2023-01-01 | Stop reason: HOSPADM

## 2023-01-01 RX ORDER — LIDOCAINE 40 MG/G
CREAM TOPICAL
Status: DISCONTINUED | OUTPATIENT
Start: 2023-01-01 | End: 2023-01-01 | Stop reason: HOSPADM

## 2023-01-01 RX ORDER — ONDANSETRON 2 MG/ML
4 INJECTION INTRAMUSCULAR; INTRAVENOUS EVERY 6 HOURS PRN
Status: DISCONTINUED | OUTPATIENT
Start: 2023-01-01 | End: 2023-01-01 | Stop reason: HOSPADM

## 2023-01-01 RX ORDER — DEXAMETHASONE SODIUM PHOSPHATE 4 MG/ML
4 INJECTION, SOLUTION INTRA-ARTICULAR; INTRALESIONAL; INTRAMUSCULAR; INTRAVENOUS; SOFT TISSUE
Status: DISCONTINUED | OUTPATIENT
Start: 2023-01-01 | End: 2023-01-01 | Stop reason: HOSPADM

## 2023-01-01 RX ORDER — DEXAMETHASONE SODIUM PHOSPHATE 10 MG/ML
INJECTION, SOLUTION INTRAMUSCULAR; INTRAVENOUS
Status: COMPLETED | OUTPATIENT
Start: 2023-01-01 | End: 2023-01-01

## 2023-01-01 RX ORDER — CALCIUM CARBONATE 500 MG/1
1000 TABLET, CHEWABLE ORAL 4 TIMES DAILY PRN
Status: DISCONTINUED | OUTPATIENT
Start: 2023-01-01 | End: 2023-01-01 | Stop reason: HOSPADM

## 2023-01-01 RX ORDER — ONDANSETRON 4 MG/1
4 TABLET, ORALLY DISINTEGRATING ORAL EVERY 30 MIN PRN
Status: CANCELLED | OUTPATIENT
Start: 2023-01-01

## 2023-01-01 RX ORDER — LANOLIN ALCOHOL/MO/W.PET/CERES
2000 CREAM (GRAM) TOPICAL DAILY
Status: DISCONTINUED | OUTPATIENT
Start: 2023-01-01 | End: 2023-01-01

## 2023-01-01 RX ORDER — HYDROMORPHONE HCL IN WATER/PF 6 MG/30 ML
0.2 PATIENT CONTROLLED ANALGESIA SYRINGE INTRAVENOUS EVERY 5 MIN PRN
Status: CANCELLED | OUTPATIENT
Start: 2023-01-01

## 2023-01-01 RX ORDER — IODIXANOL 320 MG/ML
150 INJECTION, SOLUTION INTRAVASCULAR ONCE
Status: COMPLETED | OUTPATIENT
Start: 2023-01-01 | End: 2023-01-01

## 2023-01-01 RX ORDER — OXYCODONE HYDROCHLORIDE 5 MG/1
2.5 TABLET ORAL EVERY 6 HOURS PRN
Qty: 30 TABLET | Refills: 0 | Status: SHIPPED | DISCHARGE
Start: 2023-01-01 | End: 2023-01-01

## 2023-01-01 RX ORDER — DEXAMETHASONE SODIUM PHOSPHATE 4 MG/ML
INJECTION, SOLUTION INTRA-ARTICULAR; INTRALESIONAL; INTRAMUSCULAR; INTRAVENOUS; SOFT TISSUE PRN
Status: DISCONTINUED | OUTPATIENT
Start: 2023-01-01 | End: 2023-01-01

## 2023-01-01 RX ORDER — BISACODYL 10 MG
10 SUPPOSITORY, RECTAL RECTAL DAILY PRN
Status: DISCONTINUED | OUTPATIENT
Start: 2023-01-01 | End: 2023-01-01 | Stop reason: HOSPADM

## 2023-01-01 RX ORDER — FENTANYL CITRATE 50 UG/ML
25-50 INJECTION, SOLUTION INTRAMUSCULAR; INTRAVENOUS EVERY 5 MIN PRN
Status: DISCONTINUED | OUTPATIENT
Start: 2023-01-01 | End: 2023-01-01 | Stop reason: HOSPADM

## 2023-01-01 RX ORDER — ONDANSETRON 2 MG/ML
4 INJECTION INTRAMUSCULAR; INTRAVENOUS EVERY 30 MIN PRN
Status: DISCONTINUED | OUTPATIENT
Start: 2023-01-01 | End: 2023-01-01 | Stop reason: HOSPADM

## 2023-01-01 RX ORDER — CEFAZOLIN SODIUM 2 G/100ML
2 INJECTION, SOLUTION INTRAVENOUS
Status: CANCELLED | OUTPATIENT
Start: 2023-01-01

## 2023-01-01 RX ORDER — HYDROMORPHONE HYDROCHLORIDE 1 MG/ML
0.2 INJECTION, SOLUTION INTRAMUSCULAR; INTRAVENOUS; SUBCUTANEOUS EVERY 5 MIN PRN
Status: DISCONTINUED | OUTPATIENT
Start: 2023-01-01 | End: 2023-01-01 | Stop reason: HOSPADM

## 2023-01-01 RX ORDER — MAGNESIUM SULFATE HEPTAHYDRATE 40 MG/ML
2 INJECTION, SOLUTION INTRAVENOUS ONCE
Status: COMPLETED | OUTPATIENT
Start: 2023-01-01 | End: 2023-01-01

## 2023-01-01 RX ORDER — PROPRANOLOL HYDROCHLORIDE 10 MG/1
40 TABLET ORAL 2 TIMES DAILY
Status: DISCONTINUED | OUTPATIENT
Start: 2023-01-01 | End: 2023-01-01

## 2023-01-01 RX ORDER — OXYCODONE HYDROCHLORIDE 10 MG/1
10 TABLET ORAL
Status: DISCONTINUED | OUTPATIENT
Start: 2023-01-01 | End: 2023-01-01 | Stop reason: HOSPADM

## 2023-01-01 RX ORDER — FENTANYL CITRATE 50 UG/ML
25 INJECTION, SOLUTION INTRAMUSCULAR; INTRAVENOUS EVERY 5 MIN PRN
Status: CANCELLED | OUTPATIENT
Start: 2023-01-01

## 2023-01-01 RX ORDER — TRANEXAMIC ACID 10 MG/ML
1 INJECTION, SOLUTION INTRAVENOUS ONCE
Status: CANCELLED | OUTPATIENT
Start: 2023-01-01 | End: 2023-01-01

## 2023-01-01 RX ORDER — SENNOSIDES A AND B 8.6 MG/1
1 TABLET, FILM COATED ORAL DAILY
COMMUNITY

## 2023-01-01 RX ORDER — METHOCARBAMOL 500 MG/1
500 TABLET, FILM COATED ORAL EVERY 6 HOURS PRN
DISCHARGE
Start: 2023-01-01 | End: 2024-01-01

## 2023-01-01 RX ORDER — AMOXICILLIN 250 MG
2 CAPSULE ORAL 2 TIMES DAILY PRN
Status: DISCONTINUED | OUTPATIENT
Start: 2023-01-01 | End: 2023-01-01 | Stop reason: HOSPADM

## 2023-01-01 RX ORDER — CALCIUM CARBONATE/VITAMIN D3 600 MG-10
1 TABLET ORAL DAILY
Status: DISCONTINUED | OUTPATIENT
Start: 2023-01-01 | End: 2023-01-01

## 2023-01-01 RX ORDER — MAGNESIUM HYDROXIDE 1200 MG/15ML
LIQUID ORAL PRN
Status: DISCONTINUED | OUTPATIENT
Start: 2023-01-01 | End: 2023-01-01 | Stop reason: HOSPADM

## 2023-01-01 RX ORDER — NALOXONE HYDROCHLORIDE 0.4 MG/ML
0.4 INJECTION, SOLUTION INTRAMUSCULAR; INTRAVENOUS; SUBCUTANEOUS
Status: DISCONTINUED | OUTPATIENT
Start: 2023-01-01 | End: 2023-01-01 | Stop reason: HOSPADM

## 2023-01-01 RX ORDER — MULTIVITAMIN,THERAPEUTIC
1 TABLET ORAL DAILY
COMMUNITY

## 2023-01-01 RX ORDER — CEFAZOLIN SODIUM 1 G/3ML
1 INJECTION, POWDER, FOR SOLUTION INTRAMUSCULAR; INTRAVENOUS EVERY 8 HOURS
Qty: 10 ML | Refills: 0 | Status: COMPLETED | OUTPATIENT
Start: 2023-01-01 | End: 2023-01-01

## 2023-01-01 RX ORDER — ONDANSETRON 2 MG/ML
4 INJECTION INTRAMUSCULAR; INTRAVENOUS EVERY 30 MIN PRN
Status: CANCELLED | OUTPATIENT
Start: 2023-01-01

## 2023-01-01 RX ORDER — ASPIRIN 81 MG/1
81 TABLET, CHEWABLE ORAL DAILY
DISCHARGE
Start: 2023-01-01

## 2023-01-01 RX ORDER — HYDROMORPHONE HYDROCHLORIDE 1 MG/ML
0.4 INJECTION, SOLUTION INTRAMUSCULAR; INTRAVENOUS; SUBCUTANEOUS EVERY 5 MIN PRN
Status: CANCELLED | OUTPATIENT
Start: 2023-01-01

## 2023-01-01 RX ORDER — ENOXAPARIN SODIUM 100 MG/ML
40 INJECTION SUBCUTANEOUS EVERY 24 HOURS
DISCHARGE
Start: 2023-01-01 | End: 2023-01-01

## 2023-01-01 RX ORDER — LANOLIN ALCOHOL/MO/W.PET/CERES
2000 CREAM (GRAM) TOPICAL DAILY
Status: DISCONTINUED | OUTPATIENT
Start: 2023-01-01 | End: 2023-01-01 | Stop reason: HOSPADM

## 2023-01-01 RX ORDER — FENTANYL CITRATE 50 UG/ML
50 INJECTION, SOLUTION INTRAMUSCULAR; INTRAVENOUS EVERY 5 MIN PRN
Status: CANCELLED | OUTPATIENT
Start: 2023-01-01

## 2023-01-01 RX ORDER — HYDROMORPHONE HYDROCHLORIDE 1 MG/ML
0.4 INJECTION, SOLUTION INTRAMUSCULAR; INTRAVENOUS; SUBCUTANEOUS EVERY 5 MIN PRN
Status: DISCONTINUED | OUTPATIENT
Start: 2023-01-01 | End: 2023-01-01 | Stop reason: HOSPADM

## 2023-01-01 RX ORDER — ONDANSETRON 2 MG/ML
INJECTION INTRAMUSCULAR; INTRAVENOUS PRN
Status: DISCONTINUED | OUTPATIENT
Start: 2023-01-01 | End: 2023-01-01

## 2023-01-01 RX ORDER — LIDOCAINE HYDROCHLORIDE 20 MG/ML
INJECTION, SOLUTION INFILTRATION; PERINEURAL PRN
Status: DISCONTINUED | OUTPATIENT
Start: 2023-01-01 | End: 2023-01-01

## 2023-01-01 RX ORDER — OXYCODONE HYDROCHLORIDE 5 MG/1
5 TABLET ORAL
Status: DISCONTINUED | OUTPATIENT
Start: 2023-01-01 | End: 2023-01-01 | Stop reason: HOSPADM

## 2023-01-01 RX ORDER — CEFAZOLIN SODIUM/WATER 2 G/20 ML
SYRINGE (ML) INTRAVENOUS PRN
Status: DISCONTINUED | OUTPATIENT
Start: 2023-01-01 | End: 2023-01-01

## 2023-01-01 RX ORDER — LIDOCAINE 4 G/G
1 PATCH TOPICAL
Status: DISCONTINUED | OUTPATIENT
Start: 2023-01-01 | End: 2023-01-01 | Stop reason: HOSPADM

## 2023-01-01 RX ORDER — FENTANYL CITRATE 50 UG/ML
50 INJECTION, SOLUTION INTRAMUSCULAR; INTRAVENOUS EVERY 5 MIN PRN
Status: DISCONTINUED | OUTPATIENT
Start: 2023-01-01 | End: 2023-01-01 | Stop reason: HOSPADM

## 2023-01-01 RX ORDER — POLYETHYLENE GLYCOL 3350 17 G/17G
17 POWDER, FOR SOLUTION ORAL DAILY
Status: DISCONTINUED | OUTPATIENT
Start: 2023-01-01 | End: 2023-01-01

## 2023-01-01 RX ORDER — ACETAMINOPHEN 500 MG
1000 TABLET ORAL ONCE
Status: COMPLETED | OUTPATIENT
Start: 2023-01-01 | End: 2023-01-01

## 2023-01-01 RX ORDER — HYDRALAZINE HYDROCHLORIDE 20 MG/ML
10-20 INJECTION INTRAMUSCULAR; INTRAVENOUS EVERY 30 MIN PRN
Status: DISCONTINUED | OUTPATIENT
Start: 2023-01-01 | End: 2023-01-01 | Stop reason: HOSPADM

## 2023-01-01 RX ORDER — IOPAMIDOL 755 MG/ML
90 INJECTION, SOLUTION INTRAVASCULAR ONCE
Status: DISCONTINUED | OUTPATIENT
Start: 2023-01-01 | End: 2023-01-01

## 2023-01-01 RX ORDER — LIDOCAINE 40 MG/G
CREAM TOPICAL
Status: CANCELLED | OUTPATIENT
Start: 2023-01-01

## 2023-01-01 RX ORDER — SODIUM CHLORIDE 9 MG/ML
INJECTION, SOLUTION INTRAVENOUS CONTINUOUS
Status: CANCELLED | OUTPATIENT
Start: 2023-01-01

## 2023-01-01 RX ORDER — CEFAZOLIN SODIUM 2 G/100ML
2 INJECTION, SOLUTION INTRAVENOUS SEE ADMIN INSTRUCTIONS
Status: CANCELLED | OUTPATIENT
Start: 2023-01-01

## 2023-01-01 RX ORDER — AMOXICILLIN 250 MG
1 CAPSULE ORAL 2 TIMES DAILY
Status: DISCONTINUED | OUTPATIENT
Start: 2023-01-01 | End: 2023-01-01

## 2023-01-01 RX ORDER — SODIUM CHLORIDE, SODIUM LACTATE, POTASSIUM CHLORIDE, CALCIUM CHLORIDE 600; 310; 30; 20 MG/100ML; MG/100ML; MG/100ML; MG/100ML
INJECTION, SOLUTION INTRAVENOUS CONTINUOUS
Status: DISCONTINUED | OUTPATIENT
Start: 2023-01-01 | End: 2023-01-01

## 2023-01-01 RX ORDER — IBUPROFEN 600 MG/1
600 TABLET, FILM COATED ORAL ONCE
Status: COMPLETED | OUTPATIENT
Start: 2023-01-01 | End: 2023-01-01

## 2023-01-01 RX ORDER — PROPOFOL 10 MG/ML
INJECTION, EMULSION INTRAVENOUS PRN
Status: DISCONTINUED | OUTPATIENT
Start: 2023-01-01 | End: 2023-01-01

## 2023-01-01 RX ORDER — METHOCARBAMOL 500 MG/1
500 TABLET, FILM COATED ORAL EVERY 6 HOURS PRN
Status: DISCONTINUED | OUTPATIENT
Start: 2023-01-01 | End: 2023-01-01 | Stop reason: HOSPADM

## 2023-01-01 RX ORDER — IOPAMIDOL 755 MG/ML
100 INJECTION, SOLUTION INTRAVASCULAR ONCE
Status: COMPLETED | OUTPATIENT
Start: 2023-01-01 | End: 2023-01-01

## 2023-01-01 RX ORDER — FLUMAZENIL 0.1 MG/ML
0.2 INJECTION, SOLUTION INTRAVENOUS
Status: DISCONTINUED | OUTPATIENT
Start: 2023-01-01 | End: 2023-01-01 | Stop reason: HOSPADM

## 2023-01-01 RX ORDER — OXYCODONE HYDROCHLORIDE 5 MG/1
2.5 TABLET ORAL EVERY 6 HOURS PRN
Qty: 30 TABLET | Status: SHIPPED | DISCHARGE
Start: 2023-01-01 | End: 2023-01-01

## 2023-01-01 RX ORDER — OXYCODONE HYDROCHLORIDE 5 MG/1
2.5 TABLET ORAL EVERY 4 HOURS PRN
Status: SHIPPED | DISCHARGE
Start: 2023-01-01 | End: 2023-01-01

## 2023-01-01 RX ORDER — METHOCARBAMOL 500 MG/1
500 TABLET, FILM COATED ORAL 4 TIMES DAILY PRN
Status: DISCONTINUED | OUTPATIENT
Start: 2023-01-01 | End: 2023-01-01

## 2023-01-01 RX ORDER — ENOXAPARIN SODIUM 100 MG/ML
40 INJECTION SUBCUTANEOUS EVERY 24 HOURS
Status: DISCONTINUED | OUTPATIENT
Start: 2023-01-01 | End: 2023-01-01 | Stop reason: HOSPADM

## 2023-01-01 RX ORDER — FENTANYL CITRATE 50 UG/ML
INJECTION, SOLUTION INTRAMUSCULAR; INTRAVENOUS PRN
Status: DISCONTINUED | OUTPATIENT
Start: 2023-01-01 | End: 2023-01-01

## 2023-01-01 RX ORDER — SODIUM CHLORIDE, SODIUM LACTATE, POTASSIUM CHLORIDE, CALCIUM CHLORIDE 600; 310; 30; 20 MG/100ML; MG/100ML; MG/100ML; MG/100ML
INJECTION, SOLUTION INTRAVENOUS CONTINUOUS
Status: CANCELLED | OUTPATIENT
Start: 2023-01-01

## 2023-01-01 RX ORDER — PROPRANOLOL HYDROCHLORIDE 40 MG/1
40 TABLET ORAL 2 TIMES DAILY
Status: DISCONTINUED | OUTPATIENT
Start: 2023-01-01 | End: 2023-01-01 | Stop reason: HOSPADM

## 2023-01-01 RX ORDER — HYDRALAZINE HYDROCHLORIDE 20 MG/ML
10-20 INJECTION INTRAMUSCULAR; INTRAVENOUS
Status: DISCONTINUED | OUTPATIENT
Start: 2023-01-01 | End: 2023-01-01

## 2023-01-01 RX ORDER — AMOXICILLIN 250 MG
1 CAPSULE ORAL 2 TIMES DAILY PRN
Status: DISCONTINUED | OUTPATIENT
Start: 2023-01-01 | End: 2023-01-01 | Stop reason: HOSPADM

## 2023-01-01 RX ORDER — AMOXICILLIN 250 MG
2 CAPSULE ORAL 2 TIMES DAILY
Status: DISCONTINUED | OUTPATIENT
Start: 2023-01-01 | End: 2023-01-01 | Stop reason: HOSPADM

## 2023-01-01 RX ORDER — LIDOCAINE 40 MG/G
CREAM TOPICAL
Status: DISCONTINUED | OUTPATIENT
Start: 2023-01-01 | End: 2023-01-01

## 2023-01-01 RX ORDER — HYDROMORPHONE HCL IN WATER/PF 6 MG/30 ML
0.4 PATIENT CONTROLLED ANALGESIA SYRINGE INTRAVENOUS EVERY 5 MIN PRN
Status: CANCELLED | OUTPATIENT
Start: 2023-01-01

## 2023-01-01 RX ORDER — PROCHLORPERAZINE MALEATE 5 MG
5 TABLET ORAL EVERY 6 HOURS PRN
Status: DISCONTINUED | OUTPATIENT
Start: 2023-01-01 | End: 2023-01-01 | Stop reason: HOSPADM

## 2023-01-01 RX ORDER — CALCIUM CARBONATE/VITAMIN D3 600 MG-10
1 TABLET ORAL DAILY
Status: DISCONTINUED | OUTPATIENT
Start: 2023-01-01 | End: 2023-01-01 | Stop reason: HOSPADM

## 2023-01-01 RX ORDER — SODIUM CHLORIDE, SODIUM GLUCONATE, SODIUM ACETATE, POTASSIUM CHLORIDE AND MAGNESIUM CHLORIDE 526; 502; 368; 37; 30 MG/100ML; MG/100ML; MG/100ML; MG/100ML; MG/100ML
INJECTION, SOLUTION INTRAVENOUS CONTINUOUS
Status: DISCONTINUED | OUTPATIENT
Start: 2023-01-01 | End: 2023-01-01

## 2023-01-01 RX ORDER — ONDANSETRON 4 MG/1
4 TABLET, ORALLY DISINTEGRATING ORAL EVERY 6 HOURS PRN
Status: DISCONTINUED | OUTPATIENT
Start: 2023-01-01 | End: 2023-01-01 | Stop reason: HOSPADM

## 2023-01-01 RX ORDER — DEXMEDETOMIDINE HYDROCHLORIDE 4 UG/ML
INJECTION, SOLUTION INTRAVENOUS
Status: COMPLETED | OUTPATIENT
Start: 2023-01-01 | End: 2023-01-01

## 2023-01-01 RX ORDER — HYDROMORPHONE HCL IN WATER/PF 6 MG/30 ML
0.4 PATIENT CONTROLLED ANALGESIA SYRINGE INTRAVENOUS
Status: DISCONTINUED | OUTPATIENT
Start: 2023-01-01 | End: 2023-01-01

## 2023-01-01 RX ORDER — MULTIVITAMIN,THERAPEUTIC
1 TABLET ORAL DAILY
Status: DISCONTINUED | OUTPATIENT
Start: 2023-01-01 | End: 2023-01-01 | Stop reason: HOSPADM

## 2023-01-01 RX ORDER — BUPIVACAINE HYDROCHLORIDE AND EPINEPHRINE 2.5; 5 MG/ML; UG/ML
INJECTION, SOLUTION INFILTRATION; PERINEURAL
Status: COMPLETED | OUTPATIENT
Start: 2023-01-01 | End: 2023-01-01

## 2023-01-01 RX ORDER — OXYCODONE HYDROCHLORIDE 5 MG/1
2.5 TABLET ORAL EVERY 6 HOURS PRN
Status: SHIPPED | DISCHARGE
Start: 2023-01-01 | End: 2023-01-01

## 2023-01-01 RX ORDER — ACETAMINOPHEN 325 MG/1
650 TABLET ORAL EVERY 4 HOURS PRN
Status: DISCONTINUED | OUTPATIENT
Start: 2023-01-01 | End: 2023-01-01

## 2023-01-01 RX ADMIN — Medication 10 MG: at 09:27

## 2023-01-01 RX ADMIN — SENNOSIDES AND DOCUSATE SODIUM 2 TABLET: 8.6; 5 TABLET ORAL at 08:12

## 2023-01-01 RX ADMIN — FENTANYL CITRATE 25 MCG: 50 INJECTION, SOLUTION INTRAMUSCULAR; INTRAVENOUS at 19:44

## 2023-01-01 RX ADMIN — POLYETHYLENE GLYCOL 3350 17 G: 17 POWDER, FOR SOLUTION ORAL at 19:46

## 2023-01-01 RX ADMIN — SODIUM CHLORIDE: 9 INJECTION, SOLUTION INTRAVENOUS at 04:30

## 2023-01-01 RX ADMIN — IBUPROFEN 600 MG: 600 TABLET, FILM COATED ORAL at 17:06

## 2023-01-01 RX ADMIN — Medication 2 G: at 08:19

## 2023-01-01 RX ADMIN — MAGNESIUM SULFATE IN WATER 2 G: 40 INJECTION, SOLUTION INTRAVENOUS at 07:39

## 2023-01-01 RX ADMIN — ONDANSETRON 4 MG: 2 INJECTION INTRAMUSCULAR; INTRAVENOUS at 09:43

## 2023-01-01 RX ADMIN — SODIUM CHLORIDE: 9 INJECTION, SOLUTION INTRAVENOUS at 15:46

## 2023-01-01 RX ADMIN — ACETAMINOPHEN 975 MG: 325 TABLET, FILM COATED ORAL at 20:13

## 2023-01-01 RX ADMIN — HUMAN ALBUMIN MICROSPHERES AND PERFLUTREN 6 ML: 10; .22 INJECTION, SOLUTION INTRAVENOUS at 10:08

## 2023-01-01 RX ADMIN — MIDAZOLAM 0.5 MG: 1 INJECTION INTRAMUSCULAR; INTRAVENOUS at 20:13

## 2023-01-01 RX ADMIN — ASPIRIN 81 MG CHEWABLE TABLET 81 MG: 81 TABLET CHEWABLE at 09:20

## 2023-01-01 RX ADMIN — HYDRALAZINE HYDROCHLORIDE 10 MG: 20 INJECTION INTRAMUSCULAR; INTRAVENOUS at 14:59

## 2023-01-01 RX ADMIN — ACETAMINOPHEN 650 MG: 325 TABLET, FILM COATED ORAL at 10:00

## 2023-01-01 RX ADMIN — ENOXAPARIN SODIUM 40 MG: 40 INJECTION SUBCUTANEOUS at 09:21

## 2023-01-01 RX ADMIN — SODIUM CHLORIDE, POTASSIUM CHLORIDE, SODIUM LACTATE AND CALCIUM CHLORIDE: 600; 310; 30; 20 INJECTION, SOLUTION INTRAVENOUS at 07:53

## 2023-01-01 RX ADMIN — ENOXAPARIN SODIUM 40 MG: 40 INJECTION SUBCUTANEOUS at 11:14

## 2023-01-01 RX ADMIN — FENTANYL CITRATE 50 MCG: 50 INJECTION INTRAMUSCULAR; INTRAVENOUS at 07:59

## 2023-01-01 RX ADMIN — DOCUSATE SODIUM 50 MG AND SENNOSIDES 8.6 MG 1 TABLET: 8.6; 5 TABLET, FILM COATED ORAL at 20:14

## 2023-01-01 RX ADMIN — CYANOCOBALAMIN TAB 1000 MCG 2000 MCG: 1000 TAB at 09:21

## 2023-01-01 RX ADMIN — POTASSIUM PHOSPHATE, MONOBASIC POTASSIUM PHOSPHATE, DIBASIC 9 MMOL: 224; 236 INJECTION, SOLUTION, CONCENTRATE INTRAVENOUS at 13:01

## 2023-01-01 RX ADMIN — CYANOCOBALAMIN TAB 1000 MCG 2000 MCG: 1000 TAB at 09:15

## 2023-01-01 RX ADMIN — LIDOCAINE HYDROCHLORIDE 10 ML: 10 INJECTION, SOLUTION EPIDURAL; INFILTRATION; INTRACAUDAL; PERINEURAL at 19:56

## 2023-01-01 RX ADMIN — CYANOCOBALAMIN TAB 1000 MCG 2000 MCG: 1000 TAB at 09:18

## 2023-01-01 RX ADMIN — CALCIUM CARBONATE 600 MG (1,500 MG)-VITAMIN D3 400 UNIT TABLET 1 TABLET: at 09:18

## 2023-01-01 RX ADMIN — IOPAMIDOL 125 ML: 755 INJECTION, SOLUTION INTRAVENOUS at 13:51

## 2023-01-01 RX ADMIN — Medication 1 LOZENGE: at 00:18

## 2023-01-01 RX ADMIN — PHENYLEPHRINE HYDROCHLORIDE 100 MCG: 10 INJECTION INTRAVENOUS at 08:16

## 2023-01-01 RX ADMIN — DEXAMETHASONE SODIUM PHOSPHATE 4 MG: 4 INJECTION, SOLUTION INTRA-ARTICULAR; INTRALESIONAL; INTRAMUSCULAR; INTRAVENOUS; SOFT TISSUE at 08:21

## 2023-01-01 RX ADMIN — SENNOSIDES AND DOCUSATE SODIUM 2 TABLET: 8.6; 5 TABLET ORAL at 20:20

## 2023-01-01 RX ADMIN — ACETAMINOPHEN 975 MG: 325 TABLET, FILM COATED ORAL at 04:37

## 2023-01-01 RX ADMIN — OXYCODONE HYDROCHLORIDE 5 MG: 5 TABLET ORAL at 23:05

## 2023-01-01 RX ADMIN — FENTANYL CITRATE 25 MCG: 50 INJECTION, SOLUTION INTRAMUSCULAR; INTRAVENOUS at 20:01

## 2023-01-01 RX ADMIN — POTASSIUM & SODIUM PHOSPHATES POWDER PACK 280-160-250 MG 1 PACKET: 280-160-250 PACK at 08:12

## 2023-01-01 RX ADMIN — HYDRALAZINE HYDROCHLORIDE 10 MG: 20 INJECTION INTRAMUSCULAR; INTRAVENOUS at 07:38

## 2023-01-01 RX ADMIN — OXYCODONE HYDROCHLORIDE 2.5 MG: 5 TABLET ORAL at 16:07

## 2023-01-01 RX ADMIN — Medication 40 MG: at 08:02

## 2023-01-01 RX ADMIN — LIDOCAINE 1 PATCH: 4 PATCH TOPICAL at 00:48

## 2023-01-01 RX ADMIN — CALCIUM CARBONATE 600 MG (1,500 MG)-VITAMIN D3 400 UNIT TABLET 1 TABLET: at 09:15

## 2023-01-01 RX ADMIN — ASPIRIN 81 MG CHEWABLE TABLET 81 MG: 81 TABLET CHEWABLE at 09:18

## 2023-01-01 RX ADMIN — HYDROMORPHONE HYDROCHLORIDE 0.2 MG: 0.2 INJECTION, SOLUTION INTRAMUSCULAR; INTRAVENOUS; SUBCUTANEOUS at 02:40

## 2023-01-01 RX ADMIN — SENNOSIDES AND DOCUSATE SODIUM 2 TABLET: 8.6; 5 TABLET ORAL at 09:18

## 2023-01-01 RX ADMIN — PROPRANOLOL HYDROCHLORIDE 40 MG: 40 TABLET ORAL at 09:18

## 2023-01-01 RX ADMIN — POLYETHYLENE GLYCOL 3350 17 G: 17 POWDER, FOR SOLUTION ORAL at 08:12

## 2023-01-01 RX ADMIN — HEPARIN SODIUM 6 BAG: 200 INJECTION, SOLUTION INTRAVENOUS at 20:02

## 2023-01-01 RX ADMIN — GADOBUTROL 7 ML: 604.72 INJECTION INTRAVENOUS at 11:35

## 2023-01-01 RX ADMIN — PROPOFOL 70 MG: 10 INJECTION, EMULSION INTRAVENOUS at 07:59

## 2023-01-01 RX ADMIN — SODIUM CHLORIDE 1000 ML: 9 INJECTION, SOLUTION INTRAVENOUS at 23:07

## 2023-01-01 RX ADMIN — POLYETHYLENE GLYCOL 3350 17 G: 17 POWDER, FOR SOLUTION ORAL at 19:37

## 2023-01-01 RX ADMIN — Medication 20 MCG: at 08:00

## 2023-01-01 RX ADMIN — PROPRANOLOL HYDROCHLORIDE 40 MG: 40 TABLET ORAL at 20:20

## 2023-01-01 RX ADMIN — OXYCODONE HYDROCHLORIDE 2.5 MG: 5 TABLET ORAL at 10:33

## 2023-01-01 RX ADMIN — HYDRALAZINE HYDROCHLORIDE 10 MG: 20 INJECTION INTRAMUSCULAR; INTRAVENOUS at 15:53

## 2023-01-01 RX ADMIN — CEFAZOLIN 1 G: 1 INJECTION, POWDER, FOR SOLUTION INTRAMUSCULAR; INTRAVENOUS at 16:47

## 2023-01-01 RX ADMIN — PHENYLEPHRINE HYDROCHLORIDE 150 MCG: 10 INJECTION INTRAVENOUS at 09:04

## 2023-01-01 RX ADMIN — DEXAMETHASONE SODIUM PHOSPHATE 2 MG: 10 INJECTION, SOLUTION INTRAMUSCULAR; INTRAVENOUS at 08:00

## 2023-01-01 RX ADMIN — Medication 1 LOZENGE: at 21:33

## 2023-01-01 RX ADMIN — ACETAMINOPHEN 975 MG: 325 TABLET, FILM COATED ORAL at 04:52

## 2023-01-01 RX ADMIN — BUPIVACAINE HYDROCHLORIDE AND EPINEPHRINE BITARTRATE 30 ML: 2.5; .005 INJECTION, SOLUTION INFILTRATION; PERINEURAL at 08:00

## 2023-01-01 RX ADMIN — PROPRANOLOL HYDROCHLORIDE 40 MG: 40 TABLET ORAL at 19:38

## 2023-01-01 RX ADMIN — PHENYLEPHRINE HYDROCHLORIDE 50 MCG: 10 INJECTION INTRAVENOUS at 09:24

## 2023-01-01 RX ADMIN — ACETAMINOPHEN 650 MG: 325 TABLET, FILM COATED ORAL at 13:02

## 2023-01-01 RX ADMIN — MAGNESIUM HYDROXIDE 30 ML: 2400 SUSPENSION ORAL at 09:19

## 2023-01-01 RX ADMIN — ENOXAPARIN SODIUM 40 MG: 40 INJECTION SUBCUTANEOUS at 09:19

## 2023-01-01 RX ADMIN — ACETAMINOPHEN 975 MG: 325 TABLET, FILM COATED ORAL at 12:05

## 2023-01-01 RX ADMIN — PROPRANOLOL HYDROCHLORIDE 40 MG: 40 TABLET ORAL at 08:51

## 2023-01-01 RX ADMIN — Medication 10 MG: at 08:51

## 2023-01-01 RX ADMIN — POLYETHYLENE GLYCOL 3350 17 G: 17 POWDER, FOR SOLUTION ORAL at 08:42

## 2023-01-01 RX ADMIN — ACETAMINOPHEN 975 MG: 325 TABLET, FILM COATED ORAL at 12:37

## 2023-01-01 RX ADMIN — ACETAMINOPHEN 975 MG: 325 TABLET, FILM COATED ORAL at 20:23

## 2023-01-01 RX ADMIN — SODIUM CHLORIDE: 9 INJECTION, SOLUTION INTRAVENOUS at 01:12

## 2023-01-01 RX ADMIN — CALCIUM CARBONATE 600 MG (1,500 MG)-VITAMIN D3 400 UNIT TABLET 1 TABLET: at 08:12

## 2023-01-01 RX ADMIN — FENTANYL CITRATE 25 MCG: 50 INJECTION, SOLUTION INTRAMUSCULAR; INTRAVENOUS at 20:39

## 2023-01-01 RX ADMIN — ACETAMINOPHEN 650 MG: 325 TABLET, FILM COATED ORAL at 23:09

## 2023-01-01 RX ADMIN — SENNOSIDES AND DOCUSATE SODIUM 2 TABLET: 8.6; 5 TABLET ORAL at 09:20

## 2023-01-01 RX ADMIN — POLYETHYLENE GLYCOL 3350 17 G: 17 POWDER, FOR SOLUTION ORAL at 09:14

## 2023-01-01 RX ADMIN — POLYETHYLENE GLYCOL 3350 17 G: 17 POWDER, FOR SOLUTION ORAL at 20:20

## 2023-01-01 RX ADMIN — PROPRANOLOL HYDROCHLORIDE 40 MG: 40 TABLET ORAL at 09:20

## 2023-01-01 RX ADMIN — ASPIRIN 81 MG CHEWABLE TABLET 81 MG: 81 TABLET CHEWABLE at 08:12

## 2023-01-01 RX ADMIN — PROPRANOLOL HYDROCHLORIDE 40 MG: 40 TABLET ORAL at 09:15

## 2023-01-01 RX ADMIN — CYANOCOBALAMIN TAB 1000 MCG 2000 MCG: 1000 TAB at 08:41

## 2023-01-01 RX ADMIN — HYDROMORPHONE HYDROCHLORIDE 0.2 MG: 0.2 INJECTION, SOLUTION INTRAMUSCULAR; INTRAVENOUS; SUBCUTANEOUS at 11:40

## 2023-01-01 RX ADMIN — CEFAZOLIN 1 G: 1 INJECTION, POWDER, FOR SOLUTION INTRAMUSCULAR; INTRAVENOUS at 00:14

## 2023-01-01 RX ADMIN — ACETAMINOPHEN 650 MG: 325 TABLET, FILM COATED ORAL at 15:40

## 2023-01-01 RX ADMIN — POLYETHYLENE GLYCOL 3350 17 G: 17 POWDER, FOR SOLUTION ORAL at 09:19

## 2023-01-01 RX ADMIN — LIDOCAINE 1 PATCH: 4 PATCH TOPICAL at 01:12

## 2023-01-01 RX ADMIN — DOCUSATE SODIUM 50 MG AND SENNOSIDES 8.6 MG 1 TABLET: 8.6; 5 TABLET, FILM COATED ORAL at 20:24

## 2023-01-01 RX ADMIN — DOCUSATE SODIUM 50 MG AND SENNOSIDES 8.6 MG 1 TABLET: 8.6; 5 TABLET, FILM COATED ORAL at 08:41

## 2023-01-01 RX ADMIN — IODIXANOL 150 ML: 320 INJECTION, SOLUTION INTRAVASCULAR at 21:58

## 2023-01-01 RX ADMIN — SODIUM CHLORIDE 120 ML: 9 INJECTION, SOLUTION INTRAVENOUS at 14:01

## 2023-01-01 RX ADMIN — MIDAZOLAM 0.5 MG: 1 INJECTION INTRAMUSCULAR; INTRAVENOUS at 19:51

## 2023-01-01 RX ADMIN — ACETAMINOPHEN 975 MG: 325 TABLET, FILM COATED ORAL at 12:32

## 2023-01-01 RX ADMIN — FENTANYL CITRATE 25 MCG: 50 INJECTION, SOLUTION INTRAMUSCULAR; INTRAVENOUS at 21:03

## 2023-01-01 RX ADMIN — TRANEXAMIC ACID 1 G: 1 INJECTION, SOLUTION INTRAVENOUS at 08:19

## 2023-01-01 RX ADMIN — FENTANYL CITRATE 25 MCG: 50 INJECTION, SOLUTION INTRAMUSCULAR; INTRAVENOUS at 20:25

## 2023-01-01 RX ADMIN — PHENYLEPHRINE HYDROCHLORIDE 100 MCG: 10 INJECTION INTRAVENOUS at 08:31

## 2023-01-01 RX ADMIN — HYDRALAZINE HYDROCHLORIDE 20 MG: 20 INJECTION INTRAMUSCULAR; INTRAVENOUS at 16:14

## 2023-01-01 RX ADMIN — IOPAMIDOL 90 ML: 755 INJECTION, SOLUTION INTRAVENOUS at 12:09

## 2023-01-01 RX ADMIN — HYDRALAZINE HYDROCHLORIDE 20 MG: 20 INJECTION INTRAMUSCULAR; INTRAVENOUS at 14:27

## 2023-01-01 RX ADMIN — THERA TABS 1 TABLET: TAB at 09:21

## 2023-01-01 RX ADMIN — THERA TABS 1 TABLET: TAB at 19:46

## 2023-01-01 RX ADMIN — SODIUM CHLORIDE, SODIUM GLUCONATE, SODIUM ACETATE, POTASSIUM CHLORIDE AND MAGNESIUM CHLORIDE: 526; 502; 368; 37; 30 INJECTION, SOLUTION INTRAVENOUS at 11:18

## 2023-01-01 RX ADMIN — PROPRANOLOL HYDROCHLORIDE 40 MG: 40 TABLET ORAL at 13:02

## 2023-01-01 RX ADMIN — DOCUSATE SODIUM 50 MG AND SENNOSIDES 8.6 MG 1 TABLET: 8.6; 5 TABLET, FILM COATED ORAL at 09:14

## 2023-01-01 RX ADMIN — ACETAMINOPHEN 650 MG: 325 TABLET, FILM COATED ORAL at 10:33

## 2023-01-01 RX ADMIN — FENTANYL CITRATE 50 MCG: 50 INJECTION INTRAMUSCULAR; INTRAVENOUS at 08:51

## 2023-01-01 RX ADMIN — SUGAMMADEX 130 MG: 100 INJECTION, SOLUTION INTRAVENOUS at 10:15

## 2023-01-01 RX ADMIN — HYDROMORPHONE HYDROCHLORIDE 0.2 MG: 1 INJECTION, SOLUTION INTRAMUSCULAR; INTRAVENOUS; SUBCUTANEOUS at 11:06

## 2023-01-01 RX ADMIN — HYDROMORPHONE HYDROCHLORIDE 0.2 MG: 1 INJECTION, SOLUTION INTRAMUSCULAR; INTRAVENOUS; SUBCUTANEOUS at 10:51

## 2023-01-01 RX ADMIN — ENOXAPARIN SODIUM 40 MG: 40 INJECTION SUBCUTANEOUS at 04:37

## 2023-01-01 RX ADMIN — OXYCODONE HYDROCHLORIDE 2.5 MG: 5 TABLET ORAL at 10:32

## 2023-01-01 RX ADMIN — PHENYLEPHRINE HYDROCHLORIDE 100 MCG: 10 INJECTION INTRAVENOUS at 08:36

## 2023-01-01 RX ADMIN — MAGNESIUM HYDROXIDE 30 ML: 2400 SUSPENSION ORAL at 10:23

## 2023-01-01 RX ADMIN — PHENYLEPHRINE HYDROCHLORIDE 0.3 MCG/KG/MIN: 10 INJECTION INTRAVENOUS at 08:37

## 2023-01-01 RX ADMIN — LISINOPRIL 20 MG: 20 TABLET ORAL at 09:21

## 2023-01-01 RX ADMIN — SENNOSIDES AND DOCUSATE SODIUM 2 TABLET: 8.6; 5 TABLET ORAL at 19:37

## 2023-01-01 RX ADMIN — CYANOCOBALAMIN TAB 1000 MCG 2000 MCG: 1000 TAB at 08:12

## 2023-01-01 RX ADMIN — LIDOCAINE 1 PATCH: 4 PATCH TOPICAL at 23:08

## 2023-01-01 RX ADMIN — PROPRANOLOL HYDROCHLORIDE 40 MG: 40 TABLET ORAL at 20:32

## 2023-01-01 RX ADMIN — ENOXAPARIN SODIUM 40 MG: 40 INJECTION SUBCUTANEOUS at 04:53

## 2023-01-01 RX ADMIN — MIDAZOLAM 0.5 MG: 1 INJECTION INTRAMUSCULAR; INTRAVENOUS at 20:29

## 2023-01-01 RX ADMIN — LIDOCAINE 1 PATCH: 4 PATCH TOPICAL at 23:47

## 2023-01-01 RX ADMIN — FENTANYL CITRATE 50 MCG: 50 INJECTION INTRAMUSCULAR; INTRAVENOUS at 09:24

## 2023-01-01 RX ADMIN — PROPRANOLOL HYDROCHLORIDE 40 MG: 40 TABLET ORAL at 08:12

## 2023-01-01 RX ADMIN — ACETAMINOPHEN 1000 MG: 500 TABLET ORAL at 17:06

## 2023-01-01 RX ADMIN — PHENYLEPHRINE HYDROCHLORIDE 150 MCG: 10 INJECTION INTRAVENOUS at 09:31

## 2023-01-01 RX ADMIN — HYDRALAZINE HYDROCHLORIDE 10 MG: 20 INJECTION INTRAMUSCULAR; INTRAVENOUS at 04:48

## 2023-01-01 RX ADMIN — FENTANYL CITRATE 25 MCG: 50 INJECTION INTRAMUSCULAR; INTRAVENOUS at 10:24

## 2023-01-01 RX ADMIN — LIDOCAINE HYDROCHLORIDE 60 MG: 20 INJECTION, SOLUTION INFILTRATION; PERINEURAL at 07:59

## 2023-01-01 RX ADMIN — PHENYLEPHRINE HYDROCHLORIDE 150 MCG: 10 INJECTION INTRAVENOUS at 09:49

## 2023-01-01 RX ADMIN — POTASSIUM & SODIUM PHOSPHATES POWDER PACK 280-160-250 MG 1 PACKET: 280-160-250 PACK at 11:14

## 2023-01-01 RX ADMIN — LIDOCAINE 1 PATCH: 4 PATCH TOPICAL at 23:58

## 2023-01-01 RX ADMIN — PROPRANOLOL HYDROCHLORIDE 40 MG: 40 TABLET ORAL at 19:46

## 2023-01-01 RX ADMIN — ACETAMINOPHEN 650 MG: 325 TABLET, FILM COATED ORAL at 23:59

## 2023-01-01 RX ADMIN — MIDAZOLAM 0.5 MG: 1 INJECTION INTRAMUSCULAR; INTRAVENOUS at 21:00

## 2023-01-01 RX ADMIN — SENNOSIDES AND DOCUSATE SODIUM 2 TABLET: 8.6; 5 TABLET ORAL at 19:46

## 2023-01-01 RX ADMIN — CALCIUM CARBONATE 600 MG (1,500 MG)-VITAMIN D3 400 UNIT TABLET 1 TABLET: at 08:41

## 2023-01-01 RX ADMIN — PROPRANOLOL HYDROCHLORIDE 40 MG: 40 TABLET ORAL at 21:02

## 2023-01-01 RX ADMIN — CALCIUM CARBONATE 600 MG (1,500 MG)-VITAMIN D3 400 UNIT TABLET 1 TABLET: at 09:20

## 2023-01-01 RX ADMIN — PHENYLEPHRINE HYDROCHLORIDE 100 MCG: 10 INJECTION INTRAVENOUS at 08:10

## 2023-01-01 RX ADMIN — FENTANYL CITRATE 25 MCG: 50 INJECTION INTRAMUSCULAR; INTRAVENOUS at 10:21

## 2023-01-01 RX ADMIN — HYDRALAZINE HYDROCHLORIDE 10 MG: 20 INJECTION INTRAMUSCULAR; INTRAVENOUS at 13:09

## 2023-01-01 RX ADMIN — HYDRALAZINE HYDROCHLORIDE 10 MG: 20 INJECTION INTRAMUSCULAR; INTRAVENOUS at 18:53

## 2023-01-01 ASSESSMENT — ACTIVITIES OF DAILY LIVING (ADL)
ADLS_ACUITY_SCORE: 37
ADLS_ACUITY_SCORE: 28
ADLS_ACUITY_SCORE: 37
ADLS_ACUITY_SCORE: 30
ADLS_ACUITY_SCORE: 39
ADLS_ACUITY_SCORE: 31
ADLS_ACUITY_SCORE: 37
ADLS_ACUITY_SCORE: 37
ADLS_ACUITY_SCORE: 35
ADLS_ACUITY_SCORE: 30
ADLS_ACUITY_SCORE: 37
ADLS_ACUITY_SCORE: 37
ADLS_ACUITY_SCORE: 31
ADLS_ACUITY_SCORE: 37
ADLS_ACUITY_SCORE: 35
ADLS_ACUITY_SCORE: 30
ADLS_ACUITY_SCORE: 39
ADLS_ACUITY_SCORE: 37
ADLS_ACUITY_SCORE: 35
ADLS_ACUITY_SCORE: 37
ADLS_ACUITY_SCORE: 35
ADLS_ACUITY_SCORE: 41
ADLS_ACUITY_SCORE: 30
ADLS_ACUITY_SCORE: 35
ADLS_ACUITY_SCORE: 37
ADLS_ACUITY_SCORE: 35
ADLS_ACUITY_SCORE: 39
ADLS_ACUITY_SCORE: 33
ADLS_ACUITY_SCORE: 30
ADLS_ACUITY_SCORE: 35
ADLS_ACUITY_SCORE: 30
ADLS_ACUITY_SCORE: 30
ADLS_ACUITY_SCORE: 37
ADLS_ACUITY_SCORE: 30
ADLS_ACUITY_SCORE: 31
ADLS_ACUITY_SCORE: 35
ADLS_ACUITY_SCORE: 30
ADLS_ACUITY_SCORE: 35
ADLS_ACUITY_SCORE: 35
ADLS_ACUITY_SCORE: 39
ADLS_ACUITY_SCORE: 39
ADLS_ACUITY_SCORE: 33
ADLS_ACUITY_SCORE: 35
ADLS_ACUITY_SCORE: 35
ADLS_ACUITY_SCORE: 30
ADLS_ACUITY_SCORE: 35
ADLS_ACUITY_SCORE: 30
ADLS_ACUITY_SCORE: 30
ADLS_ACUITY_SCORE: 33
ADLS_ACUITY_SCORE: 35
ADLS_ACUITY_SCORE: 37
ADLS_ACUITY_SCORE: 37
ADLS_ACUITY_SCORE: 39
ADLS_ACUITY_SCORE: 35
PREVIOUS_RESPONSIBILITIES: MEAL PREP;HOUSEKEEPING;LAUNDRY;SHOPPING
ADLS_ACUITY_SCORE: 35
ADLS_ACUITY_SCORE: 33
ADLS_ACUITY_SCORE: 30
PREVIOUS_RESPONSIBILITIES: MEAL PREP;HOUSEKEEPING;LAUNDRY;SHOPPING
DEPENDENT_IADLS:: INDEPENDENT
ADLS_ACUITY_SCORE: 30
ADLS_ACUITY_SCORE: 35
ADLS_ACUITY_SCORE: 31
ADLS_ACUITY_SCORE: 34
ADLS_ACUITY_SCORE: 41
ADLS_ACUITY_SCORE: 35
ADLS_ACUITY_SCORE: 35
ADLS_ACUITY_SCORE: 31
ADLS_ACUITY_SCORE: 35
ADLS_ACUITY_SCORE: 30
ADLS_ACUITY_SCORE: 33
ADLS_ACUITY_SCORE: 30
ADLS_ACUITY_SCORE: 37
ADLS_ACUITY_SCORE: 35
ADLS_ACUITY_SCORE: 37
ADLS_ACUITY_SCORE: 30
ADLS_ACUITY_SCORE: 35
ADLS_ACUITY_SCORE: 30
ADLS_ACUITY_SCORE: 35
ADLS_ACUITY_SCORE: 30
ADLS_ACUITY_SCORE: 34
ADLS_ACUITY_SCORE: 35
ADLS_ACUITY_SCORE: 35
ADLS_ACUITY_SCORE: 37
ADLS_ACUITY_SCORE: 31

## 2023-01-01 ASSESSMENT — PATIENT HEALTH QUESTIONNAIRE - PHQ9: SUM OF ALL RESPONSES TO PHQ QUESTIONS 1-9: 0

## 2023-01-11 ENCOUNTER — ANCILLARY PROCEDURE (OUTPATIENT)
Dept: CARDIOLOGY | Facility: CLINIC | Age: 88
End: 2023-01-11
Attending: INTERNAL MEDICINE
Payer: MEDICARE

## 2023-01-11 DIAGNOSIS — I49.5 SICK SINUS SYNDROME (H): ICD-10-CM

## 2023-01-11 DIAGNOSIS — Z95.0 CARDIAC PACEMAKER IN SITU: ICD-10-CM

## 2023-01-11 PROCEDURE — 93280 PM DEVICE PROGR EVAL DUAL: CPT | Performed by: INTERNAL MEDICINE

## 2023-01-11 NOTE — PATIENT INSTRUCTIONS
It was a pleasure to see you in clinic today.  Please do not hesitate to call with any questions or concerns.  You are scheduled for remote transmissions on 4/13/23, 7/17/23 and 10/19/23.  We look forward to seeing you in clinic at your next device check in 1 year.    Amanda Medina, RN, MS, CCRN  Electrophysiology Nurse Clinician  [facility]    During Business Hours Please Call:  [phone]  After Hours Please Call:  [phone] - select option #[phone] and ask for job code [phone]

## 2023-01-13 LAB
MDC_IDC_LEAD_IMPLANT_DT: NORMAL
MDC_IDC_LEAD_IMPLANT_DT: NORMAL
MDC_IDC_LEAD_LOCATION: NORMAL
MDC_IDC_LEAD_LOCATION: NORMAL
MDC_IDC_LEAD_LOCATION_DETAIL_1: NORMAL
MDC_IDC_LEAD_LOCATION_DETAIL_1: NORMAL
MDC_IDC_LEAD_MFG: NORMAL
MDC_IDC_LEAD_MFG: NORMAL
MDC_IDC_LEAD_MODEL: NORMAL
MDC_IDC_LEAD_MODEL: NORMAL
MDC_IDC_LEAD_POLARITY_TYPE: NORMAL
MDC_IDC_LEAD_POLARITY_TYPE: NORMAL
MDC_IDC_LEAD_SERIAL: NORMAL
MDC_IDC_LEAD_SERIAL: NORMAL
MDC_IDC_MSMT_BATTERY_DTM: NORMAL
MDC_IDC_MSMT_BATTERY_REMAINING_LONGEVITY: 126 MO
MDC_IDC_MSMT_BATTERY_REMAINING_PERCENTAGE: 100 %
MDC_IDC_MSMT_BATTERY_STATUS: NORMAL
MDC_IDC_MSMT_LEADCHNL_RA_IMPEDANCE_VALUE: 521 OHM
MDC_IDC_MSMT_LEADCHNL_RA_PACING_THRESHOLD_AMPLITUDE: 0.6 V
MDC_IDC_MSMT_LEADCHNL_RA_PACING_THRESHOLD_PULSEWIDTH: 0.4 MS
MDC_IDC_MSMT_LEADCHNL_RV_IMPEDANCE_VALUE: 643 OHM
MDC_IDC_MSMT_LEADCHNL_RV_PACING_THRESHOLD_AMPLITUDE: 1 V
MDC_IDC_MSMT_LEADCHNL_RV_PACING_THRESHOLD_PULSEWIDTH: 0.4 MS
MDC_IDC_PG_IMPLANT_DTM: NORMAL
MDC_IDC_PG_MFG: NORMAL
MDC_IDC_PG_MODEL: NORMAL
MDC_IDC_PG_SERIAL: NORMAL
MDC_IDC_PG_TYPE: NORMAL
MDC_IDC_SESS_CLINIC_NAME: NORMAL
MDC_IDC_SESS_DTM: NORMAL
MDC_IDC_SESS_TYPE: NORMAL
MDC_IDC_SET_BRADY_AT_MODE_SWITCH_MODE: NORMAL
MDC_IDC_SET_BRADY_AT_MODE_SWITCH_RATE: 170 {BEATS}/MIN
MDC_IDC_SET_BRADY_LOWRATE: 60 {BEATS}/MIN
MDC_IDC_SET_BRADY_MAX_SENSOR_RATE: 130 {BEATS}/MIN
MDC_IDC_SET_BRADY_MAX_TRACKING_RATE: 130 {BEATS}/MIN
MDC_IDC_SET_BRADY_MODE: NORMAL
MDC_IDC_SET_BRADY_PAV_DELAY_HIGH: 180 MS
MDC_IDC_SET_BRADY_PAV_DELAY_LOW: 230 MS
MDC_IDC_SET_BRADY_SAV_DELAY_HIGH: 180 MS
MDC_IDC_SET_BRADY_SAV_DELAY_LOW: 230 MS
MDC_IDC_SET_LEADCHNL_RA_PACING_AMPLITUDE: 2 V
MDC_IDC_SET_LEADCHNL_RA_PACING_CAPTURE_MODE: NORMAL
MDC_IDC_SET_LEADCHNL_RA_PACING_POLARITY: NORMAL
MDC_IDC_SET_LEADCHNL_RA_PACING_PULSEWIDTH: 0.4 MS
MDC_IDC_SET_LEADCHNL_RA_SENSING_ADAPTATION_MODE: NORMAL
MDC_IDC_SET_LEADCHNL_RA_SENSING_POLARITY: NORMAL
MDC_IDC_SET_LEADCHNL_RA_SENSING_SENSITIVITY: 0.25 MV
MDC_IDC_SET_LEADCHNL_RV_PACING_AMPLITUDE: 1.4 V
MDC_IDC_SET_LEADCHNL_RV_PACING_CAPTURE_MODE: NORMAL
MDC_IDC_SET_LEADCHNL_RV_PACING_POLARITY: NORMAL
MDC_IDC_SET_LEADCHNL_RV_PACING_PULSEWIDTH: 0.4 MS
MDC_IDC_SET_LEADCHNL_RV_SENSING_ADAPTATION_MODE: NORMAL
MDC_IDC_SET_LEADCHNL_RV_SENSING_POLARITY: NORMAL
MDC_IDC_SET_LEADCHNL_RV_SENSING_SENSITIVITY: 1.5 MV
MDC_IDC_SET_ZONE_DETECTION_INTERVAL: 375 MS
MDC_IDC_SET_ZONE_TYPE: NORMAL
MDC_IDC_SET_ZONE_VENDOR_TYPE: NORMAL
MDC_IDC_STAT_AT_BURDEN_PERCENT: 0 %
MDC_IDC_STAT_AT_DTM_END: NORMAL
MDC_IDC_STAT_AT_DTM_START: NORMAL
MDC_IDC_STAT_BRADY_DTM_END: NORMAL
MDC_IDC_STAT_BRADY_DTM_START: NORMAL
MDC_IDC_STAT_BRADY_RA_PERCENT_PACED: 95 %
MDC_IDC_STAT_BRADY_RV_PERCENT_PACED: 5 %
MDC_IDC_STAT_EPISODE_RECENT_COUNT: 0
MDC_IDC_STAT_EPISODE_RECENT_COUNT_DTM_END: NORMAL
MDC_IDC_STAT_EPISODE_RECENT_COUNT_DTM_START: NORMAL
MDC_IDC_STAT_EPISODE_TYPE: NORMAL
MDC_IDC_STAT_EPISODE_VENDOR_TYPE: NORMAL

## 2023-05-26 NOTE — LETTER
May 30, 2023      Vickie Gonzalez  [address]   [place] [address]        Dear ,    We are writing to inform you of your test results.      Resulted Orders   Hemoglobin A1c   Result Value Ref Range    Hemoglobin A1C 5.9 (H) 0.0 - 5.6 %      Comment:      Normal <5.7%   Prediabetes 5.7-6.4%    Diabetes 6.5% or higher     Note: Adopted from ADA consensus guidelines.   Comprehensive metabolic panel (BMP + Alb, Alk Phos, ALT, AST, Total. Bili, TP)   Result Value Ref Range    Sodium 137 136 - 145 mmol/L    Potassium 4.5 3.4 - 5.3 mmol/L    Chloride 99 98 - 107 mmol/L    Carbon Dioxide (CO2) 26 22 - 29 mmol/L    Anion Gap 12 7 - 15 mmol/L    Urea Nitrogen 23.5 (H) 8.0 - 23.0 mg/dL    Creatinine 0.79 0.51 - 0.95 mg/dL    Calcium 10.3 (H) 8.2 - 9.6 mg/dL    Glucose 89 70 - 99 mg/dL    Alkaline Phosphatase 60 35 - 104 U/L    AST 24 10 - 35 U/L    ALT 14 10 - 35 U/L    Protein Total 8.1 6.4 - 8.3 g/dL    Albumin 4.6 3.5 - 5.2 g/dL    Bilirubin Total 0.5 <=1.2 mg/dL    GFR Estimate 68 >60 mL/min/1.73m2      Comment:      eGFR calculated using 2021 CKD-EPI equation.   Albumin Random Urine Quantitative with Creat Ratio   Result Value Ref Range    Creatinine Urine mg/dL 52.5 mg/dL      Comment:      The reference ranges have not been established in urine creatinine. The results should be integrated into the clinical context for interpretation.    Albumin Urine mg/L 326.0 mg/L      Comment:      The reference ranges have not been established in urine albumin. The results should be integrated into the clinical context for interpretation.    Albumin Urine mg/g Cr 620.95 (H) 0.00 - 25.00 mg/g Cr      Comment:      Microalbuminuria is defined as an albumin:creatinine ratio of 17 to 299 for males and 25 to 299 for females. A ratio of albumin:creatinine of 300 or higher is indicative of overt proteinuria.  Due to biologic variability, positive results should be confirmed by a second, first-morning random or  24-hour timed urine specimen. If there is discrepancy, a third specimen is recommended. When 2 out of 3 results are in the microalbuminuria range, this is evidence for incipient nephropathy and warrants increased efforts at glucose control, blood pressure control, and institution of therapy with an angiotensin-converting-enzyme (ACE) inhibitor (if the patient can tolerate it).         If you have any questions or concerns, please call the clinic at the number listed above.   See you in 6 months.       Sincerely,      SANDIP Kendall CNP

## 2023-05-26 NOTE — PROGRESS NOTES
Assessment & Plan     1. Encounter for medical examination to establish care  Doing well overall. Discussed sodium intake, compression stockings, leg elevation for trace edema in bilateral ankles. Discussed signs/symptoms of increased fluid and to notify with acute changes in weight. Plan to follow up in 6 months.   - Hemoglobin A1c; Future  - Comprehensive metabolic panel (BMP + Alb, Alk Phos, ALT, AST, Total. Bili, TP); Future  - Albumin Random Urine Quantitative with Creat Ratio; Future      2. Type 2 diabetes mellitus without complication, without long-term current use of insulin (H)  Last A1c 6.1. Does not take any medications. Check today.   - Hemoglobin A1c    3. Chronic renal impairment, stage 3a (H)  GFR in November improved to 57. Check today.  - Comprehensive metabolic panel (BMP + Alb, Alk Phos, ALT, AST, Total. Bili, TP)  - Albumin Random Urine Quantitative with Creat Ratio    4. Sick sinus syndrome (H)  S/p pacemaker. Left chest. Device check scheduled for July.     5. Essential hypertension  Stable. Continue current lisinopril.       SANDIP Kendall [facility]   Vickie is a 89 year old, presenting for the following health issues:  Establish Care (Was a Dr Boswell Pt)        5/26/2023    12:47 PM   Additional Questions   Roomed by Melonie NAVA     History of Present Illness       Reason for visit:  Establish care    She eats 4 or more servings of fruits and vegetables daily.She consumes 0 sweetened beverage(s) daily.She exercises with enough effort to increase her heart rate 9 or less minutes per day.  She exercises with enough effort to increase her heart rate 4 days per week.   She is taking medications regularly.     Vickie is here to establish care, she was a patient of Dr. Boswell for 32 yeas. Lives in senior living independently. They help with transportation to the grocery store as she no longer drives. Has not been to the eye doctor recently,  "she only wears cheater glasses and has noted no vision changes. Does not need any refills today.     Has noticed some mild swelling in her ankles the last few weeks. No injury reported, not painful. No redness. Denies change in sensation. It has not gotten worse. Denies shortness of breath, chest pain, orthopnea, nocturnal dyspnea. She does wear support stockings. Does not follow a low sodium diet, eats frozen dinner meals often.       Review of Systems   Constitutional, neuro, ENT, endocrine, pulmonary, cardiac, gastrointestinal, genitourinary, musculoskeletal, integument and psychiatric systems are negative, except as otherwise noted.      Objective    /70   Pulse 60   Temp 97.2  F (36.2  C) (Tympanic)   Resp 14   Ht 1.613 m (5' 3.5\")   Wt 64 kg (141 lb)   LMP  (LMP Unknown)   SpO2 95%   BMI 24.59 kg/m    Body mass index is 24.59 kg/m .  Physical Exam   GENERAL: healthy, alert and no distress  RESP: lungs clear to auscultation - no rales, rhonchi or wheezes  CV: regular rate and rhythm, normal S1 S2, no S3 or S4, no murmur, click or rub, trace peripheral edema in bilateral ankles and peripheral pulses strong  MS: no gross musculoskeletal defects noted, no edema  NEURO: Normal strength and tone, mentation intact and speech normal  PSYCH: mentation appears normal, affect normal/bright  Diabetic foot exam: normal DP and PT pulses, no trophic changes or ulcerative lesions, normal sensory exam and bunions              "

## 2023-06-07 NOTE — TELEPHONE ENCOUNTER
DME (Durable Medical Equipment) Orders and Documentation  Orders Placed This Encounter   Procedures     Orthotics and Prosthetics DME Mastectomy Supplies; Bra      The patient was assessed and it was determined the patient is in need of the following listed DME Supplies/Equipment. Please complete supporting documentation below to demonstrate medical necessity.      DME All Other Item(s) Documentation    List reason for need and supporting documentation for medical necessity below for each DME item.     1. History of right mastectomy due to breast cancer.

## 2023-06-07 NOTE — TELEPHONE ENCOUNTER
Order/Referral Request    Who is requesting: patient    Orders being requested:   Mastectomy bras    Order needs to be faxed to [facility]  Fax:  [phone]  Attention:  Indira     Reason service is needed/diagnosis:   Right Mastectomy 1991    When are orders needed by: n/a    Has this been discussed with Provider: No    Does patient have a preference on a Group/Provider/Facility? na    Does patient have an appointment scheduled?: No    Where to send orders: N/A    Okay to leave a detailed message?: Yes at Home number on file [phone] (home)

## 2023-06-12 NOTE — TELEPHONE ENCOUNTER
Order/Referral Request    Who is requesting: Patient    Orders being requested: dxa scan    Reason service is needed/diagnosis: Recommendations:  Given the significant decline in bone density of 2 of 3 measured sites, a reassessment of current management is advised with a recheck in 2 years.    When are orders needed by: asap    Has this been discussed with Provider: Yes Dr Boswell    Does patient have a preference on a Group/Provider/Facility? [facility]    Does patient have an appointment scheduled?: No    Where to send orders: Place orders within Epic    Okay to leave a detailed message?: Yes at Home number on file [phone] (home)

## 2023-06-12 NOTE — TELEPHONE ENCOUNTER
Last DXA completed on 5/10/21 (the report indicated to recheck x 2 years).     I have pended an order for this to sign if agreeable

## 2023-07-19 NOTE — PROGRESS NOTES
Assessment & Plan     Bilateral impacted cerumen  Right ear occluded with wax and left ear partially occluded. After irrigation normal TMs visualized with improvement in hearing.   - GA REMOVAL IMPACTED CERUMEN IRRIGATION/LVG LAWANDAAT    SANDIP Kendall CNP  [facility]    Jarvis Johnston is a 89 year old, presenting for the following health issues:  office visit and Recheck Medication (Pt reports that she has some concern with possible wax build up in both ears, right ear being worse. Pt suggesting ear cleaning for both ears)        7/19/2023    12:28 PM   Additional Questions   Roomed by kurtis   Accompanied by alone         7/19/2023    12:28 PM   Patient Reported Additional Medications   Patient reports taking the following new medications none     History of Present Illness       Reason for visit:  Possible ear infection    She eats 2-3 servings of fruits and vegetables daily.She consumes 1 sweetened beverage(s) daily.She exercises with enough effort to increase her heart rate 10 to 19 minutes per day.  She exercises with enough effort to increase her heart rate 3 or less days per week.   She is taking medications regularly.       Pt reports that she is here to possible ear infection and wax build up. Pt reports that both ears has given her some concern ,and she is having a hard time hearing out of them.      Worse in the last month with difficulty hearing all frequencies. Does have a history of mastoid surgery on the right. Never had issues with wax. No hearing aids. Has not seen an audiologist. Worse in the right ear. Denies pain, dizziness, nasal congestion, sinus pressure.       Review of Systems   Constitutional, HEENT, cardiovascular, pulmonary, gi and gu systems are negative, except as otherwise noted.      Objective    /60 (BP Location: Left arm, Patient Position: Sitting, Cuff Size: Adult Regular)   Pulse 70   Temp 97.8  F (36.6  C) (Tympanic)   Resp 18    "Ht 1.613 m (5' 3.5\")   Wt 62.7 kg (138 lb 3.2 oz)   LMP  (LMP Unknown)   SpO2 94%   Breastfeeding No   BMI 24.10 kg/m    Body mass index is 24.1 kg/m .     Physical Exam   GENERAL: healthy, alert and no distress  EYES: Eyes grossly normal to inspection, PERRL and conjunctivae and sclerae normal  HENT: right ear: occluded with wax and left ear: partial occlusion with wax  MS: no gross musculoskeletal defects noted, no edema                "

## 2023-11-30 PROBLEM — S72.351A CLOSED DISPLACED COMMINUTED FRACTURE OF SHAFT OF RIGHT FEMUR, INITIAL ENCOUNTER (H): Status: ACTIVE | Noted: 2023-01-01

## 2023-11-30 NOTE — ED TRIAGE NOTES
BIBA, fell (slipped) today, landed on right hip. She is not on thinners, tenderness to right hip reported. Unable to bear weight on affected leg. Pain rating at 3 at rest and 8 to 10 in motion.  BS at 127, patient denies hitting her head

## 2023-11-30 NOTE — LETTER
Transition Communication Hand-off for Care Transitions to Next Level of Care Provider    Name: Vickie Gonzalez  : 1925  MRN #: [id number]  Primary Care Provider: Hermelinda Jacob     Primary Clinic: [facility] [address]     Reason for Hospitalization:  Closed displaced comminuted fracture of shaft of right femur, initial encounter (H) [S72.351A]  Closed displaced intertrochanteric fracture of right femur, initial encounter (H) [S72.141A]  Admit Date/Time: 2023  4:11 PM  Discharge Date: 2023  Payor Source: Payor: MEDICARE / Plan: MEDICARE / Product Type: Medicare /            Concern for non-adherence with plan of care:   no  Discharge Needs Assessment:  Needs      Flowsheet Row Most Recent Value   Equipment Currently Used at Home grab bar, toilet, grab bar, tub/shower, shower chair, walker, rolling                Any outstanding tests or procedures:        Referrals       Future Labs/Procedures    Occupational Therapy Adult Consult     Comments:    Evaluate and treat as clinically indicated.    Reason: Status Post Hip Surgery    Physical Therapy Adult Consult     Comments:    Evaluate and treat as clinically indicated.    Reason: Status Post Hip Surgery    Speech Language Path Adult Consult     Comments:    Evaluate and treat as clinically indicated.    Reason:  Stroke            Supplies       Future Labs/Procedures    [facility]     Process Instructions:    By signing this order, the Authorizing Provider is attesting that they have completed a face-to-face evaluation on the patient to determine their need for this equipment in the last 60 days. A new face-to-face evaluation is required each time  A new prescription for one of the specified items is ordered.   If an additional provider completed the evaluation, please indicate their name below.     **As of , an order requisition and face sheet will print for all DME orders. Please give printed order and face sheet to patient if  not obtaining product from [facility] DME closet.     Comments:    DME Documentation: Describe the reason for need to support medical necessity: Impaired gait status post hip surgery. I, the undersigned, certify that the above prescribed supplies are medically necessary for this patient and is both reasonable and necessary in reference to accepted standards of medical practice in the treatment of this patient's condition and is not prescribed as a convenience.    [facility]     Process Instructions:    By signing this order, the Authorizing Provider is attesting that they have completed a face-to-face evaluation on the patient to determine their need for this equipment in the last 60 days. A new face-to-face evaluation is required each time  A new prescription for one of the specified items is ordered.   If an additional provider completed the evaluation, please indicate their name below.     **As of 2018, an order requisition and face sheet will print for all DME orders. Please give printed order and face sheet to patient if not obtaining product from [facility] DME closet.     Comments:    DME Documentation: Describe the reason for need to support medical necessity: Impaired gait status post hip surgery. I, the undersigned, certify that the above prescribed supplies are medically necessary for this patient and is both reasonable and necessary in reference to accepted standards of medical practice in the treatment of this patient's condition and is not prescribed as a convenience.    [facility]     Process Instructions:    By signing this order, the Authorizing Provider is attesting that they have completed a face-to-face evaluation on the patient to determine their need for this equipment in the last 60 days. A new face-to-face evaluation is required each time  A new prescription for one of the specified items is ordered.   If an additional provider completed the evaluation, please indicate  their name below.     **As of 2018, an order requisition and face sheet will print for all DME orders. Please give printed order and face sheet to patient if not obtaining product from [facility] DME closet.     Comments:    : DME Documentation: Describe the reason for need to support medical necessity: Impaired gait status post hip surgery. I, the undersigned, certify that the above prescribed supplies are medically necessary for this patient and is both reasonable and necessary in reference to accepted standards of medical practice in the treatment of this patient's condition and is not prescribed as a convenience.            Key Recommendations:      KEITH Ridley    AVS/Discharge Summary is the source of truth; this is a helpful guide for improved communication of patient story

## 2023-11-30 NOTE — ED PROVIDER NOTES
[place] EMERGENCY DEPARTMENT ([facility])    11/30/23       ED PROVIDER NOTE  [facility]      History     Chief Complaint   Patient presents with    Fall     HPI  Vickie Gonzalez is a 89 year old female with a past medical history significant for breast cancer, DM2, CKD III, SSS, RBBB, HLD and HTN who presents to the Emergency Department for evaluation of a fall. Patient states she lost balance and fell just PTA. Landed on right hip. Pain and unable to stand/bear weight. No chestpain. No pain below hip in right leg. No LLE or BUE complaints. No back pain. No neck pain. No headache. Denies head trauma or LOC. No thinners reported.      Past Medical History  Past Medical History:   Diagnosis Date    Breast cancer (H) right     Chronic kidney disease     Diabetes (H)     Goiter, nontoxic, multinodular     HTN (hypertension)     Hx antineoplastic chemotherapy     breast cancer     Hypertriglyceridemia without hypercholesterolemia     Osteopenia      Past Surgical History:   Procedure Laterality Date    BIOPSY BREAST Right     MASTECTOMY Right      calcium carbonate-vitamin D3 (CALCIUM 600 + D,3,) 600 mg(1,500mg) -200 unit per tablet  cyanocobalamin (VITAMIN B-12) 2000 MCG tablet  lisinopril (ZESTRIL) 20 MG tablet  MEDICATION CANNOT BE REORDERED - PLEASE MANUALLY REORDER AND DISCONTINUE THE OLD ORDER  propranolol (INDERAL) 40 MG tablet      Allergies   Allergen Reactions    Tamoxifen Analogues [Tamoxifen] Unknown     Annotation: hepatitis       Family History  Family History   Problem Relation Age of Onset    Pacemaker Sister     CABG Brother 67.00    Breast Cancer No family hx of      Social History   Social History     Tobacco Use    Smoking status: Never    Smokeless tobacco: Never   Substance Use Topics    Alcohol use: Yes    Drug use: No      Past medical history, past surgical history, medications, allergies, family history, and social history were reviewed with the patient. No additional pertinent  items.      A medically appropriate review of systems was performed with pertinent positives and negatives noted in the HPI, and all other systems negative.    Physical Exam   BP: (!) 190/80  Pulse: 60  Temp: 98.7  F (37.1  C)  SpO2: 94 %  Physical Exam  Vital Signs Reviewed  Gen: Well nourished, well developed, resting comfortably, no acute distress  HEENT: NC/AT, PERRL, EOMI, MMM  Neck: Supple, FROM, non-tender  CV: Regular Rate, no murmur/rub/gallop  Lungs/Chest: Normal Effort, CTAB  Abd: Non-distended, non-tender  MSK/Back: Right lower extremity range of motion limited hip secondary to pain.  Some point tenderness over the greater trochanter.  Neuro: A&Ox3, GCS 15, CN II-XII unremarkable  Skin: Warm, Dry, Intact, no visible lesions    ED Course, Procedures, & Data     ED Course as of 11/30/23 2008   Thu Nov 30, 2023 1812 Discussed with Ortho on-call, recommending admission to medicine [facility] if possible.  Likely surgery.     Seen and evaluated in [facility] bed, labs images ordered.  I independently interpreted the x-rays of the hip and noted a fracture.  Orthopedic surgery has been consulted.  Final radiology reads confirming fracture.  Patient will be admitted to medicine with ortho consulting, will likely need surgery  Admitted to Medicine    Procedures            EKG Interpretation:      Interpreted by Nadeem Gruber MD  Time reviewed: 1640  Symptoms at time of EKG: CV screening   Rhythm: paced  Rate: Normal  Axis: Left Axis Deviation  Ectopy: none  Conduction: left bundle branch block (complete)  ST Segments/ T Waves: Non-specific ST-T wave changes  Q Waves: none  Comparison to prior: Unchanged    Clinical Impression: Atrial paced rhythm, largely unchanged from prior.                Results for orders placed or performed during the hospital encounter of 11/30/23   XR Femur Right 2 Views     Status: None    Narrative    EXAM: XR PELVIS 1/2 VIEWS, XR FEMUR RIGHT 2 VIEWS  LOCATION: [facility]  [facility]  DATE: 11/30/2023    INDICATION: Fall, unable to stand, R hip pain. Eval pelvic hip fx  COMPARISON: None.      Impression    IMPRESSION: Comminuted displaced fracture of the proximal right femur involving the intertrochanteric and subtrochanteric regions. There is varus and apex anterior angulation. Diffuse bony demineralization. Degenerative changes lumbar spine, SI joints,   and right knee.   XR Chest 1 View     Status: None    Narrative    Exam: XR CHEST 1 VIEW, 11/30/2023 5:01 PM    Comparison: 9/3/2015    History: Fall    Findings:  Single AP supine view of the chest. Left chest wall cardiac device  with leads projecting over the right atrium and right ventricle.    Trachea is midline. Mediastinum is within normal limits.  Cardiopulmonary silhouette is within normal limits. Aortic knob  calcifications. Peribronchial cuffing and diffuse interstitial  pulmonary opacities. There is no pneumothorax or pleural effusion. The  upper abdomen is unremarkable.      Impression    Impression: Peribronchial cuffing and diffuse interstitial opacities  likely represent mild pulmonary edema.    I have personally reviewed the examination and initial interpretation  and I agree with the findings.    RICKY GARCIA MD         SYSTEM ID:  [id number]   Head CT w/o contrast     Status: None    Narrative    EXAM: CT HEAD W/O CONTRAST  11/30/2023 4:48 PM     HISTORY: Fall       COMPARISON: None    TECHNIQUE: Using multidetector thin collimation helical acquisition  technique, axial, coronal and sagittal CT images from the skull base  to the vertex were obtained without intravenous contrast.   (topogram) image(s) also obtained and reviewed.    FINDINGS:  No acute intracranial hemorrhage, mass effect, or midline shift. No  acute loss of gray-white matter differentiation. Ventricles are  proportionate to the cerebral sulci. Clear basal cisterns. Patchy  hypoattenuation involving the  periventricular white matter,  nonspecific, likely secondary to chronic small vessel ischemic changes  given patient's age. Mild-to-moderate generalized cerebral volume  loss.    Atraumatic calvarium. Clear paranasal sinuses, mastoid air cells.  Nonfocal orbits.       Impression    IMPRESSION:   1. No acute intracranial abnormality.   2. Mild-to-moderate generalized cerebral volume loss and  leukoaraiosis.    I have personally reviewed the examination and initial interpretation  and I agree with the findings.    MARIA C CARPENTER MD         SYSTEM ID:  [id number]   XR Pelvis 1/2 Views     Status: None    Narrative    EXAM: XR PELVIS 1/2 VIEWS, XR FEMUR RIGHT 2 VIEWS  LOCATION: [facility]  DATE: 11/30/2023    INDICATION: Fall, unable to stand, R hip pain. Eval pelvic hip fx  COMPARISON: None.      Impression    IMPRESSION: Comminuted displaced fracture of the proximal right femur involving the intertrochanteric and subtrochanteric regions. There is varus and apex anterior angulation. Diffuse bony demineralization. Degenerative changes lumbar spine, SI joints,   and right knee.   Partial thromboplastin time     Status: Normal   Result Value Ref Range    aPTT 27 22 - 38 Seconds   INR     Status: Normal   Result Value Ref Range    INR 1.05 0.85 - 1.15   Basic metabolic panel     Status: Abnormal   Result Value Ref Range    Sodium 139 135 - 145 mmol/L    Potassium 4.3 3.4 - 5.3 mmol/L    Chloride 101 98 - 107 mmol/L    Carbon Dioxide (CO2) 26 22 - 29 mmol/L    Anion Gap 12 7 - 15 mmol/L    Urea Nitrogen 21.6 8.0 - 23.0 mg/dL    Creatinine 0.78 0.51 - 0.95 mg/dL    GFR Estimate 69 >60 mL/min/1.73m2    Calcium 9.7 (H) 8.2 - 9.6 mg/dL    Glucose 135 (H) 70 - 99 mg/dL   CBC with platelets and differential     Status: Abnormal   Result Value Ref Range    WBC Count 10.5 4.0 - 11.0 10e3/uL    RBC Count 3.70 (L) 3.80 - 5.20 10e6/uL    Hemoglobin 11.6 (L) 11.7 - 15.7 g/dL    Hematocrit  34.4 (L) 35.0 - 47.0 %    MCV 93 78 - 100 fL    MCH 31.4 26.5 - 33.0 pg    MCHC 33.7 31.5 - 36.5 g/dL    RDW 12.7 10.0 - 15.0 %    Platelet Count 189 150 - 450 10e3/uL    % Neutrophils 81 %    % Lymphocytes 12 %    % Monocytes 6 %    % Eosinophils 1 %    % Basophils 0 %    % Immature Granulocytes 0 %    NRBCs per 100 WBC 0 <1 /100    Absolute Neutrophils 8.5 (H) 1.6 - 8.3 10e3/uL    Absolute Lymphocytes 1.2 0.8 - 5.3 10e3/uL    Absolute Monocytes 0.7 0.0 - 1.3 10e3/uL    Absolute Eosinophils 0.1 0.0 - 0.7 10e3/uL    Absolute Basophils 0.0 0.0 - 0.2 10e3/uL    Absolute Immature Granulocytes 0.0 <=0.4 10e3/uL    Absolute NRBCs 0.0 10e3/uL   Troponin T, High Sensitivity     Status: Abnormal   Result Value Ref Range    Troponin T, High Sensitivity 24 (H) <=14 ng/L   EKG 12-lead, tracing only     Status: None   Result Value Ref Range    Systolic Blood Pressure  mmHg    Diastolic Blood Pressure  mmHg    Ventricular Rate 60 BPM    Atrial Rate 60 BPM    ID Interval 224 ms    QRS Duration 154 ms     ms    QTc 490 ms    P Axis 73 degrees    R AXIS -51 degrees    T Axis 49 degrees    Interpretation ECG       Atrial-paced rhythm with prolonged AV conduction  Left axis deviation  Left bundle branch block  Abnormal ECG  Unconfirmed report - interpretation of this ECG is computer generated - see medical record for final interpretation  Confirmed by - EMERGENCY ROOM, PHYSICIAN ([id number]),  MEGAN VENTURA ([id number]) on 11/30/2023 6:42:46 PM     Adult Type and Screen     Status: None (Preliminary result)   Result Value Ref Range    SPECIMEN EXPIRATION DATE [id number]    CBC with platelets differential     Status: Abnormal    Narrative    The following orders were created for panel order CBC with platelets differential.  Procedure                               Abnormality         Status                     ---------                               -----------         ------                     CBC with platelets and  d...[[id number]]  Abnormal            Final result                 Please view results for these tests on the individual orders.   ABO/Rh type and screen     Status: None (In process)    Narrative    The following orders were created for panel order ABO/Rh type and screen.  Procedure                               Abnormality         Status                     ---------                               -----------         ------                     Adult Type and Screen[[id number]]                            Preliminary result           Please view results for these tests on the individual orders.     Medications   acetaminophen (TYLENOL) tablet 1,000 mg (1,000 mg Oral $Given 11/30/23 1706)   ibuprofen (ADVIL/MOTRIN) tablet 600 mg (600 mg Oral $Given 11/30/23 1706)     Labs Ordered and Resulted from Time of ED Arrival to Time of ED Departure   BASIC METABOLIC PANEL - Abnormal       Result Value    Sodium 139      Potassium 4.3      Chloride 101      Carbon Dioxide (CO2) 26      Anion Gap 12      Urea Nitrogen 21.6      Creatinine 0.78      GFR Estimate 69      Calcium 9.7 (*)     Glucose 135 (*)    CBC WITH PLATELETS AND DIFFERENTIAL - Abnormal    WBC Count 10.5      RBC Count 3.70 (*)     Hemoglobin 11.6 (*)     Hematocrit 34.4 (*)     MCV 93      MCH 31.4      MCHC 33.7      RDW 12.7      Platelet Count 189      % Neutrophils 81      % Lymphocytes 12      % Monocytes 6      % Eosinophils 1      % Basophils 0      % Immature Granulocytes 0      NRBCs per 100 WBC 0      Absolute Neutrophils 8.5 (*)     Absolute Lymphocytes 1.2      Absolute Monocytes 0.7      Absolute Eosinophils 0.1      Absolute Basophils 0.0      Absolute Immature Granulocytes 0.0      Absolute NRBCs 0.0     TROPONIN T, HIGH SENSITIVITY - Abnormal    Troponin T, High Sensitivity 24 (*)    PARTIAL THROMBOPLASTIN TIME - Normal    aPTT 27     INR - Normal    INR 1.05     TYPE AND SCREEN, ADULT    SPECIMEN EXPIRATION DATE [id number]     ABO/RH TYPE AND  SCREEN     XR Femur Right 2 Views   Final Result   IMPRESSION: Comminuted displaced fracture of the proximal right femur involving the intertrochanteric and subtrochanteric regions. There is varus and apex anterior angulation. Diffuse bony demineralization. Degenerative changes lumbar spine, SI joints,    and right knee.      XR Pelvis 1/2 Views   Final Result   IMPRESSION: Comminuted displaced fracture of the proximal right femur involving the intertrochanteric and subtrochanteric regions. There is varus and apex anterior angulation. Diffuse bony demineralization. Degenerative changes lumbar spine, SI joints,    and right knee.      XR Chest 1 View   Final Result   Impression: Peribronchial cuffing and diffuse interstitial opacities   likely represent mild pulmonary edema.      I have personally reviewed the examination and initial interpretation   and I agree with the findings.      RICKY GARCIA MD            SYSTEM ID:  [id number]      Head CT w/o contrast   Final Result   IMPRESSION:    1. No acute intracranial abnormality.    2. Mild-to-moderate generalized cerebral volume loss and   leukoaraiosis.      I have personally reviewed the examination and initial interpretation   and I agree with the findings.      MARIA C CARPENTER MD            SYSTEM ID:  [id number]             Critical care was not performed.     Medical Decision Making  The patient's presentation was of moderate complexity (an acute complicated injury).    The patient's evaluation involved:  ordering and/or review of 3+ test(s) in this encounter (see separate area of note for details)  independent interpretation of testing performed by another health professional (see separate area of note for details)  discussion of management or test interpretation with another health professional (Orthopedic Surgery)    The patient's management necessitated high risk (a decision regarding hospitalization).    Assessment & Plan    Vickie Gonzalez is a 89 year  old female with a past medical history significant for breast cancer, DM2, CKD III, SSS, RBBB, HLD and HTN who presents to the Emergency Department for evaluation of a fall.  On arrival patient had some elevated blood pressures in the setting of acute pain and suspected broken hip with no major cardiopulmonary symptoms.  Labs and imaging were obtained.  On assessment of this imaging was apparent that she had fractured her proximal right femur.  Final radiology reads confirm this.  Orthopedic surgery was consulted and patient will likely need surgical stabilization especially given her level of independence prior to the fall.  Given her age and comorbidities we will admit her to the medicine service with orthopedic surgery consulting.  [place] [facility]    I have reviewed the nursing notes. I have reviewed the findings, diagnosis, plan and need for follow up with the patient.    New Prescriptions    No medications on file       Final diagnoses:   Closed displaced comminuted fracture of shaft of right femur, initial encounter (H)       Nadeem Gruber Jr., MD   [facility] EMERGENCY DEPARTMENT  11/30/2023     Nadeem Gruber MD  11/30/23 2009

## 2023-11-30 NOTE — ED TRIAGE NOTES
Triage Assessment (Adult)       Row Name 11/30/23 1613          Triage Assessment    Airway WDL WDL        Respiratory WDL    Respiratory WDL WDL        Skin Circulation/Temperature WDL    Skin Circulation/Temperature WDL WDL        Cardiac WDL    Cardiac WDL WDL        Cognitive/Neuro/Behavioral WDL    Cognitive/Neuro/Behavioral WDL WDL

## 2023-11-30 NOTE — CONSULTS
[facility]  ORTHOPAEDIC SURGERY CONSULT - HISTORY AND PHYSICAL    DATE OF CONSULT: 11/30/2023 5:51 PM    REQUESTING PROVIDER: Nadeem Gruber MD, MD - [facility] Staff.    CC: right hip fx     DATE OF INJURY: 11/30/23    HISTORY OF PRESENT ILLNESS:   The orthopaedic surgery service was consulted by Nadeem Castillo MD for evaluation and treatment recommendations of a right hip fx.    Vickie Gonzalez is a 89 year old who presents to the ED with right hip pain. She state she slipped on slick floor at her facility around 1530 today and fell onto her right side. She says she was unable to ambulate after. Denies any pain elsewhere. Denies numbness or tingling in the RLE. Denies any head trauma or LOC. Denies antecedent lightheadedness, chest pain or diaphoresis.     Denies any previous right hip pain. Denies any previous right hip injuries or surgeries.     Ambulates without assistive devices.    Lives in independent living in a facility.     States she is not on any blood thinners.   Denies history of adverse reactions to anesthesia.       PAST MEDICAL HISTORY:   Past Medical History:   Diagnosis Date    Breast cancer (H) right     Chronic kidney disease     Diabetes (H)     Goiter, nontoxic, multinodular     HTN (hypertension)     Hx antineoplastic chemotherapy     breast cancer     Hypertriglyceridemia without hypercholesterolemia     Osteopenia      [Patient denies any personal history of bleeding disorders, clotting disorders, or adverse reactions to anesthesia].    PAST SURGICAL HISTORY:    Past Surgical History:   Procedure Laterality Date    BIOPSY BREAST Right     MASTECTOMY Right        MEDICATIONS:   Prior to Admission medications    Medication Sig Last Dose Taking? Auth Provider Long Term End Date   calcium carbonate-vitamin D3 (CALCIUM 600 + D,3,) 600 mg(1,500mg) -200 unit per tablet [CALCIUM CARBONATE-VITAMIN D3 (CALCIUM 600 + D,3,) 600 MG(1,500MG) -200 UNIT PER TABLET] Take 1 tablet by mouth  daily. As directed.   Jj Boswell MD     cyanocobalamin (VITAMIN B-12) 2000 MCG tablet [CYANOCOBALAMIN (VITAMIN B-12) 2000 MCG TABLET] Take 2,000 mcg by mouth daily.   Jj Boswell MD     lisinopril (ZESTRIL) 20 MG tablet Take 1 tablet (20 mg) by mouth daily   Hermelinda Jacob APRN CNP Yes    MEDICATION CANNOT BE REORDERED - PLEASE MANUALLY REORDER AND DISCONTINUE THE OLD ORDER [MULTIVITAMIN-IRON, HEMATINIC, TAB] Take 1 tablet by mouth once daily.   Jj Boswell MD     propranolol (INDERAL) 40 MG tablet Take 1 tablet (40 mg) by mouth 2 times daily   Hermelinda Jacob APRN CNP Yes        ALLERGIES:   Tamoxifen analogues [tamoxifen]    SOCIAL HISTORY:   Social History     Socioeconomic History    Marital status:      Spouse name: Not on file    Number of children: Not on file    Years of education: Not on file    Highest education level: Not on file   Occupational History    Not on file   Tobacco Use    Smoking status: Never    Smokeless tobacco: Never   Substance and Sexual Activity    Alcohol use: Yes    Drug use: No    Sexual activity: Not on file   Other Topics Concern    Not on file   Social History Narrative    Not on file     Social Determinants of Health     Financial Resource Strain: Not on file   Food Insecurity: Not on file   Transportation Needs: Not on file   Physical Activity: Not on file   Stress: Not on file   Social Connections: Not on file   Interpersonal Safety: Not on file   Housing Stability: Not on file     Tobacco: denies   Alcohol: occasional     FAMILY HISTORY:  Family History   Problem Relation Age of Onset    Pacemaker Sister     CABG Brother 67.00    Breast Cancer No family hx of        Patient denies known family history of bleeding, clotting, or anesthesia related complications.     REVIEW OF SYSTEMS:   Per HPI     PHYSICAL EXAM:   Vitals:    11/30/23 1622   BP: (!) 190/80   Pulse: 60   Temp: 98.7  F (37.1  C)   TempSrc: Oral   SpO2: 94%     General: Awake, alert,  "appropriate, following commands, NAD.  Neuro: CN II-XII grossly intact.   Skin: No rashes,  skin color normal.  HEENT: Normal.   Lungs: Breathing comfortably and nonlabored, no wheezes or stridor noted.  Heart/Cardiovascular:  no peripheral cyanosis.  Right Lower Extremity: shortened and externally rotated. skin intact. No significant tenderness to palpation over thigh, knee, leg, ankle/foot. TTP over lateral hip. Motor intact distally TA/GSC/EHL/FHL with 5/5 strength. SILT sp/dp/tibial/saph/sural nerves. DP/PT pulses palpable, 2+, toes warm and well perfused.   Left Lower Extremity: No deformity, skin intact. No significant tenderness to palpation over thigh, knee, leg, ankle/foot. No pain with ROM hip/knee/ankle. Motor intact distally TA/GSC/EHL/FHL with 5/5 strength. SILT sp/dp/tibial/saph/sural nerves. DP/PT pulses palpable, 2+, toes warm and well perfused.     LABS:  Hemoglobin   Date Value Ref Range Status   11/28/2022 13.1 11.7 - 15.7 g/dL Final   05/04/2021 13.2 12.0 - 16.0 g/dL Final     WBC Count   Date Value Ref Range Status   11/28/2022 5.8 4.0 - 11.0 10e3/uL Final     Platelet Count   Date Value Ref Range Status   11/28/2022 176 150 - 450 10e3/uL Final     No results found for: \"INR\"  Creatinine   Date Value Ref Range Status   05/26/2023 0.79 0.51 - 0.95 mg/dL Final     Glucose   Date Value Ref Range Status   05/26/2023 89 70 - 99 mg/dL Final   05/16/2022 101 70 - 125 mg/dL Final       IMAGING:  Pelvic and right  femur films demonstrate a comminuted displaced right IT femur fx.     IMPRESSION:   Vickie Gonzalez is a 89 year old with a right IT femur fx sustained in a mechanical GLF on 11/30.     Imaging was reviewed with the patient and her niece over the phone. We discussed the diagnosis in detail. Discussed our recommendation for surgical management with an IMN.  Risks, benefits and alternative discussed. Benefits include pain control, fracture healing, mobilization and decrease risks of complications " associated with bed rest such as pneumonia, UTI and bed sores. Risks include those with anesthesia, heart attack, stroke, and even death. Other risks include bleeding, infection, malunion, non union, need for additional surgeries, damage to surrounding structures. Through shared decision making, the patient and her niece elected to proceed. The patient makes her own medical decisions and signed informed consent.     RECOMMENDATIONS:   - Admit to medicine, transfer to [facility] for OR tomorrow AM 0730 case.  - Plan for OR: IMN right femur    -Consent: obtained    -Pre-op labs: complete    -Medicine clearance: complete   - Anticoagulation/DVT Prophylaxis: hold for OR   - Antibiotics/Tetanus: pre op ordered   - X-rays/Imaging: complete   - Activity: bedrest for now   - Weight bearing: NWB RLE   - Pain control: per primary, anesthesia for block   - Diet: NPO at MN   - Follow-up: TBD   - Disposition: TBD    Assessment and Plan discussed with Dr. Addison, Orthopaedic Surgery staff.     Olamide Waller MD   Orthopedic Surgery, PGY-4  P: [phone]     For questions about this patient during weekday business hours, please attempt to contact me at my pager prior to contacting the Orthopaedic Surgery resident on call. After weekday business hours, or on the weekends and overnight, please page the Orthopaedic Surgery resident on call. Thank you!

## 2023-11-30 NOTE — ED NOTES
[address]  INFECTIOUS DISEASE - DAILY PROGRESS NOTE    Patient: Petr Burton Date: 2019   : 1985 Attending: Kiara Mittal DO   29year old female      Admission Diagnoses:  Generalized abdominal pain [R10.84]  Atypical pneumonia [J18.9]  Diarrhea, unspecified type [R19.7]     Subjective: abd pain and nausea. Asking for pain medications. Wants PICC line placed. Had bronch yesterday. Still coughing and SOB. On NC this AM      Assessment/Plan  34yoF with hypoxia    Acute Respiratory Failure with Hypoxia, Atypical PNA, Viral Infection  - Resp panel with H1N1, rhinovirus   - Concern for PJP given hx, on steroids.  - Bronch with BAL , Clx Pending   - Cefepime, Vanco, Azithromycin, Prednisone taper, PO Fluconazole   - Pending: PCP sputum, Strep pneumo, UA, Blood clx, resp clx from BAL    + MRSA  - Nasal Bactroban  Â   G Tube Drainage  - Clx pending. CT reviewed   - Requesting tube removal, would wait until her PO intake improves more     Diarrhea  - Cdiff negative. Enteric pathogens pending   Â   HIV/AIDS  - Genvoya, Atovaquone   - Last CD3 Count 2018: 3  Â   Chronic Abd Pain  - Pain Mgmt consulted    Hx Lupus        Antibiotic Therapy Plan  - Cefepime, Vanco, Azithromycin, Prednisone taper, PO Fluconazole    Case Discussed With: RNDr Martha NP  Infectious Disease  [phone]    ------------------------------------------------------------------------------------------------------------  Physical Exam:   General:Â up at bedside, anxious  HEENT: Normocephalic, intact pink MMM, hearing intact  Cardiac: tachy   Respiratory NC, coarse, SOB/YU with conversation   Gastrointestinal: Abdomen soft, TTP t/o. PEG left with brown drainage   Skin:Â  Warm and dry without rash  Peripheral: Warm without cyanosis, no lower extremity edema    Blood Cultures: pending     Laboratory Results: Last 24 hour labs were reviewed in detail.   No results found for: PAB   Albumin (g/dL)   Date Bed: [facility]-  Expected date:   Expected time:   Means of arrival:   Comments:  SP1 89f  Fall hip pain hit head   Value   02/12/2019 1.8 (L)   02/04/2019 1.6 (L)   02/03/2019 1.7 (L)      No results available in last 24 hours   Lab Results   Component Value Date    WBC 12.8 (H) 02/13/2019    GLUCOSE 77 02/13/2019    CRP 4.5 (H) 02/03/2019    RESR 88 (H) 02/03/2019    CREATININE 0.52 02/13/2019    GFRA >90 02/13/2019    GFRNA >90 02/13/2019        Recent Labs   Lab 02/13/19  0430 02/12/19  1705   WBC 12.8* 9.1   HCT 25.4* 28.5*   * 125*   AST  --  269*   GPT  --  133*    Recent Labs   Lab 02/13/19  0430 02/12/19  1705   CREATININE 0.52 0.59   PCT  --  1.19*        Urinalysis:  No results found     Culture results:  Specimen Description (no units)   Date Value   02/13/2019 BRONCHOALVEOLAR LAVAGE  LUNG MARY ELLEN BAL   02/13/2019 SWAB DRAINAGE ABDOMEN G TUBE SITE   02/13/2019 SPUTUM, INDUCED   02/12/2019 BLOOD, PERIPHERAL HAND, RIGHT      CULTURE (no units)   Date Value   02/13/2019 PENDING   02/13/2019 PENDING   02/12/2019 NO GROWTH 1 DAY. 02/12/2019 NO GROWTH 1 DAY. Microbiology:      Specimen Description (no units)   Date Value   02/13/2019 BRONCHOALVEOLAR LAVAGE  LUNG MARY ELLEN BAL   02/13/2019 SWAB DRAINAGE ABDOMEN G TUBE SITE   02/13/2019 SPUTUM, INDUCED   02/12/2019 BLOOD, PERIPHERAL HAND, RIGHT       CULTURE (no units)   Date Value   02/13/2019 PENDING   02/13/2019 PENDING   02/12/2019 NO GROWTH 1 DAY. 02/12/2019 NO GROWTH 1 DAY.         Last Stool Occurrence: 1 (02/14/19 0600)    Medications/Infusions: Reviewed    Imaging: Reviewed    Review of Systems:   No fever or chills   No cough or shortness of breath  No chest pain or palpitations  No diarrhea, nausea or vomiting   No dysuria or frequency  No rash    VITAL SIGNS:  Vital Last Value 24 Hour Range   Temperature (Patient refused) (02/14/19 0700) Temp  Min: 97.1 Â°F (36.2 Â°C)  Max: 97.4 Â°F (36.3 Â°C)   Pulse 83 (02/14/19 1000) Pulse  Min: 80  Max: 107   Respiratory (!) 39 (02/14/19 1000) Resp  Min: 30  Max: 75   Non-Invasive  Blood Pressure 91/68 (02/14/19 1000) BP "Min: 81/46  Max: 104/68   Pulse Oximetry (!) 84 %(when talking patient drops to 80's) (02/14/19 1000) SpO2  Min: 79 %  Max: 100 %     Vital Today Admitted   Weight 60 kg (02/14/19 0600) Weight: 64.3 kg (02/12/19 1607)   Height N/A Height: 4' 9"" (144.8 cm) (02/12/19 1607)   BMI N/A BMI (Calculated): 30.66 (02/12/19 1607)       Intake/Output:    Intake/Output Summary (Last 24 hours) at 2/14/2019 1127  Last data filed at 2/14/2019 1000  Gross per 24 hour   Intake 918 ml   Output 600 ml   Net 318 ml       Problem List:   Patient Active Problem List   Diagnosis   â¢ Lupus arthritis (CMS/[facility])   â¢ Elevated serum creatinine   â¢ Pneumococcal pneumonia (CMS/[facility])   â¢ Moraxella catarrhalis pneumonia (CMS/[facility])   â¢ Rhinovirus   â¢ HIV positive (CMS/[facility])   â¢ AIDS (acquired immune deficiency syndrome) (CMS/[facility])   â¢ Noncompliance with medication regimen   â¢ Dysphagia   â¢ Chronic pain   â¢ Cachexia (CMS/[facility])   â¢ Malnutrition (CMS/[facility])   â¢ Thrombocytopenia (CMS/[facility])   â¢ Anemia       Allergies:   ALLERGIES:   Allergen Reactions   â¢ Bactrim RASH   â¢ Bactrim Ds HIVES and VISUAL DISTURBANCE   â¢ Celexa Photosensitivity   â¢ Celexa Other (See Comments)     Pt is itchy all over. â¢ Contrast Media RASH   â¢ Morphine PRURITUS   â¢ Morphine RASH and VISUAL DISTURBANCE   â¢ Sodium Hypochlorite Other (See Comments)   â¢ Sulfa Antibiotics HIVES and RASH   â¢ Tape Gavin Scarlet   (Environmental)] Other (See Comments)                  ID attending    I have personally examined and interviewed this patient. Diagnostic tests were reviewed. Continues to report abdominal pain,has refused blood draw this,wants to get a PICC line placed.     BAL PJP DFA is positive  -start IV clindamycin, po primaquine  -she has sulfa allergy  -IV pentamidine is too toxic    Ct chest pending    BAL with H1N1 start tamilfu    Cont IV vancomycin, cefepime for now  -wean soon    D/W Patrice Santana, [address] [facility], [place]  [phone]            "

## 2023-12-01 PROBLEM — S72.141A CLOSED DISPLACED INTERTROCHANTERIC FRACTURE OF RIGHT FEMUR, INITIAL ENCOUNTER (H): Status: ACTIVE | Noted: 2023-01-01

## 2023-12-01 NOTE — PLAN OF CARE
Pt arrived to the unit from [place] @ 0145 via stretcher accompanied with 2 EMS. Pt transferred to bed with assist of 3 staff. Pt oriented to the room and the call light system. Vss stable up on arrival, BP  slightly up.   Home medication: pt denied bringing any home medications  Belongings: wrist watch, ring, keys, bra. No skin issue noted, but unable to assess back side due to pt not able to move.         Pt A&O x's 4. VSS. Afebrile. 02 sats in the Lungs clear. Denies SOB, CP and nausea. NPO.  Bowel sound active in all quadrants. No BM but passing gas. Voiding: incontinent  of bladder. Pain well managed. Pt reported no pain with no movement. CMS intact, denies N/T. PIV patent and SL. Pt slept between care and is able to make needs known, call light with in reach. Will continue to monitor.

## 2023-12-01 NOTE — BRIEF OP NOTE
[facility]    Brief Operative Note    Pre-operative diagnosis: Femur fracture (H) [S72.90XA]  Post-operative diagnosis Same as pre-operative diagnosis    Procedure: Insertion intramedullary nail Right femur using the fracture bed, Right - Leg    Surgeon: Surgeon(s) and Role:     * Sridhar Tsai MD - Primary     * Dyllan Cid MD - Assisting     * Kush Morales MD - Assisting  Anesthesia: General   Estimated Blood Loss: 150 ccs    Drains: None  Specimens: * No specimens in log *  Findings:  See full operative note.   Complications: None.  Implants:   Implant Name Type Inv. Item Serial No.  Lot No. LRB No. Used Action   WIRE FX 3MM 285MM KRSH STRL LF - [id number]  WIRE FX 3MM 285MM KRSH STRL LF  UNI5 [id number] Right 1 Used as a Supply   WIRE FX 3MM 285MM KRSH STRL LF - [id number]  WIRE FX 3MM 285MM KRSH STRL LF  UNI5 [id number] Right 1 Used as a Supply   NAIL GAMMA LONG RIGHT [id number] X 130DEG 8430-0360S - [id number] Metallic Hardware/[employer] NAIL GAMMA LONG RIGHT [id number] X 130DEG 8430-0360S  [employer] [id number] Right 1 Implanted   SCREW BONE LAG GAMMA D10.5X90MM 8160-0090S - [id number] Metallic Hardware/[employer] SCREW BONE LAG GAMMA D10.5X90MM 8160-0090S  [employer] [id number] Right 1 Implanted   SCREW BN 37.5MM 5MM LCK STRL T2 ALPHA - [id number] Metallic Hardware/[employer] SCREW BN 37.5MM 5MM LCK STRL T2 ALPHA  [employer] [id number] Right 1 Implanted     Orthopedic plan  Medicine primary  Activity: Up with assist until independent.   Weight bearing status: WBAT RLE  Pain management: Oral pain medications with IV for breakthrough. Wean IV as able.    Antibiotics: Ancef x 24 hours  Diet: Begin with clear fluids and progress diet as tolerated.   DVT prophylaxis: Mechanical prophylaxis with SCD.  Recommend Lovenox starting postop day 1.  Imaging: PACU x-rays of the right hip  Labs: Monitor Hgb    Bracing/Splinting: None.   Dressings: Keep clean, dry and intact x5 days.  Reinforce as needed.  Okay to take down after 5-day javi.  Drains: None  Physical Therapy/Occupational Therapy: Eval and treat.  Posterior precautions  Consults: PT/OT, SW assistance for disposition planning.  Follow-up: Clinic in 2 weeks for wound check, 6 weeks with Dr. Tsai with new x-rays of the right hip and AP pelvis.  Disposition: Admitted for physical therapy and pain control.    Kush Morales MD  Orthopedic surgery resident

## 2023-12-01 NOTE — PHARMACY-ADMISSION MEDICATION HISTORY
Pharmacist Admission Medication History    Admission medication history is complete. The information provided in this note is only as accurate as the sources available at the time of the update.    Information Source(s): Patient and CareEverywhere/SureScripts via in-person    Pertinent Information:   Patient reports taking 5 medications in the morning with breakfast and one in the evening.     Changes made to PTA medication list:  Added: None  Deleted: None  Changed: medication cannot be reordered [multivitamin-iron, hematinic tab] 1 tablet daily --> multivitamin 1 tablet daily (per patient, takes general multivitamin)    Medication Affordability:  Not including over the counter (OTC) medications, was there a time in the past 3 months when you did not take your medications as prescribed because of cost?: No      Allergies reviewed with patient and updates made in EHR: yes    Medication History Completed By: Gabrielle Matthews [facility] 12/1/2023 2:39 PM    PTA Med List   Medication Sig Last Dose    calcium carbonate-vitamin D3 (CALCIUM 600 + D,3,) 600 mg(1,500mg) -200 unit per tablet [CALCIUM CARBONATE-VITAMIN D3 (CALCIUM 600 + D,3,) 600 MG(1,500MG) -200 UNIT PER TABLET] Take 1 tablet by mouth daily. As directed. 11/30/2023 at am    cyanocobalamin (VITAMIN B-12) 2000 MCG tablet [CYANOCOBALAMIN (VITAMIN B-12) 2000 MCG TABLET] Take 2,000 mcg by mouth daily. 11/30/2023 at am    lisinopril (ZESTRIL) 20 MG tablet Take 1 tablet (20 mg) by mouth daily 11/30/2023 at am    multivitamin, therapeutic (THERA-VIT) TABS tablet Take 1 tablet by mouth daily 11/30/2023 at am    propranolol (INDERAL) 40 MG tablet Take 1 tablet (40 mg) by mouth 2 times daily 11/30/2023 at am

## 2023-12-01 NOTE — ANESTHESIA PROCEDURE NOTES
Fascia iliaca Procedure Note    Pre-Procedure   Staff -        Anesthesiologist:  Burak Rodriguez MD       Resident/Fellow: Goldberg, Leslie, MD       Performed By: resident       Location: OR       Procedure Start/Stop Times: 12/1/2023 8:00 AM and 12/1/2023 8:10 AM       Pre-Anesthestic Checklist: patient identified, IV checked, site marked, risks and benefits discussed, informed consent, monitors and equipment checked, pre-op evaluation, at physician/surgeon's request and post-op pain management  Timeout:       Correct Patient: Yes        Correct Procedure: Yes        Correct Site: Yes        Correct Position: Yes        Correct Laterality: Yes        Site Marked: Yes  Procedure Documentation  Procedure: Fascia iliaca       Diagnosis: POST-OPERATIVE PAIN CONTROL       Laterality: right       Patient Position: supine       Patient Prep/Sterile Barriers: sterile gloves, mask       Skin prep: Chloraprep       Needle Type: short bevel       Needle Gauge: 21.        Needle Length (millimeters): 110        Ultrasound guided       1. Ultrasound was used to identify targeted nerve, plexus, vascular marker, or fascial plane and place a needle adjacent to it in real-time.       2. Ultrasound was used to visualize the spread of anesthetic in close proximity to the above referenced structure.       3. A permanent image is entered into the patient's record.    Assessment/Narrative         The placement was negative for: blood aspirated, painful injection and site bleeding       Paresthesias: No.       Bolus given via needle..        Secured via.        Insertion/Infusion Method: Single Shot       Complications: none       Injection made incrementally with aspirations every 5 mL.    Medication(s) Administered   Bupivacaine 0.25% w/ 1:200K Epi (Injection) - Injection   30 mL - 12/1/2023 8:00:00 AM  Dexmedetomidine 4 mcg/mL (Perineural) - Perineural   20 mcg - 12/1/2023 8:00:00 AM  Dexamethasone 10 mg/mL PF (Perineural) -  "Perineural   2 mg - 12/1/2023 8:00:00 AM  Medication Administration Time: 12/1/2023 8:00 AM      FOR [facility] ONLY:   Pain Team Contact information: please page the Pain Team Via [employer]. Search \"Pain\". During daytime hours, please page the attending first. At night please page the resident first.      "

## 2023-12-01 NOTE — PROGRESS NOTES
Orthopaedic Surgery Progress Note   12/01/2023    Subjective: No acute events overnight. Pain well controlled. NPO for OR. No new numbness or tingling.     Objective: BP (!) 155/44   Pulse 61   Temp 98.4  F (36.9  C) (Oral)   Resp 16   LMP  (LMP Unknown)   SpO2 93%     General: NAD, alert and oriented, cooperative with exam.   Cardio: RRR, extremities wwp.   Respiratory: Non-labored breathing.  MSK: Focused examination of     RLE: Toes wwp, DP 2+, bcr in all toes. +EHL/FHL/GSC/TA with 5/5 strength. SILT SP/DP/Sa/Floyd/T.    Assessment and Plan: Vickie Gonzalez is a 89 year old with a right IT femur fx sustained in a mechanical GLF on 11/30.     Doing well overnight. OR this morning.      RECOMMENDATIONS:   - Admit to medicine  - Plan for OR: IMN right femur this morning at 0730                  -Consent: obtained                   -Pre-op labs: complete                   -Medicine clearance: complete/optimized.   - Anticoagulation/DVT Prophylaxis: hold for OR   - Antibiotics/Tetanus: pre op ordered   - X-rays/Imaging: complete   - Activity: bedrest for now   - Weight bearing: NWB RLE   - Pain control: per primary  - Diet: NPO   - Follow-up: TBD   - Disposition: TBD    Olamide Waller MD   Orthopedic Surgery, PGY-4  P: [phone]     For questions about this patient during weekday business hours, please attempt to contact me at my pager prior to contacting the Orthopaedic Surgery resident on call. After weekday business hours, or on the weekends and overnight, please page the Orthopaedic Surgery resident on call. Thank you!

## 2023-12-01 NOTE — PROGRESS NOTES
MEDICINE CROSS COVER:  Patient arrived from [place] to [facility]. H&P reviewed.     Went to bedside to evaluate patient. Patient reports she feels OK as long as she doesn't move - then she has pain at RLE. She denies any current chest pain, shortness of breath.     Noted that troponins have been mildly elevated, with slight uptrend on last check (24-->43-->51). See H&P for discussion about pre-operative cardiac and pulmonary risk stratification. Will monitor troponin trend and follow up limited echo already ordered per H&P.     Will otherwise continue with plan of care as outlined in H&P.     Discussed with bedside RN, she will reach out with any questions or concerns on plan of care.     UPDATE AT 0541: Follow up troponin was 30 (down from 51). Will defer any further troponin checks unless there is a clinical change. See H&P for further discussion of pre-operative cardiac and pulmonary risk stratification.    Kendy Becerra MD  Internal Medicine/Pediatrics Hospitalist   of Internal Medicine and Pediatrics  [facility]  Pager: [phone]

## 2023-12-01 NOTE — PROVIDER NOTIFICATION
Time of notification: 10:57 PM  Provider notified: Dr. Caden Ochoa  Patient status: ED 9 DL: Pt going to [facility]. Can I get pain meds for transfer from bed to bed?  Temp:  [98.7  F (37.1  C)] 98.7  F (37.1  C)  Pulse:  [60-69] 60  BP: (121-190)/(60-80) 121/65  SpO2:  [90 %-94 %] 90 %  Orders received:  Orders for meds placed

## 2023-12-01 NOTE — ANESTHESIA POSTPROCEDURE EVALUATION
Patient: Vickie Gonzalez    Procedure: Procedure(s):  Insertion intramedullary nail Right femur using the fracture bed       Anesthesia Type:  General    Note:  Disposition: Inpatient   Postop Pain Control: Uneventful            Sign Out: Well controlled pain   PONV: No   Neuro/Psych: Uneventful            Sign Out: Acceptable/Baseline neuro status   Airway/Respiratory: Uneventful            Sign Out: Acceptable/Baseline resp. status   CV/Hemodynamics: Uneventful            Sign Out: Acceptable CV status; No obvious hypovolemia; No obvious fluid overload   Other NRE: NONE   DID A NON-ROUTINE EVENT OCCUR?            Last vitals:  Vitals Value Taken Time   /60 12/01/23 1245   Temp 35.9  C (96.6  F) 12/01/23 1145   Pulse 60 12/01/23 1258   Resp 14 12/01/23 1258   SpO2 99 % 12/01/23 1258   Vitals shown include unfiled device data.    Electronically Signed By: Keaton Herrera MD  December 1, 2023  12:59 PM

## 2023-12-01 NOTE — PROGRESS NOTES
[facility]    Medicine Progress Note - Hospitalist Service, GOLD TEAM 18    Date of Admission:  11/30/2023    Assessment & Plan      Vickie Gonzalez is a 89 year old female admitted on 11/30/2023. 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 She presented to the ER with right hip pain after slipping and falling. She was unable to ambulate after fall. Denies numbness or tingling of RLE, denies pain outside of right hip, denies head trauma or LOC, denies lightheadedness prior to fall.     #Fracture of the right proximal femur s/p fall   #Osteopenia   She presented to the ER with right hip pain after slipping and falling on her right hip at home. She lives in independent living in a facility. Does not use assistive devices to ambulate. She was unable to ambulate after the fall. Denies numbness or tingling of RLE, denies pain outside of right hip, denies head trauma or LOC, denies lightheadedness prior to fall. Is not on blood thinners. XR femur right and pelvis show comminuted displaced fracture of the proximal right femur involving intertrochanteric and subtrochanteric regions. CT head showing no acute intracranial abnormality. Hgb 11.6.   - Orthopaedic surgery consulted, appreciate assistance   - Continue PTA calcium-vitamin D 600 mg daily   - PT/OT consult   - Denies current pain; will order PRN tylenol, heat, and/or ice for now and can adjust pending course   - NPO at midnight for potential surgery tomorrow     #Pre-op Assessment   RCRI-Very low risk: Class 1 0.4% complication rate            Total Score: -        Criteria with incomplete data:    RCRI: High Risk Surgery    RCRI: CAD    RCRI: CHF    RCRI: Cerebrovascular Disease     RCRI: Diabetes    RCRI: Elevated Creatinine      - This score is  not calculated because of inadequate data       #Cardiovascular Risk. Surgery is urgent and is moderate risk. Patient does not have any active cardiac conditions but does have a pacemaker that was placed in 2015 for sick sinus syndrome. Elevated troponin 24 -> 43 in the ER but continues to deny active cardiac disease symptoms such as chest pain or palpitations. Functional capacity is 4-6 METs without symptoms, able to go up stairs, clean house independently. Clinical risk factors include none of the following - high risk surgery - vascular or open intraperitoneal/intrathoracic, ischemic heart disease, HF, cerebrovascular disease, IDDM, Cr > 2 - reflecting a 3.9% risk of cardiac death, nonfatal MI, or nonfatal cardiac arrest based on RCRI (Revised Wilhelm Cardiac Risk Index). Patient may proceed with surgery without further testing.     #Pulmonary Risk. does not have known underlying pulmonary disease. Risk for any post op pulmonary complications is intermediate at 13.3% based on Canet Risk Index (age, pre op O2 sat, resp inf past month, pre op hgb < 10, surgical incision site, duration of surgery, emergency surgery). Patient may proceed with surgery without further testing.     Management of Medications & Comorbidities.     #Chronic renal impairment, stage 3a  Creatinine upon admission 0.78, at baseline.   - Repeat labs in the morning and after surgery   - Avoid nephrotoxic medications when able     #Sick sinus syndrome s/p pacemaker insertion (2015)  #Right bundle branch block   #Hypertension   #Elevated troponin   Troponin 24 -> 43 in the ER. EKG in the ER showed an atrial paced rhythm, left BBB, non-specific ST-T wave changes; largely unchanged from prior. Denies chest pain and palpitations. Troponin slightly elevated, could be elevated in the setting of CKD stage 3a.   - Due to elevation in troponin, will obtained limited echo   - Obtain third troponin   - Hold PTA lisinopril   - Continue PTA propranolol 40 mg  BID   - Pacemaker device check     #T2DM  HgbA1c 5.9% on 5/26/2023.   - Managed with diet and lifestyle   - Repeat A1c   - POCT glucose checks BID     #Distant hx of breast cancer s/p right mastectomy           Diet: Advance Diet as Tolerated: Regular Diet Adult    DVT Prophylaxis: Enoxaparin (Lovenox) SQ  Rothman Catheter: Not present  Lines: None     Cardiac Monitoring: None  Code Status: Full Code      Clinically Significant Risk Factors Present on Admission                  # Hypertension: Noted on problem list           # Pacemaker present       Disposition Plan      Expected Discharge Date: 12/03/2023      Destination: home              Foster Gaviria DO, MHS  Hospitalist Service, GOLD TEAM 18  [facility]  Securely message with PEER (more info)  Text page via [facility] Paging/Directory   See signed in provider for up to date coverage information  ______________________________________________________________________    Interval History   Patient is in excellent spirits today.  Patient reports right hip discomfort is tolerable.  Patient denies chest pain, shortness of breath.  Patient's family members are present at bedside.    Physical Exam   Vital Signs: Temp: 97.4  F (36.3  C) Temp src: Oral BP: 133/59 Pulse: 59   Resp: 16 SpO2: 94 % O2 Device: Nasal cannula Oxygen Delivery: 2 LPM  Weight: 0 lbs 0 oz    GENERAL: Alert and oriented x 3; no acute distress; well-nourished.  HEENT: Normocephalic; atraumatic; PERRLA; MMM.  CV: RRR; normal S1, S2; no rubs, murmurs, or gallops.  RESP: Lung fields clear to aucultation B/L; no wheezing or crepitations.  GI: Abdomen is soft, nontender, nondistended; no organomegaly; normal bowel sounds.  : Deferred genital examination.   MSK: No clubbing, cyanosis, or edema.  DERM: Skin is intact; no rash, lesions, or skin breakdown.  NEURO: No focal deficits appreciated; strength & sensorium are grossly intact.  PSYCH: No active  hallucinations; affect, insight appear within normal limits.    Medical Decision Making       45 MINUTES SPENT BY ME on the date of service doing chart review, history, exam, documentation & further activities per the note.      Data     I have personally reviewed the following data over the past 24 hrs:    9.5  \   11.8   / 173     135 100 25.4 (H) /  148 (H)   4.1 26 0.87 \     Trop: 30 (H) BNP: N/A     TSH: N/A T4: N/A A1C: 5.8 (H)     INR:  N/A PTT:  N/A   D-dimer:  N/A Fibrinogen:  N/A       Imaging results reviewed over the past 24 hrs:   Recent Results (from the past 24 hour(s))   POC US Guidance Needle Placement    Narrative    Right fascia iliaca block    Impression    Right fascia iliaca block   XR Surgery SIOMARA L/T 5 Min Fluoro    Narrative    This exam was marked as non-reportable because it will not be read by a   radiologist or a [place] non-radiologist provider.         XR Femur Port Right 2 Views    Narrative    EXAM: XR FEMUR PORT RIGHT 2 VIEWS  LOCATION: [facility]  DATE: 12/1/2023    INDICATION: Status post Hip surgery  COMPARISON: 11/30/2023      Impression    IMPRESSION: Long intramedullary nail and screw fixation placed across the intertrochanteric and subtrochanteric fracture. Postoperative air is present. The hardware is intact.   XR Pelvis Port 1/2 Views    Narrative    EXAM: XR PELVIS PORT 1/2 VIEWS  LOCATION: [facility]  DATE: 12/1/2023    INDICATION: Status post Hip surgery  COMPARISON: 11/30/2023      Impression    IMPRESSION: Intramedullary nail and screw fixation has been placed across the right intertrochanteric fracture.

## 2023-12-01 NOTE — PLAN OF CARE
Goal Outcome Evaluation:      Plan of Care Reviewed With: patient    Overall Patient Progress: improving    Outcome Evaluation: Pt returned to unit at 1330. Pain controlled on PO medications. Resting between cares.            VS:     Pt A/O X 4. Afebrile. VSS on 2 LPM NC. Lungs- clear bilaterally with both anterior and lateral. IS encouraged. Denies nausea, shortness of breath, and chest pain.     Output:     Bowels- active in all four quadrants. Unable to recall last BM, prior to surgery. Voids spontaneously without difficulty in the BSC.      Activity:     Pt up with 2x assist with walker and GB.     Skin: Surgical incisions x3 to R hip, scattered bruises.     Pain:     Pt denied pain and given scheduled tylenol, ICE applied, and is tolerating well.      CMS:     CMS and Neuro's are intact. Denies numbness and tingling in all extremities.      Dressing:     Surgical incisional dressing to greater trochanter has scant dried drainage. Distal dressing x2 are CDI.     Diet:     Pt is on a regular diet and appetite was good this shift.       LDA:     PIV is patent in the L wrist and is infusing LR at 50 mL/hr.   PIV to L forearm is patent and SL.      Equipment:     IV pole, PCD's, walker, and gait belt.     Plan:     Ortho and IM following. Pt is able to make needs known and the call light is within the pt's reach. Continue to monitor.       Additional Info:

## 2023-12-01 NOTE — ANESTHESIA CARE TRANSFER NOTE
Patient: Vickie Gonzalez    Procedure: Procedure(s):  Insertion intramedullary nail Right femur using the fracture bed       Diagnosis: Femur fracture (H) [S72.90XA]  Diagnosis Additional Information: No value filed.    Anesthesia Type:   General     Note:    Oropharynx: oropharynx clear of all foreign objects and spontaneously breathing  Level of Consciousness: drowsy  Oxygen Supplementation: blow-by O2  Level of Supplemental Oxygen (L/min / FiO2): 6  Independent Airway: airway patency satisfactory and stable  Dentition: dentition unchanged  Vital Signs Stable: post-procedure vital signs reviewed and stable  Report to RN Given: handoff report given  Patient transferred to: PACU    Handoff Report: Identifed the Patient, Identified the Reponsible Provider, Reviewed the pertinent medical history, Discussed the surgical course, Reviewed Intra-OP anesthesia mangement and issues during anesthesia, Set expectations for post-procedure period and Allowed opportunity for questions and acknowledgement of understanding      Vitals:  Vitals Value Taken Time   /64 12/01/23 1029   Temp 36    Pulse 60 12/01/23 1035   Resp 18 12/01/23 1035   SpO2 93 % 12/01/23 1035   Vitals shown include unfiled device data.    Electronically Signed By: SANDIP Mustafa CRNA  December 1, 2023  10:36 AM

## 2023-12-01 NOTE — OR NURSING
PACU to Inpatient Nursing Handoff    Patient Vickie Gonzalez is a 89 year old female who speaks English.   Procedure Procedure(s):  Insertion intramedullary nail Right femur using the fracture bed   Surgeon(s) Primary: Sridhar Tsai MD  Assisting: Dyllan Cid MD; Kush Morales MD     Allergies   Allergen Reactions    Tamoxifen Analogues [Tamoxifen] Unknown     Annotation: hepatitis         Isolation  [unfilled]     Past Medical History   has a past medical history of Breast cancer (H) (right ), Chronic kidney disease, Diabetes (H), Goiter, nontoxic, multinodular, HTN (hypertension), antineoplastic chemotherapy, Hypertriglyceridemia without hypercholesterolemia, and Osteopenia.    Anesthesia General   Dermatome Level     Preop Meds Not applicable   Nerve block Fascia illiaca .  Location:right. Med:bupivacaine, epinephrine, and precedex and decadron. Time given: 0800   Intraop Meds dexamethasone (Decadron)  fentanyl (Sublimaze): 200 mcg total  ondansetron (Zofran): last given at 0943   Local Meds No   Antibiotics cefazolin (Ancef) - last given at 0819     Pain Patient Currently in Pain: denies (as long as not moving)   PACU meds  acetaminophen (Tylenol): 975 mg (total dose) last given at 1206   hydromorphone (Dilaudid): 0.4 mg (total dose) last given at 1105    PCA / epidural No   Capnography     Telemetry     Inpatient Telemetry Monitor Ordered? No        Labs Glucose Lab Results   Component Value Date     12/01/2023     12/01/2023     05/16/2022       Hgb Lab Results   Component Value Date    HGB 11.8 12/01/2023       INR Lab Results   Component Value Date    INR 1.05 11/30/2023      PACU Imaging Completed     Wound/Incision Incision/Surgical Site 12/01/23 Anterior;Right Greater Trochanter (Active)   Incision Assessment UTV 12/01/23 1130   Closure ANKUSH 12/01/23 1130   Incision Drainage Amount None 12/01/23 1130   Dressing Intervention Clean, dry, intact 12/01/23 1130   Number of  days: 0       Incision/Surgical Site 12/01/23 Anterior;Right Thigh (Active)   Incision Assessment UTV 12/01/23 1130   Closure ANKUSH 12/01/23 1130   Incision Drainage Amount None 12/01/23 1130   Dressing Intervention Clean, dry, intact 12/01/23 1130   Number of days: 0       Incision/Surgical Site 12/01/23 Anterior;Right Knee (Active)   Incision Assessment UTV 12/01/23 1130   Closure ANKUSH 12/01/23 1130   Incision Drainage Amount None 12/01/23 1130   Dressing Intervention Clean, dry, intact 12/01/23 1130   Number of days: 0      CMS        Equipment ice pack   Other LDA       IV Access Peripheral IV 11/30/23 Anterior;Left Upper forearm (Active)   Site Assessment WDL 12/01/23 1030   Line Status Saline locked 12/01/23 1030   Dressing Transparent 12/01/23 1030   Dressing Status clean;dry;intact 12/01/23 1030   Dressing Intervention New dressing  11/30/23 1752   Line Intervention Lab drawn 11/30/23 1752   Phlebitis Scale 0-->no symptoms 12/01/23 1030   Infiltration? no 11/30/23 2200   Number of days: 1       Peripheral IV 12/01/23 Left;Posterior Wrist (Active)   Site Assessment WDL 12/01/23 1030   Line Status Infusing 12/01/23 1030   Dressing Transparent 12/01/23 1030   Dressing Status clean;dry;intact 12/01/23 1030   Phlebitis Scale 0-->no symptoms 12/01/23 1030   Number of days: 0      Blood Products Not applicable  mL   Intake/Output Date 12/01/23 0700 - 12/02/23 0659   Shift 7767-8407 3184-4764 7794-7788 24 Hour Total   INTAKE   P.O. 20   20   I.V. 2000 2000   Shift Total 2020 2020   OUTPUT   Blood 150   150   Shift Total 150   150   Weight (kg)          Drains / Rothman     Time of void PreOp      PostOp Urine Occurrence: 1 (voided a large amount on the bedsheets.) (12/01/23 1130)    Diapered? Yes   Bladder Scan     PO 20 mL (12/01/23 1130)  tolerating sips     Vitals    B/P: (!) 150/69  T: (!) 96.6  F (35.9  C)    Temp src: Axillary  P:  Pulse: 60 (12/01/23 1145)          R: 14  O2:  SpO2: 95 %    O2 Device:  Simple face mask (12/01/23 1145)    Oxygen Delivery: 7 LPM (12/01/23 1145)         Family/support present niece   Patient belongings     Patient transported on bed   DC meds/scripts (obs/outpt) Not applicable   Inpatient Pain Meds Released? Yes       Special needs/considerations None   Tasks needing completion None       Madhavi Stern, RN  [facility] [id number]

## 2023-12-01 NOTE — H&P
[facility]    History and Physical - Hospitalist Service, GOLD TEAM        Date of Admission:  11/30/2023    Assessment & Plan      Vickie Gonzalez is a 89 year old female admitted on 11/30/2023. 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 She presented to the ER with right hip pain after slipping and falling. She was unable to ambulate after fall. Denies numbness or tingling of RLE, denies pain outside of right hip, denies head trauma or LOC, denies lightheadedness prior to fall.     Fracture of the right proximal femur s/p fall   Osteopenia   She presented to the ER with right hip pain after slipping and falling on her right hip at home. She lives in independent living in a facility. Does not use assistive devices to ambulate. She was unable to ambulate after the fall. Denies numbness or tingling of RLE, denies pain outside of right hip, denies head trauma or LOC, denies lightheadedness prior to fall. Is not on blood thinners. XR femur right and pelvis show comminuted displaced fracture of the proximal right femur involving intertrochanteric and subtrochanteric regions. CT head showing no acute intracranial abnormality. Hgb 11.6.   - orthopaedic surgery consulted, appreciate assistance   - continue PTA calcium-vitamin D 600 mg daily   - PT/OT consult   - denies current pain; will order PRN tylenol, heat, and/or ice for now and can adjust pending course   - NPO at midnight for potential surgery tomorrow     Pre-op Assessment   RCRI-Very low risk: Class 1 0.4% complication rate            Total Score: -        Criteria with incomplete data:    RCRI: High Risk Surgery    RCRI: CAD    RCRI: CHF    RCRI: Cerebrovascular Disease     RCRI: Diabetes    RCRI: Elevated Creatinine      - This score is not  calculated because of inadequate data       Cardiovascular Risk. Surgery is urgent and is moderate risk. Patient does not have any active cardiac conditions but does have a pacemaker that was placed in 2015 for sick sinus syndrome. Elevated troponin 24 -> 43 in the ER but continues to deny active cardiac disease symptoms such as chest pain or palpitations. Functional capacity is 4-6 METs without symptoms, able to go up stairs, clean house independently. Clinical risk factors include none of the following - high risk surgery - vascular or open intraperitoneal/intrathoracic, ischemic heart disease, HF, cerebrovascular disease, IDDM, Cr > 2 - reflecting a 3.9% risk of cardiac death, nonfatal MI, or nonfatal cardiac arrest based on RCRI (Revised Wilhelm Cardiac Risk Index). Patient may proceed with surgery without further testing.     Pulmonary Risk. does not have known underlying pulmonary disease. Risk for any post op pulmonary complications is intermediate at 13.3% based on Canet Risk Index (age, pre op O2 sat, resp inf past month, pre op hgb < 10, surgical incision site, duration of surgery, emergency surgery). Patient may proceed with surgery without further testing.     Management of Medications & Comorbidities.     Chronic renal impairment stage 3a  Creatinine upon admission 0.78, at baseline.   - repeat labs in the morning and after surgery   - avoid nephrotoxic medications when able     Sick sinus syndrome s/p pacemaker insertion (2015)  Right bundle branch block   Hypertension   Elevated troponin   Troponin 24 -> 43 in the ER. EKG in the ER showed an atrial paced rhythm, left BBB, non-specific ST-T wave changes; largely unchanged from prior. Denies chest pain and palpitations. Troponin slightly elevated, could be elevated in the setting of CKD stage 3a.   - due to elevation in troponin, will obtained limited echo   - obtain third troponin   - hold PTA lisinopril   - continue PTA propranolol 40 mg BID   -  "pacemaker device check     Diabetes Mellitus type 2 not on insulin   HgbA1c 5.9% on 5/26/2023.   - managed with diet and lifestyle   - repeat A1c   - POCT glucose checks BID     Distant hx of breast cancer s/p right mastectomy  - noted           Diet: NPO per Anesthesia Guidelines for Procedure/Surgery Except for: Meds  Regular Diet Adult  DVT Prophylaxis: Pneumatic Compression Devices  Rothman Catheter: Not present  Lines: None     Cardiac Monitoring: None  Code Status: Full Code    Clinically Significant Risk Factors Present on Admission                  # Hypertension: Noted on problem list           # Pacemaker present       Disposition Plan      Expected Discharge Date: 12/02/2023                The patient's care was discussed with the Attending Physician, Dr. Ochoa .    Sandy Lawrence NP  Hospitalist Service, [facility]  Securely message with Vocera (more info)  Text page via [employer] Paging/Directory   See signed in provider for up to date coverage information    ______________________________________________________________________    Chief Complaint   Right hip pain    History is obtained from the patient and electronic health record    History of Present Illness   Vickie Gonzalez is a 89 year old female admitted on 11/30/2023. 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 She presented to the ER with right hip pain after slipping and falling. She was unable to ambulate after fall. Denies numbness or tingling of RLE, denies pain outside of right hip, denies head trauma or LOC, denies lightheadedness prior to fall.     Vickie says that she was at home and slipped and fell. She said that she had \"good shoes\" on but says the floor was shiny and may have just been cleaned or waxed " and she ended up slipping. She denies lightheadedness or any precipitating symptoms. After the fall she was unable to walk. Denies numbness or tingling of RLE, denies pain outside of right hip, denies head trauma or LOC. She says that she is totally independent at home, climbs stairs, and does all of her own cleaning. She does yoga twice a week. She says that she doesn't have any pain when she is able to lay still and doesn't try and move. She denies chest pain, headache, dizziness, palpitations, shortness of breath, cough, abdominal pain, nausea/vomiting, changes in bowel/bladder, decreased appetite, numbness/tingling, and peripheral edema.  Vickie says that she was able to talk with the orthopaedic team already and doesn't have any further concerns at this time.       Past Medical History    Past Medical History:   Diagnosis Date    Breast cancer (H) right     Chronic kidney disease     Diabetes (H)     Goiter, nontoxic, multinodular     HTN (hypertension)     Hx antineoplastic chemotherapy     breast cancer     Hypertriglyceridemia without hypercholesterolemia     Osteopenia        Past Surgical History   Past Surgical History:   Procedure Laterality Date    BIOPSY BREAST Right     MASTECTOMY Right        Prior to Admission Medications   Prior to Admission Medications   Prescriptions Last Dose Informant Patient Reported? Taking?   MEDICATION CANNOT BE REORDERED - PLEASE MANUALLY REORDER AND DISCONTINUE THE OLD ORDER   Yes No   Sig: [MULTIVITAMIN-IRON, HEMATINIC, TAB] Take 1 tablet by mouth once daily.   calcium carbonate-vitamin D3 (CALCIUM 600 + D,3,) 600 mg(1,500mg) -200 unit per tablet   Yes No   Sig: [CALCIUM CARBONATE-VITAMIN D3 (CALCIUM 600 + D,3,) 600 MG(1,500MG) -200 UNIT PER TABLET] Take 1 tablet by mouth daily. As directed.   cyanocobalamin (VITAMIN B-12) 2000 MCG tablet   Yes No   Sig: [CYANOCOBALAMIN (VITAMIN B-12) 2000 MCG TABLET] Take 2,000 mcg by mouth daily.   lisinopril (ZESTRIL) 20 MG tablet    No No   Sig: Take 1 tablet (20 mg) by mouth daily   propranolol (INDERAL) 40 MG tablet   No No   Sig: Take 1 tablet (40 mg) by mouth 2 times daily      Facility-Administered Medications: None          Physical Exam   Vital Signs: Temp: 98.7  F (37.1  C) Temp src: Oral BP: (!) 190/80 Pulse: 60     SpO2: 94 % O2 Device: None (Room air)    Weight: 0 lbs 0 oz    GENERAL: Alert and awake. Answering questions appropriately. Oriented x 3. NAD. Pleasant and conversational   HEENT: Anicteric sclera. EOMI. Mucous membranes moist   CARDIOVASCULAR: RRR. S1, S2. No murmurs, rubs, or gallops.   RESPIRATORY: Effort normal on RA. Clear to auscultation bilaterally, no rales, rhonchi or wheezes  GI: Abdomen soft, non-tender abdomen without rebound or guarding, normoactive bowel sounds present  MUSCULOSKELETAL: No joint swelling or tenderness. Pain with attempted movement of right lip.   EXTREMITIES: No peripheral edema. Intact bilateral pedal pulses. No calf asymmetry, erythema, or tenderness.   NEUROLOGICAL: No focal deficits. CN II-XII grossly intact. Moving all extremities symmetrically.   SKIN: Intact. Warm and dry. No jaundice. No rashes on exposed skin of arms and legs    PSYCH: Affect appropriate      Medical Decision Making       65 MINUTES SPENT BY ME on the date of service doing chart review, history, exam, documentation & further activities per the note.      Data   Imaging results reviewed over the past 24 hrs:   Recent Results (from the past 24 hour(s))   Head CT w/o contrast    Narrative    EXAM: CT HEAD W/O CONTRAST  11/30/2023 4:48 PM     HISTORY: Fall       COMPARISON: None    TECHNIQUE: Using multidetector thin collimation helical acquisition  technique, axial, coronal and sagittal CT images from the skull base  to the vertex were obtained without intravenous contrast.   (topogram) image(s) also obtained and reviewed.    FINDINGS:  No acute intracranial hemorrhage, mass effect, or midline shift. No  acute loss  of gray-white matter differentiation. Ventricles are  proportionate to the cerebral sulci. Clear basal cisterns. Patchy  hypoattenuation involving the periventricular white matter,  nonspecific, likely secondary to chronic small vessel ischemic changes  given patient's age. Mild-to-moderate generalized cerebral volume  loss.    Atraumatic calvarium. Clear paranasal sinuses, mastoid air cells.  Nonfocal orbits.       Impression    IMPRESSION:   1. No acute intracranial abnormality.   2. Mild-to-moderate generalized cerebral volume loss and  leukoaraiosis.    I have personally reviewed the examination and initial interpretation  and I agree with the findings.    MARIA C CARPENTER MD         SYSTEM ID:  [id number]   XR Chest 1 View    Narrative    Exam: XR CHEST 1 VIEW, 11/30/2023 5:01 PM    Comparison: 9/3/2015    History: Fall    Findings:  Single AP supine view of the chest. Left chest wall cardiac device  with leads projecting over the right atrium and right ventricle.    Trachea is midline. Mediastinum is within normal limits.  Cardiopulmonary silhouette is within normal limits. Aortic knob  calcifications. Peribronchial cuffing and diffuse interstitial  pulmonary opacities. There is no pneumothorax or pleural effusion. The  upper abdomen is unremarkable.      Impression    Impression: Peribronchial cuffing and diffuse interstitial opacities  likely represent mild pulmonary edema.    I have personally reviewed the examination and initial interpretation  and I agree with the findings.    RICKY GARCIA MD         SYSTEM ID:  [id number]   XR Pelvis 1/2 Views    Narrative    EXAM: XR PELVIS 1/2 VIEWS, XR FEMUR RIGHT 2 VIEWS  LOCATION: [facility]  DATE: 11/30/2023    INDICATION: Fall, unable to stand, R hip pain. Eval pelvic hip fx  COMPARISON: None.      Impression    IMPRESSION: Comminuted displaced fracture of the proximal right femur involving the intertrochanteric and  subtrochanteric regions. There is varus and apex anterior angulation. Diffuse bony demineralization. Degenerative changes lumbar spine, SI joints,   and right knee.   XR Femur Right 2 Views    Narrative    EXAM: XR PELVIS 1/2 VIEWS, XR FEMUR RIGHT 2 VIEWS  LOCATION: [facility]  DATE: 11/30/2023    INDICATION: Fall, unable to stand, R hip pain. Eval pelvic hip fx  COMPARISON: None.      Impression    IMPRESSION: Comminuted displaced fracture of the proximal right femur involving the intertrochanteric and subtrochanteric regions. There is varus and apex anterior angulation. Diffuse bony demineralization. Degenerative changes lumbar spine, SI joints,   and right knee.     Recent Labs   Lab 11/30/23  1750   WBC 10.5   HGB 11.6*   MCV 93      INR 1.05      POTASSIUM 4.3   CHLORIDE 101   CO2 26   BUN 21.6   CR 0.78   ANIONGAP 12   WILFRID 9.7*   *

## 2023-12-01 NOTE — OP NOTE
Orthopedic surgery operative report    Date of Service: 12/1/2023      Surgeon: Dr. Tsai    Assistants:  1.  Gene Cid MD, PGY 5  2.  Kush Morales MD, PGY1      Preoperative Diagnosis: Right unstable intertrochanteric femur fracture      Postoperative Diagnosis: Right unstable intertrochanteric femur fracture      Procedures: Right femur intramedullary nail      Anesthesia:  General endotracheal.     EBL: 150 cc    Complications:  None apparent.     Implants:   - [place] gamma 4 size 10 nail, 130 deg  - 90 mm lag screw  - 37.5 mm interlock screw    Specimens: none    DVT prophylaxis during case: Sequential compressive device on nonoperative extremity    Preoperative antibiotics: Cefazolin 2 g IV within 30 minutes of incision      Indications: The patient is a 89-year-old female who sustained a right intertrochanteric femur fracture on 11/30/2023.  Upon presentation, imaging was obtained and notable for an intertrochanteric femur fracture.  The risks, benefits, and alternatives to surgery were discussed with the patient.  The risks include but are not limited to pain, scar, blood loss, infection, implant failure/loosening, malrotation/malunion, nonunion, symptomatic implant, possible need for future surgery, and medical complications including DVT/PE, cardiovascular issues, stroke, and up to and including death.  After this discussion, the patient elected to proceed with surgery.  Informed consent was signed.      Details:  Properly identified in preop area, site marked, informed consent signed.  Wheeled to OR. Preoperative brief earlier performed.  General anesthesia administered.    Patient was transferred to the fracture table and padded boots were applied.  With all bony prominences well-padded.  Preoperatively, the operative hip fracture was closed reduced with traction and internal rotation, and fluoroscopic images confirmed adequate reduction of the fracture.  The operative extremity was prepped and  "draped in usual sterile fashion.  Surgical timeout performed prior to procedure start confirming the correct patient, procedure to be performed, and laterality.  All members of the surgical team were in agreement.        Planned incisions were marked with an indelible marker.  Skin was incised sharply and hemostasis was obtained.  Blunt dissection was carried down to the tip of the greater trochanter.  A guidewire was introduced into the proximal femur after confirming appropriate placement on orthogonal views.  The soft tissue protector was inserted and the opening reamer was used to gain entry to the intramedullary canal.  A guidewire was inserted into the intramedullary canal and gently impacted into the distal femoral physeal scar.  The femur was sequentially reamed up to 11.5 mm.  The nail was attached to the jig and impacted into the intramedullary canal.  The trocar for the lag screw was attached and a stab incision was made to accommodate the lag screw trocar.  The guidewire was drilled into a centered position on the femoral head and checked on orthogonal views.  The depth was measured, drilled, and a 90 mm lag screw was inserted. The setscrew was inserted, tightened all the way, and locked in place with the locking mechanism.     The distal interlocking holes were visualized radiographically in preparation for placement of interlocking screw.  A \"perfect [place]\" view of the interlocking holes was obtained, an 1cm incision was made in the skin with a 15 blade and a hemostat was used to free soft tissues down to bone.  The tip of the drill bit was placed on the lateral cortex of the femur under fluoroscopic visualization in the planned trajectory through the interlocking hole.  The femur was drilled bicortically through the interlocking hole, depth was measured, and the interlocking screw was placed.     The nail jig was removed and final images were obtained, confirming appropriate implant position and " maintained fracture reduction. The wounds were irrigated with copious sterile saline.  Fascia was repaired with 0 Vicryl in an interrupted fashion.  The dermis was closed with 2-0 Vicryl and the skin was closed with 3-0 Monocryl. Sterile dressings were applied. Anesthesia was reversed. The patient was transferred to a gurney and then to the PACU in stable condition.    At the end of the case, all sponge, needle, and instrument counts were correct.  Dr. Tsai was present for the entirety of the procedure.      Postoperative plan:    Medicine primary  Activity: Up with assist until independent.   Weight bearing status: WBAT RLE  Pain management: Oral pain medications with IV for breakthrough. Wean IV as able.    Antibiotics: Ancef x 24 hours  Diet: Begin with clear fluids and progress diet as tolerated.   DVT prophylaxis: Mechanical prophylaxis with SCD.  Recommend Lovenox starting postop day 1.  Imaging: PACU x-rays of the right hip  Labs: Monitor Hgb   Bracing/Splinting: None.   Dressings: Keep clean, dry and intact x5 days.  Reinforce as needed.  Okay to take down after 5-day javi.  Drains: None  Physical Therapy/Occupational Therapy: Eval and treat.  Posterior precautions  Consults: PT/OT, SW assistance for disposition planning.  Follow-up: Clinic in 2 weeks for wound check, 6 weeks with Dr. Tsai with new x-rays of the right hip and AP pelvis.  Disposition: Admitted for physical therapy and pain control.    Dyllan Cid MD     STAFF ATTESTATION:  Patient is a 89 year old female with right pertrochanteric hip fracture. She was indicated for ORIF to restore length, alignment, rotation and stability, alleviate fracture-associated pain, and allow for early mobilization after fracture fixation. We reduced and stabilized the fracture with a long trochanteric entry IM nail with cephalomedullary screw fixation. I was present for the entire procedure and scrubbed for the key portions including  fracture reduction and instrumentation.     I have reviewed the surgical note above and agree with the documentation.       Sridhar Tsai MD  , Department of Orthopedic Surgery  [facility]

## 2023-12-01 NOTE — ANESTHESIA PREPROCEDURE EVALUATION
Anesthesia Pre-Procedure Evaluation    Patient: Vickie Gonzalez   MRN: [id number] : 1925        Procedure : Procedure(s):  INTERNAL FIXATION, FRACTURE, TROCHANTERIC, HIP, USING PINS OR RODS, USING HANA TABLE          Past Medical History:   Diagnosis Date    Breast cancer (H) right     Chronic kidney disease     Diabetes (H)     Goiter, nontoxic, multinodular     HTN (hypertension)     Hx antineoplastic chemotherapy     breast cancer     Hypertriglyceridemia without hypercholesterolemia     Osteopenia       Past Surgical History:   Procedure Laterality Date    BIOPSY BREAST Right     MASTECTOMY Right       Allergies   Allergen Reactions    Tamoxifen Analogues [Tamoxifen] Unknown     Annotation: hepatitis        Social History     Tobacco Use    Smoking status: Never    Smokeless tobacco: Never   Substance Use Topics    Alcohol use: Yes      Wt Readings from Last 1 Encounters:   23 62.7 kg (138 lb 3.2 oz)        Anesthesia Evaluation            ROS/MED HX  ENT/Pulmonary:  - neg pulmonary ROS     Neurologic:  - neg neurologic ROS     Cardiovascular: Comment: RBBB  Slight troponin elevation in setting of CRI and acute fall. Trending down now  Sick sinus syndrome    (+) Dyslipidemia hypertension- -   -  - -              pacemaker,                        METS/Exercise Tolerance:     Hematologic:  - neg hematologic  ROS     Musculoskeletal: Comment: Hip fracture      GI/Hepatic:  - neg GI/hepatic ROS     Renal/Genitourinary:     (+) renal disease, type: CRI, Pt does not require dialysis,           Endo: Comment: Diet-controlled DM    (+)  type II DM,        thyroid problem, Goiter,           Psychiatric/Substance Use:  - neg psychiatric ROS     Infectious Disease:  - neg infectious disease ROS     Malignancy:   (+) Malignancy, History of Breast.Breast CA Remission status post.      Other:            Physical Exam    Airway  airway exam normal      Mallampati: II       Respiratory Devices and Support      "    Dental       (+) Minor Abnormalities - some fillings, tiny chips      Cardiovascular   cardiovascular exam normal          Pulmonary   pulmonary exam normal                OUTSIDE LABS:  CBC:   Lab Results   Component Value Date    WBC 10.5 11/30/2023    WBC 5.8 11/28/2022    HGB 11.6 (L) 11/30/2023    HGB 13.1 11/28/2022    HCT 34.4 (L) 11/30/2023    HCT 40.6 11/28/2022     11/30/2023     11/28/2022     BMP:   Lab Results   Component Value Date     11/30/2023     05/26/2023    POTASSIUM 4.3 11/30/2023    POTASSIUM 4.5 05/26/2023    CHLORIDE 101 11/30/2023    CHLORIDE 99 05/26/2023    CO2 26 11/30/2023    CO2 26 05/26/2023    BUN 21.6 11/30/2023    BUN 23.5 (H) 05/26/2023    CR 0.78 11/30/2023    CR 0.79 05/26/2023     (H) 11/30/2023    GLC 89 05/26/2023     COAGS:   Lab Results   Component Value Date    PTT 27 11/30/2023    INR 1.05 11/30/2023     POC: No results found for: \"BGM\", \"HCG\", \"HCGS\"  HEPATIC:   Lab Results   Component Value Date    ALBUMIN 4.6 05/26/2023    PROTTOTAL 8.1 05/26/2023    ALT 14 05/26/2023    AST 24 05/26/2023    ALKPHOS 60 05/26/2023    BILITOTAL 0.5 05/26/2023     OTHER:   Lab Results   Component Value Date    A1C 5.9 (H) 05/26/2023    WILFRID 9.7 (H) 11/30/2023    TSH 0.72 05/16/2022       Anesthesia Plan    ASA Status:  3    NPO Status:  NPO Appropriate    Anesthesia Type: General.     - Airway: ETT   Induction: Intravenous, Propofol.   Maintenance: Balanced.   Techniques and Equipment:     - Lines/Monitors: BIS     - Blood: T&S     Consents    Anesthesia Plan(s) and associated risks, benefits, and realistic alternatives discussed. Questions answered and patient/representative(s) expressed understanding.     - Discussed: Risks, Benefits and Alternatives for the PROCEDURE were discussed     - Discussed with:  Patient      - Extended Intubation/Ventilatory Support Discussed: No.      - Patient is DNR/DNI Status: No     Use of blood products discussed: No . "     Postoperative Care    Pain management: IV analgesics, Oral pain medications, Multi-modal analgesia.   PONV prophylaxis: Ondansetron (or other 5HT-3), Dexamethasone or Solumedrol, Background Propofol Infusion     Comments:               Keaton Herrera MD    I have reviewed the pertinent notes and labs in the chart from the past 30 days and (re)examined the patient.  Any updates or changes from those notes are reflected in this note.

## 2023-12-01 NOTE — ANESTHESIA PROCEDURE NOTES
Airway       Patient location during procedure: OR       Procedure Start/Stop Times: 12/1/2023 8:05 AM  Staff -        CRNA: Meredith Brumfield APRN CRNA       Performed By: CRNA  Consent for Airway        Urgency: elective  Indications and Patient Condition       Indications for airway management: alfonso-procedural       Induction type:intravenous       Mask difficulty assessment: 1 - vent by mask    Final Airway Details       Final airway type: endotracheal airway       Successful airway: ETT - single  Endotracheal Airway Details        ETT size (mm): 7.0       Cuffed: yes       Successful intubation technique: video laryngoscopy       VL Blade Size: Glidescope 3       Grade View of Cords: 1       Adjucts: stylet       Position: Right       Measured from: gums/teeth       Secured at (cm): 22       Bite block used: None    Post intubation assessment        Placement verified by: capnometry, equal breath sounds and chest rise        Number of attempts at approach: 1       Number of other approaches attempted: 0       Secured with: tape       Ease of procedure: easy       Dentition: Intact and Unchanged    Medication(s) Administered   Medication Administration Time: 12/1/2023 8:05 AM         Yes

## 2023-12-02 NOTE — PROGRESS NOTES
12/02/23 1130   Appointment Info   Signing Clinician's Name / Credentials (PT) Stanford Najera DPT   Rehab Comments (PT) WBAT R LE   Living Environment   People in Home alone   Current Living Arrangements independent living facility   Home Accessibility no concerns   Transportation Anticipated health plan transportation;family or friend will provide   Living Environment Comments Pt lives alone at [facility], was not receiving any services PTA, notes having TCU care center on site.   Self-Care   Usual Activity Tolerance moderate   Current Activity Tolerance poor   Equipment Currently Used at Home grab bar, toilet;grab bar, tub/shower;shower chair;walker, rolling   Fall history within last six months yes   Number of times patient has fallen within last six months 1   Activity/Exercise/Self-Care Comment Pt had single fall that led to admission, fallen on attempt to stand from low chair. Has 4ww in room, was only using to assist wtih getting groceries up to room. Previously IND with ADLs at [facility]   General Information   Onset of Illness/Injury or Date of Surgery 11/30/23   Referring Physician Kush Morales MD   Patient/Family Therapy Goals Statement (PT) To improve mobility and get back home   Pertinent History of Current Problem (include personal factors and/or comorbidities that impact the POC) Per EMR: Vickie Gonzalez is a 89 year old female now s/p right femoral intramedullary nail with Dr. Tsai on 12/1/23   Existing Precautions/Restrictions fall   Weight-Bearing Status - LUE full weight-bearing   Weight-Bearing Status - RUE full weight-bearing   Weight-Bearing Status - LLE full weight-bearing   Weight-Bearing Status - RLE weight-bearing as tolerated   General Observations Activity Ambulate with assist   Cognition   Affect/Mental Status (Cognition) WFL   Orientation Status (Cognition) oriented x 4   Follows Commands (Cognition) 50-74% accuracy;follows one-step commands;repetition of directions  required;physical/tactile prompts required   Pain Assessment   Patient Currently in Pain Yes, see Vital Sign flowsheet   Integumentary/Edema   Integumentary/Edema no deficits were identifed   Posture    Posture Kyphosis   Range of Motion (ROM)   Range of Motion ROM deficits secondary to pain   Strength (Manual Muscle Testing)   Strength (Manual Muscle Testing) Deficits observed during functional mobility   Strength Comments unable to perform R SLR   Bed Mobility   Comment, (Bed Mobility) maxA supine > sit at LEs and trunk, maxAx2 for sit > supine   Transfers   Comment, (Transfers) modA for STS from EOB with FW   Gait/Stairs (Locomotion)   Comment, (Gait/Stairs) able to take couple small steps forward/back with FWW at this time, not functionally ambulating   Balance   Balance Comments initially needing Quintin for sitting balance, needing CGA-Quintin for standing balance   Sensory Examination   Sensory Perception patient reports no sensory changes   Clinical Impression   Criteria for Skilled Therapeutic Intervention Yes, treatment indicated   PT Diagnosis (PT) impaired functional mobility   Influenced by the following impairments pain, weakness, deconditioning   Functional limitations due to impairments bed mobility, STS, transfers, gait, activity tolerance, balance   Clinical Presentation (PT Evaluation Complexity) stable   Clinical Presentation Rationale stable post op, clinical reasoning   Clinical Decision Making (Complexity) low complexity   Planned Therapy Interventions (PT) balance training;bed mobility training;gait training;home exercise program;neuromuscular re-education;patient/family education;ROM (range of motion);strengthening;stretching;transfer training;progressive activity/exercise;risk factor education;home program guidelines   Risk & Benefits of therapy have been explained evaluation/treatment results reviewed;care plan/treatment goals reviewed;risks/benefits reviewed;current/potential barriers  reviewed;participants voiced agreement with care plan;participants included;patient   Clinical Impression Comments Pt presents to PT with significant pain and weakness s/p R IMN for hip fx; pt significantly limited in mobility and requiring skilled PT services for improved functional mobility for safe d/c home.   PT Total Evaluation Time   PT Eval, Low Complexity Minutes (78633) 10   Physical Therapy Goals   PT Frequency Daily   PT Predicted Duration/Target Date for Goal Attainment 12/15/23   PT Goals Bed Mobility;Transfers;Gait;PT Goal 1   PT: Bed Mobility Independent;Supine to/from sit;Rolling;Bridging   PT: Transfers Modified independent;Sit to/from stand;Bed to/from chair;Assistive device   PT: Gait Modified independent;Rolling walker;150 feet   PT: Goal 1 Pt will be IND with HEP in order to progress strength and mobility for improved function.   PT Discharge Planning   PT Plan pre-gait and progress gait as able, sitting balance, supine LE ex   PT Discharge Recommendation (DC Rec) Transitional Care Facility   PT Rationale for DC Rec Pt well below IND baseline, notes having TCU care center on site at [facility]. Recommending TCU at d/c due to pt requiring Ax1-2 for mobility at this time.   PT Brief overview of current status Ax1-2 for bed mobility and STS with FWW   PT Equipment Needed at Discharge   (tbd)   Total Session Time   Timed Code Treatment Minutes 24   Total Session Time (sum of timed and untimed services) 34

## 2023-12-02 NOTE — PROGRESS NOTES
12/02/23 1424   Appointment Info   Signing Clinician's Name / Credentials (OT) Janet Hoyos OTR/L   Rehab Comments (OT) WBAT, No hip prec   Living Environment   People in Home alone   Current Living Arrangements independent living facility   Home Accessibility no concerns   Transportation Anticipated health plan transportation;family or friend will provide   Living Environment Comments Pt lives alone at [facility], was not receiving any services PTA, notes having TCU care center on site.   Self-Care   Usual Activity Tolerance moderate   Current Activity Tolerance poor   Equipment Currently Used at Home grab bar, toilet;grab bar, tub/shower;shower chair;walker, rolling   Fall history within last six months yes   Number of times patient has fallen within last six months 1   Activity/Exercise/Self-Care Comment Pt takes facility bus to get groceries, uses 4WW for managing groceries, otherwise no AD or A for ADLs   Instrumental Activities of Daily Living (IADL)   Previous Responsibilities meal prep;housekeeping;laundry;shopping   General Information   Onset of Illness/Injury or Date of Surgery 12/01/23   Referring Physician Kush Morales MD   Patient/Family Therapy Goal Statement (OT) open to tcu   Additional Occupational Profile Info/Pertinent History of Current Problem per chart: 89 year old female now s/p right femoral intramedullary nail with Dr. Tsai on 12/1/23   Existing Precautions/Restrictions fall   Left Lower Extremity (Weight-bearing Status) full weight-bearing (FWB)   Right Lower Extremity (Weight-bearing Status) weight-bearing as tolerated (WBAT)   General Observations and Info activity order: ambulate with assist 3x daily   Cognitive Status Examination   Orientation Status orientation to person, place and time   Affect/Mental Status (Cognitive) WFL   Follows Commands follows one-step commands;75-90% accuracy;physical/tactile prompts required;repetition of directions required   Pain Assessment   Patient  Currently in Pain Yes, see Vital Sign flowsheet   Posture   Posture forward head position;protracted shoulders   Range of Motion Comprehensive   Comment, General Range of Motion BUE WFL; RLE limited by surgical pain   Strength Comprehensive (MMT)   Comment, General Manual Muscle Testing (MMT) Assessment BUE WFL; RLE limited by surgical pain   Coordination   Upper Extremity Coordination Left UE impaired;Right UE impaired   Gross Motor Coordination baseline tremor BUE   Functional Limitations Fine motor ADL performance impaired   Bed Mobility   Bed Mobility scooting/bridging;supine-sit;sit-supine   Scooting/Bridging Lynn (Bed Mobility) maximum assist (25% patient effort);2 person assist   Supine-Sit Lynn (Bed Mobility) moderate assist (50% patient effort);verbal cues   Sit-Supine Lynn (Bed Mobility) moderate assist (50% patient effort);verbal cues   Assistive Device (Bed Mobility) bed rails;draw sheet   Transfers   Transfers sit-stand transfer;toilet transfer;shower transfer   Sit-Stand Transfer   Sit-Stand Lynn (Transfers) moderate assist (50% patient effort);verbal cues   Assistive Device (Sit-Stand Transfers) walker, front-wheeled   Shower Transfer   Shower Transfer Comments MaxA per clinical judgement   Toilet Transfer   Toilet Transfer Comments ModA per clinical judgement   Balance   Balance Comments pt leans to L, able to self-correct with cues; good standing balance with 2WW   Activities of Daily Living   BADL Assessment/Intervention lower body dressing;grooming;toileting   Lower Body Dressing Assessment/Training   Lynn Level (Lower Body Dressing) maximum assist (25% patient effort)   Grooming Assessment/Training   Lynn Level (Grooming) moderate assist (50% patient effort)   Toileting   Lynn Level (Toileting) maximum assist (25% patient effort)   Clinical Impression   Criteria for Skilled Therapeutic Interventions Met (OT) Yes, treatment indicated   OT  Diagnosis impaired ADLs   OT Problem List-Impairments impacting ADL problems related to;activity tolerance impaired;balance;cognition;coordination;range of motion (ROM);sensation;pain   Assessment of Occupational Performance 3-5 Performance Deficits   Identified Performance Deficits dressing, toilet transfer, toileting, bathing, home chores   Planned Therapy Interventions (OT) ADL retraining;transfer training;strengthening;home program guidelines   Clinical Decision Making Complexity (OT) detailed assessment/moderate complexity   Risk & Benefits of therapy have been explained evaluation/treatment results reviewed;patient   OT Total Evaluation Time   OT Eval, Moderate Complexity Minutes (06119) 6   OT Goals   Therapy Frequency (OT) 5 times/week   OT Predicted Duration/Target Date for Goal Attainment 12/16/23   OT Goals Lower Body Dressing;Toilet Transfer/Toileting;OT Goal 1;Hygiene/Grooming   OT: Hygiene/Grooming modified independent   OT: Lower Body Dressing Modified independent;using adaptive equipment   OT: Toilet Transfer/Toileting Modified independent;toilet transfer;cleaning and garment management;using adaptive equipment   OT: Goal 1 Jigar or less for shower transfer/task, using DME prn   Interventions   Interventions Quick Adds Self-Care/Home Management;Therapeutic Activity   OT Discharge Planning   OT Plan transfer to [facility] progressing to toilet, LE dressing w/AE, g/h seated EOB, monitor cognition   OT Discharge Recommendation (DC Rec) Transitional Care Facility   OT Rationale for DC Rec Pt well below baseline, limited by pain and weakness, as well as baseline tremor and Min cognitive deficits.  She would benefit from continued therapy to maximize safety and independence with ADLs.   OT Brief overview of current status Ax1 STS and sidestep along bed with 2WW   Total Session Time   Timed Code Treatment Minutes 20   Total Session Time (sum of timed and untimed services) 26

## 2023-12-02 NOTE — PROGRESS NOTES
[facility]    Medicine Progress Note - Hospitalist Service, GOLD TEAM 18    Date of Admission:  11/30/2023    Assessment & Plan   Vickie Gonzalez is a 89 year old female admitted on 11/30/2023. 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 She presented to the ER with right hip pain after slipping and falling. She was unable to ambulate after fall. Denies numbness or tingling of RLE, denies pain outside of right hip, denies head trauma or LOC, denies lightheadedness prior to fall.     #Fracture of the right proximal femur s/p fall   #Osteopenia   She presented to the ER with right hip pain after slipping and falling on her right hip at home. She lives in independent living in a facility. Does not use assistive devices to ambulate. She was unable to ambulate after the fall. Denies numbness or tingling of RLE, denies pain outside of right hip, denies head trauma or LOC, denies lightheadedness prior to fall. Is not on blood thinners. XR femur right and pelvis show comminuted displaced fracture of the proximal right femur involving intertrochanteric and subtrochanteric regions. CT head showing no acute intracranial abnormality. Hgb 11.6.   - Orthopaedic surgery consulted, appreciate assistance   - Continue PTA calcium-vitamin D 600 mg daily   - PT/OT consult   - Denies current pain; will order PRN tylenol, heat, and/or ice for now and can adjust pending course   - NPO at midnight for potential surgery tomorrow     #Pre-op Assessment   RCRI-Very low risk: Class 1 0.4% complication rate            Total Score: -        Criteria with incomplete data:    RCRI: High Risk Surgery    RCRI: CAD    RCRI: CHF    RCRI: Cerebrovascular Disease     RCRI: Diabetes    RCRI: Elevated Creatinine      - This score is not  calculated because of inadequate data       #Cardiovascular Risk. Surgery is urgent and is moderate risk. Patient does not have any active cardiac conditions but does have a pacemaker that was placed in 2015 for sick sinus syndrome. Elevated troponin 24 -> 43 in the ER but continues to deny active cardiac disease symptoms such as chest pain or palpitations. Functional capacity is 4-6 METs without symptoms, able to go up stairs, clean house independently. Clinical risk factors include none of the following - high risk surgery - vascular or open intraperitoneal/intrathoracic, ischemic heart disease, HF, cerebrovascular disease, IDDM, Cr > 2 - reflecting a 3.9% risk of cardiac death, nonfatal MI, or nonfatal cardiac arrest based on RCRI (Revised Wilhelm Cardiac Risk Index). Patient may proceed with surgery without further testing.     #Pulmonary Risk. does not have known underlying pulmonary disease. Risk for any post op pulmonary complications is intermediate at 13.3% based on Canet Risk Index (age, pre op O2 sat, resp inf past month, pre op hgb < 10, surgical incision site, duration of surgery, emergency surgery). Patient may proceed with surgery without further testing.     Management of Medications & Comorbidities.     #Chronic renal impairment, stage 3a  Creatinine upon admission 0.78, at baseline.   - Repeat labs in the morning and after surgery   - Avoid nephrotoxic medications when able     #Sick sinus syndrome s/p pacemaker insertion (2015)  #Right bundle branch block   #Hypertension   #Elevated troponin   Troponin 24 -> 43 in the ER. EKG in the ER showed an atrial paced rhythm, left BBB, non-specific ST-T wave changes; largely unchanged from prior. Denies chest pain and palpitations. Troponin slightly elevated, could be elevated in the setting of CKD stage 3a.   - Due to elevation in troponin, will obtained limited echo   - Obtain third troponin   - Hold PTA lisinopril   - Continue PTA propranolol 40 mg BID    - Pacemaker device check     #T2DM  HgbA1c 5.9% on 5/26/2023.   - Managed with diet and lifestyle   - Repeat A1c   - POCT glucose checks BID     #Distant hx of breast cancer s/p right mastectomy           Diet: Advance Diet as Tolerated: Regular Diet Adult    DVT Prophylaxis: Enoxaparin (Lovenox) SQ and Pneumatic Compression Devices  Rothman Catheter: Not present  Lines: None     Cardiac Monitoring: None  Code Status: Full Code      Clinically Significant Risk Factors                  # Hypertension: Noted on problem list             # Pacemaker present       Disposition Plan     Expected Discharge Date: 12/03/2023      Destination: home              Foster Gaviria DO, MHS  Hospitalist Service, GOLD TEAM 18  [facility]  Securely message with light (more info)  Text page via [facility] Paging/Directory   See signed in provider for up to date coverage information  ______________________________________________________________________    Interval History   Patient working diligently with therapies.  Patient endorses no specific symptomatology.  Per nursing staff, no acute issues or clinical concerns.  Family is present at bedside.    Physical Exam   Vital Signs: Temp: 97.8  F (36.6  C) Temp src: Oral BP: (!) 115/36 Pulse: 68   Resp: 16 SpO2: 94 % O2 Device: None (Room air) Oxygen Delivery: 1 LPM  Weight: 140 lbs 13.98 oz    GENERAL: Alert and oriented x 3; no acute distress; well-nourished.  HEENT: Normocephalic; atraumatic; PERRLA; MMM.  CV: RRR; normal S1, S2; no rubs, murmurs, or gallops.  RESP: Lung fields clear to aucultation B/L; no wheezing or crepitations.  GI: Abdomen is soft, nontender, nondistended; no organomegaly; normal bowel sounds.  : Deferred genital examination.   MSK: No clubbing, cyanosis, or edema.  DERM: Skin is intact; no rash, lesions, or skin breakdown.  NEURO: No focal deficits appreciated; strength & sensorium are grossly intact.  PSYCH: No active  hallucinations; affect, insight appear within normal limits.    Medical Decision Making       45 MINUTES SPENT BY ME on the date of service doing chart review, history, exam, documentation & further activities per the note.      Data     I have personally reviewed the following data over the past 24 hrs:    9.5  \   8.6 (L)   / 165     135 100 36.6 (H) /  138 (H)   4.1 28 1.02 (H) \     TSH: N/A T4: N/A A1C: 5.8 (H)       Imaging results reviewed over the past 24 hrs:   No results found for this or any previous visit (from the past 24 hour(s)).

## 2023-12-02 NOTE — PROGRESS NOTES
Cross covering hospitalist note    I was notified by RN that the blood sugar this evening 210  Patient is not diabetic.  A1c 5.8 today.  Blood sugar elevated today below 150.    This is likely post prandial hyperglycemia    I would not give insulin at this moment.  Continue to monitor.  Consider discontinuing blood sugar monitoring tomorrow if no significantly high blood sugar  Levels.  Herbert Becker MD

## 2023-12-02 NOTE — PROGRESS NOTES
Orthopedic Surgery Progress Note: 12/02/2023    Subjective:   No acute events overnight. Pain well-controlled. Able to stand with assistance yesterday. Tolerating a diet. No new concerns or complaints. Denies SOB, chest Pain, fevers, chills.     Objective:   /46 (BP Location: Left arm)   Pulse 61   Temp 97.4  F (36.3  C) (Oral)   Resp 17   Wt 63.9 kg (140 lb 14 oz)   LMP  (LMP Unknown)   SpO2 95%   BMI 24.56 kg/m    I/O this shift:  In: -   Out: 100 [Urine:100]  General: NAD. Resting comfortably in bed.  Respiratory: Nonlabored breathing  Musculoskeletal:  RLE: Proximal dressing with small amount of dried strikethrough, middle and distal dressings c/d/I. SILT in the SP/DP/Mariia/Saph/Tib nerve distributions. Fires TA/GSC/FHL/EHL. 2+ DP pulse.     Laboratory Data:  Lab Results   Component Value Date    WBC 9.5 12/01/2023    HGB 11.8 12/01/2023     12/01/2023    INR 1.05 11/30/2023         Assessment & Plan:   Vickie Gonzalez is a 89 year old female now s/p right femoral intramedullary nail with Dr. Tsai on 12/1/23. Progressing appropriately postoperatively.     Plan for Today:  - PT/OT  - Pain control  - Medical management  - Disposition planning    Medicine primary  Activity: Up with assist until independent.   Weight bearing status: WBAT RLE  Pain management: Oral pain medications with IV for breakthrough. Wean IV as able.    Antibiotics: Ancef x 24 hours  Diet: Begin with clear fluids and progress diet as tolerated.   DVT prophylaxis: Mechanical prophylaxis with SCD.  Recommend Lovenox starting postop day 1.  Imaging: PACU x-rays of the right hip  Labs: Monitor Hgb   Bracing/Splinting: None.   Dressings: Keep clean, dry and intact x5 days.  Reinforce as needed.  Okay to take down after 5-day javi.  Drains: None  Physical Therapy/Occupational Therapy: Eval and treat.  Posterior precautions  Consults: PT/OT, SW assistance for disposition planning.  Follow-up: Clinic in 2 weeks for wound  check, 6 weeks with Dr. Tsai with new x-rays of the right hip and AP pelvis.  Disposition: Admitted for physical therapy and pain control.    Orthopedic surgery staff for this patient is Dr. Tsai.    ------------------------------------------------------------------------------------------    Respectfully,    Kush Morales MD  Orthopedic Surgery PGY1  [phone]    Please page me directly with any questions/concerns during regular weekday hours before 5 pm. If there is no response, if it is a weekend, or if it is during evening hours then please page the orthopedic surgery resident on call.      FOLLOWUP:    Future Appointments   Date Time Provider Department Center   12/2/2023 11:00 AM Sridhar Najera, PT [facility] [place]   12/2/2023 11:15 AM Janet Hoyos, OT [facility] [place]   1/8/2024 10:30 AM UC CV DEVICE 1 [facility]   4/8/2024 12:00 AM UC ICD REMOTE [facility]

## 2023-12-02 NOTE — PLAN OF CARE
Goal Outcome Evaluation:      Plan of Care Reviewed With: patient    Overall Patient Progress: improving    Outcome Evaluation: Pt is A&O x4. Pt reported pain of 2/10 when not moving that was managed with scheduled Tylenol. Pt got up to bedside commode w/ Ax2. Pt slept comfortably between cares.    VS: Temp: 97.4  F (36.3  C) Temp src: Oral BP: 113/46 Pulse: 61   Resp: 17 SpO2: 95 % O2 Device: Nasal cannula Oxygen Delivery: 1 LPM    O2: SpO2 > 95% and stable. LS clear and equal bilaterally. Denies chest pain and SOB.    Output: Voids spontaneously without difficulty to BSC; w/ minimal PVR after voiding.   Last BM: Denies abdominal discomfort. BS active / passing flatus.    Activity: Up with Ax2 walker & GB.   Skin: WDL except, R hip surgical incisions; scattered bruises.   Pain: Pain managed with scheduled Tylenol.   CMS: Intact, AOx4. Denies numbness and tingling.   Dressing: Dressing to greater trochanter has scant dried drainage. Distal dressing x2 are CD    Diet: Regular diet. Denies nausea/vomiting.    LDA: X2 L PIV SL.   Equipment: IV pole, personal belongings, walker & GB   Plan: Continue with plan of care. Call light within reach, pt able to make needs known.

## 2023-12-03 NOTE — PLAN OF CARE
Goal Outcome Evaluation:      Plan of Care Reviewed With: patient, family    Overall Patient Progress: improvingOverall Patient Progress: improving    Outcome Evaluation: Met with patient, great-niece (Tona), and great-niece's spouse in room to discuss discharge planning and TCU placement.    Tameka Salas, MSN, APRN, AGCNS-BC - RN Care Coordinator  5MS [phone]    SEARCHABLE in [facility] - search CARE COORDINATOR     For Weekend & Holiday on call RN Care Coordinator:  (Tasks: Home care, home infusion, medical equipment, transportation, IMM & PEREYRA forms, etc.)  [place] (0800 - 1630) Saturday and Sunday    Units: 5A, 5B, & 5C: [phone]    Units: 6B, 6C, 6D: [phone]    Units: 7A, 7B, 7C, 7D: [phone]    Units: 6A/ICU : [phone]     [facility] ([id number]) Saturday and Sunday    Units: 6 Med/Surg, 8A, 10 ICU, & Pediatric Units: [phone]    Units: 5 Ortho, 5Med/Surg & WB ED: [phone]

## 2023-12-03 NOTE — PROGRESS NOTES
[facility]     Endovascular Surgical Neuroradiology Pre-Procedure Note      HPI:  Vickie Gonzalez is a 89 year old woman who was admitted to the [facility] after a right femur fracture and repair for who a stroke code was activated for new left hemiparesis and neglect. She is found to have a distal M2 occlusion on the right and a small ischemic penumbra. She was transferred for emergent cerebral angiogram and mechanical thrombectomy, however upon arrival, her NIHSS score was only 3 for minor facial droop and neglect. Strength on the L was largely preserved (hand  asymmetry). I discussed her case with her, her niece (medical proxy), and the Vascular Neurology service. In light of minor/nondisabling symptoms in an elderly patient with tortuous anatomy, we will forgo intervention at this time as benefits do not outweigh risks.    Medical History:  Reviewed    Surgical History:  Reviewed    Family History:  Reviewed    Social History:  Reviewed    Allergies:  Allergies   Allergen Reactions    Tamoxifen Analogues [Tamoxifen] Unknown     Annotation: hepatitis         Is there a contrast allergy?  No    Medications:  I have reviewed this patient's current medications.    ROS:  The 10 point Review of Systems is negative other than noted in the HPI or here.     PHYSICAL EXAMINATION  Vital Signs:  B/P: 138/47,  T: 98.8,  P: 73,  R: 16    Cardio:  RRR  Pulmonary:  no respiratory distress  Abdomen:  soft, non-tender, non-distended    Neurologic  Mental Status:  fully alert, attentive and oriented, follows commands, speech clear and fluent, left hemispatial neglect  Cranial Nerves:  visual fields intact, PERRL, EOMI with normal smooth pursuit, facial sensation intact and symmetric, hearing not formally tested but intact to conversation, palate elevation symmetric and uvula midline, no dysarthria, shoulder shrug strong bilaterally, tongue protrusion midline, minor left facial  droop  Motor:   no drift, left hand  weakness  Sensory:   detects sensation equally in 4/4 extremities. Extinguishes left sensation with double simultaneous tactile stimulation.  Coordination:  FNF and HS intact without dysmetria    Pre-procedure National Institutes of Health Stroke Scale:      Score    Level of consciousness: (0)   Alert, keenly responsive    LOC questions: (0)   Answers both questions correctly    LOC commands: (0)   Performs both tasks correctly    Best gaze: (0)   Normal    Visual: (0)   No visual loss    Facial palsy: (1)   Minor paralysis (flat nasolabial fold, smile asymmetry)    Motor arm (left): (0)   No drift    Motor arm (right): (0)   No drift    Motor leg (left): (0)   No drift    Motor leg (right): (0)   No drift    Limb ataxia: (0)   Absent    Sensory: (0)   Normal- no sensory loss    Best language: (0)   Normal- no aphasia    Dysarthria: (0)   Normal    Extinction and inattention: (2)   Profound juma-inattention / extinction > one modality        Total Score:  3       LABS  (most recent Cr, BUN, GFR, PLT, INR, PTT within the past 7 days):  Recent Labs   Lab 12/03/23  1337 12/03/23  1242   CR  --  0.84   BUN  --  40.3*   GFRESTIMATED  --  63   PLT  --  198   INR 1.01  --    PTT 33  --         Platelet Function P2Y12 (PRU):  Not applicable      ASSESSMENT: Right MCA distal M2 occlusion    PLAN: Cancel initial plan for emergent cerebral angiogram and mechanical thrombectomy due to minor symptoms and tortuous anatomy, higher than typical risk for complication of the intervention.        PRE-PROCEDURE SEDATION ASSESSMENT     N/A    Patient was discussed with the Attending, Dr. Caldwell, who agrees with the plan.    Abeba Araujo MD   Pager: [phone]

## 2023-12-03 NOTE — CONSULTS
Care Management Initial Consult    General Information  Assessment completed with: Patient, Family, Tona Campos Antonina (great-niece)  Type of CM/SW Visit: Initial Assessment    Primary Care Provider verified and updated as needed: Yes   Readmission within the last 30 days: no previous admission in last 30 days      Reason for Consult: discharge planning  Advance Care Planning: Advance Care Planning Reviewed: questions answered, other (see comments)          Communication Assessment  Patient's communication style: spoken language (English or Bilingual)    Hearing Difficulty or Deaf: yes   Wear Glasses or Blind: yes    Cognitive  Cognitive/Neuro/Behavioral: WDL        Orientation: oriented x 4  Mood/Behavior: calm, cooperative          Living Environment:   People in home: alone     Current living Arrangements: apartment, independent living facility      Able to return to prior arrangements: yes       Family/Social Support:  Care provided by: self  Provides care for: no one  Marital Status:   Facility resident(s)/Staff, Neighbor, Other (specify) (Great-niece, nephew)          Description of Support System: Supportive, Involved    Support Assessment: Adequate family and caregiver support, Adequate social supports    Current Resources:   Patient receiving home care services: No     Community Resources: None  Equipment currently used at home: grab bar, toilet, grab bar, tub/shower, shower chair, walker, rolling  Supplies currently used at home: Incontinence Supplies    Employment/Financial:  Employment Status: retired        Financial Concerns: none   Referral to Financial Worker: No       Does the patient's insurance plan have a 3 day qualifying hospital stay waiver?  Yes     Which insurance plan 3 day waiver is available? [employer]    Will the waiver be used for post-acute placement? No    Lifestyle & Psychosocial Needs:  Social Determinants of Health     Food Insecurity: Not on file   Depression:  Not at risk (5/26/2023)    PHQ-2     PHQ-2 Score: 0   Housing Stability: Not on file   Tobacco Use: Low Risk  (12/1/2023)    Patient History     Smoking Tobacco Use: Never     Smokeless Tobacco Use: Never     Passive Exposure: Not on file   Financial Resource Strain: Not on file   Alcohol Use: Not on file   Transportation Needs: Not on file   Physical Activity: Not on file   Interpersonal Safety: Not on file   Stress: Not on file   Social Connections: Not on file       Functional Status:  Prior to admission patient needed assistance:   Dependent ADLs:: Independent  Dependent IADLs:: Independent  Assesssment of Functional Status: Not at baseline with ADL Functioning, Not at baseline with mobility, Not at  functional baseline    Mental Health Status:  Mental Health Status: No Current Concerns  Mental Health Management:  (N/A)    Chemical Dependency Status:  Chemical Dependency Status: No Current Concerns  Chemical Dependency Management:  (N/A)          Values/Beliefs:  Spiritual, Cultural Beliefs, Quaker Practices, Values that affect care: no       Cultural/Quaker Practices Patient Routinely Participates In:  (N/A)       Additional Information:    HPI:    Met with patient, great-niece (Tona) and her spouse in patient's room. Introduced self and RNCC role. Verified address, phone number, PCP, and insurance. Patient states that she has a HCD and the family will be able to provide a copy.    Patient lives on the 2nd floor in independent living at [facility] in [place]. There are no stairs that she has to traverse and there are elevators. Patient lives alone and has been independent with all ADLs and IADLs.    She uses metromobility for transportation to and from appointments. Her facility offers bus rides to places for shopping. She has has friends and family who can assist with rides.    Patient has a 4 wheeled walker that she uses sometimes, a straight cane that she does not use, grab bars at the toilet,  grab bars at the shower, a shower chair, and incontinence supplies.    Patient prefers to be placed at [facility] on the 3rd floor. Tona stated that she talked to the TCU and they told her that they take preference of their residents and should have space. Writer will send referral.    Tona requested to talk to Dr. Gaviria about updates on patient's health (kidneys, raspy voice, and possible confusion). Writer paged Dr. Gaviria and requested that he talk to the family to give an update.    Will need stretcher transport at discharge.      TCU referrals sent:  The [address] [address]  Phone: [phone]  Fax: [phone]      Tameka Salas, MSN, APRN, AGCNS-BC - RN Care Coordinator  5MS [phone]    SEARCHABLE in [facility] - search CARE COORDINATOR     For Weekend & Holiday on call RN Care Coordinator:  (Tasks: Home care, home infusion, medical equipment, transportation, IMM & PEREYRA forms, etc.)  [place] (0800 - 1630) Saturday and Sunday    Units: 5A, 5B, & 5C: [phone]    Units: 6B, 6C, 6D: [phone]    Units: 7A, 7B, 7C, 7D: [phone]    Units: 6A/ICU : [phone]     [facility] (2229-1455) Saturday and Sunday    Units: 6 Med/Surg, 8A, 10 ICU, & Pediatric Units: [phone]    Units: 5 Ortho, 5Med/Surg & WB ED: [phone]

## 2023-12-03 NOTE — PROGRESS NOTES
VS: /41 (BP Location: Left arm)   Pulse 66   Temp 97.9  F (36.6  C) (Oral)   Resp 16   Wt 63.9 kg (140 lb 14 oz)   LMP  (LMP Unknown)   SpO2 92%   BMI 24.56 kg/m       O2: SpO2 92% and stable. LS clear and equal bilaterally. Denies chest pain and SOB.    Output: Voids on BSC as well as inc throughout the day. Pure wick in place.   Last BM: Denies abdominal discomfort. BS active / passing flatus. Can't recall LBM. Is PTA. Miralax given today.   Activity: Up with Ax1-2 walker & GB.   Skin: WDL except, R hip surgical incisions; scattered bruises.   Pain: Pain managed with scheduled Tylenol.   CMS: Intact, AOx4. Denies numbness and tingling.   Dressing: Dressing to greater trochanter has scant dried drainage. Distal dressing x2 are CD    Diet: Regular diet. Denies nausea/vomiting.    LDA: X2 L PIV SL.   Equipment: IV pole, personal belongings, walker & GB   Plan: Continue with plan of care. Call light within reach, pt able to make needs known. Placement pending.

## 2023-12-03 NOTE — IR NOTE
Patient arrived to IR room 3 from [facility] with EMS ~1530. MD SOURAV Araujo at bedside upon arrival. Patient with left sided weakness (both upper and lower). Otherwise patient intact. Upon SOURAV Araujo MD talking with patient and patient's family, cerebral angiogram with thrombectomy cancelled. Report given to Perry MEDEROS RN. Patient transferred to .

## 2023-12-03 NOTE — PLAN OF CARE
Goal Outcome Evaluation:                 Outcome Evaluation: Pain 2/10 while not moving. Up to commode x 1 with assist of 1 and inc as well. Dsgs intact.

## 2023-12-03 NOTE — PROGRESS NOTES
Rounded with Dr Gaviria about an hour ago with family at bedside noticing a little confusion more than her baseline. Dr Gaviria addressed family concerns.    30 minutes later family called my break cover RN to bedside for further confusion and left side weakness. He called a stroke team. I met Dr Gaviria and the responding RNs in the room for assessment and to answer questions. Determination was to send to CT and get some labs. At CT now.

## 2023-12-03 NOTE — CONSULTS
"[facility]    Stroke Telephone Note    I was called by Caden Ochoa on 12/03/23 regarding patient Vickie Gonzalez. The patient is a 89 year old female  PMH of T2DM, CKD, sick sinus syndrome s/p pacer, remote hx of breast ca, and HTN who was admitted on 11/30/23 after a fall and subsequent fracture of the right proximal femur. She is now s/p right femoral intramedullary nail with Dr. Tsai on 12/1/23. On 12/3, she was noted to have new left sided weakness and a code stroke was called.    Vitals  BP: 138/47   Pulse: 73   Resp: 16   Temp: 98.8  F (37.1  C)   Weight: 60.7 kg (133 lb 13.1 oz)    Stroke Code Data (for stroke code without tele)  Stroke code activated 12/03/23  1315   Stroke provider first response 12/03/23  1318   Last known normal 12/03/23  1300      Time of discovery (or onset of symptoms) 12/03/23  1300   Head CT read by Stroke Neuro Provider 12/03/23  1345   Was stroke code de-escalated? No           Imaging Findings  CT head: No acute abnoramlities  CTA head/neck: CTA shows L distal MCA occlusion  CTP showed penumbra of 11 ml in R parietal region    Intravenous Thrombolysis  Not given due to:   - surgery/trauma within the past 14 days    Endovascular Treatment  Not initiated due to absence of proximal vessel occlusion    Impression  L sided weakness    Recommendations   Emergent transfer to [facility] to assess eligibility for thrombectomy  Hosiptalist talked to pt and family and they express willingness to pursue stroke treatment        My recommendations are based on the information provided over the phone by Vickie Gonzalez's in-person providers. They are not intended to replace the clinical judgment of her in-person providers. I was not requested to personally see or examine the patient at this time.     Navin Zendejas MD  Vascular Neurology    To page me or covering stroke neurology team member, click here: [facility]COM  Choose \"On Call\" tab at " "top, then select \"NEUROLOGY/ALL SITES\" from middle drop-down box, press Enter, then look for \"stroke\" or \"telestroke\" for your site.         "

## 2023-12-03 NOTE — PROGRESS NOTES
"[facility]    Medicine Progress Note - Hospitalist Service    Date of Admission:  11/30/2023    Assessment & Plan   Vickie Gonzalez is a 89 year old female admitted on 11/30/2023. 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 She presented to the ER with right hip pain after slipping and falling. She was unable to ambulate after fall. Denies numbness or tingling of RLE, denies pain outside of right hip, denies head trauma or LOC, denies lightheadedness prior to fall.     CODE STROKE: I was called to patient's bedside. Nursing staff reported left facial droop and \"difficulty with hand movements and sensation.\" Evaluated patient at bedside. There was an appreciable left facial droop and significantly decreased  strength in the left upper extremity. Code Stroke was obviously called. Discussed case with stroke neurology. Dr. Zendejas recommended CT brain w/o contrast and CTA head & neck. Although radiology read the exam as negative, neurology thought he visualized a right large vessel occlusion. As such, discussed with family and immediately transferred patient to the [place] per neurology recommendation for endovascular thrombectomy.    #Fracture of the right proximal femur s/p fall   #Osteopenia   She presented to the ER with right hip pain after slipping and falling on her right hip at home. She lives in independent living in a facility. Does not use assistive devices to ambulate. She was unable to ambulate after the fall. Denies numbness or tingling of RLE, denies pain outside of right hip, denies head trauma or LOC, denies lightheadedness prior to fall. Is not on blood thinners. XR femur right and pelvis show comminuted displaced fracture of the proximal right femur involving intertrochanteric and " subtrochanteric regions. CT head showing no acute intracranial abnormality. Hgb 11.6.   - Orthopaedic surgery consulted, appreciate assistance   - Continue PTA calcium-vitamin D 600 mg daily   - PT/OT consult   - Denies current pain; will order PRN tylenol, heat, and/or ice for now and can adjust pending course   - NPO at midnight for potential surgery tomorrow     #Pre-op Assessment   RCRI-Very low risk: Class 1 0.4% complication rate            Total Score: -        Criteria with incomplete data:    RCRI: High Risk Surgery    RCRI: CAD    RCRI: CHF    RCRI: Cerebrovascular Disease     RCRI: Diabetes    RCRI: Elevated Creatinine      - This score is not calculated because of inadequate data       #Cardiovascular Risk. Surgery is urgent and is moderate risk. Patient does not have any active cardiac conditions but does have a pacemaker that was placed in 2015 for sick sinus syndrome. Elevated troponin 24 -> 43 in the ER but continues to deny active cardiac disease symptoms such as chest pain or palpitations. Functional capacity is 4-6 METs without symptoms, able to go up stairs, clean house independently. Clinical risk factors include none of the following - high risk surgery - vascular or open intraperitoneal/intrathoracic, ischemic heart disease, HF, cerebrovascular disease, IDDM, Cr > 2 - reflecting a 3.9% risk of cardiac death, nonfatal MI, or nonfatal cardiac arrest based on RCRI (Revised Wilhelm Cardiac Risk Index). Patient may proceed with surgery without further testing.     #Pulmonary Risk. does not have known underlying pulmonary disease. Risk for any post op pulmonary complications is intermediate at 13.3% based on Canet Risk Index (age, pre op O2 sat, resp inf past month, pre op hgb < 10, surgical incision site, duration of surgery, emergency surgery). Patient may proceed with surgery without further testing.     Management of Medications & Comorbidities.     #Chronic renal impairment, stage  3a  Creatinine upon admission 0.78, at baseline.   - Repeat labs in the morning and after surgery   - Avoid nephrotoxic medications when able     #Sick sinus syndrome s/p pacemaker insertion (2015)  #Right bundle branch block   #Hypertension   #Elevated troponin   Troponin 24 -> 43 in the ER. EKG in the ER showed an atrial paced rhythm, left BBB, non-specific ST-T wave changes; largely unchanged from prior. Denies chest pain and palpitations. Troponin slightly elevated, could be elevated in the setting of CKD stage 3a.   - Due to elevation in troponin, will obtained limited echo   - Obtain third troponin   - Hold PTA lisinopril   - Continue PTA propranolol 40 mg BID   - Pacemaker device check     #T2DM  HgbA1c 5.9% on 5/26/2023.   - Managed with diet and lifestyle   - Repeat A1c   - POCT glucose checks BID     #Distant hx of breast cancer s/p right mastectomy           Diet: Regular Diet Adult    DVT Prophylaxis: Enoxaparin (Lovenox) SQ  [place] Catheter: Not present  Lines: None     Cardiac Monitoring: ACTIVE order. Indication: Stroke, acute (48 hours)  Code Status: Full Code      Clinically Significant Risk Factors                  # Hypertension: Noted on problem list            # Financial/Environmental Concerns: none   # Pacemaker present       Disposition Plan        Patient being transferred to [place] for interventional neurology evaluation.    Foster Gaviria DO, MHS  Hospitalist Service  [facility]  Securely message with [employer] (more info)  Text page via [facility] Paging/Directory   ______________________________________________________________________    Interval History   Patient's family concern for change in voice and increased confusion.  Patient herself denies any specific symptomatology.  Please see code stroke description above.  Patient being transferred to [place] for interventional neurology evaluation.    Physical Exam   Vital Signs: Temp: 98.1  F (36.7   C) Temp src: Oral BP: 134/65 Pulse: 70   Resp: 18 SpO2: 92 % O2 Device: None (Room air)    Weight: 133 lbs 13.11 oz    GENERAL: Alert and oriented x 3; no acute distress; well-nourished.  HEENT: Normocephalic; atraumatic; PERRLA; MMM.  CV: RRR; normal S1, S2; no rubs, murmurs, or gallops.  RESP: Lung fields clear to aucultation B/L; no wheezing or crepitations.  GI: Abdomen is soft, nontender, nondistended; no organomegaly; normal bowel sounds.  : Deferred genital examination.   MSK: No clubbing, cyanosis, or edema.  DERM: Skin is intact; no rash, lesions, or skin breakdown.  NEURO: Left facial droop; diminished  strength in the left upper extremity.  PSYCH: No active hallucinations; affect, insight appear within normal limits.    Medical Decision Making       65 MINUTES SPENT BY ME on the date of service doing chart review, history, exam, documentation & further activities per the note.      Data     I have personally reviewed the following data over the past 24 hrs:    10.6  \   8.6 (L)   / 198     135 100 40.3 (H) /  157 (H)   4.6 28 0.84 \     Trop: 28 (H) BNP: N/A     INR:  1.01 PTT:  33   D-dimer:  N/A Fibrinogen:  N/A       Imaging results reviewed over the past 24 hrs:   Recent Results (from the past 24 hour(s))   CT Head w/o Contrast    Narrative    EXAM: CT HEAD W/O CONTRAST  12/3/2023 1:44 PM     HISTORY: Code Stroke to evaluate for potential thrombolysis and  thrombectomy. PLEASE READ IMMEDIATELY       COMPARISON: Head CT 11/30/2023    TECHNIQUE: Using multidetector thin collimation helical acquisition  technique, axial, coronal and sagittal CT images from the skull base  to the vertex were obtained without intravenous contrast.   (topogram) image(s) also obtained and reviewed.    FINDINGS:  No acute intracranial hemorrhage, mass effect, or midline shift. No  acute loss of gray-white matter differentiation. Ventricles are  proportionate to the cerebral sulci. Clear basal cisterns.  Moderate  cerebral volume loss. Patchy hypoattenuation in the periventricular  white matter, likely secondary to chronic small vessel ischemic  changes given the patient's age.    Atraumatic calvarium. Clear paranasal sinuses, mastoid air cells.  Nonfocal orbits.       Impression    IMPRESSION:   1. No acute intracranial abnormality. Aspects score is 10.  2. Moderate generalized cerebral volume loss.  3. Leukoaraiosis     I have personally reviewed the examination and initial interpretation  and I agree with the findings.    ZEFERINO TOUSSAINT MD         SYSTEM ID:  [id number]   CTA Head Neck with Contrast   Result Value    Radiologist flags Ischemic penumbra and arterial occlusion (Urgent)    Narrative    CTA HEAD NECK W CONTRAST, CT HEAD PERFUSION W CONTRAST 12/3/2023 2:01  PM    CT Head without contrast  CT Perfusion Study of the Brain with Contrast  CT Angiogram of the Neck with contrast   CT Angiogram of the Head with contrast    History:  Code Stroke to evaluate for potential thrombolysis and  thrombectomy. PLEASE READ IMMEDIATELY.  ICD-10:    Comparison: Head CT 11/30/2023.     Technique:   HEAD CT: Using multidetector thin collimation helical acquisition  technique, axial, coronal and sagittal CT images were obtained from  the base of the skull to the vertex without intravenous contrast and  reviewed in brain, bone, and subdural windows.     CT BRAIN PERFUSION: Dynamic perfusion CT of the brain was performed at  multiple levels with 10 mm slice thickness; levels were imaged during  2 separate rapid bolus intravenous injections of nonionic iodinated  contrast medium, 1) centered at the level of  the basal ganglia, and  2) centered at the level of the top of the lateral ventricles. Each  injection required approximately 40 cc of intravenous nonionic  contrast.  Images were reconstructed and reviewed on the [employer] 3D  workstation.    HEAD and NECK CTA: Thereafter, postcontrast images were obtained from  the aortic  arch through the [place].  Axial images were  obtained using thin collimation multidetector helical technique. This  CT angiogram data was reconstructed at thin intervals with mild  overlap. Images were sent to the [employer] workstation, and 3D  reconstructions and multiplanar reformations were obtained with  reconstructions performed by the technologist and the radiologist. The  source images, multiplanar reformations, 3D reconstructions in both  maximum intensity projection display and volume rendered models were  reviewed,     Contrast: 67 mL Isovue 370 (accession [id number]), 50 mL Isovue 370  (accession [id number])    Findings:   Head CT: No acute intracranial hemorrhage, mass effect, or midline  shift. No acute loss of gray-white matter differentiation. Ventricles  are proportionate to the cerebral sulci. Clear basal cisterns.  Moderate cerebral volume loss. Patchy hypoattenuation in the  periventricular white matter, likely secondary to chronic small vessel  ischemic changes given the patient's age. Aspect score is 10/10.    The visualized portions of the paranasal sinuses and mastoid air cells  are clear.    CT Perfusion: Images documenting relative cerebral blood volume,  cerebral blood flow and mean transit time demonstrate no evidence of  cord infarct. Delayed Tmax in the right parietal lobe approximately  molding is 17 cc compatible with ischemic penumbra.    Head CTA demonstrates abrupt occlusion of a right MCA distal M2 branch  arising from the superior division best seen on sagittal images on  series 14 image 60. The anterior communicating artery is patent. The  posterior communicating arteries are patent bilaterally. No aneurysm  identified    . Neck CTA shows patent cervical vasculature. Calcified plaques in the  carotid bifurcations without irregularity or significant stenosis.  Subclavian arteries are patent. Normal vertebral arteries. No mass is  identified in the neck.    No mass is noted  within the visualized portions of the cervical soft  tissues or lung apices.       Impression    Impression:     1. No evidence of acute infarct. Aspect score is 10.  2. Ischemic penumbra of 17 cc in the right parietal lobe. No core  infarct identified on perfusion exam.  3. Corresponding to the area of ischemic penumbra, there is abrupt  occlusion of a distal right MCA M2 branch arising from superior  division.  4. Atherosclerotic calcifications in the carotid bifurcations.      [Urgent Result: Ischemic penumbra and arterial occlusion]    Finding was identified on 12/3/2023 2:05 PM.     Dr. Zendejas was contacted by Dr. Freeman at 12/3/2023 5:00 PM and  verbalized understanding of the urgent finding.     I have personally reviewed the examination and initial interpretation  and I agree with the findings.    ZEFERINO TOUSSAINT MD         SYSTEM ID:  [id number]   CT Head Perfusion w Contrast   Result Value    Radiologist flags Ischemic penumbra and arterial occlusion (Urgent)    Narrative    CTA HEAD NECK W CONTRAST, CT HEAD PERFUSION W CONTRAST 12/3/2023 2:01  PM    CT Head without contrast  CT Perfusion Study of the Brain with Contrast  CT Angiogram of the Neck with contrast   CT Angiogram of the Head with contrast    History:  Code Stroke to evaluate for potential thrombolysis and  thrombectomy. PLEASE READ IMMEDIATELY.  ICD-10:    Comparison: Head CT 11/30/2023.     Technique:   HEAD CT: Using multidetector thin collimation helical acquisition  technique, axial, coronal and sagittal CT images were obtained from  the base of the skull to the vertex without intravenous contrast and  reviewed in brain, bone, and subdural windows.     CT BRAIN PERFUSION: Dynamic perfusion CT of the brain was performed at  multiple levels with 10 mm slice thickness; levels were imaged during  2 separate rapid bolus intravenous injections of nonionic iodinated  contrast medium, 1) centered at the level of  the basal ganglia, and  2) centered at  the level of the top of the lateral ventricles. Each  injection required approximately 40 cc of intravenous nonionic  contrast.  Images were reconstructed and reviewed on the [employer] 3D  workstation.    HEAD and NECK CTA: Thereafter, postcontrast images were obtained from  the aortic arch through the [place].  Axial images were  obtained using thin collimation multidetector helical technique. This  CT angiogram data was reconstructed at thin intervals with mild  overlap. Images were sent to the [employer] workstation, and 3D  reconstructions and multiplanar reformations were obtained with  reconstructions performed by the technologist and the radiologist. The  source images, multiplanar reformations, 3D reconstructions in both  maximum intensity projection display and volume rendered models were  reviewed,     Contrast: 67 mL Isovue 370 (accession [id number]), 50 mL Isovue 370  (accession [id number])    Findings:   Head CT: No acute intracranial hemorrhage, mass effect, or midline  shift. No acute loss of gray-white matter differentiation. Ventricles  are proportionate to the cerebral sulci. Clear basal cisterns.  Moderate cerebral volume loss. Patchy hypoattenuation in the  periventricular white matter, likely secondary to chronic small vessel  ischemic changes given the patient's age. Aspect score is 10/10.    The visualized portions of the paranasal sinuses and mastoid air cells  are clear.    CT Perfusion: Images documenting relative cerebral blood volume,  cerebral blood flow and mean transit time demonstrate no evidence of  cord infarct. Delayed Tmax in the right parietal lobe approximately  molding is 17 cc compatible with ischemic penumbra.    Head CTA demonstrates abrupt occlusion of a right MCA distal M2 branch  arising from the superior division best seen on sagittal images on  series 14 image 60. The anterior communicating artery is patent. The  posterior communicating arteries are patent  bilaterally. No aneurysm  identified    . Neck CTA shows patent cervical vasculature. Calcified plaques in the  carotid bifurcations without irregularity or significant stenosis.  Subclavian arteries are patent. Normal vertebral arteries. No mass is  identified in the neck.    No mass is noted within the visualized portions of the cervical soft  tissues or lung apices.       Impression    Impression:     1. No evidence of acute infarct. Aspect score is 10.  2. Ischemic penumbra of 17 cc in the right parietal lobe. No core  infarct identified on perfusion exam.  3. Corresponding to the area of ischemic penumbra, there is abrupt  occlusion of a distal right MCA M2 branch arising from superior  division.  4. Atherosclerotic calcifications in the carotid bifurcations.      [Urgent Result: Ischemic penumbra and arterial occlusion]    Finding was identified on 12/3/2023 2:05 PM.     Dr. Zendejas was contacted by Dr. Freeman at 12/3/2023 5:00 PM and  verbalized understanding of the urgent finding.     I have personally reviewed the examination and initial interpretation  and I agree with the findings.    ZEFERINO TOUSSAINT MD         SYSTEM ID:  [id number]

## 2023-12-03 NOTE — PLAN OF CARE
Goal Outcome Evaluation:      Plan of Care Reviewed With: patient    Overall Patient Progress: improving    Outcome Evaluation: Pt is A&O x4 with confusion at times. Pt reported pain minimal pain when not moving that was managed with scheduled Tylenol. Pt refused to get up OOB this shift. Pt slept comfortably between cares. Continue POC.    VS: Temp: 99.2  F (37.3  C) Temp src: Oral BP: 128/44 Pulse: 68   Resp: 16 SpO2: 93 % O2 Device: None (Room air)      O2: SpO2 > 92% and stable on RA. LS clear and equal bilaterally. Denies chest pain and SOB.    Output: Voids spontaneously without difficulty to purewick with adequate UOP.   Last BM: 11/29/2023 per pt. denies abdominal discomfort. BS active / passing flatus.    Activity: Not OOB this shift, education was done.    Skin: WDL except, R hip surgical incisions; scattered bruises.    Pain: Pain managed with scheduled Tylenol.    CMS: Intact, A&O x4 with intermittent confusion Bed Alarm On. Denies numbness and tingling.   Dressing: Dressing to greater trochanter has scant dried drainage. Distal dressing x2 are CD     Diet: Regular diet. Denies nausea/vomiting.    LDA: x2 PIV SL.    Equipment: IV pole, personal belongings,    Plan: Continue with plan of care. Call light within reach, pt able to make needs known.    Additional Info: Bed Alarm on

## 2023-12-03 NOTE — PROGRESS NOTES
Orthopedic Surgery Progress Note: 12/03/2023    Subjective:   No acute events overnight. Pain well-controlled. Mobilizing with therapy who recommend TCU. No concerns today.     Objective:   /44 (BP Location: Right arm)   Pulse 68   Temp 99.2  F (37.3  C) (Oral)   Resp 16   Wt 63.9 kg (140 lb 14 oz)   LMP  (LMP Unknown)   SpO2 93%   BMI 24.56 kg/m    No intake/output data recorded.  General: NAD. Resting comfortably in bed.  Respiratory: Nonlabored breathing  Musculoskeletal:  RLE: Proximal dressing with small amount of dried strikethrough, middle and distal dressings c/d/I. SILT in the SP/DP/Mariia/Saph/Tib nerve distributions. Fires TA/GSC/FHL/EHL. 2+ DP pulse.     Laboratory Data:  Lab Results   Component Value Date    WBC 9.5 12/02/2023    HGB 8.6 (L) 12/02/2023     12/02/2023    INR 1.05 11/30/2023         Assessment & Plan:   Vickie Gonzalez is a 89 year old female now s/p right femoral intramedullary nail with Dr. Tsai on 12/1/23. Progressing appropriately postoperatively.      Plan for Today:  - PT/OT  - Pain control  - Medical management  - Disposition planning - PT recommending TCU placement     Medicine primary  Activity: Up with assist until independent.   Weight bearing status: WBAT RLE  Pain management: Oral pain medications with IV for breakthrough. Wean IV as able.    Antibiotics: Ancef x 24 hours  Diet: Begin with clear fluids and progress diet as tolerated.   DVT prophylaxis: Mechanical prophylaxis with SCD.  Recommend Lovenox starting postop day 1.  Imaging: PACU x-rays of the right hip  Labs: Monitor Hgb   Bracing/Splinting: None.   Dressings: Keep clean, dry and intact x5 days.  Reinforce as needed.  Okay to take down after 5-day javi.  Drains: None  Physical Therapy/Occupational Therapy: Eval and treat.  Posterior precautions  Consults: PT/OT, SW assistance for disposition planning.  Follow-up: Clinic in 2 weeks for wound check, 6 weeks with Dr. Tsai with new  x-rays of the right hip and AP pelvis.  Disposition: Admitted for physical therapy and pain control.     Orthopedic surgery staff for this patient is Dr. Tsai.    ------------------------------------------------------------------------------------------    Respectfully,    Kush Morales MD  Orthopedic Surgery PGY1  [phone]    Please page me directly with any questions/concerns during regular weekday hours before 5 pm. If there is no response, if it is a weekend, or if it is during evening hours then please page the orthopedic surgery resident on call.      FOLLOWUP:    Future Appointments   Date Time Provider Department [place]   12/3/2023  8:45 AM UR OT WAITLIST UROT [place]   12/3/2023  1:00 PM Shawna Rodríguez, PT URPT [place]   1/8/2024 10:30 AM [facility] DEVICE [facility]   4/8/2024 12:00 AM UC ICD REMOTE [facility]

## 2023-12-03 NOTE — PROGRESS NOTES
Report given was for my pt, but discovered transport was here for different pt. Correct team for correct pt arrived and report given.Transferred to [place].    Slight left facial droop  LUE  flacid  Left hand  weak  Last known well time 1300    All belongings charted on admission taken by family

## 2023-12-03 NOTE — PROVIDER NOTIFICATION
At about 1300 - while author was covering for the patient's nurse who was on break, received a call from the patients room with family asking to see the nurse  Author entered room where the family stated they were worried about the patient who they stated was slurring their words while attempting to eat.   Author assessed the patient to had decreased L sided weakness, drooping of L side of face, primarily at her lips. Slurring while speaking, inability to squeeze her left hand and reported decreased sensation to L side extremities. Vital signs taken, VSS  Author called nurse Severino and Charge nurse Kenia and updated on situation - stroke code was called by author, Message sent to hospitalist Dr. Gaviria    [id number]   5ortho - pt reports left sided weakness, drooping left side of mouth, difficulty w/ hand movements and sensation. VSS - at baseline. Are you able to come see the pt soon   David [id number]    Author was quickly joined by charge nurse and ALANA Haq followed by hospitalist and RN flyer. Orders placed -   Please see ALANA Haq and Dr. Gaviria note

## 2023-12-03 NOTE — PROGRESS NOTES
Cross covering hospitalist note.    This evening is noted to have a blood pressure of 118/41.  Propranolol holding parameters placed but RN request.  Patient reportedly denying any dizziness or lightheadedness.  Of note, lisinopril is also on hold.    Rosita SIFUENTES

## 2023-12-03 NOTE — CODE/RAPID RESPONSE
Rapid Response Team Note    Assessment   In assessment a rapid response was called on Vickie Gonzalez due to stroke code. Patient last known well time was approximately 15 minutes prior to the code stroke being called. Patient was noted to have new left sided weakness in her hand. On arrival to bedside, patient lying in bed with nursing and provider team at bedside. Patient's primary medicine provider at bedside and implementing plan of care. Code stroke head imaging was already ordered. Primary team was already speaking with Neurology. No interventions needed by the ICU team and will defer to the patient's primary hospital provider. No further monitoring will be conducted by the ICU team.    Plan   -  Patient on way to CT imaging  -  Follow up Per Primary Team    As always, please contact the ICU team if there are any questions or concerns for this patient.    Irina Quigley, SANDIP CNP  12/3/2023  1:35 PM  Asc #: [id number]     Hospital Course   Brief Summary of events leading to rapid response:   Patient is a 89 year old with a PMH of T2DM, CKD, sick sinus syndrome s/p pacer, remote hx of breast ca, and HTN who was admitted on 23 after a fall and subsequent fracture of the right proximal femur. She is now s/p right femoral intramedullary nail with Dr. Tsai on 23. On 12/3, she was noted to have new left sided weakness and a code stroke was called.     Admission Diagnosis:   Closed displaced comminuted fracture of shaft of right femur, initial encounter (H) [S72.351A]  Closed displaced intertrochanteric fracture of right femur, initial encounter (H) [S72.141A]    Physical Exam   Temp: 98.8  F (37.1  C) Temp  Min: 97.9  F (36.6  C)  Max: 99.2  F (37.3  C)  Resp: 16 Resp  Min: 16  Max: 16  SpO2: 92 % SpO2  Min: 92 %  Max: 93 %  Pulse: 71 Pulse  Min: 66  Max: 73    No data recorded  BP: (!) 142/41 Systolic (24hrs), Av , Min:102 , Max:157   Diastolic (24hrs), Av, Min:32, Max:51     I/Os:  "I/O last 3 completed shifts:  In: -   Out: 300 [Urine:300]     Exam:   General: in no acute distress  Neurological: Left sided weakness noted    Significant Results and Procedures   Lactic Acid: No results for input(s): \"LACT\", \"LACTS\" in the last 84541 hours.  CBC:   Recent Labs   Lab Test 12/03/23  1242 12/03/23  0739 12/02/23  0707 12/01/23  0715   WBC 10.6  --  9.5 9.5   HGB 8.6* 7.9* 8.6* 11.8   HCT 26.4*  --  25.8* 34.1*     --  165 173      "

## 2023-12-04 NOTE — PLAN OF CARE
Major Shift Events: Neuro exam inconsistent. 1 hour period where following commands and moving LUE and LLE.      12/04/23 0430   Neuro Exam   Level of Consciousness lethargic   Arousal Level arouses to voice   Orientation   (ANKUSH- incoherent)   Speech slurred;garbled;incoherent   Best Language   (ANKUSH)   Pupils (CN II)   Pupil PERRLA yes   Pupil Size Left 3 mm   Pupil Size Right 3 mm   Pupil Shape Left round   Pupil Shape Right round   Pupil Reaction Left brisk;equal   Pupil Reaction Right brisk;equal   Pupil Accommodation Left ANKUSH - sedated   Pupil Accommodation Right ANKUSH - sedated   Visual Field Cuts left  (field cut vs neglect)   Left Extraocular Movement conjugate   Right Extraocular Movement conjugate   Cranial Nerve Assess   Facial Sensation Unable to assess   Facial Symmetry Asymmetrical (describe)  (L droop at rest)   Corneal Reflex Not tested   Hearing - left diminished   Hearing - right diminished   Swallow/Gag Unable to assess   Shoulder shrug - left Other (see comments)  (ANKUSH- not following)   Shoulder shrug - right Other (see comments)  (ANKUSH- not following)   Tongue Deviation   (not following this command)   Pronator Drift   Left Pronator Drift   (ANKUSH sedated)   Right Pronator Drift   (ANKUSH sedated)   Coordination/Ataxia   Finger-to-nose ANKUSH - sedated   Grit teeth Unable to assess   Heel-to-shin -left Other (see comments)  (ankush)   Heel-to-shin -right Other (see comments)  (ankush)   Motor Strength   Left Upper Motor Strength 1 - slight contraction   Right Upper Motor Strength 3 - active movement against gravity   Left Lower Motor Strength 1 - slight contraction   Right Lower Motor Strength 2 - active movement of body part when gravity is eliminated   Neglect   Neglect/Inattention   (ankush sedated)   Visual Neglect left   All Extremities Neurovascular Assessment   General Temperature All Extremities cool   General Color All Extremities pale   General Sensation All Extremities   (ankush)   RLE Neurovascular  Assessment   Temperature RLE cool   Color RLE pale   Sensation RLE other (see comments)  (lolis)     Plan: monitor neuro status  For vital signs and complete assessments, please see documentation flowsheets.

## 2023-12-04 NOTE — PROGRESS NOTES
Neuro Interventional Progress Note    2023    Vickie Gonzalez is a 89 year old year old female patient admitted on 2023 with recent femur fracture who had a right M2 occlusion who underwent thrombectomy with TICI 0. She has left sided weakness, gaze preference and neglect.       Problem List  1. Acute right MCA stroke     24 hour events:  NAEON    24 Hour Vital Signs Summary:  Temperatures:  Current - Temp: 97.4  F (36.3  C); Max - Temp  Av.1  F (36.7  C)  Min: 97.4  F (36.3  C)  Max: 98.5  F (36.9  C)  Respiration range: Resp  Av.2  Min: 11  Max: 25  Pulse range: Pulse  Av.8  Min: 60  Max: 160  Blood pressure range: Systolic (24hrs), Av , Min:106 , Max:187   ; Diastolic (24hrs), Av, Min:35, Max:90    Pulse oximetry range: SpO2  Av.6 %  Min: 90 %  Max: 100 %    Current Medications:   [Held by provider] calcium carbonate-vitamin D  1 tablet Oral Daily    [Held by provider] cyanocobalamin  2,000 mcg Oral Daily    lidocaine  1 patch Transdermal Q24h    [Held by provider] lisinopril  20 mg Oral Daily    [Held by provider] polyethylene glycol  17 g Oral Daily    [Held by provider] propranolol  40 mg Oral BID    [Held by provider] senna-docusate  1 tablet Oral BID    sodium chloride (PF)  3 mL Intracatheter Q8H       PRN Medications:  acetaminophen **OR** acetaminophen, sore throat, bisacodyl, calcium carbonate, hydrALAZINE, HYDROmorphone **OR** HYDROmorphone, lidocaine 4%, lidocaine (buffered or not buffered), magnesium hydroxide, [Held by provider] methocarbamol, naloxone **OR** naloxone **OR** naloxone **OR** naloxone, ondansetron **OR** ondansetron, [Held by provider] oxyCODONE **OR** [Held by provider] oxyCODONE, prochlorperazine **OR** prochlorperazine, senna-docusate **OR** senna-docusate, sodium chloride (PF), sodium chloride (PF)    Infusions:   sodium chloride 75 mL/hr at 23 0800       Allergies   Allergen Reactions    Tamoxifen Analogues [Tamoxifen] Unknown      Annotation: hepatitis         Physical Examination:  Puncture site was clean, dry and intact without evidence of hematoma or pain to palpation.    Mental: , awake, right  gaze preference, not oriented, dysarthria   Cranial Nerves: left sided neglect, left face droop,right gaze preference  Motor: LUE 1/5, LLE triple flexion, RUE 4/5, 4/5 RLE  Sensory: left sided neglect  Cerebellar: brings hand to face without ataxia, left sided weakness.  Gait: deferred    Labs/Studies:  Recent Labs   Lab Test 12/04/23  0608 12/04/23  0429 12/03/23  2206 12/03/23  1242 12/03/23  0739 12/03/23  0611 12/02/23  0707 12/01/23  1137 12/01/23  0715   NA  --   --   --  135  --   --  135  --  135   POTASSIUM  --   --   --  4.6  --   --  4.1  --  4.1   CHLORIDE  --   --   --  100  --   --  100  --  100   CO2  --   --   --  28  --   --  28  --  26   ANIONGAP  --   --   --  7  --   --  7  --  9   GLC  --  120* 125* 157*  --    < > 138*   < > 132*   BUN  --   --   --  40.3*  --   --  36.6*  --  25.4*   CR  --   --   --  0.84  --   --  1.02*  --  0.87   WILFRID  --   --   --  9.3  --   --  8.5  --  9.9*   WBC  --   --   --  10.6  --   --  9.5  --  9.5   RBC  --   --   --  2.73*  --   --  2.69*  --  3.70*   HGB  --   --   --  8.6* 7.9*  --  8.6*  --  11.8     --   --  198  --   --  165  --  173    < > = values in this interval not displayed.       Recent Labs   Lab Test 12/03/23  1337 11/30/23  1750   INR 1.01 1.05   PTT 33 27         No lab results found in last 7 days.    Imaging:  Head Ct: IMPRESSION: Decreased sulcal hyperdensity with in the right  temporoparietal lobe, most of which is demonstrated to be contrast on  previous dual-energy CT. Unchanged small amount of subarachnoid  hemorrhage with in the right temporal lobe.       Assessment/Plan  Acute R MCA stroke- status post thrombectomy  Plan:  -swallow eval  -In patient stroke work-up  -serial exams    Janet Waters MD  Endovascular Surgical Neuroradiology Fellow  [facility]  [place]  [phone]    Endovascular Surgical Neuroradiology staff is Dr. Caldwell

## 2023-12-04 NOTE — PROGRESS NOTES
"   12/04/23 3666   Appointment Info   Signing Clinician's Name / Credentials (SLP) Dyllan Tovar MA [facility] SLP   General Information   Onset of Illness/Injury or Date of Surgery 12/03/23   Referring Physician Rodrigo Lundy MD   Pertinent History of Current Problem Per Provider Notes: \"Vickie Gonzalez is a 89 year old year old female patient admitted on 11/30/2023 with recent femur fracture who had a right M2 occlusion who underwent thrombectomy with TICI 0. She has left sided weakness, gaze preference and neglect.\"   General Observations Patient in bed with family present. She had just returned from CT scan. Noted left-side neglect.   Type of Evaluation   Type of Evaluation Swallow Evaluation   Oral Motor   Oral Musculature anomalies present   Mucosal Quality dry   Oral Motor Deficits Observed Cough/Swallow/Gag Reflex (Oral Motor) (Group);Facial Symmetry (Oral Motor) (Group);Lip Function (Oral Motor) (Group);Tongue Function (Oral Motor) (Group);Vocal Quality/Secretion Management (Oral Motor) (Group)   Dentition (Oral Motor)   Dentition (Oral Motor) natural dentition   Facial Symmetry (Oral Motor)   Facial Symmetry (Oral Motor) left side impairment   Left Side Facial Asymmetry minimal impairment;moderate impairment   Lip Function (Oral Motor)   Lip Range of Motion (Oral Motor) retraction impairment   Retraction, Lip Range of Motion left side;minimal impairment;moderate impairment   Tongue Function (Oral Motor)   Tongue Coordination/Speed (Oral Motor) reduced rate   Tongue ROM (Oral Motor) lateralization is impaired   Lateralization, Tongue ROM Impairment (Oral Motor) left side   Cough/Swallow/Gag Reflex (Oral Motor)   Volitional Throat Clear/Cough (Oral Motor) reduced strength   Volitional Swallow (Oral Motor) mildly delayed   Vocal Quality/Secretion Management (Oral Motor)   Vocal Quality (Oral Motor) dysphonic;hoarse   Secretion Management (Oral Motor) WNL   General Swallowing Observations   Past History " of Dysphagia None prior per chart review   Respiratory Support oxygen mask;other (see comments)  (1L)   Current Diet/Method of Nutritional Intake (General Swallowing Observations, NIS) NPO   Swallowing Evaluation Clinical swallow evaluation   Clinical Swallow Evaluation   Feeding Assistance dependent   Clinical Swallow Evaluation Textures Trialed thin liquids;pureed  (ice chips)   Clinical Swallow Eval: Thin Liquid Texture Trial   Mode of Presentation, Thin Liquids cup;spoon;other (see comments)  (hand over hand assist)   Volume of Liquid or Food Presented x2 ice chips, x2 tsp sips, x5 cup sips   Oral Phase of Swallow WFL   Pharyngeal Phase of Swallow coughing/choking   Diagnostic Statement cough x1 following initial cup sip, no other overt s/s of aspiration with further trials.   Clinical Swallow Evaluation: Puree Solid Texture Trial   Mode of Presentation, Puree spoon;fed by clinician   Volume of Puree Presented 2 tsps   Oral Phase, Puree residue in oral cavity   Oral Residue, Puree left anterior lateral sulci   Pharyngeal Phase, Puree intact   Diagnostic Statement Mild amount of left-side buccal cavity residue following each trial.   Esophageal Phase of Swallow   Patient reports or presents with symptoms of esophageal dysphagia No   Swallowing Recommendations   Diet Consistency Recommendations clear liquid diet;thin liquids (level 0)   Supervision Level for Intake 1:1 supervision needed   Mode of Delivery Recommendations bolus size, small;slow rate of intake   Monitoring/Assistance Required (Eating/Swallowing) check mouth frequently for oral residue/pocketing;cue for finger/lingual sweep if oral pocketing present;stop eating activities when fatigue is present;monitor for cough or change in vocal quality with intake   Recommended Feeding/Eating Techniques (Swallow Eval) maintain upright sitting position for eating;minimize distractions during oral intake;provide assist with feeding;set-up and prepare tray    Medication Administration Recommendations, Swallowing (SLP) Currently via non-oral methods.   Instrumental Assessment Recommendations   (Pending Progress)   General Therapy Interventions   Planned Therapy Interventions Dysphagia Treatment   Dysphagia treatment Modified diet education;Instruction of safe swallow strategies;Compensatory strategies for swallowing   Clinical Impression   Criteria for Skilled Therapeutic Interventions Met (SLP [place]) Yes, treatment indicated   SLP Diagnosis Moderate oropharyngeal dysphagia   Risks & Benefits of therapy have been explained evaluation/treatment results reviewed;care plan/treatment goals reviewed;risks/benefits reviewed;current/potential barriers reviewed;participants voiced agreement with care plan;participants included;patient  (grand-niece)   Clinical Impression Comments   CSE completed per provider orders. Patient presents with mild-moderate oropharyngeal dysphagia in setting of new stroke. Patient demonstrates left-side neglect, left facial droop and weakness, reduced cough strength, and dysphonic vocal quality. Controls secretions adequately. PO trials of thin liquids (spoon, cup) and puree textures provided for patient (cup sips with hand-over-hand assist). Oral phase c/b adequate bolus acceptance and closure, suspected reduced bolus organization, and mild amount of left-sided buccal cavity residue with puree textures cleared with mouth swab. Patient did demonstrate cough x1 following initial cup sip, but no other overt s/s of aspiration observed with further trials.     Recommend advance to clear liquids diet - thin liquids, with 1:1 supervision/assistance. Patient should be fully upright and alert for all intake, use small sips, slow rate of intake. Please discontinue intake if patient is too fatigued to safely participate. Patient is impulsive with talking during PO trials, so encourage patient to swallow sip first before attempting to talk - limit all  distractions during intake. If noted consistent s/s of aspiration with intake, make NPO and refer to SLP. SLP will follow for dysphagia management.     SLP Total Evaluation Time   Eval: oral/pharyngeal swallow function, clinical swallow Minutes ([id number]) 23   SLP Discharge Planning   SLP Discharge Recommendation [facility]-Motivated patient will benefit from intensive, interdisciplinary therapy.  Anticipate will be able to tolerate 3 hours of therapy per day   SLP Rationale for DC Rec Below baseline swallow fx.

## 2023-12-04 NOTE — PROGRESS NOTES
Admitted/transferred from: IR  Reason for admission/transfer: post-procedural care  2 RN skin assessment: completed by Carol Fitzgerald  Result of skin assessment and interventions/actions: R groin site, R femur surgical sites x3 with dressings intact, scattered bruising  Height, weight, drug calc weight: Done  Patient belongings (see Flowsheet)  MDRO education added to care planN/A  ?

## 2023-12-04 NOTE — IR NOTE
Neuro IR Nursing Procedure Note    Patient Name: Vickie Gonzalez  Medical Record Number: [id number]  Today's Date: 12/3/2023      Proceduralist: MD. BUSTER Jones    Event Time ()   Procedure Start 1951   Groin/Radial Puncture 1957   Clot ID [id number] (right MCA)   Pass N/A   IA Thrombolytic Start N/A   Vessel Recanalization N   Procedure End 2137       Sedation start time: 1944  Sedation end time: 2137  Sedation medications administered: 125 mcg of fentanyl, 2 mg of versed   Total sedation time: 113    Closure Device: angio-seal    Stroke Treatment Type: Aspiration    Post-procedure Education  The following information has been reviewed with the patient:    1. Maintain bedrest with right leg straight until 2335.  2. Notify nurse immediately if having any bleeding from site, any changes in sensation, or changes in strength.  3. Notify nurse if you have to go to bathroom, so staff will assist you.   4. Nurses will be doing frequent checks afterwards, including your foot or arm pulses, vitals, groin site and neuro exam.   5. Press your call light if you have any questions.    Learner's Response to Angiogram Education: patient not ready due to sedation     Patient brought to room 3 from 6A. Report given to Reena ZHANG RN. Patient transferred to CT post procedure, then 4E.     Kathe Duckworth RN

## 2023-12-04 NOTE — PROVIDER NOTIFICATION
At 1800, family at bedside noticed patient getting slurred speech, patient's left droop became more noticeable, and left drift got worse. MD came assesed at bedside. Plan is for IR Carotid Cerebral Angiogram Bilateral

## 2023-12-04 NOTE — PROGRESS NOTES
Post thrombectomy NIHSS    NIHSS  1a. Level of Consciousness 2-->Not alert, requires repeated stimulation to attend, or is obtunded and requires strong or painful stimulation to make movements (not stereotyped)   1b. LOC Questions 0-->Answers both questions correctly   1c. LOC Commands 1-->Performs one task correctly   2.   Best Gaze 1-->Partial gaze palsy, gaze is abnormal in one or both eyes, but forced deviation or total gaze paresis is not present   3.   Visual 0-->No visual loss   4.   Facial Palsy 1-->Minor paralysis (flattened nasolabial fold, asymmetry on smiling)   5a. Motor Arm, Left 2-->Some effort against gravity, limb cannot get to or maintain (if cued) 90 (or 45) degrees, drifts down to bed, but has some effort against gravity   5b. Motor Arm, Right 0-->No drift, limb holds 90 (or 45) degrees for full 10 secs   6a. Motor Leg, Left 2-->Some effort against gravity, leg falls to bed by 5 secs, but has some effort against gravity   6b. Motor Leg, right 0-->No drift, leg holds 30 degree position for full 5 secs   7.   Limb Ataxia 0-->Absent   8.   Sensory 0-->Normal, no sensory loss   9.   Best Language 3-->Mute, global aphasia, no usable speech or auditory comprehension   10. Dysarthria 2-->Severe dysarthria, patients speech is so slurred as to be unintelligible in the absence of or out of proportion to any dysphasia, or is mute/anarthric   11. Extinction and Inattention  2-->Profound juma-inattention/extinction more than 1 modality   Total 16 (12/03/23 8207)       Rodrigo Lundy MD  [facility]   Department of Neurology  PGY-4

## 2023-12-04 NOTE — PROGRESS NOTES
12/04/23 1600   Appointment Info   Signing Clinician's Name / Credentials (OT) Paulina West OTRL   Rehab Comments (OT) RLE WBAT, No hip prec   Living Environment   People in Home alone   Current Living Arrangements independent living facility   Home Accessibility no concerns   Transportation Anticipated health plan transportation;family or friend will provide   Living Environment Comments Pt lives alone in ILF, per family pt very active at baseline.   Self-Care   Usual Activity Tolerance moderate   Current Activity Tolerance poor   Equipment Currently Used at Home grab bar, toilet;grab bar, tub/shower;shower chair;walker, rolling   Fall history within last six months yes   Number of times patient has fallen within last six months 1   Activity/Exercise/Self-Care Comment Pt uses [employer] for grocery shopping/community distances, otherwise IND-[employer]   Instrumental Activities of Daily Living (IADL)   Previous Responsibilities meal prep;housekeeping;laundry;shopping   General Information   Onset of Illness/Injury or Date of Surgery 12/01/23   Referring Physician Bing Ferreira, CNP   Patient/Family Therapy Goal Statement (OT) to go home   Additional Occupational Profile Info/Pertinent History of Current Problem per chart Vickie Gonzalez is a 89 year old female with a past medical history of T2DM, CKD, sick sinus syndrome s/p pacer, remote hx of breast ca, and HTN, initially admitted for hip fracture, found to have a distal R M2 occlusion s/p DSA with no intervention c/b mild SAH. NIHSS 6 on presentation, repeat 18.   Existing Precautions/Restrictions fall;weight bearing   Right Lower Extremity (Weight-bearing Status) weight-bearing as tolerated (WBAT)   Cognitive Status Examination   Orientation Status person;place   Affect/Mental Status (Cognitive) anxious;low arousal/lethargic   Follows Commands follows one-step commands;50-74% accuracy   Cognitive Status Comments Will cont to monitor, pt with good LTM and remembering  RN's name, though at times not following simple commands   Visual Perception   Visual Attention   (L inattention)   Sensory   Sensory Comments decreased to light touch in LLE   Posture   Posture forward head position;protracted shoulders   Range of Motion Comprehensive   General Range of Motion bilateral upper extremity ROM WFL   Strength Comprehensive (MMT)   Comment, General Manual Muscle Testing (MMT) Assessment RUE WFL, no movement observed in LUE during session, per family pt has been able to wiggle L-toes   Coordination   Upper Extremity Coordination Left UE impaired;Right UE impaired   Gross Motor Coordination baseline tremor BUE   Bed Mobility   Scooting/Bridging Candor (Bed Mobility) maximum assist (25% patient effort);2 person assist   Sit-Stand Transfer   Sit/Stand Transfer Comments Anticipate heavy Ax2   Shower Transfer   Shower Transfer Comments Anticipate heavy Ax2   Toilet Transfer   Toilet Transfer Comments Anticipate heavy Ax2   Activities of Daily Living   BADL Assessment/Intervention lower body dressing;grooming;toileting   Lower Body Dressing Assessment/Training   Candor Level (Lower Body Dressing) maximum assist (25% patient effort)   Grooming Assessment/Training   Candor Level (Grooming) moderate assist (50% patient effort)   Toileting   Comment, (Toileting) per clinical judgement   Candor Level (Toileting) maximum assist (25% patient effort)   Clinical Impression   Criteria for Skilled Therapeutic Interventions Met (OT) Yes, treatment indicated   OT Diagnosis pt is below baseline for ADLs   OT Problem List-Impairments impacting ADL problems related to;activity tolerance impaired;balance;cognition;coordination;range of motion (ROM);sensation;pain;mobility;motor control;strength;post-surgical precautions;postural control   Assessment of Occupational Performance 3-5 Performance Deficits   Identified Performance Deficits dressing, toilet transfer, toileting, bathing, home  chores, functional mobility   Planned Therapy Interventions (OT) ADL retraining;transfer training;strengthening;home program guidelines;fine motor coordination training;motor coordination training;neuromuscular re-education;cognition   Clinical Decision Making Complexity (OT) detailed assessment/moderate complexity   Risk & Benefits of therapy have been explained evaluation/treatment results reviewed;patient;care plan/treatment goals reviewed;risks/benefits reviewed;current/potential barriers reviewed;participants voiced agreement with care plan;participants included  (niece and nephews)   Clinical Impression Comments pt presents significantly below baseline, limited by pain, post-surgical precautions, decreased activity tolerance, and L-sided weakness and inattention post-stroke. Pt will benefit from skilled OT to progress toward PLOF.   OT Total Evaluation Time   OT Eval, Low Complexity Minutes (38122) 10   OT Goals   Therapy Frequency (OT) 5 times/week   OT Predicted Duration/Target Date for Goal Attainment 12/16/23   OT Goals Lower Body Dressing;Toilet Transfer/Toileting;OT Goal 1;Hygiene/Grooming   OT: Hygiene/Grooming modified independent   OT: Lower Body Dressing Modified independent;using adaptive equipment   OT: Toilet Transfer/Toileting Modified independent;toilet transfer;cleaning and garment management;using adaptive equipment   OT: Goal 1 Jigar or less for shower transfer/task, using DME prn   Interventions   Interventions Quick Adds Self-Care/Home Management;Therapeutic Activity   Therapeutic Activities   Therapeutic Activity Minutes (78261) 34   Symptoms noted during/after treatment fatigue;increased pain   Treatment Detail/Skilled Intervention Facilitated EOB sitting to progess pt sensory input, improve activity tolerance post-stroke. Pt agreeable to trial EOB with max encouragement from TH's and family. Co-treat with PT to progress activity with two skilled disciplines. Pt supine > sit EOB with max A  x 2 via use of draw sheet and HOB raised. pt with significant pain in R-hip with movement, though once seated at EOB no further c/o pain. Pt with significant posterior lean once seated, max A to maintain upright posture. Despite cues for anterior lean, pt cont to leanposteriorly, suspect related to pain. Pt brushing teeth at EOB with mod A, pt with tremor in RUE, difficulty managing toothbrush, Th provding support at elbow and wrist for increased IND. Pt sit > supine with max A x 2. Pt rolling in bed with max Ax 2 for removal of soiled sheet,  boosted in bed with Ax2. Pt VSS on 1L.   OT Discharge Planning   OT Plan transfer to C progressing to toilet, LE dressing w/AE, g/h seated EOB, monitor cognition   OT Discharge Recommendation (DC Rec) Transitional Care Facility   OT Rationale for DC Rec Pt well below baseline, limited by pain and weakness, as well as baseline tremor and Min cognitive deficits.  She would benefit from continued therapy to maximize safety and independence with ADLs.   OT Brief overview of current status OH lift   Total Session Time   Timed Code Treatment Minutes 34   Total Session Time (sum of timed and untimed services) 44

## 2023-12-04 NOTE — PROGRESS NOTES
Stroke Education Note    The following information has been reviewed with the patient:    1. Warning signs of stroke    2. Calling 911 if having warning signs of stroke    3. All modifiable risk factors: hypertension, CAD, atrial fib, diabetes, hypercholesterolemia, smoking, substance abuse, diet, physical inactivity, obesity, sleep apnea    4. Patient's risk factors for stroke which include:  hypertension  diabetes  physical inactivity    5. Follow-up plan for after discharge    6. Discharge medications which include: TBD    In addition, the above information was given to the patient in writing as a part of the Understanding Stroke Handbook.     Learner's response to risk factors / lifestyle modification education: Taking steps     Dolly Joseph RN

## 2023-12-04 NOTE — PLAN OF CARE
Major Shift Events:  Pt answering orientation questions appropriately but forgetful and intermittently nonsensical words. Field cut on left side and neglect in the upper and lower extremities. Intermittently unable to move LUE. VSS on 1L NC. SBP<160, hydralazine given x2. Speech cleared pt for clear thin liquid diet with supervision. Rothman removed prior to shift and pt straight cathed at 1600 for 700ml. NS running at 75. Hmg 6.9, 1 unit of PRBC given and abd CT completed. R hip sore and unable to move d /t recent surgery, see MAR for PRNs given.    Plan: Continue serial neuros and manage pain. Pt due to void.     For vital signs and complete assessments, please see documentation flowsheets.

## 2023-12-04 NOTE — CONSULTS
Neurocritical Care History & Physical    Reason for critical care admission: AIS  Admitting Team: NCC  Date of Service:  12/03/2023  Date of Admission:  11/30/2023  Hospital Day: 4    Assessment/Plan  Vickie Gonzalez is a 89 year old female with a past medical history of T2DM, CKD, sick sinus syndrome s/p pacer, remote hx of breast ca, and HTN, initially admitted for hip fracture, found to have a distal R M2 occlusion s/p DSA with no intervention c/b mild SAH. NIHSS 6 on presentation, now 18.     Neuro  #M2 occ s/p DSA with no intervention c/b mild SAH  -Repeat Ct in 4 hours   -Neurochecks every 1 hrs  -SBP goal < 110-160 mmHg  -HOB > 30   -PT/OT/SLP    #Analgesics & sedation  #Post op pain control  #right femoral intramedullary nail following a hip fracture, POD#2  -Lidocaine patches  -limited sedation for now  -no active complaint of pain, was not getting oxycodone prior   -consider ortho consult to follow along     CV  #HTN  #sick sinus syndrome s/p pacer  -Cardiac monitoring  -SBP goal < 110-160 mmHg  -PRN Hydralazine to goal   -Holding home propranolol and lisinopril     Resp  #no acute issues  Oxygen/vent: on room air   -Continuous pulse ox  -Maintain O2 saturations greater than 92%    GI  #no acute issues  Diet: NPO strict pending speech eval (failed for facial droop)   Last BM: PTA   GI prophylaxis:  -Bowel regimen: scheduled senna-docusate and Miralax    Renal/  #THELMA, resolving   #uremia, unclear etiology   -Daily BMP  -IV fluids: NS @ 75  -Electrolyte replacement protocol    Endo  #DM2   -Hgb A1c  -Monitor glucose levels    Heme  #borderline macrocytic anemia   -Daily CBC  -Hgb goal >7, plt goal >50k  -Transfuse to meet Hgb and plt goals    ID  #no acute issues  -afebrile, no leukocytosis   -Daily CBC  -Follow temperature curve    ICU Checklist  Lines/tubes/drains: PIVx2  FEN: NPO, NS @ 75  PPX: DVT - SCDs, holding lovenox given SAH; GI - none.  Code: Full Code   Dispo: ICU - NCC    Clinically  "Significant Risk Factors                  # Hypertension: Noted on problem list            # Financial/Environmental Concerns: none   # Pacemaker present          The patient was seen and discussed with the NCC attending, Dr. Mcfarland.    Rodrigo Lundy MD  Neurology PGY-4    History of Present Illness:  Vickie Gonzalez is a 89 year old female with a past medical history of 2DM, CKD, sick sinus syndrome s/p pacer, remote hx of breast ca, and HTN who was initially admitted [facility] for right femoral intramedullary nail following a hip fracture for whom a stroke code was called after developing left facial droop and \"difficulty with hand movements and sensation.\"      CT/CTA was significant for distal R M2 occlusion prompting transfer to [place] for endovascular thrombectomy evaluation.  Upon being evaluated by neuro IR, her exam had significantly improved. After discussion with patient and family it was decided that between her age and now minimal symptoms that thrombectomy would be deferred at that time.  Patient was then transferred to unit 6A where she was examined by me.  Initial exam with NIHSS of 3 (see below).  Shortly after I examined the patient I was alerted by nursing staff that her deficits had significantly worsened.  I reexamined the patient, and found her NIHSS to be 7 (see below).  At that time I contacted neuro IR who came to assess the patient.  After discussion with patient, family, neuro IR, stroke team it was decided that the patient would be taken back to IR for cerebral angiogram and possible mechanical thrombectomy if deemed feasible.  Expect that the patient will be admitted to neurocritical care postprocedure.       Allergies   Allergen Reactions    Tamoxifen Analogues [Tamoxifen] Unknown     Annotation: hepatitis         Current Medications:   acetaminophen  975 mg Oral Q8H    calcium carbonate-vitamin D  1 tablet Oral Daily    cyanocobalamin  2,000 mcg Oral Daily    enoxaparin " "ANTICOAGULANT  40 mg Subcutaneous Q24H    [Held by provider] lisinopril  20 mg Oral Daily    polyethylene glycol  17 g Oral Daily    propranolol  40 mg Oral BID    senna-docusate  1 tablet Oral BID    sodium chloride (PF)  3 mL Intracatheter Q8H    sodium chloride (PF)  3 mL Intracatheter Q8H       PRN Medications:  acetaminophen **OR** acetaminophen, sore throat, bisacodyl, calcium carbonate, - MEDICATION INSTRUCTIONS -, HYDROmorphone **OR** HYDROmorphone, lidocaine 4%, lidocaine (buffered or not buffered), magnesium hydroxide, methocarbamol, naloxone **OR** naloxone **OR** naloxone **OR** naloxone, ondansetron **OR** ondansetron, oxyCODONE **OR** oxyCODONE, prochlorperazine **OR** prochlorperazine, senna-docusate **OR** senna-docusate, sodium chloride (PF), sodium chloride (PF)    Infusions:   - MEDICATION INSTRUCTIONS -      lactated ringers 50 mL/hr at 12/01/23 1335       Physical Examination:  Vitals: /43   Pulse 60   Temp 98.5  F (36.9  C) (Oral)   Resp 19   Ht 1.626 m (5' 4\")   Wt 65.5 kg (144 lb 6.4 oz)   LMP  (LMP Unknown)   SpO2 100%   BMI 24.79 kg/m    General: Adult female patient, lying in bed, critically-ill  Neuro:  Mental status: eyes closed, open with noxious stim. Mute. Gives two fingers in the RUE. Not repeating sentences.   Cranial nerves: right gaze preference, can be overcome. Blink to threat intact bilaterally. FAUSTO. Left facial droop noted.   Motor: RUE and RLE move spontaneously. LUE and LLE withdraw to pain. Tone and bulk appropriate.    Sensory: Intact to light touch x 4 extremities  Coordination: ANKUSH  Gait: ANKUSH, deferred.    NIHSS  Interval baseline (12/03/23 9446)   1a. Level of Consciousness 2-->Not alert, requires repeated stimulation to attend, or is obtunded and requires strong or painful stimulation to make movements (not stereotyped)   1b. LOC Questions 0-->Answers both questions correctly   1c. LOC Commands 1-->Performs one task correctly   2.   Best Gaze 1-->Partial " gaze palsy, gaze is abnormal in one or both eyes, but forced deviation or total gaze paresis is not present   3.   Visual 0-->No visual loss   4.   Facial Palsy 1-->Minor paralysis (flattened nasolabial fold, asymmetry on smiling)   5a. Motor Arm, Left 2-->Some effort against gravity, limb cannot get to or maintain (if cued) 90 (or 45) degrees, drifts down to bed, but has some effort against gravity   5b. Motor Arm, Right 0-->No drift, limb holds 90 (or 45) degrees for full 10 secs   6a. Motor Leg, Left 2-->Some effort against gravity, leg falls to bed by 5 secs, but has some effort against gravity   6b. Motor Leg, right 0-->No drift, leg holds 30 degree position for full 5 secs   7.   Limb Ataxia 0-->Absent   8.   Sensory 0-->Normal, no sensory loss   9.   Best Language 3-->Mute, global aphasia, no usable speech or auditory comprehension   10. Dysarthria 2-->Severe dysarthria, patients speech is so slurred as to be unintelligible in the absence of or out of proportion to any dysphasia, or is mute/anarthric   11. Extinction and Inattention  2-->Profound juma-inattention/extinction more than 1 modality   Total 16 (12/03/23 7197)       Labs and Imaging:  All relevant imaging and laboratory values personally reviewed.

## 2023-12-04 NOTE — PROCEDURES
[facility]     Endovascular Surgical Neuroradiology Post-Procedure Note    Pre-Procedure Diagnosis:  Stroke  Post-Procedure Diagnosis:  Stroke    Procedure(s):   Endovascular treatment of acute ischemic stroke    - TICI Score: 0    Findings:  Difficult to navigate arch anatomy, right MCA distal M2 occlusion with early venous shunting and tenacious clot, could not be navigated beyond safely. No passes of mechanical thrombectomy performed.    Plan:  Dual energy head CT now, 2 hours flat bedrest, continue permissive HTN    Primary Surgeon:  Dr. Drake Caldwell  Secondary Surgeon:  Not applicable  Secondary Surgeon Review:  None  Fellow:  Van  Additional Assistants:  none    Prior to the start of the procedure and with procedural staff participation, I verbally confirmed: the patient s identity using two indicators, relevant allergies, that the procedure was appropriate and matched the consent or emergent situation, and that the correct equipment/implants were available. Immediately prior to starting the procedure I conducted the Time Out with the procedural staff and re-confirmed the patient s name, procedure, and site/side. (The Joint Commission universal protocol was followed.)  Yes    PRU value: Not applicable    Anesthesia:  Conscious Sedation  Medications:   lidocaine 1% 10 ml intradermal, fentanyl 125 mcg IV, midazolam 2 mg IV  Puncture site:  Right Femoral Artery    Fluoroscopy time (minutes):  100.3  Radiation dose (mGy):   1811     Contrast amount (mL):   150      Estimated blood loss (mL):  100    Closure:  Device    Disposition:  Will be followed in hospital by the Neuro Critical Care/Stroke team.        Sedation Post-Procedure Summary    Sedatives: Fentanyl and Midazolam (Versed)    Vital signs and pulse oximetry were monitored and remained stable throughout the procedure, and sedation was maintained until the procedure was complete.  The patient was  monitored by staff until sedation discharge criteria were met.    Patient tolerance:  Oxygen Desaturation down to 37%, resolved with:   Supplementing/Increasing oxygen and adjustment of oxygen saturation probe.    Time of sedation in minutes:  113 minutes from beginning to end of physician one to one monitoring.    Abeba Araujo MD  Pager:  [phone]

## 2023-12-04 NOTE — PROGRESS NOTES
[facility]     Endovascular Surgical Neuroradiology Pre-Procedure Note      HPI:  Vickie Gonzalez is a 89 year old woman with recent right femur fracture who transferred for EVT for right MCA M2 occlusion which was not performed due to clinical improvement. Later, while resting and chatting with family, her symptoms worsened. Her NIHSS score has fluctuated from 3-10 and is currently 6.    Medical History:  Past Medical History:   Diagnosis Date    Breast cancer (H) right     Chronic kidney disease     Diabetes (H)     Goiter, nontoxic, multinodular     HTN (hypertension)     Hx antineoplastic chemotherapy     breast cancer     Hypertriglyceridemia without hypercholesterolemia     Osteopenia        Surgical History:  Past Surgical History:   Procedure Laterality Date    BIOPSY BREAST Right     MASTECTOMY Right        Family History:  Family History   Problem Relation Age of Onset    Pacemaker Sister     CABG Brother 67.00    Breast Cancer No family hx of        Social History:  Social History     Socioeconomic History    Marital status:      Spouse name: Not on file    Number of children: Not on file    Years of education: Not on file    Highest education level: Not on file   Occupational History    Not on file   Tobacco Use    Smoking status: Never    Smokeless tobacco: Never   Substance and Sexual Activity    Alcohol use: Yes    Drug use: No    Sexual activity: Not on file   Other Topics Concern    Not on file   Social History Narrative    Not on file     Social Determinants of Health     Financial Resource Strain: Not on file   Food Insecurity: Not on file   Transportation Needs: Not on file   Physical Activity: Not on file   Stress: Not on file   Social Connections: Not on file   Interpersonal Safety: Not on file   Housing Stability: Not on file       Allergies:  Allergies   Allergen Reactions    Tamoxifen Analogues [Tamoxifen] Unknown     Annotation:  hepatitis         Is there a contrast allergy?  No    Medications:  I have reviewed this patient's current medications.    ROS:  The 10 point Review of Systems is negative other than noted in the HPI or here.    PHYSICAL EXAMINATION  Vital Signs:  B/P: 156/43,  T: 98.1,  P: 70,  R: 18    Cardio:  RRR  Pulmonary:  no respiratory distress  Abdomen:  soft, non-tender, non-distended    Neurologic  Mental Status:  fully alert, attentive and oriented, follows commands, speech clear and fluent, left visual neglect  Cranial Nerves:  visual fields intact, PERRL, EOMI with normal smooth pursuit, facial sensation intact and symmetric, left facial droop, mild dysarthria  Motor:   LUE drift, R intact, LLE no drift  Sensory:   intact, neglects double simultaneous tactile stim  Coordination:  FNF and HS intact without dysmetria    Pre-procedure National Institutes of Health Stroke Scale:      Score    Level of consciousness: (0)   Alert, keenly responsive    LOC questions: (0)   Answers both questions correctly    LOC commands: (0)   Performs both tasks correctly    Best gaze: (0)   Normal    Visual: (0)   No visual loss    Facial palsy: (1)   Minor paralysis (flat nasolabial fold, smile asymmetry)    Motor arm (left): (1)   Drift    Motor arm (right): (0)   No drift    Motor leg (left): (0)   No drift    Motor leg (right): (0)   No drift    Limb ataxia: (0)   Absent    Sensory: (1)   Mild to moderate sensory loss    Best language: (0)   Normal- no aphasia    Dysarthria: (1)   Mild to moderate dysarthria    Extinction and inattention: (2)   Profound juma-inattention / extinction > one modality        Total Score:  6       LABS  (most recent Cr, BUN, GFR, PLT, INR, PTT within the past 7 days):  Recent Labs   Lab 12/03/23  1337 12/03/23  1242   CR  --  0.84   BUN  --  40.3*   GFRESTIMATED  --  63   PLT  --  198   INR 1.01  --    PTT 33  --         Platelet Function P2Y12 (PRU):  Not applicable      ASSESSMENT: R JOBY  occlusion    PLAN: Cerebral angiogram, mechanical thrombectomy        PRE-PROCEDURE SEDATION ASSESSMENT     Pre-Procedure Sedation Assessment done at 1900.    Expected Level:  Moderate Sedation    Indication:  Sedation is required to allow for neurointerventional procedure.    Consent obtained from relative Tona beckford after discussing the risks, benefits and alternatives.     PO Intake:  Not NPO but emergent condition outweighs risk.    ASA Class:  Class 3 - SEVERE SYSTEMIC DISEASE, DEFINITE FUNCTIONAL LIMITATIONS.    Mallampati:  Grade 2:  Soft palate, base of uvula, tonsillar pillars, and portion of posterior pharyngeal wall visible    History and physical reviewed and no updates needed. I have reviewed the lab findings, diagnostic data, medications, and the plan for sedation. I have determined this patient to be an appropriate candidate for the planned sedation and procedure and have reassessed the patient IMMEDIATELY PRIOR to sedation and procedure.    Patient was discussed with the Attending, Dr. Caldwell, who agrees with the plan.    Abeba Araujo MD   Pager: [phone]

## 2023-12-04 NOTE — PROGRESS NOTES
Neurocritical Care Progress Note    Reason for critical care admission: AIS  Admitting Team: PILAR  Date of Service:  12/04/2023  Date of Admission:  11/30/2023  Hospital Day: 5    Assessment/Plan  Vickie Gonzalez is a 89 year old female with a past medical history of T2DM, CKD, sick sinus syndrome s/p pacer, remote hx of breast ca, and HTN, initially admitted for hip fracture, found to have a distal R M2 occlusion s/p DSA with no intervention c/b mild SAH. NIHSS 6 on presentation, repeat 18.     24 hour events:   -s/p attempted mechanical thrombectomy with difficult to navigate arch anatomy, right MCA distal M2 occlusion with early venous shunting and tenacious clot, could not be navigated beyond safely. No passes of mechanical thrombectomy performed.   -Repeat CT Head post procedure 12/4 Unchanged small amount of subarachnoid hemorrhage with in the right temporal lobe  -VS: afebrile, HR 60s, 100-140s/40-50s, O2> 95% on 1 L NC   -hgb drop this AM to 6.9 from 8.6 with abdominal pain, ordered stat CTA AP with run off and CT AP to evaluate for retroperitoneal bleed  -RLE US doppler to evaluate for DVT due to recent right hip surgery     Neuro  #Right M2 occ s/p DSA with no intervention c/b mild SAH 2/2 to ESUS   -Neurochecks every 2 hours with plan to tentatively liberalize to Q4H tonight  -SBP goal < 110-160 mmHg  -Holding ASA 81 mg for secondary stroke prevention pending MRI brain   -MRI brain with & w/o contrast   -HOB > 30   -PT/OT/SLP     #Analgesics & sedation  #Post op pain control  #right femoral intramedullary nail following a hip fracture  -Lidocaine patches Q24H   -Tylenol 650 mg Q4H PRN  -PRN dilaudid 0.2-0.4 mg Q2H      CV  #HTN  #sick sinus syndrome s/p pacer  -Cardiac monitoring  -SBP goal < 110-160 mmHg  -PRN Hydralazine to goal   -Holding PTA 40 mg BID propranolol and lisinopril 20 mg daily      Resp  #no acute issues  Oxygen/vent: on room air with PRN NC   -Continuous pulse ox  -Maintain O2 saturations  greater than 92%     GI  #no acute issues  Diet: NPO strict pending speech eval (failed for facial droop)   Last BM: PTA   GI prophylaxis:  -Bowel regimen: scheduled senna-docusate and Miralax     Renal/  #THELMA, resolved    #uremia, unclear etiology   -Daily BMP  -IV fluids: NS @ 75  -High electrolyte replacement protocol     Endo  #DM2   -Hgb A1c 5.8  -Monitor glucose levels     Heme  #Acute normocytic anemia   -Daily CBC  -s/p 1 unit  pRBCs   -repeat hgb at 6 PM   -Hgb goal >7, plt goal >50k  -Transfuse to meet Hgb and plt goals     ID  #no acute issues  -Daily CBC  -Follow temperature curve     ICU Checklist  Lines/tubes/drains: PIVx2  FEN: NPO, NS @ 75  PPX: DVT - SCDs, holding lovenox given SAH; GI - none.  Code: Full Code   Dispo: ICU - NCC    The patient was seen and discussed with the NCC attending, Dr. Mcfarland.    Jessica Abraham MD   PGY2 Neurology     24 Hour Vital Signs Summary:  Temp: 98.1  F (36.7  C) Temp  Min: 97.4  F (36.3  C)  Max: 98.5  F (36.9  C)  Resp: 17 Resp  Min: 11  Max: 25  SpO2: 100 % SpO2  Min: 90 %  Max: 100 %  Pulse: 75 Pulse  Min: 60  Max: 160    No data recorded  BP: (!) 182/62 Systolic (24hrs), Av , Min:106 , Max:187   Diastolic (24hrs), Av, Min:35, Max:90      Respiratory monitoring:   Resp: 17      I/O last 3 completed shifts:  In: 1360 [I.V.:360; IV Piggyback:1000]  Out: 1345 [Urine:1345]    Current Medications:   [Held by provider] calcium carbonate-vitamin D  1 tablet Oral Daily    [Held by provider] cyanocobalamin  2,000 mcg Oral Daily    lidocaine  1 patch Transdermal Q24h    [Held by provider] lisinopril  20 mg Oral Daily    [Held by provider] polyethylene glycol  17 g Oral Daily    [Held by provider] propranolol  40 mg Oral BID    [Held by provider] senna-docusate  1 tablet Oral BID    sodium chloride (PF)  3 mL Intracatheter Q8H       PRN Medications:  acetaminophen **OR** acetaminophen, sore throat, bisacodyl, calcium carbonate, hydrALAZINE, HYDROmorphone  "**OR** HYDROmorphone, lidocaine 4%, lidocaine (buffered or not buffered), magnesium hydroxide, [Held by provider] methocarbamol, naloxone **OR** naloxone **OR** naloxone **OR** naloxone, ondansetron **OR** ondansetron, [Held by provider] oxyCODONE **OR** [Held by provider] oxyCODONE, prochlorperazine **OR** prochlorperazine, senna-docusate **OR** senna-docusate, sodium chloride (PF), sodium chloride (PF)    Infusions:   sodium chloride 75 mL/hr at 12/04/23 0800       Allergies   Allergen Reactions    Tamoxifen Analogues [Tamoxifen] Unknown     Annotation: hepatitis         Physical Examination:  Vitals: BP (!) 182/62   Pulse 75   Temp 98.1  F (36.7  C)   Resp 17   Ht 1.626 m (5' 4\")   Wt 65.5 kg (144 lb 6.4 oz)   LMP  (LMP Unknown)   SpO2 100%   BMI 24.79 kg/m    General: Adult female patient, lying in bed, critically-ill  HEENT: Normocephalic, atraumatic, no icterus, oral cavity/oropharynx pink and moist  Cardiac: RRR  Pulm: CTAB, unlabored, expansion symmetric, no retractions or use of accessory muscles  Abdomen: Soft, non-distended, diffuse tenderness to palpation right greater than left   Extremities: Warm, no edema, distal pulses +2, well perfused  Skin: No rash or lesion  Psych: Calm and cooperative    Neuro:  Mental status: Awake, alert, attentive, oriented to self and place but not circumstance of stroke. Follow simple commands but not complex. Moderate to severe dysarthria but no aphasia  Cranial nerves: VFF, PERRL, conjugate gaze, right gaze preference that could cross midline, EOMI, face symmetric without droop   Motor: Normal bulk and tone. No abnormal movements. Antigravity in RUE doesn't fall to bed but LUE with antigravity with fall to bed, wiggles toes bilaterally but limited strength due to hip pain  Sensory: Intact to light touch x 4 extremities  Coordination: left sided neglect   Gait: ANKUSH, deferred.      NIHSS  1a. Level of Consciousness 2-->Not alert, requires repeated stimulation to " attend, or is obtunded and requires strong or painful stimulation to make movements (not stereotyped)   1b. LOC Questions 0-->Answers both questions correctly   1c. LOC Commands 1-->Performs one task correctly   2.   Best Gaze 1-->Partial gaze palsy, gaze is abnormal in one or both eyes, but forced deviation or total gaze paresis is not present   3.   Visual 0-->No visual loss   4.   Facial Palsy 1-->Minor paralysis (flattened nasolabial fold, asymmetry on smiling)   5a. Motor Arm, Left 2-->Some effort against gravity, limb cannot get to or maintain (if cued) 90 (or 45) degrees, drifts down to bed, but has some effort against gravity   5b. Motor Arm, Right 0-->No drift, limb holds 90 (or 45) degrees for full 10 secs   6a. Motor Leg, Left 2-->Some effort against gravity, leg falls to bed by 5 secs, but has some effort against gravity   6b. Motor Leg, right 0-->No drift, leg holds 30 degree position for full 5 secs   7.   Limb Ataxia 0-->Absent   8.   Sensory 0-->Normal, no sensory loss   9.   Best Language 3-->Mute, global aphasia, no usable speech or auditory comprehension   10. Dysarthria 2-->Severe dysarthria, patients speech is so slurred as to be unintelligible in the absence of or out of proportion to any dysphasia, or is mute/anarthric   11. Extinction and Inattention  2-->Profound juma-inattention/extinction more than 1 modality   Total 16 (12/03/23 9599)       Hinton and Kulkarni Scale (at arrival)  Grade             Labs and Imaging:    Results for orders placed or performed during the hospital encounter of 11/30/23 (from the past 24 hour(s))   CT Head w/o Contrast    Narrative    EXAM: CT HEAD W/O CONTRAST  12/3/2023 1:44 PM     HISTORY: Code Stroke to evaluate for potential thrombolysis and  thrombectomy. PLEASE READ IMMEDIATELY       COMPARISON: Head CT 11/30/2023    TECHNIQUE: Using multidetector thin collimation helical acquisition  technique, axial, coronal and sagittal CT images from the skull base  to the  vertex were obtained without intravenous contrast.   (topogram) image(s) also obtained and reviewed.    FINDINGS:  No acute intracranial hemorrhage, mass effect, or midline shift. No  acute loss of gray-white matter differentiation. Ventricles are  proportionate to the cerebral sulci. Clear basal cisterns. Moderate  cerebral volume loss. Patchy hypoattenuation in the periventricular  white matter, likely secondary to chronic small vessel ischemic  changes given the patient's age.    Atraumatic calvarium. Clear paranasal sinuses, mastoid air cells.  Nonfocal orbits.       Impression    IMPRESSION:   1. No acute intracranial abnormality. Aspects score is 10.  2. Moderate generalized cerebral volume loss.  3. Leukoaraiosis     I have personally reviewed the examination and initial interpretation  and I agree with the findings.    ZEFERINO TOUSSAINT MD         SYSTEM ID:  [id number]   EKG 12-lead, complete   Result Value Ref Range    Systolic Blood Pressure  mmHg    Diastolic Blood Pressure  mmHg    Ventricular Rate 71 BPM    Atrial Rate 71 BPM    KY Interval 244 ms    QRS Duration 160 ms     ms    QTc 508 ms    P Axis 61 degrees    R AXIS -14 degrees    T Axis 100 degrees    Interpretation ECG       ** Poor data quality, interpretation may be adversely affected  Sinus rhythm with 1st degree A-V block  Left bundle branch block  Abnormal ECG  When compared with ECG of 30-NOV-2023 16:35,  Sinus rhythm has replaced Electronic atrial pacemaker  QRS axis Shifted right  T wave inversion now evident in Lateral leads     CTA Head Neck with Contrast   Result Value Ref Range    Radiologist flags Ischemic penumbra and arterial occlusion (Urgent)     Narrative    CTA HEAD NECK W CONTRAST, CT HEAD PERFUSION W CONTRAST 12/3/2023 2:01  PM    CT Head without contrast  CT Perfusion Study of the Brain with Contrast  CT Angiogram of the Neck with contrast   CT Angiogram of the Head with contrast    History:  Code Stroke to evaluate  for potential thrombolysis and  thrombectomy. PLEASE READ IMMEDIATELY.  ICD-10:    Comparison: Head CT 11/30/2023.     Technique:   HEAD CT: Using multidetector thin collimation helical acquisition  technique, axial, coronal and sagittal CT images were obtained from  the base of the skull to the vertex without intravenous contrast and  reviewed in brain, bone, and subdural windows.     CT BRAIN PERFUSION: Dynamic perfusion CT of the brain was performed at  multiple levels with 10 mm slice thickness; levels were imaged during  2 separate rapid bolus intravenous injections of nonionic iodinated  contrast medium, 1) centered at the level of  the basal ganglia, and  2) centered at the level of the top of the lateral ventricles. Each  injection required approximately 40 cc of intravenous nonionic  contrast.  Images were reconstructed and reviewed on the [employer] 3D  workstation.    HEAD and NECK CTA: Thereafter, postcontrast images were obtained from  the aortic arch through the [place].  Axial images were  obtained using thin collimation multidetector helical technique. This  CT angiogram data was reconstructed at thin intervals with mild  overlap. Images were sent to the [employer] workstation, and 3D  reconstructions and multiplanar reformations were obtained with  reconstructions performed by the technologist and the radiologist. The  source images, multiplanar reformations, 3D reconstructions in both  maximum intensity projection display and volume rendered models were  reviewed,     Contrast: 67 mL Isovue 370 (accession [id number]), 50 mL Isovue 370  (accession [id number])    Findings:   Head CT: No acute intracranial hemorrhage, mass effect, or midline  shift. No acute loss of gray-white matter differentiation. Ventricles  are proportionate to the cerebral sulci. Clear basal cisterns.  Moderate cerebral volume loss. Patchy hypoattenuation in the  periventricular white matter, likely secondary to chronic small  vessel  ischemic changes given the patient's age. Aspect score is 10/10.    The visualized portions of the paranasal sinuses and mastoid air cells  are clear.    CT Perfusion: Images documenting relative cerebral blood volume,  cerebral blood flow and mean transit time demonstrate no evidence of  cord infarct. Delayed Tmax in the right parietal lobe approximately  molding is 17 cc compatible with ischemic penumbra.    Head CTA demonstrates abrupt occlusion of a right MCA distal M2 branch  arising from the superior division best seen on sagittal images on  series 14 image 60. The anterior communicating artery is patent. The  posterior communicating arteries are patent bilaterally. No aneurysm  identified    . Neck CTA shows patent cervical vasculature. Calcified plaques in the  carotid bifurcations without irregularity or significant stenosis.  Subclavian arteries are patent. Normal vertebral arteries. No mass is  identified in the neck.    No mass is noted within the visualized portions of the cervical soft  tissues or lung apices.       Impression    Impression:     1. No evidence of acute infarct. Aspect score is 10.  2. Ischemic penumbra of 17 cc in the right parietal lobe. No core  infarct identified on perfusion exam.  3. Corresponding to the area of ischemic penumbra, there is abrupt  occlusion of a distal right MCA M2 branch arising from superior  division.  4. Atherosclerotic calcifications in the carotid bifurcations.      [Urgent Result: Ischemic penumbra and arterial occlusion]    Finding was identified on 12/3/2023 2:05 PM.     Dr. Zendejas was contacted by Dr. Freeman at 12/3/2023 5:00 PM and  verbalized understanding of the urgent finding.     I have personally reviewed the examination and initial interpretation  and I agree with the findings.    ZEFERINO TOUSSAINT MD         SYSTEM ID:  [id number]   EKG 12-lead, tracing only   Result Value Ref Range    Systolic Blood Pressure  mmHg    Diastolic Blood Pressure   mmHg    Ventricular Rate 72 BPM    Atrial Rate 72 BPM    GA Interval 416 ms    QRS Duration 160 ms     ms    QTc 494 ms    P Axis 53 degrees    R AXIS -26 degrees    T Axis 115 degrees    Interpretation ECG       Sinus rhythm with 1st degree A-V block with Premature atrial complexes  Left bundle branch block  Abnormal ECG  When compared with ECG of 03-DEC-2023 14:01, (unconfirmed)  Premature atrial complexes are now Present     CT Head Perfusion w Contrast   Result Value Ref Range    Radiologist flags Ischemic penumbra and arterial occlusion (Urgent)     Narrative    CTA HEAD NECK W CONTRAST, CT HEAD PERFUSION W CONTRAST 12/3/2023 2:01  PM    CT Head without contrast  CT Perfusion Study of the Brain with Contrast  CT Angiogram of the Neck with contrast   CT Angiogram of the Head with contrast    History:  Code Stroke to evaluate for potential thrombolysis and  thrombectomy. PLEASE READ IMMEDIATELY.  ICD-10:    Comparison: Head CT 11/30/2023.     Technique:   HEAD CT: Using multidetector thin collimation helical acquisition  technique, axial, coronal and sagittal CT images were obtained from  the base of the skull to the vertex without intravenous contrast and  reviewed in brain, bone, and subdural windows.     CT BRAIN PERFUSION: Dynamic perfusion CT of the brain was performed at  multiple levels with 10 mm slice thickness; levels were imaged during  2 separate rapid bolus intravenous injections of nonionic iodinated  contrast medium, 1) centered at the level of  the basal ganglia, and  2) centered at the level of the top of the lateral ventricles. Each  injection required approximately 40 cc of intravenous nonionic  contrast.  Images were reconstructed and reviewed on the [email or web address] 3D  workstation.    HEAD and NECK CTA: Thereafter, postcontrast images were obtained from  the aortic arch through the [place].  Axial images were  obtained using thin collimation multidetector helical technique. This  CT  angiogram data was reconstructed at thin intervals with mild  overlap. Images were sent to the [employer] workstation, and 3D  reconstructions and multiplanar reformations were obtained with  reconstructions performed by the technologist and the radiologist. The  source images, multiplanar reformations, 3D reconstructions in both  maximum intensity projection display and volume rendered models were  reviewed,     Contrast: 67 mL Isovue 370 (accession [id number]), 50 mL Isovue 370  (accession [id number])    Findings:   Head CT: No acute intracranial hemorrhage, mass effect, or midline  shift. No acute loss of gray-white matter differentiation. Ventricles  are proportionate to the cerebral sulci. Clear basal cisterns.  Moderate cerebral volume loss. Patchy hypoattenuation in the  periventricular white matter, likely secondary to chronic small vessel  ischemic changes given the patient's age. Aspect score is 10/10.    The visualized portions of the paranasal sinuses and mastoid air cells  are clear.    CT Perfusion: Images documenting relative cerebral blood volume,  cerebral blood flow and mean transit time demonstrate no evidence of  cord infarct. Delayed Tmax in the right parietal lobe approximately  molding is 17 cc compatible with ischemic penumbra.    Head CTA demonstrates abrupt occlusion of a right MCA distal M2 branch  arising from the superior division best seen on sagittal images on  series 14 image 60. The anterior communicating artery is patent. The  posterior communicating arteries are patent bilaterally. No aneurysm  identified    . Neck CTA shows patent cervical vasculature. Calcified plaques in the  carotid bifurcations without irregularity or significant stenosis.  Subclavian arteries are patent. Normal vertebral arteries. No mass is  identified in the neck.    No mass is noted within the visualized portions of the cervical soft  tissues or lung apices.       Impression    Impression:     1. No evidence  of acute infarct. Aspect score is 10.  2. Ischemic penumbra of 17 cc in the right parietal lobe. No core  infarct identified on perfusion exam.  3. Corresponding to the area of ischemic penumbra, there is abrupt  occlusion of a distal right MCA M2 branch arising from superior  division.  4. Atherosclerotic calcifications in the carotid bifurcations.      [Urgent Result: Ischemic penumbra and arterial occlusion]    Finding was identified on 12/3/2023 2:05 PM.     Dr. Zendejas was contacted by Dr. Freeman at 12/3/2023 5:00 PM and  verbalized understanding of the urgent finding.     I have personally reviewed the examination and initial interpretation  and I agree with the findings.    ZEFERINO TOUSSAINT MD         SYSTEM ID:  [id number]   Glucose by meter   Result Value Ref Range    GLUCOSE BY METER POCT 125 (H) 70 - 99 mg/dL   CT Head w/o Contrast   Result Value Ref Range    Radiologist flags (AA)      Small amount of subarachnoid hemorrhage in the right    Narrative    EXAM: CT HEAD W/O CONTRAST  12/3/2023 10:26 PM     HISTORY:  s/p attempted right MCA thrombectomy, concern for extrav       COMPARISON:  Angiogram 12/3/2023, CT head 12/3/2023    TECHNIQUE: Exam was obtained with dual energy CT. Using multidetector  thin collimation helical acquisition technique, axial, coronal and  sagittal CT images from the skull base to the vertex were obtained  without intravenous contrast.  (topogram) image(s) also obtained  and reviewed. Virtual nonenhanced CT images were obtained    FINDINGS:  There is contrast present within the subarachnoid spaces of the right  parietal lobe. Small amount of hemorrhage present in the right sylvian  fissure when correlated with virtual nonenhanced CT. No acute loss of  gray-white matter differentiation in the cerebral hemispheres.  Ventricles are proportionate to the cerebral sulci. Clear basal  cisterns. Mild generalized cerebral volume loss.    The bony calvaria and the bones of the skull base  are normal. The  visualized portions of the paranasal sinuses and mastoid air cells are  clear. Grossly normal orbits.       Impression    IMPRESSION:   1. Free contrast extravasation in the right sylvian sulcus and  temporoparietal sulci from recent thrombectomy.  2. Small amount of subarachnoid hemorrhage is present in the right  sylvian fissure.    [Critical Result: Small amount of subarachnoid hemorrhage in the right  sylvian fissure and right parietal sulcal free subarachnoid contrast  enhancement.    Finding was identified on 12/3/2023 10:30 PM.     Dr. Araujo was contacted by Dr. Valerio at 12/3/2023 10:38 PM and  verbalized understanding of the critical finding.     I have personally reviewed the examination and initial interpretation  and I agree with the findings.    ZEFERINO TOUSSAINT MD         SYSTEM ID:  [id number]   CT Head w/o Contrast    Narrative    EXAM: CT HEAD W/O CONTRAST  12/4/2023 2:49 AM     HISTORY:  follow up thrombectomy scan, interval development of SAH.       COMPARISON:  CT head 12/3/2023    TECHNIQUE: Using multidetector thin collimation helical acquisition  technique, axial, coronal and sagittal CT images from the skull base  to the vertex were obtained without intravenous contrast.   (topogram) image(s) also obtained and reviewed.    FINDINGS:    There is contrast present in the sulci of the right parietal lobe.  Unchanged small amount of subarachnoid hemorrhage. Mild generalized  cerebral volume loss. Patchy periventricular hypoattenuation,  consistent with chronic small vessel ischemic disease.    No acute intracranial hemorrhage, mass effect, or midline shift. No  acute loss of gray-white matter differentiation in the cerebral  hemispheres. Ventricles are proportionate to the cerebral sulci. Clear  basal cisterns.    The bony calvaria and the bones of the skull base are normal. The  visualized portions of the paranasal sinuses and mastoid air cells are  clear. Grossly normal orbits.        Impression    IMPRESSION: Decreased sulcal hyperdensity with in the right  temporoparietal lobe, most of which is demonstrated to be contrast on  previous dual-energy CT. Unchanged small amount of subarachnoid  hemorrhage with in the right temporal lobe.    I have personally reviewed the examination and initial interpretation  and I agree with the findings.    GLENN SANCHEZ MD         SYSTEM ID:  [id number]   Glucose by meter   Result Value Ref Range    GLUCOSE BY METER POCT 120 (H) 70 - 99 mg/dL   Platelet count   Result Value Ref Range    Platelet Count 152 150 - 450 10e3/uL   Comprehensive metabolic panel   Result Value Ref Range    Sodium 145 135 - 145 mmol/L    Potassium 4.2 3.4 - 5.3 mmol/L    Carbon Dioxide (CO2) 24 22 - 29 mmol/L    Anion Gap 10 7 - 15 mmol/L    Urea Nitrogen 25.9 (H) 8.0 - 23.0 mg/dL    Creatinine 0.63 0.51 - 0.95 mg/dL    GFR Estimate 80 >60 mL/min/1.73m2    Calcium 8.3 8.2 - 9.6 mg/dL    Chloride 111 (H) 98 - 107 mmol/L    Glucose 110 (H) 70 - 99 mg/dL    Alkaline Phosphatase 42 40 - 150 U/L    AST 29 0 - 45 U/L    ALT <5 0 - 50 U/L    Protein Total 5.6 (L) 6.4 - 8.3 g/dL    Albumin 2.9 (L) 3.5 - 5.2 g/dL    Bilirubin Total 0.3 <=1.2 mg/dL   INR   Result Value Ref Range    INR 1.08 0.85 - 1.15   Partial thromboplastin time   Result Value Ref Range    aPTT 22 22 - 38 Seconds   CBC with platelets   Result Value Ref Range    WBC Count 6.7 4.0 - 11.0 10e3/uL    RBC Count 2.21 (L) 3.80 - 5.20 10e6/uL    Hemoglobin 6.9 (LL) 11.7 - 15.7 g/dL    Hematocrit 21.9 (L) 35.0 - 47.0 %    MCV 99 78 - 100 fL    MCH 31.2 26.5 - 33.0 pg    MCHC 31.5 31.5 - 36.5 g/dL    RDW 13.5 10.0 - 15.0 %    Platelet Count 141 (L) 150 - 450 10e3/uL   Prepare red blood cells (unit)   Result Value Ref Range    Blood Component Type Red Blood Cells     Product Code [id number]     Unit Status Transfused     Unit Number [id number]     CROSSMATCH Compatible     CODING SYSTEM [id number]     ISSUE DATE AND TIME  [id number]     UNIT ABO/RH A+     UNIT TYPE ISBT 6200    CTA Abdomen Pelvis with Contrast    Impression    Impression:  1. No intra-abdominal or groin hematoma and no evidence for active  arterial extravasation.  2. Right femoral surgical fixation with asymmetric subcutaneous and  intramuscular postoperative edema, without discrete hematoma.  3. Atelectasis and edema in the lung bases.         All relevant imaging and laboratory values personally reviewed.

## 2023-12-04 NOTE — PROGRESS NOTES
"   12/04/23 1712   Appointment Info   Signing Clinician's Name / Credentials (PT) Carmina Pinon DPT   Rehab Comments (PT) WBAT R LE, left hemiparesis   Living Environment   People in Home alone   Current Living Arrangements independent living facility   Home Accessibility no concerns   Transportation Anticipated health plan transportation;family or friend will provide   Living Environment Comments Pt lives alone in ILF, very active at baseline per family.   Self-Care   Usual Activity Tolerance moderate   Current Activity Tolerance poor   Regular Exercise Yes   Activity/Exercise Type other (see comments)  (Yoga)   Exercise Amount/Frequency 3-5 times/wk   Equipment Currently Used at Home grab bar, toilet;grab bar, tub/shower;shower chair;walker, rolling   Fall history within last six months yes   Number of times patient has fallen within last six months 1   Activity/Exercise/Self-Care Comment Pt uses [employer] for grocery shopping and community distances, but otherwise IND   General Information   Onset of Illness/Injury or Date of Surgery 11/30/23   Referring Physician Bing Ferreira CNP   Pertinent History of Current Problem (include personal factors and/or comorbidities that impact the POC) Per chart \"Pt is a 89 year old female with a past medical history of T2DM, CKD, sick sinus syndrome s/p pacer, remote hx of breast ca, and HTN, initially admitted for hip fracture, found to have a distal R M2 occlusion s/p DSA with no intervention c/b mild SAH. \"   Existing Precautions/Restrictions fall;other (see comments);weight bearing  (left hemiparesis)   Weight-Bearing Status - RLE weight-bearing as tolerated   Cognition   Affect/Mental Status (Cognition) WFL   Orientation Status (Cognition) oriented x 4   Follows Commands (Cognition) follows one-step commands;over 90% accuracy   Pain Assessment   Patient Currently in Pain Yes, see Vital Sign flowsheet  (right hip pain)   Integumentary/Edema   Integumentary/Edema no deficits " were identifed   Posture    Posture Kyphosis;Forward head position   Range of Motion (ROM)   Range of Motion ROM deficits secondary to pain   Strength (Manual Muscle Testing)   Strength Comments Left hemiparesis, limited testing pt with pain in RLE   Bed Mobility   Comment, (Bed Mobility) Pt rolls in bed with Max A x 2.   Transfers   Comment, (Transfers) impaired per clinical judgement, heavy a x 2, pt with left hemiparesis   Gait/Stairs (Locomotion)   Comment, (Gait/Stairs) impaired per clinical judgement, anticipate heavy A x 2, left hemiparesis   Balance   Balance Comments Pt retropulsive at EOB, required Max A to maintain upright sitting   Sensory Examination   Sensory Perception other (describe)   Sensory Perception Comments impaired sensation in LLE   Clinical Impression   Criteria for Skilled Therapeutic Intervention Yes, treatment indicated   PT Diagnosis (PT) impaired functional mobility   Influenced by the following impairments left hemiparesis, pain limiting function, activity intolerance, impaired balance, left neglect   Functional limitations due to impairments bed mob, transfers, gait   Clinical Presentation (PT Evaluation Complexity) evolving   Clinical Presentation Rationale PMHx, clinical judgement, current presentation   Clinical Decision Making (Complexity) low complexity   Planned Therapy Interventions (PT) balance training;bed mobility training;gait training;home exercise program;neuromuscular re-education;patient/family education;ROM (range of motion);strengthening;stretching;transfer training;progressive activity/exercise;risk factor education;home program guidelines   Risk & Benefits of therapy have been explained care plan/treatment goals reviewed   PT Total Evaluation Time   PT Eval, Re-eval Minutes (27798) 6   Physical Therapy Goals   PT Frequency 6x/week   PT Predicted Duration/Target Date for Goal Attainment 12/18/23   PT Goals Bed Mobility;Transfers;Gait   PT: Bed Mobility  Independent;Supine to/from sit;Rolling;Bridging   PT: Transfers Modified independent;Sit to/from stand;Bed to/from chair;Assistive device   PT: Gait Modified independent;Rolling walker;50 feet   PT Discharge Planning   PT Plan bed mob, EOB, sit->stand when ella, pre-medicate for pain   PT Discharge Recommendation (DC Rec) Transitional Care Facility   PT Rationale for DC Rec Pt is well below baseline level of function, left hemiparesis and pain limiting function, anticipate pt will need TCU at discharge.   PT Brief overview of current status A x 2 for bed mob, up with lift to chair   Total Session Time   Total Session Time (sum of timed and untimed services) 6

## 2023-12-04 NOTE — PROGRESS NOTES
"Orthopedic Surgery Progress Note: 12/04/2023    Subjective:   Stroke code called yesterday afternoon for facial droop and ipsilateral left sided weakness.CT/CTA significant for distal R M2 occlusion and was transferred to [facility] for endovascular treatment of acute ischemic stroke. No other concerns this AM.     Objective:   /56   Pulse 62   Temp 98.4  F (36.9  C) (Axillary)   Resp 17   Ht 1.626 m (5' 4\")   Wt 65.5 kg (144 lb 6.4 oz)   LMP  (LMP Unknown)   SpO2 100%   BMI 24.79 kg/m    I/O this shift:  In: 1210 [I.V.:210; IV Piggyback:1000]  Out: 420 [Urine:420]  General: NAD. Resting comfortably in bed.  Respiratory: Nonlabored breathing  Musculoskeletal:  RLE: Proximal dressing with small amount of dried strikethrough, middle and distal dressings c/d/I. SILT in the SP/DP/Mariia/Saph/Tib nerve distributions. Fires TA/GSC/FHL/EHL. 2+ DP pulse.      Laboratory Data:  Lab Results   Component Value Date    WBC 10.6 12/03/2023    HGB 8.6 (L) 12/03/2023     12/03/2023    INR 1.01 12/03/2023         Assessment & Plan:   Vickie Gonzalez is a 89 year old female now s/p right femoral intramedullary nail with Dr. Tsai on 12/1/23. Progressing appropriately postoperatively.      Plan for Today:  - PT/OT  - Pain control  - stroke management per Neurocrit  - Disposition planning - PT recommending TCU placement, placement pending patient recovery    Medicine primary  Activity: Up with assist until independent.   Weight bearing status: WBAT RLE  Pain management: Oral pain medications with IV for breakthrough. Wean IV as able.    Antibiotics: Ancef x 24 hours  Diet: Begin with clear fluids and progress diet as tolerated.   DVT prophylaxis: Mechanical prophylaxis with SCD.  Recommend Lovenox starting postop day 1.  Imaging: PACU x-rays of the right hip  Labs: Monitor Hgb   Bracing/Splinting: None.   Dressings: Keep clean, dry and intact x5 days.  Reinforce as needed.  Okay to take down after 5-day " javi.  Drains: None  Physical Therapy/Occupational Therapy: Eval and treat.  Posterior precautions  Consults: PT/OT, MARGARITO assistance for disposition planning.  Follow-up: Clinic in 2 weeks for wound check, 6 weeks with Dr. Tsai with new x-rays of the right hip and AP pelvis.  Disposition: Disposition pending TCU placement.     Will plan to follow the patient peripherally while in the hospital, please reach out to myself or orthopedics on-call with any new or worsening concerns.     Orthopedic surgery staff for this patient is Dr. Tsai.    ------------------------------------------------------------------------------------------    Respectfully,    Kush Morales MD  Orthopedic Surgery PGY1  [phone]    Please page me directly with any questions/concerns during regular weekday hours before 5 pm. If there is no response, if it is a weekend, or if it is during evening hours then please page the orthopedic surgery resident on call.      FOLLOWUP:    Future Appointments   Date Time Provider Department [place]   12/4/2023 11:00 AM Milady Maddox, SLP [facility]   12/4/2023  3:45 PM Jeannie Falk, DOROTHY UROT [place]   12/4/2023  7:00 PM Maddison Kapoor, PT [facility] [place]   1/8/2024 10:30 AM UC CV DEVICE 1 [facility]   4/8/2024 12:00 AM UC ICD REMOTE [facility]

## 2023-12-05 NOTE — PROGRESS NOTES
"Neurocritical Care Progress Note    Reason for critical care admission: Acute ischemic stroke   Admitting Team: PILAR  Date of Service:  12/06/2023  Date of Admission:  11/30/2023  Hospital Day: 7    Assessment/Plan  Vickie Gonzalez is a 89 year old female with a past medical history of T2DM, CKD, sick sinus syndrome s/p pacer, remote hx of breast ca, and HTN, initially admitted for hip fracture, found to have a distal R M2 occlusion s/p DSA with no intervention c/b mild SAH. NIHSS 6 on presentation, repeat 18.      24 hour events: No acute events overnight. Awaiting floor bed and/or disposition.    Neuro  #SAH  #Attempted mechanical thrombectomy, TICI 0  #Right M2 occlusion, ESUS   -Neurochecks every 4 hours   -SBP goal < 160 mmHg  -12/5 MR brain: \"Late acute/early subacute infarct involving the right frontoparietal and temporal lobes as described. Additional punctate acute infarcts involving the right occipital and left occipital-parietal lobes. Stable small volume right temporal subarachnoid hemorrhage.\"  -Continue Asa 81 mg daily   -HOB > 30   -PT/OT/SLP - recommending TCU  -Touch base with       #Analgesics & sedation  #Right femoral intramedullary nail following a hip fracture  -Lidocaine 1 patch Q24h   -Tylenol 650 mg Q4h PRN  -Methocarbamol 500 mg Q6h PRN  -Oxycodone 2.5-5 mg Q4h PRN      CV  #HTN  #Sick sinus syndrome s/p pacer  -Cardiac monitoring  -SBP goal < 160 mmHg  -Continue Propranolol 40 mg BID  -Holding PTA Lisinopril 20 mg daily    -PRN Hydralazine   -TTE pending       Resp  Oxygen/vent: Room air   -Continuous pulse ox  -Maintain O2 saturations greater than 92%     GI  Diet: Full liquid diet; thin liquids  Last BM: PTA   GI prophylaxis: Not indicated  -Bowel regimen: Scheduled Senna-docusate BID and Miralax daily, add MOM daily      Renal/  #Hyperchloremia  -Daily BMP  -IV fluids: None  -High electrolyte replacement protocol     Endo  #Overweight  #DM2   -12/1 Hgb A1c; 5.8  -Monitor " glucose levels     Heme  #Acute normocytic anemia   - CTA abdomen pelvis without retroperitoneal bleed or explanation for hgb drop  - RLE venous duplex with no evidence of deep vein thrombosis   -Daily CBC  -Hgb goal >7, plt goal >50k  -Transfuse to meet Hgb and plt goals     ID  -Afebrile   -Daily CBC  -Follow temperature curve     ICU Checklist  Lines/tubes/drains: PIV x1  FEN: Full liquid diet; thin liquids, replacement protocol   PPX: DVT - SCD's, Lovenox; GI - None.  Code: Full Code   Dispo: FLOOR      TIME SPENT ON THIS ENCOUNTER   I spent 45 minutes of my time on the unit/floor managing the care of Vickie Gonzalez excluding time performing procedures. Upon evaluation, this patient had a high probability of imminent or life-threatening deterioration due to an acute ischemic stroke, which required my direct attention, intervention, and personal management. Greater than 50% of my time was spent at the bedside counseling the patient and/or coordinating care including chart review, history, exam, documentation, and further activities per this note. I have personally reviewed the following data/imaging over the past 24 hours.     The patient was discussed with the [facility] attending, Dr. Mcfarland.    Bing OROPEZA CNP  Neurocritical care nurse practitioner  Pager: [phone]  Ascom: *[phone] available [facility] 0700 to 1700     24 Hour Vital Signs Summary:  Temp: 98  F (36.7  C) Temp  Min: 97  F (36.1  C)  Max: 98  F (36.7  C)  Resp: 22 Resp  Min: 16  Max: 24  SpO2: 98 % SpO2  Min: 88 %  Max: 100 %  Pulse: 60 Pulse  Min: 60  Max: 81  BP: (!) 156/59 Systolic (24hrs), Av , Min:103 , Max:175   Diastolic (24hrs), Av, Min:42, Max:72    Respiratory monitoring:   Resp: 22  Room air     I/O last 3 completed shifts:  In: 1783 [P.O.:890; I.V.:893]  Out: 1235 [Urine:1235]    Current Medications:   aspirin  81 mg Oral Daily    calcium carbonate-vitamin D  1 tablet Oral Daily    cyanocobalamin  2,000 mcg Oral Daily  "   lidocaine  1 patch Transdermal Q24h    [Held by provider] lisinopril  20 mg Oral Daily    magnesium hydroxide  30 mL Oral Daily    magnesium sulfate  2 g Intravenous Once    polyethylene glycol  17 g Oral BID    potassium & sodium phosphates  1 packet Oral or Feeding Tube Q4H    propranolol  40 mg Oral BID    senna-docusate  2 tablet Oral BID    sodium chloride (PF)  3 mL Intracatheter Q8H       PRN Medications:  acetaminophen **OR** acetaminophen, sore throat, bisacodyl, calcium carbonate, hydrALAZINE, lidocaine 4%, lidocaine (buffered or not buffered), magnesium hydroxide, methocarbamol, naloxone **OR** naloxone **OR** naloxone **OR** naloxone, ondansetron **OR** ondansetron, oxyCODONE IR **OR** oxyCODONE, prochlorperazine **OR** prochlorperazine, senna-docusate **OR** senna-docusate, sodium chloride (PF), sodium chloride (PF)    Infusions:  None    Allergies   Allergen Reactions    Tamoxifen Analogues [Tamoxifen] Unknown     Annotation: hepatitis         Physical Examination:  Vitals: BP (!) 156/59   Pulse 60   Temp 98  F (36.7  C) (Axillary)   Resp 22   Ht 1.626 m (5' 4\")   Wt 66.6 kg (146 lb 13.2 oz)   LMP  (LMP Unknown)   SpO2 98%   BMI 25.20 kg/m    General: Adult female patient, lying in bed  HEENT: Normocephalic, atraumatic, no icterus, [place], teeth missing   Cardiac: Sinus rhythm on bedside monitor   Pulm: Unlabored, expansion symmetric, no retractions or use of accessory muscles  Abdomen: Soft, non-distended abdomen   Extremities: Warm, no edema, appears adequately perfused  Skin: Bruising   Psych: Labile   Neuro:  Mental status: Awake, alert, oriented to self, time, place, and circumstance. Forgetful. Language is fluent and coherent with intact comprehension of complex commands, naming and repetition.  Cranial nerves: PERRL, conjugate gaze, EOMI, facial sensation intact, left facial droop, tongue midline, no dysarthria.   Motor: Normal bulk and tone. No abnormal movements. 1/5 strength in LUE, " LLE with active withdrawal to noxious stimuli. 4/5 strength in RUE with purposeful movement. 3/5 strength to RLE with purposeful movement. Limited by pain from recent R hip fracture with recent surgery.  Sensory: Intact to light touch x 4 extremities.  Coordination: ANKUSH, difficult to follow instructions.   Gait: ANKUSH, deferred.    Labs and Imaging:    Results for orders placed or performed during the hospital encounter of 11/30/23 (from the past 24 hour(s))   MR Brain w/o & w Contrast    Narrative     MR BRAIN W/O & W CONTRAST 12/5/2023 11:59 AM    Provided History: stroke.  ICD-10:    Comparison: Head CT 12/4/2023, 12/3/2023 11/30/2023.    Technique: Multiplanar, multisequence images of the brain without and  with intravenous contrast    Contrast: 7 mL Gadavist    Findings:  There is restricted diffusion involving the right frontoparietal and  temporal lobes, including involvement of both the pre and postcentral  gyri and right insula. There are also small foci of restricted  diffusion in the right occipital and left parietal occipital lobes.  Small volume subarachnoid hemorrhage over the right temporal lobe. Few  other scattered susceptibility foci in the right cerebral hemisphere,  such as in the right occipital lobe. Infarct in the right temporal  lobe is partially enhancing. Blush of enhancement in the left medial  cerebellum is likely artifactual given no abnormal signal on any other  sequence. Mild deep and subcortical white matter T2 hyperintensities,  most consistent with small vessel ischemic disease given age.    The ventricles are proportionate to the cerebral sulci. Normal major  vascular intracranial flow-voids.    No abnormality of the skull marrow signal. The visualized portions of  paranasal sinuses, and mastoid air cells are relatively clear. The  orbits are grossly unremarkable.      Impression    Impression:   1. Late acute/early subacute infarct involving the right  frontoparietal and temporal  lobes as described.  2. Additional punctate acute infarcts involving the right occipital  and left occipital-parietal lobes.  3. Stable small volume right temporal subarachnoid hemorrhage.    I have personally reviewed the examination and initial interpretation  and I agree with the findings.    JACQUES LAWRENCE MD         SYSTEM ID:  [id number]   CBC with platelets   Result Value Ref Range    WBC Count 6.0 4.0 - 11.0 10e3/uL    RBC Count 2.52 (L) 3.80 - 5.20 10e6/uL    Hemoglobin 7.9 (L) 11.7 - 15.7 g/dL    Hematocrit 24.8 (L) 35.0 - 47.0 %    MCV 98 78 - 100 fL    MCH 31.3 26.5 - 33.0 pg    MCHC 31.9 31.5 - 36.5 g/dL    RDW 14.3 10.0 - 15.0 %    Platelet Count 171 150 - 450 10e3/uL   Basic metabolic panel   Result Value Ref Range    Sodium 144 135 - 145 mmol/L    Potassium 4.2 3.4 - 5.3 mmol/L    Chloride 111 (H) 98 - 107 mmol/L    Carbon Dioxide (CO2) 24 22 - 29 mmol/L    Anion Gap 9 7 - 15 mmol/L    Urea Nitrogen 22.1 8.0 - 23.0 mg/dL    Creatinine 0.58 0.51 - 0.95 mg/dL    GFR Estimate 82 >60 mL/min/1.73m2    Calcium 8.9 8.2 - 9.6 mg/dL    Glucose 121 (H) 70 - 99 mg/dL   Magnesium   Result Value Ref Range    Magnesium 2.0 1.7 - 2.3 mg/dL   Phosphorus   Result Value Ref Range    Phosphorus 2.5 2.5 - 4.5 mg/dL       All relevant imaging and laboratory values personally reviewed.

## 2023-12-05 NOTE — PROGRESS NOTES
Patient is A & O x4, no aphasia noted, able to move LUE and LLE to command at times, aware that she can't feel her left arm and trying to make sense of what happened, no neglect noted, left sided facial droop and tongue to left, c/o R hip pain, tolerable with tylenol, lidocaine patch and repositioning; 100% AV paced, HR 80s, VSS, on 1L of O2 per NC; no BM this shift, bladder scanned for 600 but order straight cath for over 700 mL, IVF at 75 mL/hour, R hip incisions CDI.  Continue with current plan of care and notify MD as needed.    Vanessa Gutierrez RN

## 2023-12-05 NOTE — PROGRESS NOTES
Neurocritical Care Progress Note    Reason for critical care admission: Acute ischemic stroke  Admitting Team: PILAR  Date of Service:  12/05/2023  Date of Admission:  11/30/2023  Hospital Day: 6    Assessment/Plan  Vickie Gonzalez is a 89 year old female with a past medical history of T2DM, CKD, sick sinus syndrome s/p pacer, remote hx of breast ca, and HTN, initially admitted for hip fracture, found to have a distal R M2 occlusion s/p DSA with no intervention c/b mild SAH. NIHSS 6 on presentation, repeat 18.      24 hour events:  - RLE DVT exam yesterday negative  - CTA abdomen yesterday pelvis without retroperitoneal bleed or explanation for hgb drop  - Hgb 7.7 from 8.9 today  - Phos low, replaced    Plan for today:  Stop MIVF  Resume propranolol  Resume bowel regimen and scheduled senna BID and daily miralax  Resume ASA if MRI negative, hold on additional anticoagulation (enoxaparin for hip) pending MRI  Repeat hemoglobin tomorrow for stability    Neuro  #Right M2 occ s/p DSA with no intervention c/b mild SAH 2/2 to ESUS   -Neurochecks every 4 hours  -SBP goal < 110-160 mmHg  -Holding ASA 81 mg for secondary stroke prevention pending MRI brain   -MRI brain with & w/o contrast   -HOB > 30   -PT/OT/SLP     #Analgesics & sedation  #Post op pain control  #right femoral intramedullary nail following a hip fracture  -Lidocaine patches Q24H   -Tylenol 650 mg Q4H PRN     CV  #HTN  #Sick sinus syndrome s/p pacer  -Cardiac monitoring  -SBP goal < 110-160 mmHg  -PRN Hydralazine to goal   -Resume PTA 40 mg BID propranolol  -Holding PTA lisinopril 20 mg daily     Resp  #No acute issues  On 1L nasal cannula   -Continuous pulse ox  -Maintain O2 saturations greater than 92%     GI  #Constipation  #Nutrition  Diet: clears with 1:1 supervision, speech following  Last BM: PTA   GI prophylaxis:  -Bowel regimen: scheduled senna-docusate and Miralax     Renal/  #THELMA, resolved    #Azotemia, resolved  #Hypophosphatemia  -Daily BMP  -IV  "fluids: stopped  -High electrolyte replacement protocol     Endo  #DM2   -Hgb A1c 5.8  -Monitor glucose levels     Heme  #Acute normocytic anemia   -Daily CBC  -Transfuse to keep Hgb goal >7, plt goal >50k     ID  #No acute issues  -Daily CBC  -Follow temperature curve    MSK  #Right proximal femur fracture s/p right femoral intramedullary nailing (12/1)  -Orthopedics following  -WBAT RLE  -No enoxaparin yet pending MRI  -Keep dressing CDI x5 days, remove after 5 (12/6)  -Follow-up in clinic 2 weeks for wound check and 6 weeks with Dr. Tsai with XR       ICU Checklist  Lines/tubes/drains: PIVx2  FEN: clears  PPX: DVT - SCDs, holding lovenox given SAH; GI - none  Code: Full Code   Dispo: Floor with stroke team; PT/OT recommending TCU, discussed with     Clinically Significant Risk Factors           # Hypercalcemia: corrected calcium is >10.1, will monitor as appropriate    # Hypoalbuminemia: Lowest albumin = 2.9 g/dL at 12/4/2023  7:41 AM, will monitor as appropriate     # Hypertension: Noted on problem list        # Overweight: Estimated body mass index is 25.2 kg/m  as calculated from the following:    Height as of this encounter: 1.626 m (5' 4\").    Weight as of this encounter: 66.6 kg (146 lb 13.2 oz).      # Financial/Environmental Concerns: none   # Pacemaker present        TIME SPENT ON THIS ENCOUNTER  I spent 30 minutes of critical care time on the unit/floor managing the care of Vickie Gonzalez excluding time performing procedures. Upon evaluation, this patient had acute ischemic stroke, which required my direct attention, intervention, and personal management. Greater than 50% of my time was spent at the bedside counseling the patient and/or coordinating care including chart review, history, exam, documentation, and further activities per this note. I have personally reviewed the following data/imaging over the past 24 hours.     The patient was discussed with the NCC attending, . " "Aramis Houser, SANDIP CNP      24 Hour Vital Signs Summary:  Temp: 97.5  F (36.4  C) Temp  Min: 97.2  F (36.2  C)  Max: 99  F (37.2  C)  Resp: 19 Resp  Min: 15  Max: 23  SpO2: 98 % SpO2  Min: 94 %  Max: 100 %  Pulse: 63 Pulse  Min: 60  Max: 86    No data recorded  BP: 139/50 Systolic (24hrs), Av , Min:115 , Max:189   Diastolic (24hrs), Av, Min:44, Max:76    Respiratory monitoring:   Resp: 19    I/O last 3 completed shifts:  In: 2100 [I.V.:1800]  Out: 700 [Urine:700]    Current Medications:   [Held by provider] calcium carbonate-vitamin D  1 tablet Oral Daily    [Held by provider] cyanocobalamin  2,000 mcg Oral Daily    lidocaine  1 patch Transdermal Q24h    [Held by provider] lisinopril  20 mg Oral Daily    [Held by provider] polyethylene glycol  17 g Oral Daily    sodium phosphate  9 mmol Intravenous Once    [Held by provider] propranolol  40 mg Oral BID    [Held by provider] senna-docusate  1 tablet Oral BID    sodium chloride (PF)  3 mL Intracatheter Q8H       PRN Medications:  acetaminophen **OR** acetaminophen, sore throat, bisacodyl, calcium carbonate, hydrALAZINE, lidocaine 4%, lidocaine (buffered or not buffered), magnesium hydroxide, [Held by provider] methocarbamol, naloxone **OR** naloxone **OR** naloxone **OR** naloxone, ondansetron **OR** ondansetron, oxyCODONE IR **OR** oxyCODONE, prochlorperazine **OR** prochlorperazine, senna-docusate **OR** senna-docusate, sodium chloride (PF), sodium chloride (PF)    Infusions:   sodium chloride 75 mL/hr at 23 0430       Allergies   Allergen Reactions    Tamoxifen Analogues [Tamoxifen] Unknown     Annotation: hepatitis         Physical Examination:  Vitals: /50   Pulse 63   Temp 97.5  F (36.4  C) (Axillary)   Resp 19   Ht 1.626 m (5' 4\")   Wt 66.6 kg (146 lb 13.2 oz)   LMP  (LMP Unknown)   SpO2 98%   BMI 25.20 kg/m    General: Adult female patient, lying in bed  HEENT: Normocephalic, atraumatic, no icterus, mouth dry  Cardiac: " RRR, s1/s2 auscultated without murmur  Pulm: CTAB, unlabored  Abdomen: Soft, non-distended, bowel sounds present  Extremities: Warm, no edema, distal pulses +2, well perfused  Skin: No rash or lesion; right hip incision CDI without drainage  Psych: Calm and cooperative  Neuro:  Mental status: Awake, alert, attentive, oriented to self, time, place. Language is fluent and coherent with intact comprehension of complex commands, naming and repetition.  Cranial nerves: PERRL, conjugate gaze, EOMI, facial sensation intact, left facial droop present, tongue midline to slightly left  Motor: Normal bulk and tone. No abnormal movements. 5/5 strength RUE; LUE falls to bed, wiggles toes bilateralyy   Sensory: Intact to light touch x 4 extremities although diminished in RUE  Gait: ANKUSH, deferred.      Labs and Imaging:         Lab Results   Component Value Date     12/05/2023    Lab Results   Component Value Date    CHLORIDE 112 12/05/2023    CHLORIDE 105 05/16/2022    Lab Results   Component Value Date    BUN 21.8 12/05/2023    BUN 28 05/16/2022      Lab Results   Component Value Date    POTASSIUM 3.9 12/05/2023    POTASSIUM 4.5 05/16/2022    Lab Results   Component Value Date    CO2 22 12/05/2023    CO2 25 05/16/2022    Lab Results   Component Value Date    CR 0.62 12/05/2023        Lab Results   Component Value Date    WBC 5.5 12/05/2023    HGB 7.7 (L) 12/05/2023    HCT 23.9 (L) 12/05/2023    MCV 97 12/05/2023     (L) 12/05/2023       All relevant imaging and laboratory values personally reviewed.

## 2023-12-05 NOTE — PROGRESS NOTES
"Orthopedic Surgery Progress Note: 12/05/2023    Subjective:   No acute events overnight. Pain well-controlled in right hip. States she can't believe this is all happening. No other concerns voiced.     Objective:   /50   Pulse 63   Temp 97.5  F (36.4  C) (Axillary)   Resp 19   Ht 1.626 m (5' 4\")   Wt 66.6 kg (146 lb 13.2 oz)   LMP  (LMP Unknown)   SpO2 98%   BMI 25.20 kg/m    No intake/output data recorded.  General: NAD. Resting comfortably in bed.  Respiratory: Nonlabored breathing  Musculoskeletal:  RLE: Proximal dressing with small amount of dried strikethrough, middle and distal dressings c/d/I. SILT in the SP/DP/Mariia/Saph/Tib nerve distributions. Fires TA/GSC/FHL/EHL. 2+ DP pulse.       Laboratory Data:  Lab Results   Component Value Date    WBC 5.5 12/05/2023    HGB 7.7 (L) 12/05/2023     (L) 12/05/2023    INR 1.08 12/04/2023         Assessment & Plan:   Vickie Gonzalez is a 89 year old female now s/p right femoral intramedullary nail with Dr. Tsai on 12/1/23. Initially doing well postoperatively, but developed a right MCA distal M2 occlusion stroke on POD2, currently [facility] for management.     Plan for Today:  - PT/OT  - Pain control  - stroke management per Neurocrit  - Disposition planning - PT recommending TCU placement, placement pending patient recovery    Medicine primary  Activity: Up with assist until independent.   Weight bearing status: WBAT RLE  Pain management: Oral pain medications with IV for breakthrough. Wean IV as able.    Antibiotics: Ancef x 24 hours  Diet: Begin with clear fluids and progress diet as tolerated.   DVT prophylaxis: Mechanical prophylaxis with SCD.  Recommend Lovenox starting postop day 1.  Imaging: PACU x-rays of the right hip  Labs: Monitor Hgb   Bracing/Splinting: None.   Dressings: Keep clean, dry and intact x5 days.  Reinforce as needed.  Okay to take down after 5-day javi.  Drains: None  Physical Therapy/Occupational Therapy: " Eval and treat.  Posterior precautions  Consults: PT/OT, MARGARITO assistance for disposition planning.  Follow-up: Clinic in 2 weeks for wound check, 6 weeks with Dr. Tsai with new x-rays of the right hip and AP pelvis.  Disposition: Disposition pending TCU placement.      Will plan to follow the patient peripherally while in the hospital, please reach out to myself or orthopedics on-call with any new or worsening concerns.     Orthopedic surgery staff for this patient is Dr. Tsai.    ------------------------------------------------------------------------------------------    Respectfully,    Kush Morales MD  Orthopedic Surgery PGY1  [phone]    Please page me directly with any questions/concerns during regular weekday hours before 5 pm. If there is no response, if it is a weekend, or if it is during evening hours then please page the orthopedic surgery resident on call.      FOLLOWUP:    Future Appointments   Date Time Provider Department Center   12/5/2023  8:30 AM Dyllan Tovar, SLP [facility] O   12/5/2023  9:30 AM Paulina West OT [facility]   12/5/2023  1:00 PM Milena Friedman, JONNATHAN [facility] O   12/15/2023  3:00 PM Imani Hernández PA-C [facility]   1/8/2024 10:30 AM UC CV DEVICE 1 [facility]   4/8/2024 12:00 AM UC ICD REMOTE [facility]

## 2023-12-05 NOTE — CONSULTS
"SPIRITUAL HEALTH SERVICES Consult Note  [facility] 4E    Saw pt Vickie Gonzalez per Routine Consult. Provided spiritual support and prayer. Vickie reported \"feeling good, other than my broken hip and stroke.\"    Plan: no further needs at this time. [facility] remains available.    Emmanuel Leigh  Chaplain Resident  Pager [phone]    * [facility] remains available 24/7 for emergent requests/referrals, either by having the switchboard page the on-call  or by entering an ASAP/STAT consult in Epic (this will also page the on-call ). Routine Epic consults receive an initial response within 24 hours.*     "

## 2023-12-06 NOTE — PLAN OF CARE
Major Shift Events: No acute events overnight.  Lethargic to alert, oriented x3-4, some difficulty with time but able to state month and year. Forgetful at times. Pupils equal and reactive. Following commands. Denied pain for most of the night, except with movement/turns - pt declined pain meds. Slept between cares. Continues to have no response to stimuli in LUE, otherwise purposeful movement in RLE/RUE/LLE. L facial droop, including at rest. Hoarse voice. Some difficulty swallowing, needs close supervision with oral intake. Afebrile. 100% AV paced. Syst goal < 160 met without intervention. On 2 L nasal cannula while sleeping. Full liquid diet with full assist. No BM this shift but passing flatulence. Spontaneously voids but high PVR, bladder scanned (see flowsheets) - straight cath parameters per orders (>700 mL) not met this shift. Purewick in place.       Plan: Encourage to increase PO intake, provide close supervision. Increase activity as tolerated. Continue to follow POC and notify team with any changes.       For vital signs and complete assessments, please see documentation flowsheets.

## 2023-12-06 NOTE — PLAN OF CARE
Major Shift Events:  Neuro status improved, patient more alert. LUE no movement, no withdraw, no sensation. LLE able to make independent small movements. PERRLA intact. Denies HA. Forgetful at times. % paced. BP stable; PRN hydralazine given for SBP>160. Lungs clear; utilizing supplemental oxygen when asleep. Unable to empty bladder, high post void residuals; straight cath x1. No BM, bowel meds administered. Tolerating clear liquids w/ full supervision; has difficulty swallowing. Right hip w/ swelling at surgical sites, mild bruising. Pain adequately controlled. Up to chair with ceiling lift assist.  Plan: Transfer to floor when bed avail.  For vital signs and complete assessments, please see documentation flowsheets.

## 2023-12-06 NOTE — PROGRESS NOTES
"Care Management Follow Up    Length of Stay (days): 6    Expected Discharge Date: 12/07/2023     Concerns to be Addressed: discharge planning     Patient plan of care discussed at interdisciplinary rounds: Yes    Anticipated Discharge Disposition: Transitional Care     Anticipated Discharge Services: None  Anticipated Discharge DME: None    Patient/family educated on Medicare website which has current facility and service quality ratings:  yes  Education Provided on the Discharge Plan:  yes  Patient/Family in Agreement with the Plan: yes    Referrals Placed by CM/SW:  RESENT SNF referral to [facility] [address] [address]   Private pay costs discussed: Not applicable    Additional Information:  SW called to follow up on referral for TCU called [facility] and spoke to admissions  [phone] who stated they have yet to review referral but will do so today and get back to  today or tomorrow. SW also resent SNF referral through [facility].    Care management will follow up regarding TCU placement.     UPDATE: 3:45pm, SW received call back from their admissions [phone] and was notifed they did not receive any referral and referral through [facility] should be instead sent to to \"[facility]\" fax # [phone]. SW faxed through [employer] accordingly.    KEITH Don, RENYSW  4A & 4E ICU   Pager: [phone]  Phone: [phone]        "

## 2023-12-07 NOTE — PROGRESS NOTES
CHW scheduled stretcher ride tomorrow Friday 12/8 at between 1:42-2:27pm from pt room to TCU. Left SW # to call back.     TCU- [facility]  [address] [place] [address]      Tameka Chang   7A/B Community Health Worker   Phone: [phone]

## 2023-12-07 NOTE — PROGRESS NOTES
1900 - 0700:    D: 89 year old female with a past medical history of T2DM, CKD, sick sinus syndrome s/p pacer, remote hx of breast ca, and HTN, initially admitted for hip fracture, found to have a distal R M2 occlusion s/p DSA with no intervention c/b mild SAH.      I: Monitored vitals and assessed pt status.     Neuro: A/Ox4. Forgetful at times. L facial droop at rest. L arm flaccid. LLE weakened, 1/5 movement. Q4 neuros, neuros intact.  Cardiac: 100% dual paced. SBP <160, PRN Hydralazine available. Denies chest pain/palpitations.  Respiratory: Room air, given 1L of supplemental oxygen @ night. Denies SOB.  GI/: Small smear stool, normoactive bowel sounds. Purewick worn overnight. Requiring intermittent straight cath for bladder volume >700ml due to unable   Diet: Pureed diet, thin liquids.  Skin: R groin site covered with Tegaderm and dried drainage. RLE surgical site dressings CDI.  LDAs: PIV infusing Plasma A @ 50 ml/hr  Activity: Lift assist.  Pain: C/o pain with movement in R hip, given Tylenol and has Oxycodone available.     A: VSS. Afebrile.      P: Continue to monitor Pt status and report changes to treatment team.

## 2023-12-07 NOTE — PLAN OF CARE
Major Shift Events:  Neuro status unchanged. Forgetful at times, otherwise oriented. LUE no withdraw, no purposeful movement. LLE weak. RUE and RLE purposeful movement. PERRLA intact. % AV paced. BP stable. Lungs clear/dim. Supplemental oxygen when sleeping. Small smear stool, otherwise no BM. Unable to fully empty bladder; requires intermittent straight cath for bladder volume >700ml. RLE w/ bruising, tender, swelling at surgical sites, dressings cdi. Groin site w/ bruising, cdi. LUE markedly swollen. Tolerating prescribed diet, taking adequate PO w/ assist. Up to chair x2 w/ lift assist.  Plan: Transfer to  when bed available.  For vital signs and complete assessments, please see documentation flowsheets.

## 2023-12-07 NOTE — PROGRESS NOTES
Admission          11/30/2023  4:11 PM  -----------------------------------------------------------  Reason for admission:  Primary team notified of pt arrival.  Admitted from:   Via: Bed  Accompanied by: self  Belongings: Placed in closet   Admission Profile: complete  Teaching: orientation to unit and call light- call light within reach, call don't fall, use of console, meal times, when to call for the RN, and enforced importance of safety   Access: PIV  Telemetry:Placed on pt  Ht./Wt.: complete  Code Status verified on armband: yes   2 RN Skin Assessment Completed By: Anai WARNER and Shavon WARNER  Med Rec completed: yes   Suction/Ambu bag/Flowmeter at bedside: yes   Is patient having diarrhea upon admission- if YES fill out testing algorithm :  no         Pt status:    Temp:  [97.3  F (36.3  C)-98.1  F (36.7  C)] 97.4  F (36.3  C)  Pulse:  [60-74] 63  Resp:  [18-25] 22  BP: ()/(39-66) 128/51  SpO2:  [92 %-100 %] 98 %

## 2023-12-07 NOTE — PROGRESS NOTES
Care Management Follow Up    Length of Stay (days): 7    Expected Discharge Date: 12/08/2023     Concerns to be Addressed: discharge planning     Patient plan of care discussed at interdisciplinary rounds: Yes    Anticipated Discharge Disposition: TCU     Anticipated Discharge Services: TCU therapy services  Anticipated Discharge DME: n/a    Patient/family educated on Medicare website which has current facility and service quality ratings:  yes  Education Provided on the Discharge Plan:  yes.    MARGARITO met with pt and her nephew Chip (Phone: [phone]) at bedside at 12pm and gave them the update below. Per Chip, the primary team following this pt stopped by before and he wants to make sure his aunt is fully med ready before discharge. Pt and Chip agreeable to TCU plan and thanked the SW. MARGARITO told Chip that she'll update him at bedside once she hears from admissions.    MARGARITO called Chip at 4:02pm and left a detailed VM about the update and told him that once there is an update, SW to call or meet with him at bedside.       Patient/Family in Agreement with the Plan: yes    Referrals Placed by CM/SW:      [facility]  [address] [place] [address]  Admissions ([facility]): [phone]  Fax (for discharge orders): [phone]  - 12/7: MARGARITO called at 9:56am, spoke to Harriett, and got transferred to Li. Li confirmed that they're still following this pt and noted that her fall PTA was when the pt was visiting someone on the TCU floor/unit. Pt currently resides in independent living on campus. Li had a question about pt's rehab location as therapy notes indicate pt could progress to ARU. MARGARITO spoke to Li about how that is noted as a potential possibility within PT/OT notes but pt has been a TCU rec since she's been here at [facility]. Li told MARGARITO to call her later this afternoon with an update from PT/OT today. Li told MARGARITO that if pt is TCU still, they can likely take her tomorrow 12/8 after 1pm.      UPDATE: MARGARITO received a call from Li at 3:25pm telling SW that they can take the pt tomorrow 12/8 anytime between 1-3pm. Li gave MARGARITO the updated fax for sending discharge orders (listed above).     MARGARITO called the bedside RN at 3:56pm and she confirmed with SW that the pt will need a stretcher ride but doesn't need oxygen or have any equipment.    MARGARITO tasked the 6th floor Community Health Worker with setting up discharge ride tmrw.    MARGARITO to complete PCS form tomorrow 12/8.      Private pay costs discussed: Not applicable    Additional Information:  MARGARITO is following for discharge planning.    A provider from the primary neuro team stopped by the 6C SW desk and asked for an update. MARGARITO updated him with the info from the 12/6 ICU SW note. The provider told MARGARITO that the pt is otherwise med ready for TCU placement.    See above in the referrals section for updates.      ___________________    KEITH Sandra, LGSW  6C , covering beds 6401 to 6519  [facility]   Phone: [phone]  Pager: [phone]

## 2023-12-07 NOTE — PROGRESS NOTES
[facility]    Stroke Progress Note    Interval Events  Vickie was transferred to the floor from the ICU on (12/7). She had some nightmares overnight where she felt that she woke up at home. She had her last BM on (12/6) at 11 AM. Otherwise no acute events overnight.    Today's changes:  - Stopped IV fluids  - PT/OT/SW continues to follow: recommendation is TCU placement  - Delirium precautions; DND 5145-6104  - Stopped oxygen therapy; pt was desaturating ~93% room air. Currently on 1.5 L oxygen therapy on nasal canula.   - Continues to be on Bowel regimen with last BM on (12/6) at 11 PM  - Daily CBC and BMP   - Transfuse if Hgb <7 and platelets <50k  - Resumed PTA Lisinopril 20 mg daily    HPI Summary:  Vickie Gonzalez is a 89 year old female with a past medical history of T2DM, CKD, sick sinus syndrome s/p pacer, remote hx of breast ca, and HTN, initially admitted for hip fracture, found to have a distal R M2 occlusion s/p DSA with no intervention c/b mild SAH. NIHSS 6 on presentation, repeat 18 on (12/5). NIH currently at 15.     ED course (11/30):  Vickie presented to the ED on (11/30) for the evaluation of a fall and right hip pain after slipping and falling, She was unable to ambulate after the fall   - Imaging: X-ray indicated comminuted displaced fracture of the proximal right femur   - Vitals: She had elevated blood pressures over 190/80 over the course of her ED stay   - Orthopedic surgery consulted and surgical stabilization of her right proximal femur was recommenced. After weighing the risk and benefits, patient and her niece chose to proceed with the intramedullary nailing (IMN)    Hospital Course: (12/1- current)  (12/1) - Given her age and other commodities she was admitted to the medicine service. IMN for R intertrochanteric femur fracture in mechanical fall on (12/1). No complications during the IMN procedure with EBL of 150 ml. She was on SCD  "for DVT prophylaxis. PT/OT/SW consults placed and were following.     (12/2) - Overnight had a BP of 118/41 and propanolol holding parameters were placed. Lisinopril was also held.     (12/3) - Orthopedic surgery was in alignment with TCU placement recommendations by the PT. Stroke code was called as Vickie experienced \"difficulty with hand movements and sensation\" and L sided facial droop in the morning. She experiencing increasing bouts of confusion in the afternoon. On the exam she was found to have flaccid LUE, weakened hand . Stroke neurology was involved. Pt was taken for noncontrast CT, CTA head and neck. CT/CTA was significant for distal M2 occlusion on the right and a small ischemic penumbra and emergently transferred to [place] to assess eligibility for endovascular thrombectomy - surgical neuroradiology.  She was transferred for emergent cerebral angiogram and mechanical thrombectomy, however upon arrival, her NIHSS score was only 3 for minor facial droop and neglect. Strength on the L was largely preserved with hand  asymmetry. Dr. Araujo had a risk vs benefit discussion with the patient, her niece (medical proxy), and vascular neurology service. Through shared-decision making, the decision was made to forgo intervention in light of tortuous anatomy, pt's age, and nondisabling symptoms. Patient was then transferred to unit 6A with vascular neurology.  Her NIH scores fluctuated throughout the day between 3-10. In light of worsening NIHSS of 7 in the evening, pt developed slurred speech later in the evening where her facial droop became more noticeable with worsening left drift and neuro IR was consulted again where bilateral IR carotid cerebral angiogram was planned in addition to possible thrombectomy . Pt was found to have difficult to navigate arch anatomy, right MCA distal M2 occlusion with early venous shunting and tenacious clot, could not be navigated beyond safely. Thus, no passes of " "mechanical thrombectomy performed for her acute R distal MCA infarcts c/b small SAH. Pt was admitted to neuro critical care post procedure.     NIHSS was an 18 upon admission to neurocritical care. Held propanolol and lisinopril. PRN hydralazine. Pt was NPO pending speech eval and was put on bowel regimen. She developed THELMA and uremia of unclear etiology and was placed on electrolyte replacement protocol. She was also borderline macrocytic anemia (MCV 99 and Hgb 8.6, baseline ~11.8). NIHSS ranged between (18-16) for the remainder of the evening.     (12/4): Orthopedic surgery still following and alignment with TCU placement per TCU. No overnight concerns. SAH stable on repeat CT in the right temporal lobe and MRI was ordered. Hgb drop to 6.9 and he was transfused 1u of pRBC, trend Hgb. No groin hematoma noted but abd diffusely tender, CTA abdomen/pelvis obtained w/ no evidence of retroperitoneal bleed or contrast extrav. RLE venous duplex with no evidence of deep vein thrombosis   Continue to hold propanolol 20 mg BID and lisinopril 20 mg daily. PRN hydralazine with cardiac monitoring. Pt continued to be on electrolyte replacement protocol with daily trending of CBC and BMP. New onset hypercholemia with chloride levels at 111 (baseline ~100) Pt was started on ASA 81 daily for secondary stroke prevention.     (12/5) - No acute events overnight. Pt was in denial that this is happening. Spiritual health services consult was placed. MRI showed \"Late acute/early subacute infarct involving the right frontoparietal and temporal lobes as described. Additional punctate acute infarcts involving the right occipital and left occipital-parietal lobes. Stable small volume right temporal subarachnoid hemorrhage.\" Continue to hold propanolol 20 mg BID and lisinopril 20 mg daily. Pt continues to be on bowel regimen. PT/OT/SLP - recommending T    (12/6) - No acute events overnight and pt is awaiting floor bed and disposition. " Continued Asa 81 mg daily. Continue to hold propanolol 20 mg BID and lisinopril 20 mg daily. Pt is on SCDs and Lovenox for DVT prophylaxis.  PT/OT/SLP - recommending TCU placement.      Stroke Evaluation Summarized    MRI/Head CT MRI brain: (12/05)     1. Late acute/early subacute infarct involving the right  frontoparietal and temporal lobes as described.  2. Additional punctate acute infarcts involving the right occipital  and left occipital-parietal lobes.  3. Stable small volume right temporal subarachnoid hemorrhage.    Head CT (12/04)  Decreased sulcal hyperdensity with in the right  temporoparietal lobe, most of which is demonstrated to be contrast on previous dual-energy CT. Unchanged small amount of subarachnoid hemorrhage with in the right temporal lobe.    Head CT (12/03)  Free contrast extravasation in the right sylvian sulcus and temporoparietal sulci from recent thrombectomy.  Small amount of subarachnoid hemorrhage is present in the right  sylvian fissure.     Intracranial Vasculature CTA 12/3/23  Corresponding to the area of ischemic penumbra, there is abrupt occlusion of a distal right MCA M2 branch arising from superior division.   Cervical Vasculature Atherosclerotic calcifications in the carotid bifurcations.      Echocardiogram    EKG/Telemetry Sinus rhythm with 1st degree A-V block with Premature atrial complexes   Left bundle branch block    Other Testing Not Applicable      LDL  12/7/2023: 73 mg/dL   A1C  12/1/2023: 5.8 %   Troponin No lab value available in past 48 hrs       Assessment and plan:  Vickie Gonzalez is a 89 year old female with a past medical history of T2DM, CKD, sick sinus syndrome s/p pacer, remote hx of breast ca, and HTN, initially admitted for hip fracture, found to have a distal R M2 occlusion s/p DSA with no intervention c/b mild SAH.     #SAH  #Attempted mechanical thrombectomy, TICI 0  #Right M2 occlusion, ESUS   Lety is a 89-year-old female who was admitted to [place]  [facility] on 11/30/2023 after a fall resulting in right femoral fracture now status post femoral intramedullary nail per Ortho.  Stroke code was called after developing left facial droop and \"difficulty with hand movements and sensation.\"  CT/CTA was significant for distal R M2 occlusion prompting transfer to [place] for endovascular thrombectomy evaluation.  Upon being evaluated by neuro IR, her exam had significantly improved. After discussion with patient and family it was decided that between her age and now minimal symptoms that thrombectomy would be deferred at that time.  Patient was then transferred to unit 6A. Shortly after arriving and being evaluated by stroke team her symptoms worsened leading to an increase in NIHSS of around 4. At that time I contacted neuro IR who came to assess the patient.  After discussion with patient, family, neuro IR, stroke team it was decided that the patient would be taken back to IR for cerebral angiogram and possible mechanical thrombectomy if deemed feasible. Pt was found to have difficult to navigate arch anatomy, right MCA distal M2 occlusion with early venous shunting and tenacious clot, could not be navigated beyond safely. Thus, no passes of mechanical thrombectomy performed for her acute R distal MCA infarcts now c/b small SAH. Her right M2 occlusion is most likely due to embolic stroke of unknown etiology given insignificant TTE findings.  -Neurochecks every 4 hours   -SBP goal < 160 mmHg  -Continue Asa 81 mg daily    -HOB > 30   -PT/OT/SLP - recommending TCU      # HTN  # Sick sinus syndrome s/p pacer  -Cardiac monitoring  -SBP goal < 160 mmHg (PRN hydralazine)  -Continue Propranolol 40 mg BID  -Resumed PTA Lisinopril 20 mg daily     # R femur intertrochanteric fracture s/p intramedullary nail and screw fixation  -Lidocaine 1 patch Q24h   -Tylenol 650 mg Q4h PRN  -Methocarbamol 500 mg Q6h PRN  -Oxycodone 2.5 mg Q4h PRN     # Macrocytic anemia  Hgb today at 9.1 " "trending upwards. MCV at 101 today baseline ~97  -Daily CBC  -Hgb goal >7, plt goal >50k  -Transfuse to meet Hgb and plt goals    # Type 2 DM  Not on any medications currently.  - Last A1c: 5.8  - Monitor glucose levels    Diet: Pureed diet  PPX: DVT - SCD's, Lovenox; GI - None.  Code: Full Code   Dispo: pending TCU placement      Patient Follow-up    - Pending hospital course. Will need neurology and PCP follow up after discharge.       The patient was discussed with Stroke Staff Dr. Granados.    Caden Brar MD  Neurology Resident PGY-1  Stroke Ascom: [id number]  ______________________________________________________    Clinically Significant Risk Factors              # Hypoalbuminemia: Lowest albumin = 2.9 g/dL at 12/4/2023  7:41 AM, will monitor as appropriate     # Hypertension: Noted on problem list        # Overweight: Estimated body mass index is 25.2 kg/m  as calculated from the following:    Height as of this encounter: 1.626 m (5' 4\").    Weight as of this encounter: 66.6 kg (146 lb 13.2 oz).        # Financial/Environmental Concerns: none   # Pacemaker present         Medications   Scheduled Meds   aspirin  81 mg Oral Daily    calcium carbonate-vitamin D  1 tablet Oral Daily    cyanocobalamin  2,000 mcg Oral Daily    enoxaparin ANTICOAGULANT  40 mg Subcutaneous Q24H    lidocaine  1 patch Transdermal Q24h    [Held by provider] lisinopril  20 mg Oral Daily    magnesium hydroxide  30 mL Oral Daily    multivitamin, therapeutic  1 tablet Oral Daily    polyethylene glycol  17 g Oral BID    propranolol  40 mg Oral BID    senna-docusate  2 tablet Oral BID    sodium chloride (PF)  3 mL Intracatheter Q8H       Infusion Meds        PRN Meds  acetaminophen **OR** acetaminophen, sore throat, bisacodyl, calcium carbonate, hydrALAZINE, lidocaine 4%, lidocaine (buffered or not buffered), magnesium hydroxide, methocarbamol, naloxone **OR** naloxone **OR** naloxone **OR** naloxone, ondansetron **OR** ondansetron, oxyCODONE IR " **OR** [DISCONTINUED] oxyCODONE, prochlorperazine **OR** prochlorperazine, senna-docusate **OR** senna-docusate, sodium chloride (PF), sodium chloride (PF)       PHYSICAL EXAMINATION  Temp:  [96.8  F (36  C)-98.8  F (37.1  C)] 98.8  F (37.1  C)  Pulse:  [60-74] 68  Resp:  [13-26] 23  BP: (121-186)/() 178/72  SpO2:  [89 %-99 %] 94 %      General Exam  General:  patient lying in bed without any acute distress    HEENT:  normocephalic/atraumatic, no oral lesions  Cardio:  RRR  Pulmonary:  no respiratory distress  Abdomen:  soft, non-tender, non-distended  Extremities:  no edema  Skin:  intact, warm/dry, rash present     Neuro Exam  Mental Status:  alert, oriented x 3, follows commands, speech clear and fluent, naming and repetition normal  Cranial Nerves:  PERRL, conjugate gaze, EOMI, facial sensation intact, left facial droop, tongue midline, no dysarthria.     Motor:  Normal bulk and tone. No abnormal movements. 1/5 strength in LUE, LLE with active withdrawal to noxious stimuli. 4/5 strength in RUE with purposeful movement. 3/5 strength to RLE with purposeful movement. Limited by pain from recent R hip fracture with recent surgery.   Reflexes:  no clonus  Sensory:  light touch sensation intact and symmetric throughout upper and lower extremities, no extinction on double simultaneous stimulation   Coordination:  normal finger-to-nose and heel-to-shin unilaterally on the R without dysmetria, rapid alternating movements intact on the R. Unable to move her left arm.   Station/Gait:  unable to test due to recent surgery.     Stroke Scales    NIHSS  1a. Level of Consciousness 0-->Alert, keenly responsive   1b. LOC Questions 0-->Answers both questions correctly   1c. LOC Commands 0-->Performs both tasks correctly   2.   Best Gaze 0-->Normal   3.   Visual 0-->No visual loss   4.   Facial Palsy 2-->Partial paralysis (total or near-total paralysis of lower face)   5a. Motor Arm, Left 4-->No movement   5b. Motor Arm,  Right 0-->No drift, limb holds 90 (or 45) degrees for full 10 secs   6a. Motor Leg, Left 3-->No effort against gravity, leg falls to bed immediately (can't wiggle toes but some movement)   6b. Motor Leg, right 3-->No effort against gravity, leg falls to bed immediately (able to wiggle her right toes)   7.   Limb Ataxia 0-->Absent   8.   Sensory 1-->Mild-to-moderate sensory loss, patient feels pinprick is less sharp or is dull on the affected side, or there is a loss of superficial pain with pinprick, but patient is aware of being touched   9.   Best Language 0-->No aphasia, normal   10. Dysarthria 1-->Mild-to-moderate dysarthria, patient slurs at least some words and, at worst, can be understood with some difficulty   11. Extinction and Inattention  1-->Visual, tactile, auditory, spatial, or personal inattention or extinction to bilateral simultaneous stimulation in one of the sensory modalities   Total 15 (12/07/23 0841)       Imaging  I personally reviewed all imaging; relevant findings per HPI.     Lab Results Data   CBC  Recent Labs   Lab 12/07/23  0623 12/06/23  0611 12/05/23  0645   WBC 6.9 6.0 5.5   RBC 2.83* 2.52* 2.46*   HGB 9.1* 7.9* 7.7*   HCT 28.7* 24.8* 23.9*    171 148*     Basic Metabolic Panel    Recent Labs   Lab 12/07/23  0623 12/06/23  0611 12/05/23  0645    144 145   POTASSIUM 4.3 4.2 3.9   CHLORIDE 108* 111* 112*   CO2 23 24 22   BUN 26.1* 22.1 21.8   CR 0.59 0.58 0.62   * 121* 95   WILFRID 9.3 8.9 8.5     Liver Panel  Recent Labs   Lab 12/04/23  0741   PROTTOTAL 5.6*   ALBUMIN 2.9*   BILITOTAL 0.3   ALKPHOS 42   AST 29   ALT <5     INR    Recent Labs   Lab Test 12/04/23  0741 12/03/23  1337 11/30/23  1750   INR 1.08 1.01 1.05      Lipid Profile    Recent Labs   Lab Test 12/07/23  0623 11/28/22  0952 05/16/22  1045 11/29/21  0917   CHOL  --  205* 215* 234*   HDL  --  40* 42* 38*   LDL 73 132* 138* 138*   TRIG  --  163* 173* 291*     A1C    Recent Labs   Lab Test 12/01/23  1606  05/26/23  1343 11/28/22  0952   A1C 5.8* 5.9* 6.1*     Troponin    Recent Labs   Lab 12/03/23  1337 12/01/23  0431 11/30/23  2336   CTROPT 28* 30* 51*          Data

## 2023-12-07 NOTE — PROGRESS NOTES
Report given to 6C RN. Pt transferred to floor with belongings (glasses x2, wig, address book).  Straight cath for 700cc prior to transfer.

## 2023-12-08 NOTE — PLAN OF CARE
DX: R femur fracture  PMH:   T2DM, CKD, sick sinus syndrome s/p pacer, remote hx of breast ca, and HTN, initially admitted for hip fracture, found to have a distal R M2 occlusion s/p DSA with no intervention c/b mild SAH.      Code Status: full  Team: ortho and neuro     Cardiac: Paced rhythm. BP has been stable in the 130s  Respiratory: diminished LS in bases, sats 90s on RA  Neuro: AxO x4. Neuros unchanged throughout the day   Pain: complains of pain in hip when turning in bed  GI/: incontinent to bowel and bladder. Medium bmx1. Straight cathed x1 for bladder scan of 650, 675 was removed  Diet: level 4 pureed diet, thin liquids   Skin: incisions covered by dressings that are CDI. Orders to remove dressings on 12/15  Activity: lift     Gtts/fluid: n/a    Plan: discharge to TCU

## 2023-12-08 NOTE — PLAN OF CARE
Temp: 98.6  F (37  C) Temp src: Oral BP: 139/45 Pulse: 66   Resp: 16 SpO2: 95 % O2 Device: Nasal cannula Oxygen Delivery: 1 LPM     Hours of care: 0922-2063    D: PMHx DM2 not on insulin, chronic renal impairment stage 3a, sick sinus syndrome s/p pacemaker, distant hx of breast CA s/p right mastectomy, osteopenia, HTN. 11/30 presents w/fracture of the right proximal femur s/p fall      I/A: Vickie Gonzalez (she/her) is A/O x4 with intermittent confusion/forgetfulness. Calm and cooperative. Tele in place, AV paced. VSS on 1L NC. L PIV in place SL. R groin site, R hip incision, and R knee incision. No new skin deficits noted. Up Ax2 with lift. 1 smear for BM, but no UOP this shift, bladder scan volume was 282 mL. Appeared to sleep well overnight.    Changed:  Running:   PRN: Hydralazine x1    P: Continue to monitor and follow POC. Plan to discharge to TCU this afternoon (12/8). Notify Orthopaedic surgery/Neurology with changes.    Goal Outcome Evaluation:    Plan of Care Reviewed With: patient  Overall Patient Progress: improving  Outcome Evaluation: Pt is A/O x4, intermittent confusion, VSS, medically ready, plan to d/c to TCU this afternoon (12/8).

## 2023-12-08 NOTE — PLAN OF CARE
DX: R femur fracture  PMH:   T2DM, CKD, sick sinus syndrome s/p pacer, remote hx of breast ca, and HTN, initially admitted for hip fracture, found to have a distal R M2 occlusion s/p DSA with no intervention c/b mild SAH.      Code Status: full  Team: ortho and neuro     Cardiac: Paced rhythm. BP was labile. Hydralazine given x2 given at 1500 and 1600 for SBP in the 180s. Team notified   Respiratory: diminished LS in bases, sats 90s on RA  Neuro: AxO x4. Neuros unchanged throughout the day   Pain: complains of pain in hip when turning in bed  GI/: incontinent to bowel and bladder. Straight cathed x1 for bladder scan of 750, 725 was removed  Diet: level 4 pureed diet, thin liquids   Skin: incisions covered by dressings that are CDI  Activity: lift     Gtts/fluid: plasma-lyte stopped     Plan: discharge to TCU tomorrow

## 2023-12-08 NOTE — PROGRESS NOTES
CHW completed [id number].    Addendum:  CHW faxed discharge orders to TCU at 12pm- sent. Left SW # to call back.  [facility]  [address] [place] [address]  Admissions (Li): [phone]  Fax (for discharge orders): [phone]    Tameka Chang   7A/B Community Health Worker   Phone: [phone]

## 2023-12-08 NOTE — DISCHARGE SUMMARY
"[facility]    Neurology Stroke Discharge Summary    Date of Admission: 11/30/2023  Date of Discharge: 12/08/2023    Disposition: Discharged to TCU  Primary Care Physician: Hermelinda Jacob      Admission Diagnosis:   Fracture of the right proximal femur s/p fall    Sick sinus syndrome s/p pacemaker insertion (2015)   Chronic renal impairment stage 3a   Right bundle branch block   Hypertension   Elevated troponin  Diabetes Mellitus type 2 not on insulin    Distant hx of breast cancer s/p right mastectomy     Discharge Diagnosis:   R femur intertrochanteric fracture s/p intramedullary nail and screw fixation  Subarachnoid hemorrhage, stable  R MCA stroke, R M2 occlusion, ESUS , attempted mechanical thrombectomy, TICI 0   Sick sinus syndrome s/p pacemaker insertion (2015)   THELMA, resolved  Chronic renal impairment stage 3a   Right bundle branch block   Hypertension   Elevated troponin, resolved  Normocytic anemia, in setting of surgery - Stable  Hyperchloremia -resolved  Uremia - Improving  Type 2 DM      Problem Leading to Hospitalization (from HPI):   Lety is a 89-year-old female PMHx of T2DM, CKD, sick sinus syndrome s/p pacer, remote hx of breast ca, and HTN who was admitted to [facility] on 11/30/2023 after a fall resulting in right femoral fracture now status post femoral intramedullary nail per Ortho.  Stroke code was called after developing left facial droop and \"difficulty with hand movements and sensation.\"  CT/CTA was significant for distal R M2 occlusion prompting transfer to [place] for endovascular thrombectomy evaluation.     Please see H&P dated 11/30/2023 for further details about presentation.    Brief Hospital Course:   # R MCA infarct  #Right M2 occlusion, ESUS   #Attempted mechanical thrombectomy, TICI 0, SAH  Vickie Gonzalez is a 89-year old female with a past medical history of T2DM, CKD, sick sinus syndrome s/p pacer, remote hx of breast ca, and " HTN, initially admitted for hip fracture, NIHSS 6 on presentation,  worsened to 18 found to have a distal R M2 occlusion s/p she had angiogram c/b small SAH but thrombectomy could not be performed c/b mild SAH. NIHSS subsequently remained stable at 15 for R gaze preference, L facial droop, LUE and LLE 1/5 strength, L decreased sensation and extinction. Please see progress note 12/07/2023 for specific details of each day of her hospital course. In summary she also had THELMA that resolved. She had normocytic anemia and CT abdomen pelvis was done that did not reveal retroperitoneal bleed and has Hgb has been 8-9. She was transferred to floor from ICU on 12/7 and has been stable.     Rehab evaluation: OT, PT, and SLP.     Smoking Cessation: education provided, patient is not a smoker    BP Long-term Goal: 140/90 or less    Antithrombotic/Anticoagulant Agent: aspirin 81 mg and lovanox    Statins: LDL at goal will defer statin for now    Hgb A1C Goal: < 7.0    - Follow up with PCP in 7-10 days  - Follow up with neurology on next available appointment  - Follow up with orthopedics    Complications: Pt was found to have difficult to navigate arch anatomy, right MCA distal M2 occlusion with early venous shunting and tenacious clot, could not be navigated beyond safely. Thus, no passes of mechanical thrombectomy performed for her acute R distal MCA infarcts c/b small SAH..     Other problems addressed during this hospitalization:  # HTN  #Sick sinus syndrome s/p pacer  -SBP goal < 160 mmHg, long term goal <130/80 if tolerates  -Continue Propranolol 40 mg BID  -Continue PTA Lisinopril 20 mg daily     # R femur intertrochanteric fracture s/p intramedullary nail and screw fixation  -Tylenol 650 mg Q4h PRN  -Methocarbamol 500 mg Q6h PRN  -Oxycodone 2.5 mg Q4h PRN   -2 week wound check  - Follow up with orthopedic as out patient     # Normocytic anemia, in setting of blood loss, stable  She had normocytic anemia and CT abdomen  pelvis was done that did not reveal retroperitoneal bleed and has Hgb has been 8-9.  - CBC in 5 days    #Hyperchloremia - resolved  #Uremia - stable  Over the course of the patient's hospital stay pt developed hyperchloremia peaking at 112 on (12/5) and at 106 at discharge. Pt also developed uremia with her urea levels ranging from (40 -24.5) over the course of her admission. Urea Nitrogen at 24.5 at discharge and trending downwards. Electrolyte abnormalities responded well to fluids and normalized during the course of the hospital stay.  Her electrolyte abnormalities are most likely due to her underlying CKD.       # Type 2 DM  Not on any medications currently.  - Last A1c: 5.8, at goal  - Glucose levels fluctuated between (138 - 100) over the course of hospitalization  - outpatient follow up    Malnutrition:  - Does not meet two criteria required to diagnose malnutrition    # Respiratory  Her O2 saturations overnight ranged from 95 - 89%. She was at 93-95% with 1.5-2 L O2 and had one episode where her O2 sats dropped to 89% without the nasal canula.     PERTINENT INVESTIGATIONS    Labs  Lipid Panel:   Recent Labs   Lab Test 12/07/23  0623 11/28/22  0952   CHOL  --  205*   HDL  --  40*   LDL 73 132*   TRIG  --  163*     A1C:   Lab Results   Component Value Date    A1C 5.8 12/01/2023     INR:   Recent Labs   Lab 12/04/23  0741 12/03/23  1337   INR 1.08 1.01      Coag Panel / Hypercoag Workup: Not indicated  Pending test results: None    Echo:   Global and regional left ventricular function is hyperkinetic with an EF >70%.  Right ventricular function, chamber size, wall motion, and thickness are  normal. Mild (pulmonary artery systolic pressure<50mmHg) pulmonary hypertension is  present. IVC diameter >2.1 cm collapsing <50% with sniff suggests a high RA pressure estimated at 15 mmHg or greater. No pericardial effusion is present.    Imaging:   Endovascular procedure: None     Cardiac Monitoring: Patient had > 24 hrs  of cardiac monitor while in hospital.    Findings: Sinus rhythm with 1st degree A-V block with Premature atrial complexes   Left bundle branch block     Sleep Apnea Screen:   Questions/Answers      1. Prior to your stroke, have you been told that you snore? No.    2. Prior to your stroke, have you been told that you struggle to breath while you are sleeping? No.    3. Prior to your stroke, do you feel tired and sleepy even after getting a normal night of sleep? No.    Sleep Apnea Screen Findings: Patient has 0-1 symptoms of sleep apnea.  Further sleep study is not recommended at this time.    PHQ-9 Depression Screen Score: 0    Education discussed with: patient on blood pressure management, medical management, follow-up recommendations/plan, and 911 call if warning signs of stroke.    During daily rounds, the plan of care was discussed and developed with patient.  Plan of care includes: as above .    PHYSICAL EXAMINATION  Vital Signs:  B/P: 146/58, T: 99, P: 66, R: 19    General Exam  General:  patient lying in bed without any acute distress    HEENT:  normocephalic/atraumatic, no oral lesions  Cardio:  RRR  Pulmonary:  no respiratory distress  Abdomen:  soft, non-tender, non-distended  Extremities:  no edema  Skin:  intact, warm/dry, rash present      Neuro Exam  Mental Status:  alert, oriented x 3, follows commands, speech clear and fluent, naming and repetition normal  Cranial Nerves:  PERRL, conjugate gaze, EOMI, R gaze preference can cross midline, facial sensation intact, left facial droop, tongue midline, no dysarthria.   Motor:  Normal bulk and tone. No abnormal movements. some movement in LUE not anti gravity and horizontal plane, 1/5 strength in LUE, some movement in LUE not anti gravity and horizontal plane, 1/5 strength in LLE. 5/5 strength in RUE with purposeful movement. 3/5 strength to RLE with purposeful movement. Limited by pain from recent R hip fracture with recent surgery.   Reflexes:  no  clonus  Sensory:  light touch sensation intact and symmetric throughout upper and lower extremities, no extinction on double simultaneous stimulation   Coordination:  normal finger-to-nose and heel-to-shin unilaterally on the R without dysmetria, rapid alternating movements intact on the R. Unable to move her left arm.   Station/Gait:  Deferred    National Institutes of Health Stroke Scale (on day of discharge)  NIHSS Total Score: 15    Modified Pedro Score (Discharge)    -  5    Medications    Current Discharge Medication List      START taking these medications    Details   acetaminophen (TYLENOL) 325 MG tablet Take 2 tablets (650 mg) by mouth every 4 hours as needed for mild pain or other (and adjunct with moderate or severe pain or per patient request)    Associated Diagnoses: Closed displaced intertrochanteric fracture of right femur, initial encounter (H)      aspirin (ASA) 81 MG chewable tablet Take 1 tablet (81 mg) by mouth daily    Comments: Stroke prevention  Associated Diagnoses: Acute ischemic right MCA stroke (H)      methocarbamol (ROBAXIN) 500 MG tablet Take 1 tablet (500 mg) by mouth every 6 hours as needed for muscle spasms    Comments: Fracture post operative pain  Associated Diagnoses: Closed displaced intertrochanteric fracture of right femur, initial encounter (H)      oxyCODONE (ROXICODONE) 5 MG tablet Take 0.5 tablets (2.5 mg) by mouth every 4 hours as needed for moderate to severe pain    Comments: Fracture post operative pain  Associated Diagnoses: Closed displaced intertrochanteric fracture of right femur, initial encounter (H)         CONTINUE these medications which have NOT CHANGED    Details   calcium carbonate-vitamin D3 (CALCIUM 600 + D,3,) 600 mg(1,500mg) -200 unit per tablet [CALCIUM CARBONATE-VITAMIN D3 (CALCIUM 600 + D,3,) 600 MG(1,500MG) -200 UNIT PER TABLET] Take 1 tablet by mouth daily. As directed.      cyanocobalamin (VITAMIN B-12) 2000 MCG tablet [CYANOCOBALAMIN (VITAMIN  "B-12) 2000 MCG TABLET] Take 2,000 mcg by mouth daily.      lisinopril (ZESTRIL) 20 MG tablet Take 1 tablet (20 mg) by mouth daily  Qty: 90 tablet, Refills: 3    Associated Diagnoses: Essential hypertension      multivitamin, therapeutic (THERA-VIT) TABS tablet Take 1 tablet by mouth daily      propranolol (INDERAL) 40 MG tablet Take 1 tablet (40 mg) by mouth 2 times daily  Qty: 180 tablet, Refills: 3    Associated Diagnoses: Essential hypertension             Additional recommendations and follow up:       Incentive Spirometry    Incentive Spirometry 10 times per hour, 4 times per day.     When to call - Contact Surgeon Team    You may experience symptoms that require follow-up before your scheduled appointment. Refer to the \"Stoplight Tool\" for instructions on when to contact your Surgeon Team if you are concerned about pain control, blood clots, constipation, or if you are unable to urinate.     When to call - Reach out to Urgent Care    If you are not able to reach your Surgeon Team and you need immediate care, go to the Orthopedic Walk-in Clinic or Urgent Care at your Surgeon's office.  Do NOT go to the Emergency Room unless you have shortness of breath, chest pain, or other signs of a medical emergency.     When to call - Reasons to Call 911    Call 911 immediately if you experience sudden-onset chest pain, arm weakness/numbness, slurred speech, or shortness of breath     Symptoms - Fever Management    A low grade fever can be expected after surgery.  Use acetaminophen (TYLENOL) as needed for fever management.  Contact your Surgeon Team if you have a fever greater than 101.5 F, chills, and/or night sweats.     Symptoms - Constipation management    Constipation (hard, dry bowel movements) is expected after surgery due to the combination of being less active, the anesthetic, and the opioid pain medication.  You can do the following to help reduce constipation:  ~  FLUIDS:  Drink clear liquids (water or Gatorade), " or juice (apple/prune).  ~  DIET:  Eat a fiber rich diet.    ~  ACTIVITY:  Get up and move around several times a day.  Increase your activity as you are able.  MEDICATIONS:  Reduce the risk of constipation by starting medications before you are constipated.  You can take Miralax   (1 packet as directed) and/or a stool softener (Senokot 1-2 tablets 1-2 times a day).  If you already have constipation and these medications are not working, you can get magnesium citrate and use as directed.  If you continue to have constipation you can try an over the counter suppository or enema.  Call your Surgeon Team if it has been greater than 3 days since your last bowel movement.     Symptoms - Reduced Urine Output    Changes in the amount of fluids you drank before and after surgery may result in problems urinating.  It is important to stay well-hydrated after surgery and drink plenty of water. If it has been greater than 8 hours since you have urinated despite drinking plenty of water, call your Surgeon Team.     Comfort and Pain Management - Pain after Surgery    Pain after surgery is normal and expected.  You will have some amount of pain for several weeks after surgery.  Your pain will improve with time.  There are several things you can do to help reduce your pain including: rest, ice, elevation, and using pain medications as needed. Contact your Surgeon Team if you have pain that persists or worsens after surgery despite rest, ice, elevation, and taking your medication(s) as prescribed. Contact your Surgeon Team if you have new numbness, tingling, or weakness in your operative extremity.     Comfort and Pain Management - Swelling after Surgery    Swelling and/or bruising of the surgical extremity is common and may persist for several months after surgery. In addition to frequent icing and elevation, gentle compressive support with an ACE wrap or tubigrip may help with swelling. Apply compression regularly, removing at  least twice daily to perform skin checks. Contact your Surgeon Team if your swelling increases and is NOT associated with an increase in your activity level, or if your swelling increases and is associated with redness and pain.     Comfort and Pain Management - Cold therapy    Ice can be used to control swelling and discomfort after surgery. Place a thin towel over your operative site and apply the ice pack overtop. Leave ice pack in place for 20 minutes, then remove for 20 minutes. Repeat this 20 minutes on/20 minutes off routine as often as tolerated.     Medication Instructions - Acetaminophen (TYLENOL) Instructions    You were discharged with acetaminophen (TYLENOL) for pain management after surgery. Acetaminophen most effectively manages pain symptoms when it is taken on a schedule without missing doses (every four, six, or eight hours). Your Provider will prescribe a safe daily dose between 3000 - 4000 mg.  Do NOT exceed this daily dose. Most patients use acetaminophen for pain control for the first four weeks after surgery.  You can wean from this medication as your pain decreases.     Medication Instructions - NSAID Instructions    You were discharged with an anti-inflammatory medication for pain management to use in combination with acetaminophen (TYLENOL) and the narcotic pain medication.  Take this medication exactly as directed.  You should only take one anti-inflammatory at a time.  Some common anti-inflammatories include: ibuprofen (ADVIL, MOTRIN), naproxen (ALEVE, NAPROSYN), celecoxib (CELEBREX), meloxicam (MOBIC), ketorolac (TORADOL).  Take this medication with food and water.     Medication Instructions - Opioids - Tapering Instructions    In the first three days following surgery, your symptoms may warrant use of the narcotic pain medication every four to six hours as prescribed. This is normal. As your pain symptoms improve, focus your efforts on decreasing (tapering) use of narcotic medications.  "The most successful tapering strategy is to first, decrease the number of tablets you take every 4-6 hours to the minimum prescribed. Then, increase the amount of time between doses.  For example:  First, taper to   or 1 tablet every 4-6 hours.  Then, taper to   or 1 tablet every 6-8 hours.  Then, taper to   or 1 tablet every 8-10 hours.  Then, taper to   or 1 tablet every 10-12 hours.  Then, taper to   or 1 tablet at bedtime.  The bedtime dose can help with comfort during sleep and is typically the last dose to be discontinued after surgery.     Follow Up Care    Follow-up with your Surgeon Team at 2 week postop javi (12/15/23) for wound check.     Shower with wound/dressing covered    You must COVER your dressing or incision with saran wrap (or any other non-permeable covering) to allow the incision to remain dry while showering.  You may shower after surgery as long as the surgical wound stays dry. Continue to cover your dressing or incision for showering until your first office visit.  You are strictly prohibited from soaking or submerging the surgical wound underwater.     Comfort and Pain Management - LOWER Extremity Elevation    Swelling is expected for several months after surgery. This type of swelling is usually associated with gravity and activity, and can be improved with elevation.   The best way to do this is to get your \"toes above your nose\" by laying down and placing several pillows lengthwise under your calf and heel. When elevating your leg keep your knee completely straight. Perform this elevation as often as possible especially for the first two weeks after surgery.     Medication Instructions - Opioid Instructions (Greater than or equal to 65 years)    You were discharged with an opioid medication (hydromorphone, oxycodone, hydrocodone, or tramadol). This medication should only be taken for breakthrough pain that is not controlled with acetaminophen (TYLENOL). If you rate your pain less than 3 " you do not need this medication.  Pain rating 0-3:  You do not need this medication  Pain rating 4-6:  Take 1/2 tablet every 4-6 hours as needed  Pain rating 7-10:  Take 1 tablet every 4-6 hours as needed  Do not exceed 4 tablets per day     General info for SNF    Length of Stay Estimate: Short Term Care: Estimated # of Days <30  Condition at Discharge: Stable  Level of care:skilled   Rehabilitation Potential: Fair  Admission H&P remains valid and up-to-date: Yes  Recent Chemotherapy: N/A  Use Nursing Home Standing Orders: Yes     Mantoux instructions    Give two-step Mantoux (PPD) Per Facility Policy Yes     Follow Up and recommended labs and tests    Follow up with primary care provider in 7-10 days for hospital discharge, medications  Follow up with neurology on next available appointment  Follow up with orthopedics for your fracture     Reason for your hospital stay    You were admitted in the hospital because you had a fracture in your right femur and you underwent surgery for it.  After your surgery you also became weak on the left side and were found to have a stroke on the right side of your brain.  The stroke caused you to have weakness on the left side of your body.  You remained stable during the hospital stay.  You will require rehab for your inability to walk.    Medications  Take aspirin 81 mg once daily for life to help prevent stroke  Take painkillers as prescribed for pain in your right leg after your surgery  Take the rest of your medications as before    In case of new speech difficulty, weakness in your right arm, worse headache or severe life, fall, loss of consciousness, loss of vision     Activity - Up with nursing assistance     Bladder scan    Bladder scan every 4-8 hours if no voiding.   Straight catheterize if bladder scan is greater than 700 mL OR use bladder scanner and intermittent straight catheterization per unit / population specific criteria or specific provider order. IF straight  catheterization is required for greater than 48 hours, notify Provider.     Physical Therapy Adult Consult    Evaluate and treat as clinically indicated.    Reason: Status Post Hip Surgery     Occupational Therapy Adult Consult    Evaluate and treat as clinically indicated.    Reason: Status Post Hip Surgery     Speech Language Path Adult Consult    Evaluate and treat as clinically indicated.    Reason:  Stroke     Fall precautions     Hip precautions     Crutches DME    DME Documentation: Describe the reason for need to support medical necessity: Impaired gait status post hip surgery. I, the undersigned, certify that the above prescribed supplies are medically necessary for this patient and is both reasonable and necessary in reference to accepted standards of medical practice in the treatment of this patient's condition and is not prescribed as a convenience.     Cane DME    DME Documentation: Describe the reason for need to support medical necessity: Impaired gait status post hip surgery. I, the undersigned, certify that the above prescribed supplies are medically necessary for this patient and is both reasonable and necessary in reference to accepted standards of medical practice in the treatment of this patient's condition and is not prescribed as a convenience.     Walker DME    : DME Documentation: Describe the reason for need to support medical necessity: Impaired gait status post hip surgery. I, the undersigned, certify that the above prescribed supplies are medically necessary for this patient and is both reasonable and necessary in reference to accepted standards of medical practice in the treatment of this patient's condition and is not prescribed as a convenience.     Diet    Follow this diet upon discharge: Orders Placed This Encounter      Pureed Diet (level 4) Thin Liquids (level 0)     Stroke Hospital Follow Up (for neurologist use only)    [facility] will call you to coordinate care as  prescribed by your provider. If you don t hear from a representative within 2 business days, please call [phone].         Patient was seen and discussed with the Attending, Dr. Granados.    Gio MS3    Resident/Fellow Attestation   I, Naveed Candelario MD, was present with the medical/[employer] student who participated in the service and in the documentation of the note.  I have verified the history and personally performed the physical exam and medical decision making.  I agree with the assessment and plan of care as documented in the note.      Naveed Candelario MD  Neurology PGY-3

## 2023-12-08 NOTE — PLAN OF CARE
Speech Language Therapy Discharge Summary    Reason for therapy discharge:    Discharged to transitional care facility.    Progress towards therapy goal(s). See [facility] on [facility] for goal details.  Goals partially met.  Barriers to achieving goals:   discharge from facility.    Therapy recommendation(s):    Continued therapy is recommended.  Rationale/Recommendations:  Recommend pureed diet (IDDSI 4) and thin liquids with 1:1 supervision/total feeding assistance when fully upright & alert, small sips/bites, slow rate and reducing distractions. Please discontinue intake if patient is too fatigued to safely participate or demonstrating s/s of aspiration. Patient is impulsive with PO intake, please limit all distractions. Ensure left side of oral cavity is cleared before further trials.

## 2023-12-08 NOTE — PLAN OF CARE
Occupational Therapy Discharge Summary    Reason for therapy discharge:    Discharged to transitional care facility.    Progress towards therapy goal(s). See goals on Care Plan in [facility] electronic health record for goal details.  Goals partially met.  Barriers to achieving goals:   discharge from facility.    Therapy recommendation(s):    Continued therapy is recommended.  Rationale/Recommendations:  TCU to progress functional mobility, strength and endurance. .

## 2023-12-08 NOTE — PROGRESS NOTES
Care Management Discharge Note    Discharge Date: 12/08/2023       Discharge Disposition: TCU    [facility]  [address] [place] [address]  Admissions (Li): [phone]  Fax (for discharge orders): [phone]  - 12/8: SW tasked 6th floor Community Health Worker with completing PAS and sending discharge orders. See CHW note 12/8 for details.    MARGARITO called Li in admissions at 9:35am and gave her the PAS code and pt's discharge ride time. Li had no further questions and thanked the SW.     Discharge Services: TCU therapy services    Discharge DME: n/a    Discharge Transportation:  [facility] (Phone: [phone]) stretcher ride scheduled for a pickup window between 1:42pm to 2:27pm today 12/8. See CHW note 12/8 for details.     - 12/8: MARGARITO completed the PCS form required for stretcher ride and MARGARITO gave the document to the 6C unit clerk at 12:18pm.     Private pay costs discussed: transportation costs    Does the patient's insurance plan have a 3 day qualifying hospital stay waiver?  Yes     Which insurance plan 3 day waiver is available? [employer]    Will the waiver be used for post-acute placement? Undetermined at this time    PAS Confirmation Code: [id number]  Patient/family educated on Medicare website which has current facility and service quality ratings:  yes    Education Provided on the Discharge Plan:  yes  Persons Notified of Discharge Plans: pt, her nephew Chip (Phone: [phone], MARGARITO called him at 12:05pm and gave him the update and he was in agreement with the discharge and thanked MARGARITO for the update), the bedside RN, charge RN, the Neuro provider/team, and TCU admissions  Patient/Family in Agreement with the Plan: yes    Handoff Referral Completed: Yes  ___________________    KEITH Sandra, LINDSAY  6C , covering beds 0708 to 1183  [facility]   Phone: [phone]  Pager: [phone]

## 2023-12-11 NOTE — LETTER
2023        RE: Vickie Gonzalez  [address]  [place] [address]        [facility] GERIATRICS    PRIMARY CARE PROVIDER AND CLINIC:  SANDIP Kendall CNP, [address] [facility] [address]  Chief Complaint   Patient presents with     Hospital F/U      [facility] Record Number:  [id number]  Place of Service where encounter took place:  [facility] (SNF) [[id number]]    Vickie Gonzalez  is a 89 year old, (1925), who enjoys football, with pmhx including SSS s/p pacer, HTN, remote hx of breast cancer who was admitted to the above facility from  [facility]. Hospital stay 23 through 23..   HPI:      Hospital course  Initially admitted to the hospital 2023 after a fall in which she lost her balance.  Landed on her right hip.  She had significant pain and was unable to stand or bear weight.  No other significant symptoms.  Evaluation in the ED notable for normal BMP, grossly normal CBC, not significantly elevated at hsTrop.  X-ray of the right femur and pelvis demonstrated comminuted displaced fracture of the proximal right femur of the intertrochanteric and subtrochanteric regions.  Additional imaging as part of evaluation included a head CT with no acute intracranial pathology, mild to moderate generalized cerebral volume loss and leukoaraiosis.  Chest x-ray with findings of peribronchial cuffing and interstitial opacities suggestive of pulmonary edema.  Orthopedic surgery was consulted.  She underwent right femoral intramedullary nailing 2023 without any IntraOp complications.      Due to the modestly elevated troponin, an echocardiogram was obtained which showed hyperdynamic left ventricular function with EF of 70%, mild pulmonary hypertension, moderate mitral valve annular calcification and mild insufficiency.    Her postoperative course was complicated by a stroke.  She was noted to have  slurring of words and left-sided weakness 12/3/2023.  A code stroke was called.  CTA of the head and neck showed a right distal MCA occlusion with associated 11 millimeter right parietal penumbra.  She was transferred to [place] for possible thrombectomy.  However by time of arrival to [facility], her symptoms had improved significantly and given relatively minor symptoms, significant tortuous anatomy, did not proceed with thrombectomy. Then her symptoms progressed again, and IR attempted thrombectomy.  However again with tortuous anatomy and possibly unstable plaque, this was abandoned. Of note, a repeat CTA did show small subarachnoid hemorrhage in the right sylvian fissure.  This was unchanged with repeat imaging.  A follow-up MRI showed the prior stroke as well as additional infarcts of the right occipital and left occipital parietal lobes.  Echocardiogram without evidence of thrombus.  Diagnosed with stroke due to ESUS.  Started on aspirin. Prior antihypertensives resumed by time of discharge.    Throughout her course, she did have moderate anemia. Require 1 unit PRBCs for anemia to 6.9.  CT of the abdomen pelvis did not show any evidence of bleeding.  Hemoglobin stabilized in the range of 8-9.  She had been on aspirin and Lovenox and discharged on aspirin daily.    Today  She recalls her admission well.  In fact prior to it, she notes that with her fall, she says her legs felt strange.  She is wondering if she has stroke at that time that led to her falling.  She continues to have some pain in her hip but overall well-controlled.  Has not required pain medications other than couple doses of as needed Tylenol.  She has significant weakness of the left extremities, particularly her left arm.  Also with mild left facial droop.    Her appetite is doing well.  However she would really like a large baked potato.  She has no dysphagia symptoms.  No chest pain or shortness of breath.  No constipation or  diarrhea.  No urinary symptoms.  Nurses reported postvoid residuals have been less than 250 mL repeatedly.    Describes herself as a jokester and is taking these events and stride.  She worked many years with [employer] at the [employer] and describes greatly enjoying this work.  She now tends to spend her time watching TV.  She greatly enjoys football games with family.  [place]alls [place] [employer] [place] many years ago.    CODE STATUS/ADVANCE DIRECTIVES DISCUSSION: CPR/Full code   ALLERGIES:   Allergies   Allergen Reactions     Tamoxifen Analogues [Tamoxifen] Unknown     Annotation: hepatitis        PAST MEDICAL HISTORY:   Past Medical History:   Diagnosis Date     Breast cancer (H) right      Chronic kidney disease      Diabetes (H)      Goiter, nontoxic, multinodular      HTN (hypertension)      Hx antineoplastic chemotherapy     breast cancer      Hypertriglyceridemia without hypercholesterolemia      Osteopenia       PAST SURGICAL HISTORY:   has a past surgical history that includes Mastectomy (Right); Biopsy breast (Right); and Hip Pinning (Right, 12/1/2023).  FAMILY HISTORY: family history includes CABG (age of onset: 67.00) in her brother; Pacemaker in her sister.  SOCIAL HISTORY:   reports that she has never smoked. She has never used smokeless tobacco. She reports current alcohol use. She reports that she does not use drugs.  Patient's living condition: lives alone    Post Discharge Medication Reconciliation Status:   MED REC REQUIRED  Post Medication Reconciliation Status: discharge medications reconciled and changed, per note/orders       Current Outpatient Medications   Medication Sig     acetaminophen (TYLENOL) 325 MG tablet Take 2 tablets (650 mg) by mouth every 4 hours as needed for mild pain or other (and adjunct with moderate or severe pain or per patient request)     aspirin (ASA) 81 MG chewable tablet Take 1 tablet (81 mg) by mouth daily     calcium carbonate-vitamin  "D3 (CALCIUM 600 + D,3,) 600 mg(1,500mg) -200 unit per tablet [CALCIUM CARBONATE-VITAMIN D3 (CALCIUM 600 + D,3,) 600 MG(1,500MG) -200 UNIT PER TABLET] Take 1 tablet by mouth daily. As directed.     cyanocobalamin (VITAMIN B-12) 2000 MCG tablet [CYANOCOBALAMIN (VITAMIN B-12) 2000 MCG TABLET] Take 2,000 mcg by mouth daily.     enoxaparin ANTICOAGULANT (LOVENOX) 40 MG/0.4ML syringe Inject 0.4 mLs (40 mg) Subcutaneous every 24 hours for 7 days     lisinopril (ZESTRIL) 20 MG tablet Take 1 tablet (20 mg) by mouth daily     methocarbamol (ROBAXIN) 500 MG tablet Take 1 tablet (500 mg) by mouth every 6 hours as needed for muscle spasms     multivitamin, therapeutic (THERA-VIT) TABS tablet Take 1 tablet by mouth daily     oxyCODONE (ROXICODONE) 5 MG tablet Take 0.5 tablets (2.5 mg) by mouth every 6 hours as needed for moderate to severe pain     propranolol (INDERAL) 40 MG tablet Take 1 tablet (40 mg) by mouth 2 times daily     No current facility-administered medications for this visit.     Vitals:  BP (!) 140/70   Pulse 77   Temp 98  F (36.7  C)   Resp 18   Ht 1.626 m (5' 4\")   Wt 66.2 kg (146 lb)   LMP  (LMP Unknown)   SpO2 96%   BMI 25.06 kg/m    Exam:    GENERAL APPEARANCE: Seated comfortably, NAD  HENT:  NCAT, moderately [place], good dentition  EYES:  Conjunctiva clear, anicteric, EOMI, PERRL  PULM  Normal WOB on RA, lungs CTAB, no wheezes or crackles, good air movement  CV:  RRR,  soft systolic murmurs; trace LE edema  ABDOMEN: Abdomen soft, not tender, not distended, BS normal and active throughout   SKIN:  No significnat rashes or lesions of exposed skin  NEURO: Alert, answering questions appropriately, normal thought processes; mild left facial droop. Strength 0/5 in LUE throughout, 3/5 in LLE hip flexion, 4/5 in left dorsiflexion  PSYCH: Mood normal with bright affect, normal rate and volume of speech, insight intact    Lab/Diagnostic data:  Recent labs in [facility] reviewed by me today. 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  Mechanical thrombectomy attempted but unable to be completed.  Echocardiogram without evidence of atrial fibrillation or ventricular thrombus.  Additionally complicated by possible hemorrhagic transformation with small SAH.  Started on daily aspirin, antihypertensives at time of discharge from hospital.  Plan:   -Follow-up with neurology as planned  -PT/OT    (G81.94) Left hemiplegia (H)  Comment: Due to the above with significant MCA territory stroke and associated left upper extremity hemiplegia.  She does have moderate strength of the left lower extremity though limited range of motion.  Additionally functionally limited currently with right intertrochanteric fracture and surgery.  Plan:   -PT/OT    (W19.XXXD) Fall, subsequent encounter  (S72.141D) Closed displaced intertrochanteric fracture of right femur with routine healing, subsequent encounter  Comment: Original reason for admission to the hospital.  Underwent surgical fixation.  Complicated by acute blood loss anemia following surgery.  Did receive 1 unit PRBCs.    (M80.00XD) Age-related osteoporosis with current pathological fracture with routine healing, subsequent encounter  Comment: Consistent with the above and history of imaging with osteopenia.  Pending course, consider medical therapies.    (R41.0) Delirium  Comment: Per review of record, sounds as though she had altered level of consciousness and speech following surgery consistent with delirium and this may have been initial symptoms of her strokes    (R33.9) Urinary retention  Comment: Multifactorial related to fall, surgery, limited mobility, possibly stroke itself.  Postvoid residuals have had significant improvement and has  not required straight catheterization recently.  Plan:   -Continue to monitor, if no need for straight cath for 1 day, may discontinue  -CMP    (I49.5) Sick sinus syndrome (H)  Comment: History of pacer placement, functioning appropriately.    (I27.20) Pulmonary hypertension (H)  Comment: Evidence of mild pulmonary hypertension noted on echocardiogram, suggestive of possible HFpEF    (D62) ABLA (acute blood loss anemia)  Comment: As above  Plan:   -CBC    Orders:  [x] Discontinue methocarbamol, oxycodone (not using)  [x] tyleno 650mg TID  [x] BMP, CBC Wednesday    Electronically signed by:    Benjamin Rosenstein, MD, MA  [facility]     This note was completed with the assistance of dictation software. Typos and word substitution-errors are expected and unintended.      78 MINUTES SPENT BY ME on the date of service doing chart review, history, exam, documentation & further activities per the note.            Sincerely,        Benjamin Rosenstein, MD

## 2023-12-11 NOTE — PROGRESS NOTES
[facility] GERIATRICS    PRIMARY CARE PROVIDER AND CLINIC:  SANDIP Kendall CNP, [address] [address]  Chief Complaint   Patient presents with    Hospital F/U      [facility] Medical Record Number:  [id number]  Place of Service where encounter took place:  [facility] ([facility]) [[id number]]    Vickie Gonzalez  is a 89 year old, (1925), who enjoys football, with pmhx including SSS s/p pacer, HTN, remote hx of breast cancer who was admitted to the above facility from  [facility]. Hospital stay 23 through 23..   HPI:      Hospital course  Initially admitted to the hospital 2023 after a fall in which she lost her balance.  Landed on her right hip.  She had significant pain and was unable to stand or bear weight.  No other significant symptoms.  Evaluation in the ED notable for normal BMP, grossly normal CBC, not significantly elevated at hsTrop.  X-ray of the right femur and pelvis demonstrated comminuted displaced fracture of the proximal right femur of the intertrochanteric and subtrochanteric regions.  Additional imaging as part of evaluation included a head CT with no acute intracranial pathology, mild to moderate generalized cerebral volume loss and leukoaraiosis.  Chest x-ray with findings of peribronchial cuffing and interstitial opacities suggestive of pulmonary edema.  Orthopedic surgery was consulted.  She underwent right femoral intramedullary nailing 2023 without any IntraOp complications.      Due to the modestly elevated troponin, an echocardiogram was obtained which showed hyperdynamic left ventricular function with EF of 70%, mild pulmonary hypertension, moderate mitral valve annular calcification and mild insufficiency.    Her postoperative course was complicated by a stroke.  She was noted to have slurring of words and left-sided weakness 12/3/2023.  A code stroke was called.  CTA of the head and  neck showed a right distal MCA occlusion with associated 11 millimeter right parietal penumbra.  She was transferred to [place] for possible thrombectomy.  However by time of arrival to [facility], her symptoms had improved significantly and given relatively minor symptoms, significant tortuous anatomy, did not proceed with thrombectomy. Then her symptoms progressed again, and IR attempted thrombectomy.  However again with tortuous anatomy and possibly unstable plaque, this was abandoned. Of note, a repeat CTA did show small subarachnoid hemorrhage in the right sylvian fissure.  This was unchanged with repeat imaging.  A follow-up MRI showed the prior stroke as well as additional infarcts of the right occipital and left occipital parietal lobes.  Echocardiogram without evidence of thrombus.  Diagnosed with stroke due to ESUS.  Started on aspirin. Prior antihypertensives resumed by time of discharge.    Throughout her course, she did have moderate anemia. Require 1 unit PRBCs for anemia to 6.9.  CT of the abdomen pelvis did not show any evidence of bleeding.  Hemoglobin stabilized in the range of 8-9.  She had been on aspirin and Lovenox and discharged on aspirin daily.    Today  She recalls her admission well.  In fact prior to it, she notes that with her fall, she says her legs felt strange.  She is wondering if she has stroke at that time that led to her falling.  She continues to have some pain in her hip but overall well-controlled.  Has not required pain medications other than couple doses of as needed Tylenol.  She has significant weakness of the left extremities, particularly her left arm.  Also with mild left facial droop.    Her appetite is doing well.  However she would really like a large baked potato.  She has no dysphagia symptoms.  No chest pain or shortness of breath.  No constipation or diarrhea.  No urinary symptoms.  Nurses reported postvoid residuals have been less than 250 mL  repeatedly.    Describes herself as a jokester and is taking these events and stride.  She worked many years with [employer] at the [employer] and describes greatly enjoying this work.  She now tends to spend her time watching TV.  She greatly enjoys football games with family.  Recalls going to the [employer] but when the [employer] went many years ago.    CODE STATUS/ADVANCE DIRECTIVES DISCUSSION: CPR/Full code   ALLERGIES:   Allergies   Allergen Reactions    Tamoxifen Analogues [Tamoxifen] Unknown     Annotation: hepatitis        PAST MEDICAL HISTORY:   Past Medical History:   Diagnosis Date    Breast cancer (H) right     Chronic kidney disease     Diabetes (H)     Goiter, nontoxic, multinodular     HTN (hypertension)     Hx antineoplastic chemotherapy     breast cancer     Hypertriglyceridemia without hypercholesterolemia     Osteopenia       PAST SURGICAL HISTORY:   has a past surgical history that includes Mastectomy (Right); Biopsy breast (Right); and Hip Pinning (Right, 12/1/2023).  FAMILY HISTORY: family history includes CABG (age of onset: 67.00) in her brother; Pacemaker in her sister.  SOCIAL HISTORY:   reports that she has never smoked. She has never used smokeless tobacco. She reports current alcohol use. She reports that she does not use drugs.  Patient's living condition: lives alone    Post Discharge Medication Reconciliation Status:   MED REC REQUIRED  Post Medication Reconciliation Status: discharge medications reconciled and changed, per note/orders       Current Outpatient Medications   Medication Sig    acetaminophen (TYLENOL) 325 MG tablet Take 2 tablets (650 mg) by mouth every 4 hours as needed for mild pain or other (and adjunct with moderate or severe pain or per patient request)    aspirin (ASA) 81 MG chewable tablet Take 1 tablet (81 mg) by mouth daily    calcium carbonate-vitamin D3 (CALCIUM 600 + D,3,) 600 mg(1,500mg) -200 unit per tablet [CALCIUM CARBONATE-VITAMIN D3 (CALCIUM 600 +  "D,3,) 600 MG(1,500MG) -200 UNIT PER TABLET] Take 1 tablet by mouth daily. As directed.    cyanocobalamin (VITAMIN B-12) 2000 MCG tablet [CYANOCOBALAMIN (VITAMIN B-12) 2000 MCG TABLET] Take 2,000 mcg by mouth daily.    enoxaparin ANTICOAGULANT (LOVENOX) 40 MG/0.4ML syringe Inject 0.4 mLs (40 mg) Subcutaneous every 24 hours for 7 days    lisinopril (ZESTRIL) 20 MG tablet Take 1 tablet (20 mg) by mouth daily    methocarbamol (ROBAXIN) 500 MG tablet Take 1 tablet (500 mg) by mouth every 6 hours as needed for muscle spasms    multivitamin, therapeutic (THERA-VIT) TABS tablet Take 1 tablet by mouth daily    oxyCODONE (ROXICODONE) 5 MG tablet Take 0.5 tablets (2.5 mg) by mouth every 6 hours as needed for moderate to severe pain    propranolol (INDERAL) 40 MG tablet Take 1 tablet (40 mg) by mouth 2 times daily     No current facility-administered medications for this visit.     Vitals:  BP (!) 140/70   Pulse 77   Temp 98  F (36.7  C)   Resp 18   Ht 1.626 m (5' 4\")   Wt 66.2 kg (146 lb)   LMP  (LMP Unknown)   SpO2 96%   BMI 25.06 kg/m    Exam:    GENERAL APPEARANCE: Seated comfortably, NAD  HENT:  NCAT, moderately [place], good dentition  EYES:  Conjunctiva clear, anicteric, EOMI, PERRL  PULM  Normal WOB on RA, lungs CTAB, no wheezes or crackles, good air movement  CV:  RRR,  soft systolic murmurs; trace LE edema  ABDOMEN: Abdomen soft, not tender, not distended, BS normal and active throughout   SKIN:  No significnat rashes or lesions of exposed skin  NEURO: Alert, answering questions appropriately, normal thought processes; mild left facial droop. Strength 0/5 in LUE throughout, 3/5 in LLE hip flexion, 4/5 in left dorsiflexion  PSYCH: Mood normal with bright affect, normal rate and volume of speech, insight intact    Lab/Diagnostic data:  Recent labs in [facility] reviewed by me today.     ASSESSMENT/PLAN:    (I63.411) Cerebrovascular accident (CVA) due to embolism (ESUS) of right middle cerebral artery (H)  (primary " encounter diagnosis)  (I60.9) SAH (subarachnoid hemorrhage) (H)  Comment: Complication of her postoperative course of right hip surgery.  Mechanical thrombectomy attempted but unable to be completed.  Echocardiogram without evidence of atrial fibrillation or ventricular thrombus.  Additionally complicated by possible hemorrhagic transformation with small SAH.  Started on daily aspirin, antihypertensives at time of discharge from hospital.  Plan:   -Follow-up with neurology as planned  -PT/OT    (G81.94) Left hemiplegia (H)  Comment: Due to the above with significant MCA territory stroke and associated left upper extremity hemiplegia.  She does have moderate strength of the left lower extremity though limited range of motion.  Additionally functionally limited currently with right intertrochanteric fracture and surgery.  Plan:   -PT/OT    (W19.XXXD) Fall, subsequent encounter  (S72.141D) Closed displaced intertrochanteric fracture of right femur with routine healing, subsequent encounter  Comment: Original reason for admission to the hospital.  Underwent surgical fixation.  Complicated by acute blood loss anemia following surgery.  Did receive 1 unit PRBCs.    (M80.00XD) Age-related osteoporosis with current pathological fracture with routine healing, subsequent encounter  Comment: Consistent with the above and history of imaging with osteopenia.  Pending course, consider medical therapies.    (R41.0) Delirium  Comment: Per review of record, sounds as though she had altered level of consciousness and speech following surgery consistent with delirium and this may have been initial symptoms of her strokes    (R33.9) Urinary retention  Comment: Multifactorial related to fall, surgery, limited mobility, possibly stroke itself.  Postvoid residuals have had significant improvement and has not required straight catheterization recently.  Plan:   -Continue to monitor, if no need for straight cath for 1 day, may  discontinue  -CMP    (I49.5) Sick sinus syndrome (H)  Comment: History of pacer placement, functioning appropriately.    (I27.20) Pulmonary hypertension (H)  Comment: Evidence of mild pulmonary hypertension noted on echocardiogram, suggestive of possible HFpEF    (D62) ABLA (acute blood loss anemia)  Comment: As above  Plan:   -CBC    Orders:  [x] Discontinue methocarbamol, oxycodone (not using)  [x] tyleno 650mg TID  [x] BMP, CBC Wednesday    Electronically signed by:    Benjamin Rosenstein, MD, MA  [facility]     This note was completed with the assistance of dictation software. Typos and word substitution-errors are expected and unintended.      78 MINUTES SPENT BY ME on the date of service doing chart review, history, exam, documentation & further activities per the note.

## 2023-12-12 NOTE — TELEPHONE ENCOUNTER
Hello,    I'm with ortho con.  Family member Jasmine is calling, niece.  She says she has a health care proxy.  She is asking at the post op if a zully lift is available?  She is asking if an XR will be done.  Pt is difficult to transfer even for 2 people.  Pt will have a medical ride and be dropped off.  Jasmine is hoping to meet the pt there.  Can this be a telephone visit instead?  Jasmine is at .  Pt is currently at a TCU, [facility], ph# Cassi Edwards at .

## 2023-12-13 NOTE — TELEPHONE ENCOUNTER
Writer called and spoke with patient's niece Jasmine. Writer stated that provider would for pt to be seen here at the [facility]. Writer also stated that there is a zully lift here and enough staff to assist with lift. Jasmine agreed with plan to have pt come in and will notify Ariela at the TCU.     Lucia Johnson LPN

## 2023-12-13 NOTE — TELEPHONE ENCOUNTER
Writer called and spoke with Ariela on the phone. Writer stated that provider would prefer that pt come into clinic. The clinic here at [facility] is able to accommodate with a zully lift and has the staff to assist. Ariela thanked writer for call and will set up transport for appointment.    Lucia Johnson LPN

## 2023-12-13 NOTE — TELEPHONE ENCOUNTER
[facility] Call Center    Phone Message    May a detailed message be left on voicemail: yes     Reason for Call: Other: Pt is currently at TCU, Ariela from [facility] is calling in regards to pt requesting for wound check to be done at the TCU. Can care team call TCU or pt back to confirm? [facility] # [phone]     Action Taken: Other: [facility] [[id number]]    Travel Screening: Not Applicable

## 2023-12-13 NOTE — PROGRESS NOTES
ASSESSMENT/PLAN:  Vickie Gonzalez is a 89 year old who is status post ORIF right femur with IM nailing for closed displaced intertrochanteric fracture of the right femur with Dr. Tsai on 12/1/23.  -Stable alignment of x-rays taken today.   -Incisions healing well  -Postoperative course complicated by stroke causing left sided paralysis    She will continue as previously ordered for working on mobilization with PT. From her right hip fracture standpoint, she may range hip as tolerated and WBAT. I did page and discuss anticoagulation with Dr. Donavon Granados of neurology and he said no further recommendation for lovenox moving forward. She will continue ASA 81mg daily for stroke prevention and DVT prophylaxis.     The patient will see Dr. Tsai at six weeks postop for repeat x-rays. Vickie has our clinic number and will call with any questions or concerns.    Imani Hernández PA-C  Orthopaedic Surgery    _________________________________    HISTORY OF PRESENT ILLNESS:  Vickie Gonzalez is a 89 year old female who is approximately 2 weeks status post the above procedure.    Postoperative course was complicated by a stroke-symptoms resolved upon transfer to [place] and no thrombectomy was performed at that time. Recurrence of symptoms prompted thrombectomy attempt with IR, but was not executed due to tortuous anatomy and possible unstable plaque. She was also noted to have small sub arachnoid hemorrhage on repeat imagine.  Ultimately she was discharged on ASA and she has follow-up with neurology in January.     Weight bearing status/devices: wheelchair, difficulty with ambulation due to left sided weakness.   Pain level and management: pain is 0-1/10, currently using APAP  Physical therapy & ROM: residing at [facility]. Hasn't been mobilizing without zully or heavy assist due to weakness of left side.     Surgical dressings remain in place-no notable drainage or redness or areas of concern.      DVT prophylaxis: ASA 81 mg daily  Patient denies calf pain or tenderness.      MEDICATIONS:   Current Outpatient Rx   Medication Sig Dispense Refill    acetaminophen (TYLENOL) 325 MG tablet Take 2 tablets (650 mg) by mouth every 4 hours as needed for mild pain or other (and adjunct with moderate or severe pain or per patient request)      aspirin (ASA) 81 MG chewable tablet Take 1 tablet (81 mg) by mouth daily      calcium carbonate-vitamin D3 (CALCIUM 600 + D,3,) 600 mg(1,500mg) -200 unit per tablet [CALCIUM CARBONATE-VITAMIN D3 (CALCIUM 600 + D,3,) 600 MG(1,500MG) -200 UNIT PER TABLET] Take 1 tablet by mouth daily. As directed.      cyanocobalamin (VITAMIN B-12) 2000 MCG tablet [CYANOCOBALAMIN (VITAMIN B-12) 2000 MCG TABLET] Take 2,000 mcg by mouth daily.      enoxaparin ANTICOAGULANT (LOVENOX) 40 MG/0.4ML syringe Inject 0.4 mLs (40 mg) Subcutaneous every 24 hours for 7 days      lisinopril (ZESTRIL) 20 MG tablet Take 1 tablet (20 mg) by mouth daily 90 tablet 3    methocarbamol (ROBAXIN) 500 MG tablet Take 1 tablet (500 mg) by mouth every 6 hours as needed for muscle spasms      multivitamin, therapeutic (THERA-VIT) TABS tablet Take 1 tablet by mouth daily      oxyCODONE (ROXICODONE) 5 MG tablet Take 0.5 tablets (2.5 mg) by mouth every 6 hours as needed for moderate to severe pain 30 tablet     propranolol (INDERAL) 40 MG tablet Take 1 tablet (40 mg) by mouth 2 times daily 180 tablet 3         ALLERGIES: Tamoxifen analogues [tamoxifen]       PHYSICAL EXAMINATION:   On physical examination the patient is comfortable and is in no acute distress. The affect is appropriate and breathing is non-labored.    Surgical wound: The surgical wound was exposed and found to be clean and dry, without drainage or discharge. There is mild edema around the incision. There is no erythema. The skin was appropriately warm to touch.     ROM: supple hip ROM with flexion to 100, 60 degree arc for internal rotation/external  rotation  Isometric activation of the quadriceps: in tact  SLR: in tact  Gait: nonambulatory, use of wheelchair/zully.    RLE  Motor intact distally throughout the tibial and peroneal nerve distributions, 5/5 strength with tibialis anterior, gastrocnemius and soleus, EHL, FHL firing  Sensation intact to light touch throughout superficial peroneal, deep peroneal, tibial, saphenous, and sural nerves  Dorsalis pedis and posterior tibial pulses palpable, toes warm and well-perfused    X-rays:    AP and lateral views of the right femur taken today demonstrate intramedullary nailing of a comminuted intertrochanteric fracture extending in the subtrochanteric region. No change in position of alignment of hardware compared to surgery appreciated.

## 2023-12-15 NOTE — NURSING NOTE
Reason For Visit:   Chief Complaint   Patient presents with    Surgical Followup     2 weeks s/p IM nail right femur 12/1/23 (Dr. Tsai)       LMP  (LMP Unknown)          Taryn Guzman ATC

## 2023-12-15 NOTE — LETTER
12/15/2023         RE: Vickie Gonzalez  [address]  [place] [address]        Dear Colleague,    Thank you for referring your patient, Vickie Gonzalez, to the [facility] [place]. Please see a copy of my visit note below.    ASSESSMENT/PLAN:  Vickie Gonzalez is a 89 year old who is status post ORIF right femur with IM nailing for closed displaced intertrochanteric fracture of the right femur with Dr. Tsai on 12/1/23.  -Stable alignment of x-rays taken today.   -Incisions healing well  -Postoperative course complicated by stroke causing left sided paralysis    She will continue as previously ordered for working on mobilization with PT. From her right hip fracture standpoint, she may range hip as tolerated and WBAT. I did page and discuss anticoagulation with Dr. Donavon Granados of neurology and he said no further recommendation for lovenox moving forward. She will continue ASA 81mg daily for stroke prevention and DVT prophylaxis.     The patient will see Dr. Tsai at six weeks postop for repeat x-rays. Vickie has our clinic number and will call with any questions or concerns.    Imani Hernández PA-C  Orthopaedic Surgery    _________________________________    HISTORY OF PRESENT ILLNESS:  Vickie Gonzalez is a 89 year old female who is approximately 2 weeks status post the above procedure.    Postoperative course was complicated by a stroke-symptoms resolved upon transfer to [place] and no thrombectomy was performed at that time. Recurrence of symptoms prompted thrombectomy attempt with IR, but was not executed due to tortuous anatomy and possible unstable plaque. She was also noted to have small sub arachnoid hemorrhage on repeat imagine.  Ultimately she was discharged on ASA and she has follow-up with neurology in January.     Weight bearing status/devices: wheelchair, difficulty with ambulation due to left sided weakness.   Pain level and  management: pain is 0-1/10, currently using APAP  Physical therapy & ROM: residing at [facility]. Hasn't been mobilizing without zully or heavy assist due to weakness of left side.     Surgical dressings remain in place-no notable drainage or redness or areas of concern.     DVT prophylaxis: ASA 81 mg daily  Patient denies calf pain or tenderness.      MEDICATIONS:   Current Outpatient Rx   Medication Sig Dispense Refill    acetaminophen (TYLENOL) 325 MG tablet Take 2 tablets (650 mg) by mouth every 4 hours as needed for mild pain or other (and adjunct with moderate or severe pain or per patient request)      aspirin (ASA) 81 MG chewable tablet Take 1 tablet (81 mg) by mouth daily      calcium carbonate-vitamin D3 (CALCIUM 600 + D,3,) 600 mg(1,500mg) -200 unit per tablet [CALCIUM CARBONATE-VITAMIN D3 (CALCIUM 600 + D,3,) 600 MG(1,500MG) -200 UNIT PER TABLET] Take 1 tablet by mouth daily. As directed.      cyanocobalamin (VITAMIN B-12) 2000 MCG tablet [CYANOCOBALAMIN (VITAMIN B-12) 2000 MCG TABLET] Take 2,000 mcg by mouth daily.      enoxaparin ANTICOAGULANT (LOVENOX) 40 MG/0.4ML syringe Inject 0.4 mLs (40 mg) Subcutaneous every 24 hours for 7 days      lisinopril (ZESTRIL) 20 MG tablet Take 1 tablet (20 mg) by mouth daily 90 tablet 3    methocarbamol (ROBAXIN) 500 MG tablet Take 1 tablet (500 mg) by mouth every 6 hours as needed for muscle spasms      multivitamin, therapeutic (THERA-VIT) TABS tablet Take 1 tablet by mouth daily      oxyCODONE (ROXICODONE) 5 MG tablet Take 0.5 tablets (2.5 mg) by mouth every 6 hours as needed for moderate to severe pain 30 tablet     propranolol (INDERAL) 40 MG tablet Take 1 tablet (40 mg) by mouth 2 times daily 180 tablet 3         ALLERGIES: Tamoxifen analogues [tamoxifen]       PHYSICAL EXAMINATION:   On physical examination the patient is comfortable and is in no acute distress. The affect is appropriate and breathing is non-labored.    Surgical wound: The surgical  wound was exposed and found to be clean and dry, without drainage or discharge. There is mild edema around the incision. There is no erythema. The skin was appropriately warm to touch.     ROM: supple hip ROM with flexion to 100, 60 degree arc for internal rotation/external rotation  Isometric activation of the quadriceps: in tact  SLR: in tact  Gait: nonambulatory, use of wheelchair/zully.    RLE  Motor intact distally throughout the tibial and peroneal nerve distributions, 5/5 strength with tibialis anterior, gastrocnemius and soleus, EHL, FHL firing  Sensation intact to light touch throughout superficial peroneal, deep peroneal, tibial, saphenous, and sural nerves  Dorsalis pedis and posterior tibial pulses palpable, toes warm and well-perfused    X-rays:    AP and lateral views of the right femur taken today demonstrate intramedullary nailing of a comminuted intertrochanteric fracture extending in the subtrochanteric region. No change in position of alignment of hardware compared to surgery appreciated.

## 2023-12-18 NOTE — LETTER
"    12/18/2023        RE: Vickie Gonzalez  [address]  [place] [address]        [facility]    Chief Complaint   Patient presents with     RECHECK     HPI:  Vickie Gonzalez is a 89 year old  (12/27/1925), who enjoys football, with pmhx including SSS s/p pacer, HTN, remote hx of breast cancer who is being seen today for an episodic care visit at: [facility] ([facility] [[id number]].     Per my prior note:  \"Hospital course  Initially admitted to the hospital 11/30/2023 after a fall in which she lost her balance.  Landed on her right hip.  She had significant pain and was unable to stand or bear weight.  No other significant symptoms.  Evaluation in the ED notable for normal BMP, grossly normal CBC, not significantly elevated at hsTrop.  X-ray of the right femur and pelvis demonstrated comminuted displaced fracture of the proximal right femur of the intertrochanteric and subtrochanteric regions.  Additional imaging as part of evaluation included a head CT with no acute intracranial pathology, mild to moderate generalized cerebral volume loss and leukoaraiosis.  Chest x-ray with findings of peribronchial cuffing and interstitial opacities suggestive of pulmonary edema.  Orthopedic surgery was consulted.  She underwent right femoral intramedullary nailing 12/1/2023 without any IntraOp complications.       Due to the modestly elevated troponin, an echocardiogram was obtained which showed hyperdynamic left ventricular function with EF of 70%, mild pulmonary hypertension, moderate mitral valve annular calcification and mild insufficiency.     Her postoperative course was complicated by a stroke.  She was noted to have slurring of words and left-sided weakness 12/3/2023.  A code stroke was called.  CTA of the head and neck showed a right distal MCA occlusion with associated 11 millimeter right parietal penumbra.  She was transferred to [place] for possible thrombectomy.  However by " "time of arrival to [facility], her symptoms had improved significantly and given relatively minor symptoms, significant tortuous anatomy, did not proceed with thrombectomy. Then her symptoms progressed again, and IR attempted thrombectomy.  However again with tortuous anatomy and possibly unstable plaque, this was abandoned. Of note, a repeat CTA did show small subarachnoid hemorrhage in the right sylvian fissure.  This was unchanged with repeat imaging.  A follow-up MRI showed the prior stroke as well as additional infarcts of the right occipital and left occipital parietal lobes.  Echocardiogram without evidence of thrombus.  Diagnosed with stroke due to ESUS.  Started on aspirin. Prior antihypertensives resumed by time of discharge.     Throughout her course, she did have moderate anemia. Require 1 unit PRBCs for anemia to 6.9.  CT of the abdomen pelvis did not show any evidence of bleeding.  Hemoglobin stabilized in the range of 8-9.  She had been on aspirin and Lovenox and discharged on aspirin daily.\"    Since my prior visit, she is doing well.  She had follow-up with orthopedic surgery 12/15/2023.  X-ray showed stable alignment, good healing of the incisions.  She may do hip range of motion as tolerated and is weightbearing as tolerated as well.    Therapy is going well, she continues to have difficulty with her left side.  They have been able to get her up but not walking.  We talked about whether she will be able go back to [facility] versus needing long-term care.  She is not overly concerned with this saying she typically \"goes with the flow.\"  She has been visited by friends from [facility] and is enjoying staying in touch with them.    Recent care conference note reviewed today  \"Psychosocial Note   Late Entry:  Note Text: Care conference held today 12/14/23 in patient s room. Patient, patient s great-nephew Gabriel and his wife Idalia, PT, Nurse Manager and SW attended the meeting. Patient s " "DAVIDRONN and shakeel Carbone participated via phone. Patient is SBA with eating, min-mod assist with grooming, mod assist with UBD, and dependent--assist of 2 with toileting. Patient is max assist of 2 with bed mobility and transfers with zully lift (assist of 2). Patient scored 11/28 on the short-blessed test which indicates impairment consistent with dementia. Patient is also being followed by speech therapy for lip weakness and swallow strategies. She scored 23/30 on the SLUMS cognitive test, which is indicative of mild neurocognitive disorder. Full Code. Regular diet, minced and moist texture. Current weight 146 lbs. Patient takes Tylenol for pain. No LCD set at this time, ongoing with therapies. SW discussed adding patient to wait list for LTC at [facility] as backup plan in case patient is unable to return to independent living at [facility]. Patient s family members were in agreement, AMEE/shakeel Carbone stated she will complete the financial questionnaire with business office. SW will continue to monitor patient progress and assist with discharge planning.\"    Objective:   /88   Pulse 67   Temp 97.1  F (36.2  C)   Resp 16   Ht 1.626 m (5' 4\")   Wt 66.2 kg (146 lb)   LMP  (LMP Unknown)   SpO2 93%   BMI 25.06 kg/m      GENERAL APPEARANCE: Laying in bed comfortably, NAD  HENT:  NCAT, ,mildly [place] IRA, fair dentition  EYES:  Conjunctiva clear, anicteric, EOMI, PERR  PULM  Normal WOB on RA, lungs CTAB, no wheezes or crackles  CV:  RRR, S1/S2 normal, no murmurs; no LE edemapulses  ABDOMEN: Abdomen soft, not tender, not distended, BS normal and active throughout   NEURO: Alert, answering questions appropriately, normal thought processes; CN II-XII grossly intact, no purposeful movements of LUE, mild left facial droop  PSYCH:  Mood normal with bright affect    Recent labs in [facility] reviewed by me today.     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  Mechanical thrombectomy attempted but unable to complete.  Echocardiogram without evidence of atrial fibrillation or ventricular thrombus.  Was found to have small SAH as well.  Continues on daily aspirin, blood pressures adequately controlled on current antihypertensive regimen.  Plan:   -PT/OT    (G81.94) Left hemiplegia (H)  Comment: Due to the above with significant MCA territory stroke.  With associated left lower extremity weakness, current limited mobility with right hip fracture, may need long-term care at discharge  Plan:   -PT/OT    (W19.XXXD) Fall, subsequent encounter  (S72.141D) Closed displaced intertrochanteric fracture of right femur with routine healing, subsequent encounter  Comment: Original reason for admission to the hospital.  Underwent surgical fixation.  Complicated by CVA and postoperative anemia for which she did receive 1 unit PRBCs.  Hemoglobin stable.  Had follow-up with orthopedic surgery and able to advance to hip range of motion as tolerated and WBAT.  Plan:   -PT/OT    (M80.00XD) Age-related osteoporosis with current pathological fracture with routine healing, subsequent encounter  Comment: Consistent with the above and history of osteopenia on imaging.  Likely start osteoporosis therapies early January following fracture    (Z74.09,  Z78.9) Impaired mobility and ADLs  Comment: Due to the above concerns.  Continue to work with therapies.  May need higher level of support at time of discharge as noted.    (K59.01) Slow transit constipation  Comment: Has had significant improvement after starting bowel regimen.    MED REC REQUIRED  Post Medication Reconciliation Status: medication reconcilation previously completed during another office visit      Orders:  -NNO    Electronically signed by:     Benjamin Rosenstein, MD, MA  [facility]  Medicine Faculty    This note was completed with the assistance of dictation software. Typos and word substitution-errors are expected and unintended.           Sincerely,        Benjamin Rosenstein, MD

## 2023-12-18 NOTE — PROGRESS NOTES
"[facility] GERIATRICS    Chief Complaint   Patient presents with    RECHECK     HPI:  Vickie Gonzalez is a 89 year old  (12/27/1925), who enjoys football, with pmhx including SSS s/p pacer, HTN, remote hx of breast cancer who is being seen today for an episodic care visit at: [facility] ([facility]) [[id number]].     Per my prior note:  \"Hospital course  Initially admitted to the hospital 11/30/2023 after a fall in which she lost her balance.  Landed on her right hip.  She had significant pain and was unable to stand or bear weight.  No other significant symptoms.  Evaluation in the ED notable for normal BMP, grossly normal CBC, not significantly elevated at hsTrop.  X-ray of the right femur and pelvis demonstrated comminuted displaced fracture of the proximal right femur of the intertrochanteric and subtrochanteric regions.  Additional imaging as part of evaluation included a head CT with no acute intracranial pathology, mild to moderate generalized cerebral volume loss and leukoaraiosis.  Chest x-ray with findings of peribronchial cuffing and interstitial opacities suggestive of pulmonary edema.  Orthopedic surgery was consulted.  She underwent right femoral intramedullary nailing 12/1/2023 without any IntraOp complications.       Due to the modestly elevated troponin, an echocardiogram was obtained which showed hyperdynamic left ventricular function with EF of 70%, mild pulmonary hypertension, moderate mitral valve annular calcification and mild insufficiency.     Her postoperative course was complicated by a stroke.  She was noted to have slurring of words and left-sided weakness 12/3/2023.  A code stroke was called.  CTA of the head and neck showed a right distal MCA occlusion with associated 11 millimeter right parietal penumbra.  She was transferred to [place] for possible thrombectomy.  However by time of arrival to [facility], her symptoms had improved significantly and given relatively minor " "symptoms, significant tortuous anatomy, did not proceed with thrombectomy. Then her symptoms progressed again, and IR attempted thrombectomy.  However again with tortuous anatomy and possibly unstable plaque, this was abandoned. Of note, a repeat CTA did show small subarachnoid hemorrhage in the right sylvian fissure.  This was unchanged with repeat imaging.  A follow-up MRI showed the prior stroke as well as additional infarcts of the right occipital and left occipital parietal lobes.  Echocardiogram without evidence of thrombus.  Diagnosed with stroke due to ESUS.  Started on aspirin. Prior antihypertensives resumed by time of discharge.     Throughout her course, she did have moderate anemia. Require 1 unit PRBCs for anemia to 6.9.  CT of the abdomen pelvis did not show any evidence of bleeding.  Hemoglobin stabilized in the range of 8-9.  She had been on aspirin and Lovenox and discharged on aspirin daily.\"    Since my prior visit, she is doing well.  She had follow-up with orthopedic surgery 12/15/2023.  X-ray showed stable alignment, good healing of the incisions.  She may do hip range of motion as tolerated and is weightbearing as tolerated as well.    Therapy is going well, she continues to have difficulty with her left side.  They have been able to get her up but not walking.  We talked about whether she will be able go back to [facility] versus needing long-term care.  She is not overly concerned with this saying she typically \"goes with the flow.\"  She has been visited by friends from [facility] and is enjoying staying in touch with them.    Recent care conference note reviewed today  \"Psychosocial Note   Late Entry:  Note Text: Care conference held today 12/14/23 in patient s room. Patient, patient s great-nephew Gabriel and his wife Idalia, PT, Nurse Manager and SW attended the meeting. Patient s POA and great-nielsa Carbone participated via phone. Patient is SBA with eating, min-mod assist with " "grooming, mod assist with UBD, and dependent--assist of 2 with toileting. Patient is max assist of 2 with bed mobility and transfers with zully lift (assist of 2). Patient scored 11/28 on the short-blessed test which indicates impairment consistent with dementia. Patient is also being followed by speech therapy for lip weakness and swallow strategies. She scored 23/30 on the SLUMS cognitive test, which is indicative of mild neurocognitive disorder. Full Code. Regular diet, minced and moist texture. Current weight 146 lbs. Patient takes Tylenol for pain. No LCD set at this time, ongoing with therapies. SW discussed adding patient to wait list for LTC at [facility] as backup plan in case patient is unable to return to independent living at [facility]. Patient s family members were in agreement, POA/great-niece Tona stated she will complete the financial questionnaire with business office. SW will continue to monitor patient progress and assist with discharge planning.\"    Objective:   /88   Pulse 67   Temp 97.1  F (36.2  C)   Resp 16   Ht 1.626 m (5' 4\")   Wt 66.2 kg (146 lb)   LMP  (LMP Unknown)   SpO2 93%   BMI 25.06 kg/m      GENERAL APPEARANCE: Laying in bed comfortably, NAD  HENT:  NCAT, ,mildly [place], fair dentition  EYES:  Conjunctiva clear, anicteric, EOMI, PERR  PULM  Normal WOB on RA, lungs CTAB, no wheezes or crackles  CV:  RRR, S1/S2 normal, no murmurs; no LE edemapulses  ABDOMEN: Abdomen soft, not tender, not distended, BS normal and active throughout   NEURO: Alert, answering questions appropriately, normal thought processes; CN II-XII grossly intact, no purposeful movements of LUE, mild left facial droop  PSYCH:  Mood normal with bright affect    Recent labs in [facility] reviewed by me today.     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  Mechanical thrombectomy attempted but unable to complete.  Echocardiogram without evidence of atrial fibrillation or ventricular thrombus.  Was found to have small SAH as well.  Continues on daily aspirin, blood pressures adequately controlled on current antihypertensive regimen.  Plan:   -PT/OT    (G81.94) Left hemiplegia (H)  Comment: Due to the above with significant MCA territory stroke.  With associated left lower extremity weakness, current limited mobility with right hip fracture, may need long-term care at discharge  Plan:   -PT/OT    (W19.XXXD) Fall, subsequent encounter  (S72.141D) Closed displaced intertrochanteric fracture of right femur with routine healing, subsequent encounter  Comment: Original reason for admission to the hospital.  Underwent surgical fixation.  Complicated by CVA and postoperative anemia for which she did receive 1 unit PRBCs.  Hemoglobin stable.  Had follow-up with orthopedic surgery and able to advance to hip range of motion as tolerated and WBAT.  Plan:   -PT/OT    (M80.00XD) Age-related osteoporosis with current pathological fracture with routine healing, subsequent encounter  Comment: Consistent with the above and history of osteopenia on imaging.  Likely start osteoporosis therapies early January following fracture    (Z74.09,  Z78.9) Impaired mobility and ADLs  Comment: Due to the above concerns.  Continue to work with therapies.  May need higher level of support at time of discharge as noted.    (K59.01) Slow transit constipation  Comment: Has had significant improvement after starting bowel regimen.    MED REC REQUIRED  Post Medication Reconciliation Status: medication reconcilation previously completed during another office visit      Orders:  -NNO    Electronically signed by:     Benjamin Rosenstein, MD, MA  [facility] Faculty    This note was completed with the assistance of dictation software. Typos  and word substitution-errors are expected and unintended.

## 2023-12-18 NOTE — TELEPHONE ENCOUNTER
[facility] NON-EMERGENT ENCOUNTER    Vickie Gonzalez is a 89 year old  (12/27/1925)    Disposition  Pt c/o chronic constipation and asking for Senna    MYNOR provided to TCU:   Senna, 1 tab PO daily      Electronically signed by:   Gabrielle Snyder RN

## 2023-12-28 NOTE — PROGRESS NOTES
"[facility] GERIATRICS    Chief Complaint   Patient presents with    RECHECK     initial     HPI:  Vickie Gonzalez is a 89 year old  (12/27/1925), who is being seen today for an episodic care visit at: [facility] ([facility]) [[id number]]. HPI information obtained from: facility chart records, facility staff, patient report and [facility] chart review. PMH SSS s/p PPM, diet controlled DM2, CKD, HTN, and hx of breast CA. Presented to ED after falling resulting in right femur fracture. Ortho consulted and recommended surgical intervention; s/p femoral intramedullary nail. Post-op complications included: R MCA stroke, distal R M2 occlusion, ESUS, thrombectomy not performed c/b small SAH. NIHSS subsequently remained stable. Discharged to this facility for ongoing nursing cares, medical management, and rehabilitation.    Ms. Gonzalez is seen in her room today for a visit. She is working with OT and staff notes patient sounds congested and getting fatigued easily compared to previous session. Patient endorses acute cough but denies SOB or dyspnea. She denies feeling pain in her R hip; more so \"sore\". OT staff notes patient only complains of pain when in the zully lift.  Patient states the zully lift feels uncomfortable. No concerns with sleep or appetite. Does note feeling constipated; receptive to intensifying current bowel regimen. Denies CP, palpitations, lightheadedness, dizziness, fatigue, SOB, fever, chills, nausea/vomiting, or bladder concerns today.      Allergies, and PMH/PSH reviewed in EPIC today.  REVIEW OF SYSTEMS:  4 point ROS including Respiratory, CV, GI and , other than that noted in the HPI,  is negative    Objective:   /67   Pulse 62   Temp 98.2  F (36.8  C)   Resp 20   Ht 1.626 m (5' 4\")   Wt 66.2 kg (146 lb)   LMP  (LMP Unknown)   SpO2 91%   BMI 25.06 kg/m    GENERAL APPEARANCE:  Alert, elderly, in no distress, laying in bed  HEENT:  atraumatic, [place] R>L, EOM intact, moist mucus " membranes  RESP:  non-labored breathing, wheezing noted on auscultation, no respiratory distress, congested cough  CV:  Rate regular, S1 S2 noted, no edema  ABDOMEN:  soft, non-distended, non-tender, bowel sounds active  M/S: limited movement L side, arthritic changes noted in hands  SKIN:  warm, dry, thin, fragile   NEURO: examination of sensation by touch normal, recall difficulty, slow speech  PSYCH:  calm, cooperative      Labs reviewed as per [facility] and/or Care Everywhere.      Assessment/Plan:     Fall, subsequent encounter  Closed displaced intertrochanteric fracture of right femur with routine healing, subsequent encounter  Age-related osteoporosis with current pathological fracture with routine healing, subsequent encounter  New on ASA. Pain controlled today.  -PT/OT following  -continue ASA 81 mg daily   -continue APAP 650 mg TID   -follow-up with ortho as advised    ABLA (acute blood loss anemia)  Received 1U PRBCs in hospital. Hgb 9.8 (12/13/23).  -recheck CBC on next lab day    Cerebrovascular accident (CVA) due to embolism of right middle cerebral artery (H)  SAH (subarachnoid hemorrhage) (H)  Left hemiplegia (H)  -Follow-up with neurology as recommended    Pulmonary hypertension (H)  Sick sinus syndrome (H)  Pacemaker. /78 to 134/66 mmHg. HR 62-68 bpm.  -continue lisinopril 20 mg daily  -continue propanolol 40 mg BID  -follow BPs and adjust medications as needed    Slow transit constipation  Per staff documentation and patient report.   -Increase Senna-S to 2 tabs BID, hold for loose stools    Acute cough  Per staff, COVID negative. Today, O2 sats 97% on room air.  -Influenza screen  -Order 2-view chest XR  -Start Robitussin 10 mL BID for 5 days  -Schedule albuterol sulfate 2.5 mg neb QID for 10 days  -monitor respiratory status  -CBC on next lab day    Physical deconditioning  Currently in TCU for rehabilitation.  -PT/OT following - adv per recommendations   -SW available for safe discharge  planning ongoing    45 minutes spent on the date of this encounter doing chart review, review labs, discussion with nursing staff, patient visit, and documentation.     Electronically signed by: Dr. Cheyanne Robles, DNP, APRN, AGNP-BC

## 2023-12-28 NOTE — LETTER
"    12/28/2023        RE: Vickie Gonzalez  [address]  [place] [address]        [facility] GERIATRICS    Chief Complaint   Patient presents with     RECHECK     initial     HPI:  Vickie Gonzalez is a 89 year old  (12/27/1925), who is being seen today for an episodic care visit at: [facility] ([facility]) [[id number]]. HPI information obtained from: facility chart records, facility staff, patient report and [facility] chart review. PMH SSS s/p PPM, diet controlled DM2, CKD, HTN, and hx of breast CA. Presented to ED after falling resulting in right femur fracture. Ortho consulted and recommended surgical intervention; s/p femoral intramedullary nail. Post-op complications included: R MCA stroke, distal R M2 occlusion, ESUS, thrombectomy not performed c/b small SAH. NIHSS subsequently remained stable. Discharged to this facility for ongoing nursing cares, medical management, and rehabilitation.    Ms. Gonzalez is seen in her room today for a visit. She is working with OT and staff notes patient sounds congested and getting fatigued easily compared to previous session. Patient endorses acute cough but denies SOB or dyspnea. She denies feeling pain in her R hip; more so \"sore\". OT staff notes patient only complains of pain when in the zully lift.  Patient states the zully lift feels uncomfortable. No concerns with sleep or appetite. Does note feeling constipated; receptive to intensifying current bowel regimen. Denies CP, palpitations, lightheadedness, dizziness, fatigue, SOB, fever, chills, nausea/vomiting, or bladder concerns today.      Allergies, and PMH/PSH reviewed in EPIC today.  REVIEW OF SYSTEMS:  4 point ROS including Respiratory, CV, GI and , other than that noted in the HPI,  is negative    Objective:   /67   Pulse 62   Temp 98.2  F (36.8  C)   Resp 20   Ht 1.626 m (5' 4\")   Wt 66.2 kg (146 lb)   LMP  (LMP Unknown)   SpO2 91%   BMI 25.06 kg/m    GENERAL APPEARANCE:  " Alert, elderly, in no distress, laying in bed  HEENT:  atraumatic, [place] R>L, EOM intact, moist mucus membranes  RESP:  non-labored breathing, wheezing noted on auscultation, no respiratory distress, congested cough  CV:  Rate regular, S1 S2 noted, no edema  ABDOMEN:  soft, non-distended, non-tender, bowel sounds active  M/S: limited movement L side, arthritic changes noted in hands  SKIN:  warm, dry, thin, fragile   NEURO: examination of sensation by touch normal, recall difficulty, slow speech  PSYCH:  calm, cooperative      Labs reviewed as per [facility] and/or Care Everywhere.      Assessment/Plan:     Fall, subsequent encounter  Closed displaced intertrochanteric fracture of right femur with routine healing, subsequent encounter  Age-related osteoporosis with current pathological fracture with routine healing, subsequent encounter  New on ASA. Pain controlled today.  -PT/OT following  -continue ASA 81 mg daily   -continue APAP 650 mg TID   -follow-up with ortho as advised    ABLA (acute blood loss anemia)  Received 1U PRBCs in hospital. Hgb 9.8 (12/13/23).  -recheck CBC on next lab day    Cerebrovascular accident (CVA) due to embolism of right middle cerebral artery (H)  SAH (subarachnoid hemorrhage) (H)  Left hemiplegia (H)  -Follow-up with neurology as recommended    Pulmonary hypertension (H)  Sick sinus syndrome (H)  Pacemaker. /78 to 134/66 mmHg. HR 62-68 bpm.  -continue lisinopril 20 mg daily  -continue propanolol 40 mg BID  -follow BPs and adjust medications as needed    Slow transit constipation  Per staff documentation and patient report.   -Increase Senna-S to 2 tabs BID, hold for loose stools    Acute cough  Per staff, COVID negative. Today, O2 sats 97% on room air.  -Influenza screen  -Order 2-view chest XR  -Start Robitussin 10 mL BID for 5 days  -Schedule albuterol sulfate 2.5 mg neb QID for 10 days  -monitor respiratory status  -CBC on next lab day    Physical deconditioning  Currently in TCU for  rehabilitation.  -PT/OT following - adv per recommendations   -SW available for safe discharge planning ongoing    45 minutes spent on the date of this encounter doing chart review, review labs, discussion with nursing staff, patient visit, and documentation.     Electronically signed by: Dr. Cheyanne Robles, DNP, APRN, AGNP-BC          Sincerely,        Cheyanne Robles, SANDIP CNP

## 2024-01-01 ENCOUNTER — TELEPHONE (OUTPATIENT)
Dept: ORTHOPEDICS | Facility: CLINIC | Age: 89
End: 2024-01-01

## 2024-01-01 ENCOUNTER — TELEPHONE (OUTPATIENT)
Dept: GERIATRICS | Facility: CLINIC | Age: 89
End: 2024-01-01
Payer: MEDICARE

## 2024-01-01 ENCOUNTER — LAB REQUISITION (OUTPATIENT)
Dept: LAB | Facility: CLINIC | Age: 89
End: 2024-01-01
Payer: MEDICARE

## 2024-01-01 ENCOUNTER — VIRTUAL VISIT (OUTPATIENT)
Dept: ORTHOPEDICS | Facility: CLINIC | Age: 89
End: 2024-01-01
Payer: MEDICARE

## 2024-01-01 ENCOUNTER — TRANSITIONAL CARE UNIT VISIT (OUTPATIENT)
Dept: GERIATRICS | Facility: CLINIC | Age: 89
End: 2024-01-01
Payer: MEDICARE

## 2024-01-01 ENCOUNTER — APPOINTMENT (OUTPATIENT)
Dept: CT IMAGING | Facility: CLINIC | Age: 89
DRG: 871 | End: 2024-01-01
Attending: INTERNAL MEDICINE
Payer: MEDICARE

## 2024-01-01 ENCOUNTER — DOCUMENTATION ONLY (OUTPATIENT)
Dept: ORTHOPEDICS | Facility: CLINIC | Age: 89
End: 2024-01-01
Payer: MEDICARE

## 2024-01-01 ENCOUNTER — DOCUMENTATION ONLY (OUTPATIENT)
Dept: CARE COORDINATION | Facility: CLINIC | Age: 89
End: 2024-01-01
Payer: COMMERCIAL

## 2024-01-01 ENCOUNTER — ASSISTED LIVING VISIT (OUTPATIENT)
Dept: GERIATRICS | Facility: CLINIC | Age: 89
End: 2024-01-01
Payer: MEDICARE

## 2024-01-01 ENCOUNTER — DOCUMENTATION ONLY (OUTPATIENT)
Dept: GERIATRICS | Facility: CLINIC | Age: 89
End: 2024-01-01
Payer: MEDICARE

## 2024-01-01 ENCOUNTER — DOCUMENTATION ONLY (OUTPATIENT)
Dept: CARE COORDINATION | Facility: CLINIC | Age: 89
End: 2024-01-01

## 2024-01-01 ENCOUNTER — TELEPHONE (OUTPATIENT)
Dept: GERIATRICS | Facility: CLINIC | Age: 89
End: 2024-01-01

## 2024-01-01 ENCOUNTER — TRANSFERRED RECORDS (OUTPATIENT)
Dept: HEALTH INFORMATION MANAGEMENT | Facility: CLINIC | Age: 89
End: 2024-01-01

## 2024-01-01 ENCOUNTER — HOSPITAL ENCOUNTER (INPATIENT)
Facility: CLINIC | Age: 89
LOS: 1 days | Discharge: HOSPICE | DRG: 871 | End: 2024-02-08
Attending: INTERNAL MEDICINE | Admitting: INTERNAL MEDICINE
Payer: MEDICARE

## 2024-01-01 ENCOUNTER — HOSPITAL ENCOUNTER (INPATIENT)
Facility: CLINIC | Age: 89
LOS: 4 days | End: 2024-02-12
Attending: INTERNAL MEDICINE | Admitting: INTERNAL MEDICINE
Payer: MEDICARE

## 2024-01-01 ENCOUNTER — OFFICE VISIT (OUTPATIENT)
Dept: NEUROLOGY | Facility: CLINIC | Age: 89
End: 2024-01-01
Payer: MEDICARE

## 2024-01-01 VITALS
HEART RATE: 66 BPM | HEIGHT: 64 IN | TEMPERATURE: 98.4 F | SYSTOLIC BLOOD PRESSURE: 118 MMHG | OXYGEN SATURATION: 93 % | BODY MASS INDEX: 21.32 KG/M2 | WEIGHT: 124.9 LBS | DIASTOLIC BLOOD PRESSURE: 60 MMHG | RESPIRATION RATE: 18 BRPM

## 2024-01-01 VITALS
RESPIRATION RATE: 17 BRPM | HEIGHT: 64 IN | SYSTOLIC BLOOD PRESSURE: 112 MMHG | BODY MASS INDEX: 24.92 KG/M2 | DIASTOLIC BLOOD PRESSURE: 58 MMHG | HEART RATE: 60 BPM | OXYGEN SATURATION: 92 % | TEMPERATURE: 98 F | WEIGHT: 146 LBS

## 2024-01-01 VITALS
HEIGHT: 64 IN | SYSTOLIC BLOOD PRESSURE: 119 MMHG | RESPIRATION RATE: 20 BRPM | DIASTOLIC BLOOD PRESSURE: 55 MMHG | OXYGEN SATURATION: 93 % | HEART RATE: 82 BPM | WEIGHT: 120.6 LBS | TEMPERATURE: 98.2 F | BODY MASS INDEX: 20.59 KG/M2

## 2024-01-01 VITALS
OXYGEN SATURATION: 98 % | TEMPERATURE: 98.3 F | DIASTOLIC BLOOD PRESSURE: 40 MMHG | HEART RATE: 71 BPM | RESPIRATION RATE: 15 BRPM | SYSTOLIC BLOOD PRESSURE: 102 MMHG

## 2024-01-01 VITALS
SYSTOLIC BLOOD PRESSURE: 108 MMHG | HEART RATE: 67 BPM | WEIGHT: 146 LBS | RESPIRATION RATE: 19 BRPM | DIASTOLIC BLOOD PRESSURE: 65 MMHG | BODY MASS INDEX: 24.92 KG/M2 | HEIGHT: 64 IN | OXYGEN SATURATION: 90 % | TEMPERATURE: 97.5 F

## 2024-01-01 VITALS — OXYGEN SATURATION: 93 % | SYSTOLIC BLOOD PRESSURE: 113 MMHG | DIASTOLIC BLOOD PRESSURE: 70 MMHG | HEART RATE: 60 BPM

## 2024-01-01 VITALS — HEART RATE: 72 BPM | RESPIRATION RATE: 16 BRPM

## 2024-01-01 DIAGNOSIS — M81.0 AGE-RELATED OSTEOPOROSIS WITHOUT CURRENT PATHOLOGICAL FRACTURE: ICD-10-CM
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
## 2024-01-01 DIAGNOSIS — R53.81 PHYSICAL DECONDITIONING: ICD-10-CM

## 2024-01-01 DIAGNOSIS — I49.5 SICK SINUS SYNDROME (H): ICD-10-CM

## 2024-01-01 DIAGNOSIS — Z74.09 IMPAIRED MOBILITY AND ADLS: Primary | ICD-10-CM

## 2024-01-01 DIAGNOSIS — R63.4 ABNORMAL WEIGHT LOSS: ICD-10-CM

## 2024-01-01 DIAGNOSIS — A41.9 ACUTE SEPSIS (H): ICD-10-CM

## 2024-01-01 DIAGNOSIS — I60.9 SAH (SUBARACHNOID HEMORRHAGE) (H): ICD-10-CM

## 2024-01-01 DIAGNOSIS — Z78.9 IMPAIRED MOBILITY AND ADLS: Primary | ICD-10-CM

## 2024-01-01 DIAGNOSIS — R45.1 AGITATION: Primary | ICD-10-CM

## 2024-01-01 DIAGNOSIS — R63.4 WEIGHT LOSS: ICD-10-CM

## 2024-01-01 DIAGNOSIS — I63.9 ISCHEMIC STROKE (H): Primary | ICD-10-CM

## 2024-01-01 DIAGNOSIS — G81.94 LEFT HEMIPLEGIA (H): ICD-10-CM

## 2024-01-01 DIAGNOSIS — D62 ABLA (ACUTE BLOOD LOSS ANEMIA): ICD-10-CM

## 2024-01-01 DIAGNOSIS — R05.1 ACUTE COUGH: ICD-10-CM

## 2024-01-01 DIAGNOSIS — R79.89 ELEVATED TROPONIN: ICD-10-CM

## 2024-01-01 DIAGNOSIS — E87.5 HYPERKALEMIA: ICD-10-CM

## 2024-01-01 DIAGNOSIS — R62.7 FAILURE TO THRIVE IN ADULT: ICD-10-CM

## 2024-01-01 DIAGNOSIS — K59.01 SLOW TRANSIT CONSTIPATION: ICD-10-CM

## 2024-01-01 DIAGNOSIS — S72.141A CLOSED DISPLACED INTERTROCHANTERIC FRACTURE OF RIGHT FEMUR, INITIAL ENCOUNTER (H): ICD-10-CM

## 2024-01-01 DIAGNOSIS — M80.00XD AGE-RELATED OSTEOPOROSIS WITH CURRENT PATHOLOGICAL FRACTURE WITH ROUTINE HEALING, SUBSEQUENT ENCOUNTER: ICD-10-CM

## 2024-01-01 DIAGNOSIS — I27.20 PULMONARY HYPERTENSION (H): ICD-10-CM

## 2024-01-01 DIAGNOSIS — Z74.09 IMPAIRED MOBILITY AND ADLS: ICD-10-CM

## 2024-01-01 DIAGNOSIS — S72.141D CLOSED DISPLACED INTERTROCHANTERIC FRACTURE OF RIGHT FEMUR WITH ROUTINE HEALING, SUBSEQUENT ENCOUNTER: ICD-10-CM

## 2024-01-01 DIAGNOSIS — S72.351A CLOSED DISPLACED COMMINUTED FRACTURE OF SHAFT OF RIGHT FEMUR, INITIAL ENCOUNTER (H): Primary | ICD-10-CM

## 2024-01-01 DIAGNOSIS — I63.9 ISCHEMIC STROKE (H): ICD-10-CM

## 2024-01-01 DIAGNOSIS — I63.511 ACUTE ISCHEMIC RIGHT MCA STROKE (H): ICD-10-CM

## 2024-01-01 DIAGNOSIS — E11.9 TYPE 2 DIABETES MELLITUS WITHOUT COMPLICATION, WITHOUT LONG-TERM CURRENT USE OF INSULIN (H): ICD-10-CM

## 2024-01-01 DIAGNOSIS — I10 ESSENTIAL HYPERTENSION: ICD-10-CM

## 2024-01-01 DIAGNOSIS — R41.82 ALTERED MENTAL STATUS, UNSPECIFIED ALTERED MENTAL STATUS TYPE: ICD-10-CM

## 2024-01-01 DIAGNOSIS — S72.144S CLOSED NONDISPLACED INTERTROCHANTERIC FRACTURE OF RIGHT FEMUR, SEQUELA: ICD-10-CM

## 2024-01-01 DIAGNOSIS — R79.89 ELEVATED PROCALCITONIN: ICD-10-CM

## 2024-01-01 DIAGNOSIS — Z95.0 CARDIAC PACEMAKER IN SITU: Primary | ICD-10-CM

## 2024-01-01 DIAGNOSIS — R13.12 OROPHARYNGEAL DYSPHAGIA: ICD-10-CM

## 2024-01-01 DIAGNOSIS — E78.2 MIXED HYPERLIPIDEMIA: ICD-10-CM

## 2024-01-01 DIAGNOSIS — I63.411 CEREBROVASCULAR ACCIDENT (CVA) DUE TO EMBOLISM OF RIGHT MIDDLE CEREBRAL ARTERY (H): ICD-10-CM

## 2024-01-01 DIAGNOSIS — Z78.9 IMPAIRED MOBILITY AND ADLS: ICD-10-CM

## 2024-01-01 DIAGNOSIS — N39.0 URINARY TRACT INFECTION, SITE NOT SPECIFIED: ICD-10-CM

## 2024-01-01 DIAGNOSIS — I10 ESSENTIAL (PRIMARY) HYPERTENSION: ICD-10-CM

## 2024-01-01 DIAGNOSIS — N17.9 ACUTE KIDNEY INJURY (H): ICD-10-CM

## 2024-01-01 DIAGNOSIS — Z95.0 CARDIAC PACEMAKER IN SITU: ICD-10-CM

## 2024-01-01 LAB
ALBUMIN SERPL BCG-MCNC: 3.3 G/DL (ref 3.5–5.2)
ALBUMIN SERPL BCG-MCNC: 3.4 G/DL (ref 3.5–5.2)
ALBUMIN UR-MCNC: 300 MG/DL
ALP SERPL-CCNC: 105 U/L (ref 40–150)
ALP SERPL-CCNC: 190 U/L (ref 40–150)
ALT SERPL W P-5'-P-CCNC: 17 U/L (ref 0–50)
ALT SERPL W P-5'-P-CCNC: 18 U/L (ref 0–50)
ANION GAP SERPL CALCULATED.3IONS-SCNC: 10 MMOL/L (ref 7–15)
ANION GAP SERPL CALCULATED.3IONS-SCNC: 27 MMOL/L (ref 7–15)
APPEARANCE UR: ABNORMAL
AST SERPL W P-5'-P-CCNC: 20 U/L (ref 0–45)
AST SERPL W P-5'-P-CCNC: 33 U/L (ref 0–45)
ATRIAL RATE - MUSE: 60 BPM
BACTERIA BLD CULT: NO GROWTH
BACTERIA BLD CULT: NO GROWTH
BACTERIA UR CULT: ABNORMAL
BASOPHILS # BLD AUTO: 0 10E3/UL (ref 0–0.2)
BASOPHILS # BLD AUTO: 0 10E3/UL (ref 0–0.2)
BASOPHILS NFR BLD AUTO: 0 %
BASOPHILS NFR BLD AUTO: 0 %
BILIRUB SERPL-MCNC: 0.3 MG/DL
BILIRUB SERPL-MCNC: 0.3 MG/DL
BILIRUB UR QL STRIP: NEGATIVE
BUN SERPL-MCNC: 169 MG/DL (ref 8–23)
BUN SERPL-MCNC: 35.2 MG/DL (ref 8–23)
CA-I BLD-MCNC: 5 MG/DL (ref 4.4–5.2)
CALCIUM SERPL-MCNC: 9.7 MG/DL (ref 8.2–9.6)
CALCIUM SERPL-MCNC: 9.9 MG/DL (ref 8.2–9.6)
CHLORIDE SERPL-SCNC: 104 MMOL/L (ref 98–107)
CHLORIDE SERPL-SCNC: 94 MMOL/L (ref 98–107)
COLOR UR AUTO: ABNORMAL
CPB POCT: NO
CREAT SERPL-MCNC: 0.62 MG/DL (ref 0.51–0.95)
CREAT SERPL-MCNC: 9.2 MG/DL (ref 0.51–0.95)
CRP SERPL-MCNC: 158 MG/L
DEPRECATED HCO3 PLAS-SCNC: 13 MMOL/L (ref 22–29)
DEPRECATED HCO3 PLAS-SCNC: 25 MMOL/L (ref 22–29)
DIASTOLIC BLOOD PRESSURE - MUSE: NORMAL MMHG
EGFRCR SERPLBLD CKD-EPI 2021: 4 ML/MIN/1.73M2
EGFRCR SERPLBLD CKD-EPI 2021: 80 ML/MIN/1.73M2
EOSINOPHIL # BLD AUTO: 0.1 10E3/UL (ref 0–0.7)
EOSINOPHIL # BLD AUTO: 0.3 10E3/UL (ref 0–0.7)
EOSINOPHIL NFR BLD AUTO: 0 %
EOSINOPHIL NFR BLD AUTO: 4 %
ERYTHROCYTE [DISTWIDTH] IN BLOOD BY AUTOMATED COUNT: 15.4 % (ref 10–15)
ERYTHROCYTE [DISTWIDTH] IN BLOOD BY AUTOMATED COUNT: 17 % (ref 10–15)
ERYTHROCYTE [SEDIMENTATION RATE] IN BLOOD BY WESTERGREN METHOD: 61 MM/HR (ref 0–30)
FLUAV RNA SPEC QL NAA+PROBE: NEGATIVE
FLUBV RNA RESP QL NAA+PROBE: NEGATIVE
GLUCOSE BLD-MCNC: 58 MG/DL (ref 70–99)
GLUCOSE BLDC GLUCOMTR-MCNC: 131 MG/DL (ref 70–99)
GLUCOSE BLDC GLUCOMTR-MCNC: 152 MG/DL (ref 70–99)
GLUCOSE BLDC GLUCOMTR-MCNC: 192 MG/DL (ref 70–99)
GLUCOSE BLDC GLUCOMTR-MCNC: 194 MG/DL (ref 70–99)
GLUCOSE BLDC GLUCOMTR-MCNC: 200 MG/DL (ref 70–99)
GLUCOSE BLDC GLUCOMTR-MCNC: 226 MG/DL (ref 70–99)
GLUCOSE BLDC GLUCOMTR-MCNC: 47 MG/DL (ref 70–99)
GLUCOSE SERPL-MCNC: 159 MG/DL (ref 70–99)
GLUCOSE SERPL-MCNC: 59 MG/DL (ref 70–99)
GLUCOSE UR STRIP-MCNC: NEGATIVE MG/DL
HCO3 BLDV-SCNC: 12 MMOL/L (ref 21–28)
HCO3 BLDV-SCNC: 15 MMOL/L (ref 21–28)
HCT VFR BLD AUTO: 35.2 % (ref 35–47)
HCT VFR BLD AUTO: 35.7 % (ref 35–47)
HCT VFR BLD CALC: 39 % (ref 35–47)
HGB BLD-MCNC: 10.9 G/DL (ref 11.7–15.7)
HGB BLD-MCNC: 11.1 G/DL (ref 11.7–15.7)
HGB BLD-MCNC: 13.3 G/DL (ref 11.7–15.7)
HGB UR QL STRIP: ABNORMAL
HOLD SPECIMEN: NORMAL
IMM GRANULOCYTES # BLD: 0 10E3/UL
IMM GRANULOCYTES # BLD: 0.2 10E3/UL
IMM GRANULOCYTES NFR BLD: 0 %
IMM GRANULOCYTES NFR BLD: 2 %
INR PPP: 1.24 (ref 0.85–1.15)
INTERPRETATION ECG - MUSE: NORMAL
KETONES UR STRIP-MCNC: NEGATIVE MG/DL
LACTATE BLD-SCNC: 0.8 MMOL/L
LEUKOCYTE ESTERASE UR QL STRIP: ABNORMAL
LYMPHOCYTES # BLD AUTO: 1.6 10E3/UL (ref 0.8–5.3)
LYMPHOCYTES # BLD AUTO: 1.9 10E3/UL (ref 0.8–5.3)
LYMPHOCYTES NFR BLD AUTO: 14 %
LYMPHOCYTES NFR BLD AUTO: 24 %
MAGNESIUM SERPL-MCNC: 1.8 MG/DL (ref 1.7–2.3)
MCH RBC QN AUTO: 29.1 PG (ref 26.5–33)
MCH RBC QN AUTO: 29.5 PG (ref 26.5–33)
MCHC RBC AUTO-ENTMCNC: 31 G/DL (ref 31.5–36.5)
MCHC RBC AUTO-ENTMCNC: 31.1 G/DL (ref 31.5–36.5)
MCV RBC AUTO: 94 FL (ref 78–100)
MCV RBC AUTO: 95 FL (ref 78–100)
MONOCYTES # BLD AUTO: 0.5 10E3/UL (ref 0–1.3)
MONOCYTES # BLD AUTO: 0.7 10E3/UL (ref 0–1.3)
MONOCYTES NFR BLD AUTO: 5 %
MONOCYTES NFR BLD AUTO: 8 %
NEUTROPHILS # BLD AUTO: 10.7 10E3/UL (ref 1.6–8.3)
NEUTROPHILS # BLD AUTO: 4.2 10E3/UL (ref 1.6–8.3)
NEUTROPHILS NFR BLD AUTO: 64 %
NEUTROPHILS NFR BLD AUTO: 79 %
NITRATE UR QL: NEGATIVE
NRBC # BLD AUTO: 0 10E3/UL
NRBC # BLD AUTO: 0 10E3/UL
NRBC BLD AUTO-RTO: 0 /100
NRBC BLD AUTO-RTO: 0 /100
P AXIS - MUSE: 77 DEGREES
PCO2 BLDV: 30 MM HG (ref 40–50)
PCO2 BLDV: 44 MM HG (ref 40–50)
PH BLDV: 7.13 [PH] (ref 7.32–7.43)
PH BLDV: 7.22 [PH] (ref 7.32–7.43)
PH UR STRIP: 6 [PH] (ref 5–7)
PLATELET # BLD AUTO: 186 10E3/UL (ref 150–450)
PLATELET # BLD AUTO: 299 10E3/UL (ref 150–450)
PO2 BLDV: 25 MM HG (ref 25–47)
PO2 BLDV: 39 MM HG (ref 25–47)
POTASSIUM BLD-SCNC: 7.6 MMOL/L (ref 3.4–5.3)
POTASSIUM SERPL-SCNC: 4.3 MMOL/L (ref 3.4–5.3)
POTASSIUM SERPL-SCNC: 6.7 MMOL/L (ref 3.4–5.3)
POTASSIUM SERPL-SCNC: 6.8 MMOL/L (ref 3.4–5.3)
POTASSIUM SERPL-SCNC: 7 MMOL/L (ref 3.4–5.3)
POTASSIUM SERPL-SCNC: 7.8 MMOL/L (ref 3.4–5.3)
PR INTERVAL - MUSE: 238 MS
PROCALCITONIN SERPL IA-MCNC: 2.27 NG/ML
PROT SERPL-MCNC: 6.9 G/DL (ref 6.4–8.3)
PROT SERPL-MCNC: 7.7 G/DL (ref 6.4–8.3)
PTH-INTACT SERPL-MCNC: 34 PG/ML (ref 15–65)
QRS DURATION - MUSE: 214 MS
QT - MUSE: 518 MS
QTC - MUSE: 521 MS
R AXIS - MUSE: 1 DEGREES
RBC # BLD AUTO: 3.7 10E6/UL (ref 3.8–5.2)
RBC # BLD AUTO: 3.81 10E6/UL (ref 3.8–5.2)
RBC URINE: 24 /HPF
RSV RNA SPEC NAA+PROBE: NEGATIVE
SAO2 % BLDV: 30 % (ref 70–75)
SAO2 % BLDV: 64 % (ref 70–75)
SARS-COV-2 RNA RESP QL NAA+PROBE: NEGATIVE
SODIUM BLD-SCNC: 132 MMOL/L (ref 135–145)
SODIUM SERPL-SCNC: 134 MMOL/L (ref 135–145)
SODIUM SERPL-SCNC: 139 MMOL/L (ref 135–145)
SP GR UR STRIP: 1.01 (ref 1–1.03)
SYSTOLIC BLOOD PRESSURE - MUSE: NORMAL MMHG
T AXIS - MUSE: 140 DEGREES
TROPONIN T SERPL HS-MCNC: 133 NG/L
TROPONIN T SERPL HS-MCNC: 143 NG/L
TSH SERPL DL<=0.005 MIU/L-ACNC: 0.47 UIU/ML (ref 0.3–4.2)
UROBILINOGEN UR STRIP-MCNC: NORMAL MG/DL
VENTRICULAR RATE- MUSE: 61 BPM
VIT D+METAB SERPL-MCNC: 55 NG/ML (ref 20–50)
WBC # BLD AUTO: 13.7 10E3/UL (ref 4–11)
WBC # BLD AUTO: 6.7 10E3/UL (ref 4–11)
WBC CLUMPS #/AREA URNS HPF: PRESENT /HPF
WBC URINE: >182 /HPF

## 2024-01-01 PROCEDURE — 87040 BLOOD CULTURE FOR BACTERIA: CPT | Performed by: INTERNAL MEDICINE

## 2024-01-01 PROCEDURE — 82803 BLOOD GASES ANY COMBINATION: CPT

## 2024-01-01 PROCEDURE — 36415 COLL VENOUS BLD VENIPUNCTURE: CPT | Performed by: INTERNAL MEDICINE

## 2024-01-01 PROCEDURE — 96374 THER/PROPH/DIAG INJ IV PUSH: CPT | Mod: 59 | Performed by: INTERNAL MEDICINE

## 2024-01-01 PROCEDURE — 250N000011 HC RX IP 250 OP 636: Performed by: STUDENT IN AN ORGANIZED HEALTH CARE EDUCATION/TRAINING PROGRAM

## 2024-01-01 PROCEDURE — 110N000005 HC R&B HOSPICE, ACCENT

## 2024-01-01 PROCEDURE — 999N000248 HC STATISTIC IV INSERT WITH US BY RN

## 2024-01-01 PROCEDURE — 99203 OFFICE O/P NEW LOW 30 MIN: CPT | Mod: 24 | Performed by: NURSE PRACTITIONER

## 2024-01-01 PROCEDURE — 70450 CT HEAD/BRAIN W/O DYE: CPT | Mod: 26 | Performed by: RADIOLOGY

## 2024-01-01 PROCEDURE — 99231 SBSQ HOSP IP/OBS SF/LOW 25: CPT | Mod: FS | Performed by: STUDENT IN AN ORGANIZED HEALTH CARE EDUCATION/TRAINING PROGRAM

## 2024-01-01 PROCEDURE — 99207 PR APP CREDIT; MD BILLING SHARED VISIT: CPT

## 2024-01-01 PROCEDURE — 99316 NF DSCHRG MGMT 30 MIN+: CPT

## 2024-01-01 PROCEDURE — G1010 CDSM STANSON: HCPCS | Performed by: RADIOLOGY

## 2024-01-01 PROCEDURE — 84132 ASSAY OF SERUM POTASSIUM: CPT | Performed by: INTERNAL MEDICINE

## 2024-01-01 PROCEDURE — 250N000011 HC RX IP 250 OP 636

## 2024-01-01 PROCEDURE — 250N000012 HC RX MED GY IP 250 OP 636 PS 637: Performed by: INTERNAL MEDICINE

## 2024-01-01 PROCEDURE — 250N000009 HC RX 250: Performed by: INTERNAL MEDICINE

## 2024-01-01 PROCEDURE — 99222 1ST HOSP IP/OBS MODERATE 55: CPT | Mod: A1 | Performed by: INTERNAL MEDICINE

## 2024-01-01 PROCEDURE — 81001 URINALYSIS AUTO W/SCOPE: CPT | Mod: ORL | Performed by: FAMILY MEDICINE

## 2024-01-01 PROCEDURE — 99207 PR NO BILLABLE SERVICE THIS VISIT: CPT | Mod: 25 | Performed by: PHYSICIAN ASSISTANT

## 2024-01-01 PROCEDURE — 80053 COMPREHEN METABOLIC PANEL: CPT | Performed by: FAMILY MEDICINE

## 2024-01-01 PROCEDURE — 84484 ASSAY OF TROPONIN QUANT: CPT | Performed by: INTERNAL MEDICINE

## 2024-01-01 PROCEDURE — 84443 ASSAY THYROID STIM HORMONE: CPT | Performed by: FAMILY MEDICINE

## 2024-01-01 PROCEDURE — 99291 CRITICAL CARE FIRST HOUR: CPT | Mod: 25 | Performed by: INTERNAL MEDICINE

## 2024-01-01 PROCEDURE — 36415 COLL VENOUS BLD VENIPUNCTURE: CPT | Performed by: FAMILY MEDICINE

## 2024-01-01 PROCEDURE — 99207 PR APP CREDIT; MD BILLING SHARED VISIT: CPT | Performed by: STUDENT IN AN ORGANIZED HEALTH CARE EDUCATION/TRAINING PROGRAM

## 2024-01-01 PROCEDURE — 250N000009 HC RX 250: Performed by: PHYSICIAN ASSISTANT

## 2024-01-01 PROCEDURE — 99207 PR APP CREDIT; MD BILLING SHARED VISIT: CPT | Performed by: PHYSICIAN ASSISTANT

## 2024-01-01 PROCEDURE — 96375 TX/PRO/DX INJ NEW DRUG ADDON: CPT | Performed by: INTERNAL MEDICINE

## 2024-01-01 PROCEDURE — 70450 CT HEAD/BRAIN W/O DYE: CPT | Mod: MG

## 2024-01-01 PROCEDURE — 99207 PR NO BILLABLE SERVICE THIS VISIT: CPT | Mod: 93 | Performed by: ORTHOPAEDIC SURGERY

## 2024-01-01 PROCEDURE — 96365 THER/PROPH/DIAG IV INF INIT: CPT | Mod: 59 | Performed by: INTERNAL MEDICINE

## 2024-01-01 PROCEDURE — 99207 PR APP CREDIT; MD BILLING SHARED VISIT: CPT | Mod: FS | Performed by: INTERNAL MEDICINE

## 2024-01-01 PROCEDURE — 96372 THER/PROPH/DIAG INJ SC/IM: CPT | Performed by: INTERNAL MEDICINE

## 2024-01-01 PROCEDURE — 250N000013 HC RX MED GY IP 250 OP 250 PS 637: Performed by: PHYSICIAN ASSISTANT

## 2024-01-01 PROCEDURE — 87637 SARSCOV2&INF A&B&RSV AMP PRB: CPT | Performed by: INTERNAL MEDICINE

## 2024-01-01 PROCEDURE — 258N000003 HC RX IP 258 OP 636: Performed by: INTERNAL MEDICINE

## 2024-01-01 PROCEDURE — 250N000011 HC RX IP 250 OP 636: Performed by: INTERNAL MEDICINE

## 2024-01-01 PROCEDURE — 99238 HOSP IP/OBS DSCHRG MGMT 30/<: CPT | Mod: GV | Performed by: INTERNAL MEDICINE

## 2024-01-01 PROCEDURE — 93005 ELECTROCARDIOGRAM TRACING: CPT | Performed by: INTERNAL MEDICINE

## 2024-01-01 PROCEDURE — 258N000001 HC RX 258: Performed by: INTERNAL MEDICINE

## 2024-01-01 PROCEDURE — 99349 HOME/RES VST EST MOD MDM 40: CPT | Performed by: FAMILY MEDICINE

## 2024-01-01 PROCEDURE — 120N000002 HC R&B MED SURG/OB UMMC

## 2024-01-01 PROCEDURE — 82306 VITAMIN D 25 HYDROXY: CPT | Performed by: FAMILY MEDICINE

## 2024-01-01 PROCEDURE — 82947 ASSAY GLUCOSE BLOOD QUANT: CPT | Performed by: INTERNAL MEDICINE

## 2024-01-01 PROCEDURE — 82962 GLUCOSE BLOOD TEST: CPT

## 2024-01-01 PROCEDURE — 99285 EMERGENCY DEPT VISIT HI MDM: CPT | Mod: 25 | Performed by: INTERNAL MEDICINE

## 2024-01-01 PROCEDURE — 85652 RBC SED RATE AUTOMATED: CPT | Performed by: INTERNAL MEDICINE

## 2024-01-01 PROCEDURE — 250N000011 HC RX IP 250 OP 636: Performed by: PHYSICIAN ASSISTANT

## 2024-01-01 PROCEDURE — 99309 SBSQ NF CARE MODERATE MDM 30: CPT

## 2024-01-01 PROCEDURE — 87086 URINE CULTURE/COLONY COUNT: CPT | Performed by: FAMILY MEDICINE

## 2024-01-01 PROCEDURE — 96361 HYDRATE IV INFUSION ADD-ON: CPT | Performed by: INTERNAL MEDICINE

## 2024-01-01 PROCEDURE — 99309 SBSQ NF CARE MODERATE MDM 30: CPT | Performed by: FAMILY MEDICINE

## 2024-01-01 PROCEDURE — P9603 ONE-WAY ALLOW PRORATED MILES: HCPCS | Performed by: FAMILY MEDICINE

## 2024-01-01 PROCEDURE — 84145 PROCALCITONIN (PCT): CPT | Performed by: INTERNAL MEDICINE

## 2024-01-01 PROCEDURE — 85025 COMPLETE CBC W/AUTO DIFF WBC: CPT | Performed by: FAMILY MEDICINE

## 2024-01-01 PROCEDURE — 74176 CT ABD & PELVIS W/O CONTRAST: CPT | Mod: 26 | Performed by: RADIOLOGY

## 2024-01-01 PROCEDURE — 74176 CT ABD & PELVIS W/O CONTRAST: CPT | Mod: MG

## 2024-01-01 PROCEDURE — 86140 C-REACTIVE PROTEIN: CPT | Performed by: INTERNAL MEDICINE

## 2024-01-01 PROCEDURE — 93010 ELECTROCARDIOGRAM REPORT: CPT | Performed by: INTERNAL MEDICINE

## 2024-01-01 PROCEDURE — 83735 ASSAY OF MAGNESIUM: CPT | Performed by: FAMILY MEDICINE

## 2024-01-01 PROCEDURE — 83970 ASSAY OF PARATHORMONE: CPT | Performed by: FAMILY MEDICINE

## 2024-01-01 PROCEDURE — 99233 SBSQ HOSP IP/OBS HIGH 50: CPT | Mod: FS

## 2024-01-01 PROCEDURE — 85610 PROTHROMBIN TIME: CPT | Performed by: INTERNAL MEDICINE

## 2024-01-01 PROCEDURE — 99207 PR APP CREDIT; MD BILLING SHARED VISIT: CPT | Mod: 25 | Performed by: PHYSICIAN ASSISTANT

## 2024-01-01 PROCEDURE — 85025 COMPLETE CBC W/AUTO DIFF WBC: CPT | Performed by: INTERNAL MEDICINE

## 2024-01-01 RX ORDER — ONDANSETRON 4 MG/1
4 TABLET, ORALLY DISINTEGRATING ORAL EVERY 6 HOURS PRN
Status: CANCELLED | OUTPATIENT
Start: 2024-01-01

## 2024-01-01 RX ORDER — LORAZEPAM 0.5 MG/1
1 TABLET ORAL
Status: DISCONTINUED | OUTPATIENT
Start: 2024-01-01 | End: 2024-01-01 | Stop reason: HOSPADM

## 2024-01-01 RX ORDER — BISACODYL 10 MG
10 SUPPOSITORY, RECTAL RECTAL DAILY PRN
Status: DISCONTINUED | OUTPATIENT
Start: 2024-01-01 | End: 2024-02-13 | Stop reason: HOSPADM

## 2024-01-01 RX ORDER — HYDROMORPHONE HYDROCHLORIDE 1 MG/ML
0.5 INJECTION, SOLUTION INTRAMUSCULAR; INTRAVENOUS; SUBCUTANEOUS
Status: DISCONTINUED | OUTPATIENT
Start: 2024-01-01 | End: 2024-02-13 | Stop reason: HOSPADM

## 2024-01-01 RX ORDER — HYDROMORPHONE HYDROCHLORIDE 1 MG/ML
0.3 INJECTION, SOLUTION INTRAMUSCULAR; INTRAVENOUS; SUBCUTANEOUS
Status: DISCONTINUED | OUTPATIENT
Start: 2024-01-01 | End: 2024-01-01 | Stop reason: HOSPADM

## 2024-01-01 RX ORDER — OXYCODONE HYDROCHLORIDE 5 MG/1
5 TABLET ORAL ONCE
Status: COMPLETED | OUTPATIENT
Start: 2024-01-01 | End: 2024-01-01

## 2024-01-01 RX ORDER — ONDANSETRON 2 MG/ML
4 INJECTION INTRAMUSCULAR; INTRAVENOUS EVERY 6 HOURS PRN
Status: DISCONTINUED | OUTPATIENT
Start: 2024-01-01 | End: 2024-01-01 | Stop reason: HOSPADM

## 2024-01-01 RX ORDER — ATROPINE SULFATE 10 MG/ML
2 SOLUTION/ DROPS OPHTHALMIC EVERY 4 HOURS PRN
Status: DISCONTINUED | OUTPATIENT
Start: 2024-01-01 | End: 2024-02-13 | Stop reason: HOSPADM

## 2024-01-01 RX ORDER — CALCIUM CARBONATE 500 MG/1
1000 TABLET, CHEWABLE ORAL 4 TIMES DAILY PRN
Status: CANCELLED | OUTPATIENT
Start: 2024-01-01

## 2024-01-01 RX ORDER — LISINOPRIL 10 MG/1
10 TABLET ORAL DAILY
COMMUNITY
Start: 2024-01-01

## 2024-01-01 RX ORDER — LORAZEPAM 2 MG/ML
1 INJECTION INTRAMUSCULAR 2 TIMES DAILY
Status: DISCONTINUED | OUTPATIENT
Start: 2024-01-01 | End: 2024-02-13 | Stop reason: HOSPADM

## 2024-01-01 RX ORDER — DEXTROSE MONOHYDRATE 25 G/50ML
25 INJECTION, SOLUTION INTRAVENOUS ONCE
Status: COMPLETED | OUTPATIENT
Start: 2024-01-01 | End: 2024-01-01

## 2024-01-01 RX ORDER — ATORVASTATIN CALCIUM 10 MG/1
10 TABLET, FILM COATED ORAL DAILY
Qty: 90 TABLET | Refills: 3 | Status: SHIPPED | OUTPATIENT
Start: 2024-01-01

## 2024-01-01 RX ORDER — ALBUTEROL SULFATE 0.83 MG/ML
2.5 SOLUTION RESPIRATORY (INHALATION) 4 TIMES DAILY PRN
Status: CANCELLED | OUTPATIENT
Start: 2024-01-01

## 2024-01-01 RX ORDER — LIDOCAINE 40 MG/G
CREAM TOPICAL
Status: DISCONTINUED | OUTPATIENT
Start: 2024-01-01 | End: 2024-02-13 | Stop reason: HOSPADM

## 2024-01-01 RX ORDER — ALBUTEROL SULFATE 0.83 MG/ML
2.5 SOLUTION RESPIRATORY (INHALATION) 4 TIMES DAILY PRN
COMMUNITY
Start: 2024-01-01

## 2024-01-01 RX ORDER — HYDROMORPHONE HCL IN WATER/PF 6 MG/30 ML
0.2 PATIENT CONTROLLED ANALGESIA SYRINGE INTRAVENOUS
Status: DISCONTINUED | OUTPATIENT
Start: 2024-01-01 | End: 2024-01-01

## 2024-01-01 RX ORDER — ALBUTEROL SULFATE 0.83 MG/ML
2.5 SOLUTION RESPIRATORY (INHALATION) 4 TIMES DAILY PRN
Status: DISCONTINUED | OUTPATIENT
Start: 2024-01-01 | End: 2024-01-01 | Stop reason: HOSPADM

## 2024-01-01 RX ORDER — NALOXONE HYDROCHLORIDE 0.4 MG/ML
0.2 INJECTION, SOLUTION INTRAMUSCULAR; INTRAVENOUS; SUBCUTANEOUS
Status: DISCONTINUED | OUTPATIENT
Start: 2024-01-01 | End: 2024-02-13 | Stop reason: HOSPADM

## 2024-01-01 RX ORDER — LIDOCAINE 40 MG/G
CREAM TOPICAL
Status: DISCONTINUED | OUTPATIENT
Start: 2024-01-01 | End: 2024-01-01 | Stop reason: HOSPADM

## 2024-01-01 RX ORDER — HYDROMORPHONE HYDROCHLORIDE 1 MG/ML
0.5 INJECTION, SOLUTION INTRAMUSCULAR; INTRAVENOUS; SUBCUTANEOUS
Status: COMPLETED | OUTPATIENT
Start: 2024-01-01 | End: 2024-01-01

## 2024-01-01 RX ORDER — ALBUTEROL SULFATE 0.83 MG/ML
2.5 SOLUTION RESPIRATORY (INHALATION) 4 TIMES DAILY PRN
Status: DISCONTINUED | OUTPATIENT
Start: 2024-01-01 | End: 2024-02-13 | Stop reason: HOSPADM

## 2024-01-01 RX ORDER — HYDROMORPHONE HCL IN WATER/PF 6 MG/30 ML
0.2 PATIENT CONTROLLED ANALGESIA SYRINGE INTRAVENOUS ONCE
Status: COMPLETED | OUTPATIENT
Start: 2024-01-01 | End: 2024-01-01

## 2024-01-01 RX ORDER — LORAZEPAM 2 MG/ML
1 INJECTION INTRAMUSCULAR
Status: DISCONTINUED | OUTPATIENT
Start: 2024-01-01 | End: 2024-01-01

## 2024-01-01 RX ORDER — SALIVA STIMULANT COMB. NO.3
2 SPRAY, NON-AEROSOL (ML) MUCOUS MEMBRANE
Status: CANCELLED | OUTPATIENT
Start: 2024-01-01

## 2024-01-01 RX ORDER — SALIVA STIMULANT COMB. NO.3
2 SPRAY, NON-AEROSOL (ML) MUCOUS MEMBRANE
Status: DISCONTINUED | OUTPATIENT
Start: 2024-01-01 | End: 2024-01-01 | Stop reason: HOSPADM

## 2024-01-01 RX ORDER — CARBOXYMETHYLCELLULOSE SODIUM 5 MG/ML
1-2 SOLUTION/ DROPS OPHTHALMIC
Status: CANCELLED | OUTPATIENT
Start: 2024-01-01

## 2024-01-01 RX ORDER — GLYCOPYRROLATE 0.2 MG/ML
0.2 INJECTION, SOLUTION INTRAMUSCULAR; INTRAVENOUS EVERY 4 HOURS PRN
Status: DISCONTINUED | OUTPATIENT
Start: 2024-01-01 | End: 2024-01-01 | Stop reason: HOSPADM

## 2024-01-01 RX ORDER — ACETAMINOPHEN 650 MG/1
650 SUPPOSITORY RECTAL EVERY 6 HOURS PRN
Status: DISCONTINUED | OUTPATIENT
Start: 2024-01-01 | End: 2024-01-01 | Stop reason: HOSPADM

## 2024-01-01 RX ORDER — PROPRANOLOL HYDROCHLORIDE 40 MG/1
40 TABLET ORAL 2 TIMES DAILY
Status: DISCONTINUED | OUTPATIENT
Start: 2024-01-01 | End: 2024-01-01

## 2024-01-01 RX ORDER — LORAZEPAM 2 MG/ML
1 INJECTION INTRAMUSCULAR
Status: CANCELLED | OUTPATIENT
Start: 2024-01-01

## 2024-01-01 RX ORDER — MINERAL OIL/HYDROPHIL PETROLAT
OINTMENT (GRAM) TOPICAL
Status: DISCONTINUED | OUTPATIENT
Start: 2024-01-01 | End: 2024-02-13 | Stop reason: HOSPADM

## 2024-01-01 RX ORDER — NALOXONE HYDROCHLORIDE 0.4 MG/ML
0.1 INJECTION, SOLUTION INTRAMUSCULAR; INTRAVENOUS; SUBCUTANEOUS
Status: DISCONTINUED | OUTPATIENT
Start: 2024-01-01 | End: 2024-02-13 | Stop reason: HOSPADM

## 2024-01-01 RX ORDER — OLANZAPINE 10 MG/2ML
2.5 INJECTION, POWDER, FOR SOLUTION INTRAMUSCULAR 2 TIMES DAILY PRN
Status: CANCELLED | OUTPATIENT
Start: 2024-01-01

## 2024-01-01 RX ORDER — PIPERACILLIN SODIUM, TAZOBACTAM SODIUM 2; .25 G/10ML; G/10ML
2.25 INJECTION, POWDER, LYOPHILIZED, FOR SOLUTION INTRAVENOUS EVERY 6 HOURS
Status: DISCONTINUED | OUTPATIENT
Start: 2024-01-01 | End: 2024-01-01

## 2024-01-01 RX ORDER — CALCIUM CARBONATE 500 MG/1
1000 TABLET, CHEWABLE ORAL 4 TIMES DAILY PRN
Status: DISCONTINUED | OUTPATIENT
Start: 2024-01-01 | End: 2024-02-13 | Stop reason: HOSPADM

## 2024-01-01 RX ORDER — NALOXONE HYDROCHLORIDE 0.4 MG/ML
0.1 INJECTION, SOLUTION INTRAMUSCULAR; INTRAVENOUS; SUBCUTANEOUS
Status: CANCELLED | OUTPATIENT
Start: 2024-01-01

## 2024-01-01 RX ORDER — DEXTROSE MONOHYDRATE 25 G/50ML
25-50 INJECTION, SOLUTION INTRAVENOUS
Status: DISCONTINUED | OUTPATIENT
Start: 2024-01-01 | End: 2024-01-01

## 2024-01-01 RX ORDER — OLANZAPINE 10 MG/2ML
2.5 INJECTION, POWDER, FOR SOLUTION INTRAMUSCULAR 2 TIMES DAILY PRN
Status: DISCONTINUED | OUTPATIENT
Start: 2024-01-01 | End: 2024-01-01 | Stop reason: HOSPADM

## 2024-01-01 RX ORDER — LORAZEPAM 0.5 MG/1
1 TABLET ORAL
Status: CANCELLED | OUTPATIENT
Start: 2024-01-01

## 2024-01-01 RX ORDER — OLANZAPINE 10 MG/2ML
2.5 INJECTION, POWDER, FOR SOLUTION INTRAMUSCULAR 2 TIMES DAILY PRN
Status: DISCONTINUED | OUTPATIENT
Start: 2024-01-01 | End: 2024-02-13 | Stop reason: HOSPADM

## 2024-01-01 RX ORDER — PROCHLORPERAZINE MALEATE 5 MG
5 TABLET ORAL EVERY 6 HOURS PRN
Status: DISCONTINUED | OUTPATIENT
Start: 2024-01-01 | End: 2024-02-13 | Stop reason: HOSPADM

## 2024-01-01 RX ORDER — BENZONATATE 100 MG/1
100 CAPSULE ORAL 3 TIMES DAILY PRN
COMMUNITY
End: 2024-01-01

## 2024-01-01 RX ORDER — ACETAMINOPHEN 325 MG/1
650 TABLET ORAL 3 TIMES DAILY
Status: DISCONTINUED | OUTPATIENT
Start: 2024-01-01 | End: 2024-01-01

## 2024-01-01 RX ORDER — ONDANSETRON 4 MG/1
4 TABLET, ORALLY DISINTEGRATING ORAL EVERY 6 HOURS PRN
Status: DISCONTINUED | OUTPATIENT
Start: 2024-01-01 | End: 2024-01-01

## 2024-01-01 RX ORDER — LORAZEPAM 2 MG/ML
1 INJECTION INTRAMUSCULAR
Status: DISCONTINUED | OUTPATIENT
Start: 2024-01-01 | End: 2024-02-13 | Stop reason: HOSPADM

## 2024-01-01 RX ORDER — ATROPINE SULFATE 10 MG/ML
2 SOLUTION/ DROPS OPHTHALMIC EVERY 4 HOURS PRN
Status: DISCONTINUED | OUTPATIENT
Start: 2024-01-01 | End: 2024-01-01 | Stop reason: HOSPADM

## 2024-01-01 RX ORDER — NALOXONE HYDROCHLORIDE 0.4 MG/ML
0.2 INJECTION, SOLUTION INTRAMUSCULAR; INTRAVENOUS; SUBCUTANEOUS
Status: CANCELLED | OUTPATIENT
Start: 2024-01-01

## 2024-01-01 RX ORDER — PROCHLORPERAZINE 25 MG
12.5 SUPPOSITORY, RECTAL RECTAL EVERY 12 HOURS PRN
Status: DISCONTINUED | OUTPATIENT
Start: 2024-01-01 | End: 2024-01-01 | Stop reason: HOSPADM

## 2024-01-01 RX ORDER — GLYCOPYRROLATE 0.2 MG/ML
0.2 INJECTION, SOLUTION INTRAMUSCULAR; INTRAVENOUS EVERY 4 HOURS PRN
Status: DISCONTINUED | OUTPATIENT
Start: 2024-01-01 | End: 2024-02-13 | Stop reason: HOSPADM

## 2024-01-01 RX ORDER — HYDROMORPHONE HYDROCHLORIDE 1 MG/ML
0.3 INJECTION, SOLUTION INTRAMUSCULAR; INTRAVENOUS; SUBCUTANEOUS
Status: CANCELLED | OUTPATIENT
Start: 2024-01-01

## 2024-01-01 RX ORDER — PROCHLORPERAZINE 25 MG
12.5 SUPPOSITORY, RECTAL RECTAL EVERY 12 HOURS PRN
Status: CANCELLED | OUTPATIENT
Start: 2024-01-01

## 2024-01-01 RX ORDER — ACETAMINOPHEN 325 MG/1
650 TABLET ORAL 3 TIMES DAILY
COMMUNITY
Start: 2024-01-01

## 2024-01-01 RX ORDER — CARBOXYMETHYLCELLULOSE SODIUM 5 MG/ML
1-2 SOLUTION/ DROPS OPHTHALMIC
Status: DISCONTINUED | OUTPATIENT
Start: 2024-01-01 | End: 2024-01-01 | Stop reason: HOSPADM

## 2024-01-01 RX ORDER — MINERAL OIL/HYDROPHIL PETROLAT
OINTMENT (GRAM) TOPICAL
Status: DISCONTINUED | OUTPATIENT
Start: 2024-01-01 | End: 2024-01-01 | Stop reason: HOSPADM

## 2024-01-01 RX ORDER — LORAZEPAM 0.5 MG/1
1 TABLET ORAL
Status: DISCONTINUED | OUTPATIENT
Start: 2024-01-01 | End: 2024-02-13 | Stop reason: HOSPADM

## 2024-01-01 RX ORDER — NICOTINE POLACRILEX 4 MG
15-30 LOZENGE BUCCAL
Status: DISCONTINUED | OUTPATIENT
Start: 2024-01-01 | End: 2024-01-01

## 2024-01-01 RX ORDER — ATROPINE SULFATE 10 MG/ML
2 SOLUTION/ DROPS OPHTHALMIC EVERY 4 HOURS PRN
Status: CANCELLED | OUTPATIENT
Start: 2024-01-01

## 2024-01-01 RX ORDER — HYDROMORPHONE HYDROCHLORIDE 1 MG/ML
0.3 INJECTION, SOLUTION INTRAMUSCULAR; INTRAVENOUS; SUBCUTANEOUS
Status: DISCONTINUED | OUTPATIENT
Start: 2024-01-01 | End: 2024-01-01

## 2024-01-01 RX ORDER — ONDANSETRON 2 MG/ML
4 INJECTION INTRAMUSCULAR; INTRAVENOUS EVERY 6 HOURS PRN
Status: CANCELLED | OUTPATIENT
Start: 2024-01-01

## 2024-01-01 RX ORDER — PROCHLORPERAZINE 25 MG
12.5 SUPPOSITORY, RECTAL RECTAL EVERY 12 HOURS PRN
Status: DISCONTINUED | OUTPATIENT
Start: 2024-01-01 | End: 2024-02-13 | Stop reason: HOSPADM

## 2024-01-01 RX ORDER — ACETAMINOPHEN 650 MG/1
650 SUPPOSITORY RECTAL EVERY 6 HOURS PRN
Status: DISCONTINUED | OUTPATIENT
Start: 2024-01-01 | End: 2024-02-13 | Stop reason: HOSPADM

## 2024-01-01 RX ORDER — MINERAL OIL/HYDROPHIL PETROLAT
OINTMENT (GRAM) TOPICAL
Status: CANCELLED | OUTPATIENT
Start: 2024-01-01

## 2024-01-01 RX ORDER — HYDROMORPHONE HYDROCHLORIDE 1 MG/ML
0.5 INJECTION, SOLUTION INTRAMUSCULAR; INTRAVENOUS; SUBCUTANEOUS ONCE
Status: COMPLETED | OUTPATIENT
Start: 2024-01-01 | End: 2024-01-01

## 2024-01-01 RX ORDER — NALOXONE HYDROCHLORIDE 0.4 MG/ML
0.2 INJECTION, SOLUTION INTRAMUSCULAR; INTRAVENOUS; SUBCUTANEOUS
Status: DISCONTINUED | OUTPATIENT
Start: 2024-01-01 | End: 2024-01-01 | Stop reason: HOSPADM

## 2024-01-01 RX ORDER — CALCIUM GLUCONATE 94 MG/ML
1 INJECTION, SOLUTION INTRAVENOUS ONCE
Status: COMPLETED | OUTPATIENT
Start: 2024-01-01 | End: 2024-01-01

## 2024-01-01 RX ORDER — BENZONATATE 100 MG/1
100 CAPSULE ORAL 3 TIMES DAILY PRN
Status: CANCELLED
Start: 2024-01-01

## 2024-01-01 RX ORDER — BISACODYL 10 MG
10 SUPPOSITORY, RECTAL RECTAL DAILY PRN
Status: DISCONTINUED | OUTPATIENT
Start: 2024-01-01 | End: 2024-01-01 | Stop reason: HOSPADM

## 2024-01-01 RX ORDER — ONDANSETRON 2 MG/ML
4 INJECTION INTRAMUSCULAR; INTRAVENOUS EVERY 6 HOURS PRN
Status: DISCONTINUED | OUTPATIENT
Start: 2024-01-01 | End: 2024-01-01

## 2024-01-01 RX ORDER — OLANZAPINE 10 MG/2ML
2.5 INJECTION, POWDER, FOR SOLUTION INTRAMUSCULAR
Status: DISCONTINUED | OUTPATIENT
Start: 2024-01-01 | End: 2024-01-01

## 2024-01-01 RX ORDER — LORAZEPAM 0.5 MG/1
1 TABLET ORAL 2 TIMES DAILY
Status: DISCONTINUED | OUTPATIENT
Start: 2024-01-01 | End: 2024-01-01

## 2024-01-01 RX ORDER — HYDROMORPHONE HCL IN WATER/PF 6 MG/30 ML
0.2 PATIENT CONTROLLED ANALGESIA SYRINGE INTRAVENOUS ONCE
Status: DISCONTINUED | OUTPATIENT
Start: 2024-01-01 | End: 2024-01-01

## 2024-01-01 RX ORDER — HYDROMORPHONE HYDROCHLORIDE 1 MG/ML
0.5 INJECTION, SOLUTION INTRAMUSCULAR; INTRAVENOUS; SUBCUTANEOUS
Status: DISCONTINUED | OUTPATIENT
Start: 2024-01-01 | End: 2024-01-01

## 2024-01-01 RX ORDER — LORAZEPAM 0.5 MG/1
0.5 TABLET ORAL EVERY 8 HOURS PRN
Qty: 30 TABLET | Refills: 0 | Status: SHIPPED | OUTPATIENT
Start: 2024-01-01

## 2024-01-01 RX ORDER — SALIVA STIMULANT COMB. NO.3
2 SPRAY, NON-AEROSOL (ML) MUCOUS MEMBRANE
Status: DISCONTINUED | OUTPATIENT
Start: 2024-01-01 | End: 2024-02-13 | Stop reason: HOSPADM

## 2024-01-01 RX ORDER — ONDANSETRON 4 MG/1
4 TABLET, ORALLY DISINTEGRATING ORAL EVERY 6 HOURS PRN
Status: DISCONTINUED | OUTPATIENT
Start: 2024-01-01 | End: 2024-02-13 | Stop reason: HOSPADM

## 2024-01-01 RX ORDER — ONDANSETRON 4 MG/1
4 TABLET, ORALLY DISINTEGRATING ORAL EVERY 6 HOURS PRN
Status: DISCONTINUED | OUTPATIENT
Start: 2024-01-01 | End: 2024-01-01 | Stop reason: HOSPADM

## 2024-01-01 RX ORDER — NALOXONE HYDROCHLORIDE 0.4 MG/ML
0.1 INJECTION, SOLUTION INTRAMUSCULAR; INTRAVENOUS; SUBCUTANEOUS
Status: DISCONTINUED | OUTPATIENT
Start: 2024-01-01 | End: 2024-01-01 | Stop reason: HOSPADM

## 2024-01-01 RX ORDER — VANCOMYCIN HYDROCHLORIDE 1 G/200ML
1000 INJECTION, SOLUTION INTRAVENOUS ONCE
Status: COMPLETED | OUTPATIENT
Start: 2024-01-01 | End: 2024-01-01

## 2024-01-01 RX ORDER — ACETAMINOPHEN 650 MG/1
650 SUPPOSITORY RECTAL EVERY 6 HOURS PRN
Status: CANCELLED | OUTPATIENT
Start: 2024-01-01

## 2024-01-01 RX ORDER — PIPERACILLIN SODIUM, TAZOBACTAM SODIUM 3; .375 G/15ML; G/15ML
3.38 INJECTION, POWDER, LYOPHILIZED, FOR SOLUTION INTRAVENOUS ONCE
Qty: 15 ML | Refills: 0 | Status: COMPLETED | OUTPATIENT
Start: 2024-01-01 | End: 2024-01-01

## 2024-01-01 RX ORDER — ONDANSETRON 2 MG/ML
4 INJECTION INTRAMUSCULAR; INTRAVENOUS EVERY 6 HOURS PRN
Status: DISCONTINUED | OUTPATIENT
Start: 2024-01-01 | End: 2024-02-13 | Stop reason: HOSPADM

## 2024-01-01 RX ORDER — LORAZEPAM 0.5 MG/1
0.5 TABLET ORAL EVERY 8 HOURS PRN
COMMUNITY
End: 2024-01-01

## 2024-01-01 RX ORDER — BISACODYL 10 MG
10 SUPPOSITORY, RECTAL RECTAL DAILY PRN
Status: CANCELLED | OUTPATIENT
Start: 2024-01-01

## 2024-01-01 RX ORDER — PROCHLORPERAZINE MALEATE 5 MG
5 TABLET ORAL EVERY 6 HOURS PRN
Status: DISCONTINUED | OUTPATIENT
Start: 2024-01-01 | End: 2024-01-01 | Stop reason: HOSPADM

## 2024-01-01 RX ORDER — CALCIUM CARBONATE 500 MG/1
1000 TABLET, CHEWABLE ORAL 4 TIMES DAILY PRN
Status: DISCONTINUED | OUTPATIENT
Start: 2024-01-01 | End: 2024-01-01 | Stop reason: HOSPADM

## 2024-01-01 RX ORDER — PROCHLORPERAZINE MALEATE 5 MG
5 TABLET ORAL EVERY 6 HOURS PRN
Status: CANCELLED | OUTPATIENT
Start: 2024-01-01

## 2024-01-01 RX ORDER — CARBOXYMETHYLCELLULOSE SODIUM 5 MG/ML
1-2 SOLUTION/ DROPS OPHTHALMIC
Status: DISCONTINUED | OUTPATIENT
Start: 2024-01-01 | End: 2024-02-13 | Stop reason: HOSPADM

## 2024-01-01 RX ORDER — LIDOCAINE 40 MG/G
CREAM TOPICAL
Status: CANCELLED | OUTPATIENT
Start: 2024-01-01

## 2024-01-01 RX ORDER — GLYCOPYRROLATE 0.2 MG/ML
0.2 INJECTION, SOLUTION INTRAMUSCULAR; INTRAVENOUS EVERY 4 HOURS PRN
Status: CANCELLED | OUTPATIENT
Start: 2024-01-01

## 2024-01-01 RX ORDER — MULTIVITAMIN,THERAPEUTIC
1 TABLET ORAL DAILY
Status: DISCONTINUED | OUTPATIENT
Start: 2024-01-01 | End: 2024-01-01

## 2024-01-01 RX ADMIN — HYDROMORPHONE HYDROCHLORIDE 0.5 MG: 1 INJECTION, SOLUTION INTRAMUSCULAR; INTRAVENOUS; SUBCUTANEOUS at 14:00

## 2024-01-01 RX ADMIN — HYDROMORPHONE HYDROCHLORIDE 0.5 MG: 1 INJECTION, SOLUTION INTRAMUSCULAR; INTRAVENOUS; SUBCUTANEOUS at 03:25

## 2024-01-01 RX ADMIN — HYDROMORPHONE HYDROCHLORIDE 0.3 MG: 1 INJECTION, SOLUTION INTRAMUSCULAR; INTRAVENOUS; SUBCUTANEOUS at 06:30

## 2024-01-01 RX ADMIN — HYDROMORPHONE HYDROCHLORIDE 0.5 MG: 1 INJECTION, SOLUTION INTRAMUSCULAR; INTRAVENOUS; SUBCUTANEOUS at 17:02

## 2024-01-01 RX ADMIN — HYDROMORPHONE HYDROCHLORIDE 0.5 MG: 1 INJECTION, SOLUTION INTRAMUSCULAR; INTRAVENOUS; SUBCUTANEOUS at 11:27

## 2024-01-01 RX ADMIN — LORAZEPAM 1 MG: 2 INJECTION INTRAMUSCULAR; INTRAVENOUS at 13:14

## 2024-01-01 RX ADMIN — HYDROMORPHONE HYDROCHLORIDE 0.5 MG: 1 INJECTION, SOLUTION INTRAMUSCULAR; INTRAVENOUS; SUBCUTANEOUS at 23:35

## 2024-01-01 RX ADMIN — SODIUM CHLORIDE 1000 ML: 9 INJECTION, SOLUTION INTRAVENOUS at 18:22

## 2024-01-01 RX ADMIN — HYDROMORPHONE HYDROCHLORIDE 0.3 MG: 1 INJECTION, SOLUTION INTRAMUSCULAR; INTRAVENOUS; SUBCUTANEOUS at 11:55

## 2024-01-01 RX ADMIN — HYDROMORPHONE HYDROCHLORIDE 0.5 MG: 1 INJECTION, SOLUTION INTRAMUSCULAR; INTRAVENOUS; SUBCUTANEOUS at 05:23

## 2024-01-01 RX ADMIN — HYDROMORPHONE HYDROCHLORIDE 0.5 MG: 1 INJECTION, SOLUTION INTRAMUSCULAR; INTRAVENOUS; SUBCUTANEOUS at 19:00

## 2024-01-01 RX ADMIN — HYDROMORPHONE HYDROCHLORIDE 0.5 MG: 1 INJECTION, SOLUTION INTRAMUSCULAR; INTRAVENOUS; SUBCUTANEOUS at 07:02

## 2024-01-01 RX ADMIN — HYDROMORPHONE HYDROCHLORIDE 0.5 MG: 1 INJECTION, SOLUTION INTRAMUSCULAR; INTRAVENOUS; SUBCUTANEOUS at 03:15

## 2024-01-01 RX ADMIN — HYDROMORPHONE HYDROCHLORIDE 0.5 MG: 1 INJECTION, SOLUTION INTRAMUSCULAR; INTRAVENOUS; SUBCUTANEOUS at 17:54

## 2024-01-01 RX ADMIN — PIPERACILLIN SODIUM AND TAZOBACTAM SODIUM 3.38 G: 3; .375 INJECTION, POWDER, LYOPHILIZED, FOR SOLUTION INTRAVENOUS at 23:23

## 2024-01-01 RX ADMIN — HYDROMORPHONE HYDROCHLORIDE 0.5 MG: 1 INJECTION, SOLUTION INTRAMUSCULAR; INTRAVENOUS; SUBCUTANEOUS at 15:02

## 2024-01-01 RX ADMIN — HYDROMORPHONE HYDROCHLORIDE 0.2 MG: 0.2 INJECTION, SOLUTION INTRAMUSCULAR; INTRAVENOUS; SUBCUTANEOUS at 18:21

## 2024-01-01 RX ADMIN — HYDROMORPHONE HYDROCHLORIDE 0.3 MG: 1 INJECTION, SOLUTION INTRAMUSCULAR; INTRAVENOUS; SUBCUTANEOUS at 00:00

## 2024-01-01 RX ADMIN — HYDROMORPHONE HYDROCHLORIDE 0.5 MG: 1 INJECTION, SOLUTION INTRAMUSCULAR; INTRAVENOUS; SUBCUTANEOUS at 12:00

## 2024-01-01 RX ADMIN — HYDROMORPHONE HYDROCHLORIDE 0.3 MG: 1 INJECTION, SOLUTION INTRAMUSCULAR; INTRAVENOUS; SUBCUTANEOUS at 06:06

## 2024-01-01 RX ADMIN — HYDROMORPHONE HYDROCHLORIDE 0.5 MG: 1 INJECTION, SOLUTION INTRAMUSCULAR; INTRAVENOUS; SUBCUTANEOUS at 05:06

## 2024-01-01 RX ADMIN — HYDROMORPHONE HYDROCHLORIDE 0.5 MG: 1 INJECTION, SOLUTION INTRAMUSCULAR; INTRAVENOUS; SUBCUTANEOUS at 08:48

## 2024-01-01 RX ADMIN — Medication 2 SPRAY: at 20:44

## 2024-01-01 RX ADMIN — HYDROMORPHONE HYDROCHLORIDE 0.3 MG: 1 INJECTION, SOLUTION INTRAMUSCULAR; INTRAVENOUS; SUBCUTANEOUS at 10:24

## 2024-01-01 RX ADMIN — Medication 2 SPRAY: at 23:16

## 2024-01-01 RX ADMIN — HYDROMORPHONE HYDROCHLORIDE 0.5 MG: 1 INJECTION, SOLUTION INTRAMUSCULAR; INTRAVENOUS; SUBCUTANEOUS at 01:05

## 2024-01-01 RX ADMIN — HYDROMORPHONE HYDROCHLORIDE 0.5 MG: 1 INJECTION, SOLUTION INTRAMUSCULAR; INTRAVENOUS; SUBCUTANEOUS at 03:58

## 2024-01-01 RX ADMIN — HYDROMORPHONE HYDROCHLORIDE 0.3 MG: 1 INJECTION, SOLUTION INTRAMUSCULAR; INTRAVENOUS; SUBCUTANEOUS at 17:19

## 2024-01-01 RX ADMIN — CALCIUM GLUCONATE 1 G: 98 INJECTION, SOLUTION INTRAVENOUS at 23:40

## 2024-01-01 RX ADMIN — HYDROMORPHONE HYDROCHLORIDE 0.5 MG: 1 INJECTION, SOLUTION INTRAMUSCULAR; INTRAVENOUS; SUBCUTANEOUS at 18:38

## 2024-01-01 RX ADMIN — HYDROMORPHONE HYDROCHLORIDE 0.5 MG: 1 INJECTION, SOLUTION INTRAMUSCULAR; INTRAVENOUS; SUBCUTANEOUS at 03:11

## 2024-01-01 RX ADMIN — HYDROMORPHONE HYDROCHLORIDE 0.5 MG: 1 INJECTION, SOLUTION INTRAMUSCULAR; INTRAVENOUS; SUBCUTANEOUS at 06:10

## 2024-01-01 RX ADMIN — HYDROMORPHONE HYDROCHLORIDE 0.5 MG: 1 INJECTION, SOLUTION INTRAMUSCULAR; INTRAVENOUS; SUBCUTANEOUS at 09:45

## 2024-01-01 RX ADMIN — HYDROMORPHONE HYDROCHLORIDE 0.3 MG: 1 INJECTION, SOLUTION INTRAMUSCULAR; INTRAVENOUS; SUBCUTANEOUS at 09:04

## 2024-01-01 RX ADMIN — HYDROMORPHONE HYDROCHLORIDE 0.3 MG: 1 INJECTION, SOLUTION INTRAMUSCULAR; INTRAVENOUS; SUBCUTANEOUS at 02:51

## 2024-01-01 RX ADMIN — HYDROMORPHONE HYDROCHLORIDE 0.3 MG: 1 INJECTION, SOLUTION INTRAMUSCULAR; INTRAVENOUS; SUBCUTANEOUS at 19:48

## 2024-01-01 RX ADMIN — HYDROMORPHONE HYDROCHLORIDE 0.3 MG: 1 INJECTION, SOLUTION INTRAMUSCULAR; INTRAVENOUS; SUBCUTANEOUS at 04:48

## 2024-01-01 RX ADMIN — HYDROMORPHONE HYDROCHLORIDE 0.5 MG: 1 INJECTION, SOLUTION INTRAMUSCULAR; INTRAVENOUS; SUBCUTANEOUS at 23:06

## 2024-01-01 RX ADMIN — HYDROMORPHONE HYDROCHLORIDE 0.5 MG: 1 INJECTION, SOLUTION INTRAMUSCULAR; INTRAVENOUS; SUBCUTANEOUS at 21:14

## 2024-01-01 RX ADMIN — Medication 2 SPRAY: at 19:07

## 2024-01-01 RX ADMIN — Medication 2 SPRAY: at 20:07

## 2024-01-01 RX ADMIN — HYDROMORPHONE HYDROCHLORIDE 0.5 MG: 1 INJECTION, SOLUTION INTRAMUSCULAR; INTRAVENOUS; SUBCUTANEOUS at 16:25

## 2024-01-01 RX ADMIN — HYDROMORPHONE HYDROCHLORIDE 0.5 MG: 1 INJECTION, SOLUTION INTRAMUSCULAR; INTRAVENOUS; SUBCUTANEOUS at 13:14

## 2024-01-01 RX ADMIN — Medication 2 SPRAY: at 17:03

## 2024-01-01 RX ADMIN — LORAZEPAM 1 MG: 2 INJECTION INTRAMUSCULAR; INTRAVENOUS at 09:08

## 2024-01-01 RX ADMIN — HYDROMORPHONE HYDROCHLORIDE 0.5 MG: 1 INJECTION, SOLUTION INTRAMUSCULAR; INTRAVENOUS; SUBCUTANEOUS at 09:08

## 2024-01-01 RX ADMIN — DEXTROSE MONOHYDRATE 25 G: 25 INJECTION, SOLUTION INTRAVENOUS at 00:16

## 2024-01-01 RX ADMIN — HYDROMORPHONE HYDROCHLORIDE 0.3 MG: 1 INJECTION, SOLUTION INTRAMUSCULAR; INTRAVENOUS; SUBCUTANEOUS at 07:49

## 2024-01-01 RX ADMIN — HYDROMORPHONE HYDROCHLORIDE 0.5 MG: 1 INJECTION, SOLUTION INTRAMUSCULAR; INTRAVENOUS; SUBCUTANEOUS at 10:04

## 2024-01-01 RX ADMIN — HYDROMORPHONE HYDROCHLORIDE 0.3 MG: 1 INJECTION, SOLUTION INTRAMUSCULAR; INTRAVENOUS; SUBCUTANEOUS at 10:49

## 2024-01-01 RX ADMIN — HYDROMORPHONE HYDROCHLORIDE 0.3 MG: 1 INJECTION, SOLUTION INTRAMUSCULAR; INTRAVENOUS; SUBCUTANEOUS at 11:25

## 2024-01-01 RX ADMIN — LORAZEPAM 1 MG: 2 INJECTION INTRAMUSCULAR; INTRAVENOUS at 09:44

## 2024-01-01 RX ADMIN — GLYCOPYRROLATE 0.2 MG: 0.2 INJECTION INTRAMUSCULAR; INTRAVENOUS at 14:35

## 2024-01-01 RX ADMIN — ATROPINE SULFATE 2 DROP: 10 SOLUTION/ DROPS OPHTHALMIC at 03:28

## 2024-01-01 RX ADMIN — HYDROMORPHONE HYDROCHLORIDE 0.5 MG: 1 INJECTION, SOLUTION INTRAMUSCULAR; INTRAVENOUS; SUBCUTANEOUS at 18:34

## 2024-01-01 RX ADMIN — LORAZEPAM 1 MG: 2 INJECTION INTRAMUSCULAR; INTRAVENOUS at 12:57

## 2024-01-01 RX ADMIN — DEXTROSE MONOHYDRATE 300 ML: 100 INJECTION, SOLUTION INTRAVENOUS at 00:04

## 2024-01-01 RX ADMIN — HYDROMORPHONE HYDROCHLORIDE 0.5 MG: 1 INJECTION, SOLUTION INTRAMUSCULAR; INTRAVENOUS; SUBCUTANEOUS at 12:06

## 2024-01-01 RX ADMIN — LORAZEPAM 1 MG: 2 INJECTION INTRAMUSCULAR; INTRAVENOUS at 21:17

## 2024-01-01 RX ADMIN — SODIUM CHLORIDE 6 UNITS: 9 INJECTION, SOLUTION INTRAVENOUS at 00:21

## 2024-01-01 RX ADMIN — HYDROMORPHONE HYDROCHLORIDE 0.3 MG: 1 INJECTION, SOLUTION INTRAMUSCULAR; INTRAVENOUS; SUBCUTANEOUS at 14:34

## 2024-01-01 RX ADMIN — Medication 2 SPRAY: at 18:42

## 2024-01-01 RX ADMIN — LORAZEPAM 1 MG: 2 INJECTION INTRAMUSCULAR; INTRAVENOUS at 20:04

## 2024-01-01 RX ADMIN — HYDROMORPHONE HYDROCHLORIDE 0.5 MG: 1 INJECTION, SOLUTION INTRAMUSCULAR; INTRAVENOUS; SUBCUTANEOUS at 19:04

## 2024-01-01 RX ADMIN — HYDROMORPHONE HYDROCHLORIDE 0.5 MG: 1 INJECTION, SOLUTION INTRAMUSCULAR; INTRAVENOUS; SUBCUTANEOUS at 07:00

## 2024-01-01 RX ADMIN — HYDROMORPHONE HYDROCHLORIDE 0.5 MG: 1 INJECTION, SOLUTION INTRAMUSCULAR; INTRAVENOUS; SUBCUTANEOUS at 22:18

## 2024-01-01 RX ADMIN — VANCOMYCIN HYDROCHLORIDE 1000 MG: 1 INJECTION, SOLUTION INTRAVENOUS at 23:13

## 2024-01-01 RX ADMIN — HYDROMORPHONE HYDROCHLORIDE 0.2 MG: 0.2 INJECTION, SOLUTION INTRAMUSCULAR; INTRAVENOUS; SUBCUTANEOUS at 07:50

## 2024-01-01 RX ADMIN — SODIUM CHLORIDE 1000 ML: 9 INJECTION, SOLUTION INTRAVENOUS at 04:17

## 2024-01-01 RX ADMIN — HYDROMORPHONE HYDROCHLORIDE 0.5 MG: 1 INJECTION, SOLUTION INTRAMUSCULAR; INTRAVENOUS; SUBCUTANEOUS at 14:55

## 2024-01-01 RX ADMIN — SODIUM BICARBONATE 50 MEQ: 84 INJECTION, SOLUTION INTRAVENOUS at 00:05

## 2024-01-01 RX ADMIN — HYDROMORPHONE HYDROCHLORIDE 0.5 MG: 1 INJECTION, SOLUTION INTRAMUSCULAR; INTRAVENOUS; SUBCUTANEOUS at 17:06

## 2024-01-01 RX ADMIN — Medication 2 DROP: at 22:03

## 2024-01-01 RX ADMIN — HYDROMORPHONE HYDROCHLORIDE 0.5 MG: 1 INJECTION, SOLUTION INTRAMUSCULAR; INTRAVENOUS; SUBCUTANEOUS at 20:42

## 2024-01-01 RX ADMIN — HYDROMORPHONE HYDROCHLORIDE 0.3 MG: 1 INJECTION, SOLUTION INTRAMUSCULAR; INTRAVENOUS; SUBCUTANEOUS at 21:52

## 2024-01-01 RX ADMIN — HYDROMORPHONE HYDROCHLORIDE 0.5 MG: 1 INJECTION, SOLUTION INTRAMUSCULAR; INTRAVENOUS; SUBCUTANEOUS at 18:54

## 2024-01-01 RX ADMIN — HYDROMORPHONE HYDROCHLORIDE 0.5 MG: 1 INJECTION, SOLUTION INTRAMUSCULAR; INTRAVENOUS; SUBCUTANEOUS at 01:19

## 2024-01-01 RX ADMIN — ATROPINE SULFATE 2 DROP: 10 SOLUTION/ DROPS OPHTHALMIC at 14:30

## 2024-01-01 RX ADMIN — HYDROMORPHONE HYDROCHLORIDE 0.5 MG: 1 INJECTION, SOLUTION INTRAMUSCULAR; INTRAVENOUS; SUBCUTANEOUS at 21:17

## 2024-01-01 RX ADMIN — HYDROMORPHONE HYDROCHLORIDE 0.5 MG: 1 INJECTION, SOLUTION INTRAMUSCULAR; INTRAVENOUS; SUBCUTANEOUS at 23:14

## 2024-01-01 RX ADMIN — HYDROMORPHONE HYDROCHLORIDE 0.5 MG: 1 INJECTION, SOLUTION INTRAMUSCULAR; INTRAVENOUS; SUBCUTANEOUS at 01:03

## 2024-01-01 RX ADMIN — LORAZEPAM 1 MG: 2 INJECTION INTRAMUSCULAR; INTRAVENOUS at 20:43

## 2024-01-01 RX ADMIN — HYDROMORPHONE HYDROCHLORIDE 0.3 MG: 1 INJECTION, SOLUTION INTRAMUSCULAR; INTRAVENOUS; SUBCUTANEOUS at 12:56

## 2024-01-01 RX ADMIN — SODIUM CHLORIDE, POTASSIUM CHLORIDE, SODIUM LACTATE AND CALCIUM CHLORIDE 1000 ML: 600; 310; 30; 20 INJECTION, SOLUTION INTRAVENOUS at 07:45

## 2024-01-01 ASSESSMENT — ACTIVITIES OF DAILY LIVING (ADL)
ADLS_ACUITY_SCORE: 55
ADLS_ACUITY_SCORE: 59
ADLS_ACUITY_SCORE: 55
ADLS_ACUITY_SCORE: 55
ADLS_ACUITY_SCORE: 39
ADLS_ACUITY_SCORE: 39
ADLS_ACUITY_SCORE: 55
ADLS_ACUITY_SCORE: 59
ADLS_ACUITY_SCORE: 55
ADLS_ACUITY_SCORE: 55
ADLS_ACUITY_SCORE: 39
ADLS_ACUITY_SCORE: 39
ADLS_ACUITY_SCORE: 55
ADLS_ACUITY_SCORE: 39
ADLS_ACUITY_SCORE: 59
ADLS_ACUITY_SCORE: 55
ADLS_ACUITY_SCORE: 59
ADLS_ACUITY_SCORE: 39
ADLS_ACUITY_SCORE: 55
ADLS_ACUITY_SCORE: 39
ADLS_ACUITY_SCORE: 55
ADLS_ACUITY_SCORE: 59
ADLS_ACUITY_SCORE: 55
ADLS_ACUITY_SCORE: 39
ADLS_ACUITY_SCORE: 55
ADLS_ACUITY_SCORE: 39
ADLS_ACUITY_SCORE: 39
ADLS_ACUITY_SCORE: 55
ADLS_ACUITY_SCORE: 59
ADLS_ACUITY_SCORE: 55
ADLS_ACUITY_SCORE: 59
ADLS_ACUITY_SCORE: 55
ADLS_ACUITY_SCORE: 55
ADLS_ACUITY_SCORE: 39
ADLS_ACUITY_SCORE: 39
ADLS_ACUITY_SCORE: 55

## 2024-01-02 NOTE — PROGRESS NOTES
"[facility] GERIATRICS    Chief Complaint   Patient presents with    RECHECK     HPI:  Vickie Gonzalez is a 89 year old  (12/27/1925), who is being seen today for an episodic care visit at: [facility] ([facility]) [[id number]]. HPI information obtained from: facility chart records, facility staff, patient report and [facility] chart review. PMH SSS s/p PPM, diet controlled DM2, CKD, HTN, and hx of breast CA. Presented to ED after falling resulting in right femur fracture. Ortho consulted and recommended surgical intervention; s/p femoral intramedullary nail. Post-op complications included: R MCA stroke, distal R M2 occlusion, ESUS, thrombectomy not performed c/b small SAH. NIHSS subsequently remained stable. Discharged to this facility for ongoing nursing cares, medical management, and rehabilitation.    Ms. Gonzalez is seen today in her room accompanied by SLP staff eating lunch.  Reports ongoing congested cough. When asked if cough syrup has been effective, patient responds, \"I don't know\"; \"I am still coughing\". Endorses \"a lot\" of phlegm. Writer asks if the patient would like to continue the scheduled Robitussin, patient says, \"yes\". She notes the Nebs have been helpful and would like to continue that also. Appetite is good. No sleep concerns. Had a BM movement over the weekend. Denies CP, palpitations, lightheadedness, dizziness, fatigue, SOB, fever, chills, nausea/vomiting, or bladder concerns today.      Allergies, and PMH/PSH reviewed in [facility] today.  REVIEW OF SYSTEMS:  4 point ROS including Respiratory, CV, GI and , other than that noted in the HPI,  is negative    Objective:   /65   Pulse 67   Temp 97.5  F (36.4  C)   Resp 19   Ht 1.626 m (5' 4\")   Wt 66.2 kg (146 lb)   LMP  (LMP Unknown)   SpO2 90%   BMI 25.06 kg/m    GENERAL APPEARANCE:  Alert, elderly, in no distress, sitting in recliner  HEENT:  atraumatic, [place] R>L, EOM intact, moist mucus membranes  RESP:  non-labored breathing, " wheezing noted on auscultation, no respiratory distress, congested cough  CV:  Rate regular, S1 S2 noted, no edema  ABDOMEN:  soft, non-distended, non-tender, bowel sounds active  M/S: limited movement L side, arthritic changes noted in hands  SKIN:  warm, dry, thin, fragile   NEURO: examination of sensation by touch normal, recall difficulty, slow speech  PSYCH:  calm, cooperative      Labs reviewed as per [facility] and/or Care Everywhere.      Assessment/Plan:     Fall, subsequent encounter  Closed displaced intertrochanteric fracture of right femur with routine healing, subsequent encounter  Age-related osteoporosis with current pathological fracture with routine healing, subsequent encounter  New on ASA. Endorses pain but tolerable.   -PT/OT following  -continue ASA 81 mg daily   -continue APAP 650 mg TID   -follow-up with ortho as advised    ABLA (acute blood loss anemia)  Improved. Received 1U PRBCs in hospital. Hgb 9.8 ~> 10.9 (12/29/23).  -monitor bleeding risks  -trend labs periodically while in TCU    Cerebrovascular accident (CVA) due to embolism of right middle cerebral artery (H)  SAH (subarachnoid hemorrhage) (H)  Left hemiplegia (H)  -Follow-up with neurology as recommended    Pulmonary hypertension (H)  Sick sinus syndrome (H)  Pacemaker. /65 mmHg. HR 67 bpm.  -continue lisinopril 20 mg daily  -continue propanolol 40 mg BID  -follow BPs and adjust medications as needed    Slow transit constipation  Per staff report, digital extraction over the weekend, medium BM reported.   -continue Senna-S to 2 tabs BID, hold for loose stools  -suppository PRN  -monitor BMs    Acute cough  COVID negative per staff and Influenza negative (12/28/23). Today, O2 sats 90% on room air. Chest Xray done on 12/29/23 non-remarkable. Continues to have congested cough today.   -continue Robitussin 10 mL BID until resolved  -continue albuterol sulfate 2.5 mg neb QID until wheezing resolves  -monitor respiratory  status    Physical deconditioning  Currently in TCU for rehabilitation.  -PT/OT following - adv per recommendations   -SW available for safe discharge planning ongoing      Electronically signed by: Dr. Cheyanne Robles, DNP, APRN, AGNP-BC

## 2024-01-08 NOTE — LETTER
"    1/8/2024        RE: Vickie Gonzalez  [address]  [place] [address]        [facility] GERIATRICS    Chief Complaint   Patient presents with     RECHECK     HPI:  Vickie Gonzalez is a 89 year old  (12/27/1925), who enjoys football, with pmhx including SSS s/p pacer, HTN, remote hx of breast cancer who is being seen today for an episodic care visit at: [facility] ([facility]) [[id number]].     Per my prior note:  \"Hospital course  Initially admitted to the hospital 11/30/2023 after a fall in which she lost her balance.  Landed on her right hip.  She had significant pain and was unable to stand or bear weight.  No other significant symptoms.  Evaluation in the ED notable for normal BMP, grossly normal CBC, not significantly elevated at hsTrop.  X-ray of the right femur and pelvis demonstrated comminuted displaced fracture of the proximal right femur of the intertrochanteric and subtrochanteric regions.  Additional imaging as part of evaluation included a head CT with no acute intracranial pathology, mild to moderate generalized cerebral volume loss and leukoaraiosis.  Chest x-ray with findings of peribronchial cuffing and interstitial opacities suggestive of pulmonary edema.  Orthopedic surgery was consulted.  She underwent right femoral intramedullary nailing 12/1/2023 without any IntraOp complications.       Due to the modestly elevated troponin, an echocardiogram was obtained which showed hyperdynamic left ventricular function with EF of 70%, mild pulmonary hypertension, moderate mitral valve annular calcification and mild insufficiency.     Her postoperative course was complicated by a stroke.  She was noted to have slurring of words and left-sided weakness 12/3/2023.  A code stroke was called.  CTA of the head and neck showed a right distal MCA occlusion with associated 11 millimeter right parietal penumbra.  She was transferred to [place] for possible thrombectomy.  However by time " "of arrival to [facility], her symptoms had improved significantly and given relatively minor symptoms, significant tortuous anatomy, did not proceed with thrombectomy. Then her symptoms progressed again, and IR attempted thrombectomy.  However again with tortuous anatomy and possibly unstable plaque, this was abandoned. Of note, a repeat CTA did show small subarachnoid hemorrhage in the right sylvian fissure.  This was unchanged with repeat imaging.  A follow-up MRI showed the prior stroke as well as additional infarcts of the right occipital and left occipital parietal lobes.  Echocardiogram without evidence of thrombus.  Diagnosed with stroke due to ESUS.  Started on aspirin. Prior antihypertensives resumed by time of discharge.     Throughout her course, she did have moderate anemia. Require 1 unit PRBCs for anemia to 6.9.  CT of the abdomen pelvis did not show any evidence of bleeding.  Hemoglobin stabilized in the range of 8-9.  She had been on aspirin and Lovenox and discharged on aspirin daily.\"    Primary concern today she continues to have persistent cough.  Describes as having a scratchy throat and though she cannot clear her throat.  Not short of breath.  No fevers or chills.  No nausea or vomiting.  Her appetite is doing okay.  She is not sure if she coughs on food or liquids, and she has directions as outlined by SLP hung by her bedside to follow.  She had been constipated but had a large bowel movement after treated with suppository.    Objective:   /58   Pulse 60   Temp 98  F (36.7  C)   Resp 17   Ht 1.626 m (5' 4\")   Wt 66.2 kg (146 lb)   LMP  (LMP Unknown)   SpO2 92%   BMI 25.06 kg/m      GENERAL APPEARANCE: Laying in bed comfortably, NAD but intermittent harsh cough  PULM  Normal WOB on RA, lungs CTAB, no wheezes or crackles, low air movement  CV: RRR, S1/S2 normal, no murmurs; no LE edema  ABDOMEN: Abdomen soft, not tender, not distended, BS normal and active throughout   M/S:  "  No significant movement of left extremities  NEURO: Alert, answering questions appropriately, poor memory (does not recall me); CN II-XII grossly intact    Recent labs in EPIC reviewed by me today.     Assessment/Plan:  (W19.XXXD) Fall, subsequent encounter  (primary encounter diagnosis)  (S72.141D) Closed displaced intertrochanteric fracture of right femur with routine healing, subsequent encounter  Comment: Original reason for hospital admission.  Underwent surgical fixation.  Complicated by CVA as below.  Continues to follow with orthopedic surgery, therapies for hip range of motion.  Plan:   -PT/OT    (M80.00XD) Age-related osteoporosis with current pathological fracture with routine healing, subsequent encounter  Comment: Consistent with the above, at risk with history of osteopenia on imaging.  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  Mechanical thrombectomy was attempted but a not able to be completed.  Echocardiogram without evidence of atrial fibrillation or ventricular thrombus, suspect to be ESUS.  Continues on daily aspirin.  Blood pressures adequately controlled.    (G81.94) Left hemiplegia (H)  Comment: Due to the above with significant MCA territory stroke.  Likely to need long-term care placement from TCU    (R05.1) Acute cough  Comment: Suspect she developed a viral URI (negative COVID19 and Influenza tests) and has subsequent postviral cough as well as for clearance of secretions due to dysphagia from stroke.  No concerning findings on pulmonary examination.  Has had little relief with cough syrup.  Plan:   -Trial of benzonatate Perles    (R13.12) Oropharyngeal dysphagia  Comment: Due to significant stroke as above.  Working with SLP, on a modified diet which she  is tolerating.    (R53.81) Physical deconditioning  (Z74.09,  Z78.9) Impaired mobility and ADLs  Comment: Due to fall and fracture, postoperative recovery, and complications of stroke.  Plan:   -PT/OT  -Likely discharge to LTC    MED REC REQUIRED  Post Medication Reconciliation Status: medication reconcilation previously completed during another office visit      Orders:  [x] Weekly weights  [x] Tessalon pearls    Electronically signed by:     Benjamin Rosenstein, MD, MA  [facility]    This note was completed with the assistance of dictation software. Typos and word substitution-errors are expected and unintended.          Sincerely,        Benjamin Rosenstein, MD

## 2024-01-08 NOTE — TELEPHONE ENCOUNTER
Writer called and spoke with pt's nurse at the [phone]. Writer gave the nurse verbal orders to have xr right femur Ap/Lat view, to have completed by PPX. Writer changed the post op visit to a phone visit and the TCU will call pt's niece to come in and assist with phone visit.   TCU will have PPX complete the xray 1/9/24.    Lucia Johnson LPN

## 2024-01-08 NOTE — PROGRESS NOTES
"[facility] GERIATRICS    Chief Complaint   Patient presents with    RECHECK     HPI:  Vickie Gonzalez is a 89 year old  (12/27/1925), who enjoys football, with pmhx including SSS s/p pacer, HTN, remote hx of breast cancer who is being seen today for an episodic care visit at: [facility] ([facility]) [[id number]].     Per my prior note:  \"Hospital course  Initially admitted to the hospital 11/30/2023 after a fall in which she lost her balance.  Landed on her right hip.  She had significant pain and was unable to stand or bear weight.  No other significant symptoms.  Evaluation in the ED notable for normal BMP, grossly normal CBC, not significantly elevated at hsTrop.  X-ray of the right femur and pelvis demonstrated comminuted displaced fracture of the proximal right femur of the intertrochanteric and subtrochanteric regions.  Additional imaging as part of evaluation included a head CT with no acute intracranial pathology, mild to moderate generalized cerebral volume loss and leukoaraiosis.  Chest x-ray with findings of peribronchial cuffing and interstitial opacities suggestive of pulmonary edema.  Orthopedic surgery was consulted.  She underwent right femoral intramedullary nailing 12/1/2023 without any IntraOp complications.       Due to the modestly elevated troponin, an echocardiogram was obtained which showed hyperdynamic left ventricular function with EF of 70%, mild pulmonary hypertension, moderate mitral valve annular calcification and mild insufficiency.     Her postoperative course was complicated by a stroke.  She was noted to have slurring of words and left-sided weakness 12/3/2023.  A code stroke was called.  CTA of the head and neck showed a right distal MCA occlusion with associated 11 millimeter right parietal penumbra.  She was transferred to [place] for possible thrombectomy.  However by time of arrival to [facility], her symptoms had improved significantly and given relatively minor " "symptoms, significant tortuous anatomy, did not proceed with thrombectomy. Then her symptoms progressed again, and IR attempted thrombectomy.  However again with tortuous anatomy and possibly unstable plaque, this was abandoned. Of note, a repeat CTA did show small subarachnoid hemorrhage in the right sylvian fissure.  This was unchanged with repeat imaging.  A follow-up MRI showed the prior stroke as well as additional infarcts of the right occipital and left occipital parietal lobes.  Echocardiogram without evidence of thrombus.  Diagnosed with stroke due to ESUS.  Started on aspirin. Prior antihypertensives resumed by time of discharge.     Throughout her course, she did have moderate anemia. Require 1 unit PRBCs for anemia to 6.9.  CT of the abdomen pelvis did not show any evidence of bleeding.  Hemoglobin stabilized in the range of 8-9.  She had been on aspirin and Lovenox and discharged on aspirin daily.\"    Primary concern today she continues to have persistent cough.  Describes as having a scratchy throat and though she cannot clear her throat.  Not short of breath.  No fevers or chills.  No nausea or vomiting.  Her appetite is doing okay.  She is not sure if she coughs on food or liquids, and she has directions as outlined by SLP hung by her bedside to follow.  She had been constipated but had a large bowel movement after treated with suppository.    Objective:   /58   Pulse 60   Temp 98  F (36.7  C)   Resp 17   Ht 1.626 m (5' 4\")   Wt 66.2 kg (146 lb)   LMP  (LMP Unknown)   SpO2 92%   BMI 25.06 kg/m      GENERAL APPEARANCE: Laying in bed comfortably, NAD but intermittent harsh cough  PULM  Normal WOB on RA, lungs CTAB, no wheezes or crackles, low air movement  CV: RRR, S1/S2 normal, no murmurs; no LE edema  ABDOMEN: Abdomen soft, not tender, not distended, BS normal and active throughout   M/S:   No significant movement of left extremities  NEURO: Alert, answering questions appropriately, " poor memory (does not recall me); CN II-XII grossly intact    Recent labs in EPIC reviewed by me today.     Assessment/Plan:  (W19.XXXD) Fall, subsequent encounter  (primary encounter diagnosis)  (S72.141D) Closed displaced intertrochanteric fracture of right femur with routine healing, subsequent encounter  Comment: Original reason for hospital admission.  Underwent surgical fixation.  Complicated by CVA as below.  Continues to follow with orthopedic surgery, therapies for hip range of motion.  Plan:   -PT/OT    (M80.00XD) Age-related osteoporosis with current pathological fracture with routine healing, subsequent encounter  Comment: Consistent with the above, at risk with history of osteopenia on imaging.  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  Mechanical thrombectomy was attempted but a not able to be completed.  Echocardiogram without evidence of atrial fibrillation or ventricular thrombus, suspect to be ESUS.  Continues on daily aspirin.  Blood pressures adequately controlled.    (G81.94) Left hemiplegia (H)  Comment: Due to the above with significant MCA territory stroke.  Likely to need long-term care placement from TCU    (R05.1) Acute cough  Comment: Suspect she developed a viral URI (negative COVID19 and Influenza tests) and has subsequent postviral cough as well as for clearance of secretions due to dysphagia from stroke.  No concerning findings on pulmonary examination.  Has had little relief with cough syrup.  Plan:   -Trial of benzonatate Perles    (R13.12) Oropharyngeal dysphagia  Comment: Due to significant stroke as above.  Working with SLP, on a modified diet which she is tolerating.    (R53.81) Physical deconditioning  (Z74.09,  Z78.9) Impaired mobility and  ADLs  Comment: Due to fall and fracture, postoperative recovery, and complications of stroke.  Plan:   -PT/OT  -Likely discharge to LTC    MED REC REQUIRED  Post Medication Reconciliation Status: medication reconcilation previously completed during another office visit      Orders:  [x] Weekly weights  [x] Tessalon pearls    Electronically signed by:     Benjamin Rosenstein, MD, MA  [facility]    This note was completed with the assistance of dictation software. Typos and word substitution-errors are expected and unintended.

## 2024-01-09 NOTE — PROGRESS NOTES
__________________________________________________________      [facility]   [phone]  __________________________________________________________    Chief Complaint: Patient presents with:  Stroke: Stroke      History of Present Illness: Vickie Gonzalez is a 89 year old female presenting for right MCA stroke. This is a follow-up to a hospitalization that recently happened at [facility] on 11/30-12/8/23. She is here today with her great niece Tona.     Prior to the hospital stay, she had a past medical history of DM2, CKD, SSS s/p pacemaker, breast cancer, HTN.    She initially presented to the [facility] after a fall resulting in right femoral fracture and underwent femoral intramedullary nailing. Stroke code was called on 12/3 for new left sided weakness. CT/CTA showed a distal right M2 occlusion. She was not a TNK candidate due to recent surgery. She was transferred to [place] for mechanical thrombectomy which was, unfortunately, unsuccessful (TICI 0, difficulty anatomy and tenacious clot) and c/b small SAH.     She was started on aspirin 81 mg for recurrent stroke prevention. [facility]; she is currently residing at [facility]. She recently moved from the TCU to [facility]. She was receiving therapies but they haven't yet restarted since her move to [facility]. Her speech has improved as has her left leg weakness. She hasn't noticed much improvement in her left arm. The pain in her left hip has improved. She isn't able to walk or transfer independently; she is getting from bed to chair via zully lift. She spends the majority of her day in bed because it is the most comfortable but tries to get up for meals. She initially had trouble identifying when she had to urinate but she reports that this has improved. She does still have some incontinence but mostly because she can't get to the bathroom on time. Her appetite isn't  great and she is having a difficult time remembering to drink enough fluids which has resulted in some issues with constipation.     Stroke Evaluation Summarized:    (Studies personally re-reviewed by me today or new results resulted and reviewed by me today are in BOLD below)    MRI/Head CT MRI: late acute/early subacute right frontoparietal and temporal lobe infarcts, acute punctate bilateral occipital and left parietal infarcts, stable small volume right temporal SAH  Post-procedure CT: small amount of SAH in right Sylvian fissure   CTP: ischemic penumbra of 17 ml in the right parietal lobe, no core infarct    Intracranial Vasculature Right M2 occlusion    Cervical Vasculature Calcified plaques in the carotid bifurcations without significant stenosis   I personally reviewed the following neuroimaging studies today and the comments above reflect my own personal interpretation of the images: images: CT head, CTA head/neck, MRI brain, and I also showed MRI to the patient myself today.    Echocardiogram EF > 70%, mild pulmonary HTN, no regional WMA, normal right atrial size, moderate left atrial enlargement    EKG/Telemetry SR w/ 1st degree AVB, LBBB, PACs    Other Testing Cerebral angiogram: Right middle cerebral artery distal M2 segment occlusion s/p aborted mechanical thrombectomy due to futility and high risk of intervention.    CTA A/P: No active extravasation. No pseudoaneurysm from the right common femoral arteriotomy site. No retroperitoneal hematoma. Contrast in the bladder and excreted contrast in the gallbladder, from the previous day's angiography, in the unenhanced scan, may suggest renal dysfunction.    RLE venous US: No deep venous thrombosis demonstrated in the RIGHT leg      Labs Lab Results   Component Value Date    LDL 73 12/07/2023    A1C 5.8 (H) 12/01/2023    CTROPT 28 (H) 12/03/2023    INR 1.08 12/04/2023    INR 1.01 12/03/2023        Impression/Plan:     1. Right MCA ischemic stroke due to ESUS  (embolic stroke of unidentified source)  2. Right M2 occlusion s/p attempted mechanical thrombectomy, aborted due to futility and high risk   3. Residual left hemiparesis (arm weaker than leg), dysarthria, left sided sensory deficit   - long term blood pressure goal 130/80  - continue aspirin 81 mg daily for secondary stroke prevention   - start atorvastatin 10 mg daily; LDL goal 40-70; Rx sent to [facility] pharmacy and paper copy sent with patient   - follow up in stroke clinic in 3 months; Tona (great niece) will call to schedule depending on how Vickie is doing/feeling   - device nurse reports that pt's pacemaker hasn't picked up any afib thus far    Stroke Education provided.  She will call us with any questions.  For any acute neurologic deficits she was advised to  go directly to the hospital rather than call the clinic.    Shawna Dee NP  Neurology  01/22/2024    ___________________________________________________________________    Current Medications  Current Outpatient Medications   Medication Sig    acetaminophen (TYLENOL) 325 MG tablet Take 2 tablets (650 mg) by mouth every 4 hours as needed for mild pain or other (and adjunct with moderate or severe pain or per patient request) (Patient taking differently: Take 650 mg by mouth 3 times daily)    albuterol (PROVENTIL) (2.5 MG/3ML) 0.083% neb solution Take 2.5 mg by nebulization 4 times daily as needed for shortness of breath, wheezing or cough    aspirin (ASA) 81 MG chewable tablet Take 1 tablet (81 mg) by mouth daily    atorvastatin (LIPITOR) 10 MG tablet Take 1 tablet (10 mg) by mouth daily    benzonatate (TESSALON) 100 MG capsule Take 100 mg by mouth 3 times daily as needed for cough    calcium carbonate-vitamin D3 (CALCIUM 600 + D,3,) 600 mg(1,500mg) -200 unit per tablet [CALCIUM CARBONATE-VITAMIN D3 (CALCIUM 600 + D,3,) 600 MG(1,500MG) -200 UNIT PER TABLET] Take 1 tablet by mouth daily. As directed.    cyanocobalamin (VITAMIN B-12) 2000 MCG tablet  "[CYANOCOBALAMIN (VITAMIN B-12) 2000 MCG TABLET] Take 2,000 mcg by mouth daily.    lisinopril (ZESTRIL) 20 MG tablet Take 1 tablet (20 mg) by mouth daily (Patient taking differently: Take 20 mg by mouth daily Patient taking 10mg)    methocarbamol (ROBAXIN) 500 MG tablet Take 1 tablet (500 mg) by mouth every 6 hours as needed for muscle spasms    multivitamin, therapeutic (THERA-VIT) TABS tablet Take 1 tablet by mouth daily    propranolol (INDERAL) 40 MG tablet Take 1 tablet (40 mg) by mouth 2 times daily    senna (SENOKOT) 8.6 MG tablet Take 1 tablet by mouth daily     No current facility-administered medications for this visit.       Physical Exam    Estimated body mass index is 20.56 kg/m  as calculated from the following:    Height as of an earlier encounter on 1/22/24: 1.626 m (5' 4\").    Weight as of an earlier encounter on 1/22/24: 54.3 kg (119 lb 12.8 oz).    /70   Pulse 60   LMP  (LMP Unknown)   SpO2 93%        General Exam  General: Sitting up in wheelchair in no acute distress  Cardio:  RRR  Pulmonary:  no respiratory distress    Neurologic:  Mental Status:  alert, oriented x 3, follows commands, speech mildly dysarthric but fluent, naming and repetition normal  Cranial Nerves:  visual fields intact, PERRL, EOMI with normal smooth pursuit, facial sensation intact and symmetric, facial movements symmetric, hearing not formally tested but intact to conversation, mild dysarthria, weak left shoulder shrug, tongue protrusion midline  Motor:  decreased tone in left arm, decreased muscle mass in left shoulder, chronic essential tremor in right hand, normal strength in right arm and leg, 3/5 in left leg, 1/5 in left arm (no shoulder abduction, weak elbow flexion/extension, no wrist flexion/extension, no movement noted in hand/fingers)  Reflexes:  1+ and symmetric biceps and patella  Sensory:  light touch sensation decreased along the left side, more significant in her arm; sensory extinction on double " simultaneous stimulation in left arm although this doesn't seem consistent   Coordination:   tremor in RUE noted with finger to nose, unable to perform in LUE due to weakness  Station/Gait:  unable to test due to weakness    Modified Harvey Scale  Score: 4-Moderately severe disability; unable to walk without assistance and unable to attend to own bodily    Questionnaires:        1/22/2024     8:44 AM   Stroke Questionnaire   Residual effects: Any residual effects from stroke? Yes, I do   Residual effects: Most bothersome symptom i cant walk or use my left arm   Level of independence: Walking Unable   Level of independence: Eating Dependent   Level of independence: Stairs Dependent   Level of independence: Dressing Dependent   Level of independence: Bathing Dependent   Level of independence: Toileting Unable   Incontinence: Bowel? Yes   Incontinence: Bladder? Yes   Able to: Use electronics No   Able to: Do own shopping, including online No   Able to: Manage own finances No   Current therapy: Physical Therapy Yes   Current therapy: Occupation Therapy Yes   Current therapy: Speech Therapy Yes   Current therapy: Other No   Current services: Home Health Yes: live in assisted living or nursing home   Medication: How do you take your meds Someone helps or supervises me when I take them   Medication: Do you ever miss or forget meds Daily   Risk Factor: Checking blood pressure at home Yes   Risk Factor: Checking blood sugar at home No   Risk Factor: Second-hand smoke at home No   Who completed this questionnaire? Someone on behalf of the patient because of cognitive or language limitation           Billing:    I spent a total of 40 minutes on the day of the visit.   Time spent by me doing chart review, history and exam, documentation and further activities per the note

## 2024-01-09 NOTE — TELEPHONE ENCOUNTER
[facility]iatric[facility] Lab Note     Provider: Rosenstein, Benjamin MD  Facility: [facility]  Facility Type:  TCU    Allergies   Allergen Reactions    Tamoxifen Analogues [Tamoxifen] Unknown     Annotation: hepatitis         Labs Reviewed by provider: Faxed Xray        Verbal Order/Direction given by Provider: NNO, Xray report to go with to Ortho follow up.     Provider giving Order:  Rosenstein, Benjamin MD Karla Rentner, RN

## 2024-01-10 NOTE — LETTER
1/10/2024         RE: Vickie Gonzalez  [address]  [place] [address]        Dear Colleague,    Thank you for referring your patient, Vickie Gonzalez, to the [facility] [place]. Please see a copy of my visit note below.    Vickie is a very pleasant 89 year old female sustained a right intertrochanteric femur fracture s/p cephalomedullary fixation with me on 12/1/2023. Unfortunately her postoperative course was complicated by ischemic stroke in the right M2 distribution s/p endovascular thrombectomy.  Riya has had failure to thrive since transitioning to her TCU and has lost over 20 lbs.     Today we reviewed radiographs and I spoke with her niece by telephone. We were unable to connect directly with Wendy at her TCU due to technical difficulty.  She has been struggling to mobilize following her stroke due to hemiparesis.  Per Vickie' niece, Vickie has been oriented and has not endorsed hip pain.      I reviewed with Riya niece that xrays show some shortening along the lag screw but that is acceptable and is what the implant is designed to do; this will enable compression across the fracture and is favorable for healing. There is no evidence of screw cutout in the femoral head.     We also discussed that with the rapid weight loss and failure to thrive it definitely makes it harder for the bone to heal and I would not be at all surprised if she has delayed union. Hopefully her fracture does not go on to nonunion.  It is okay to continue weight bearing for transfers.  If she begins endorsing worsening hip pain I would advocate for getting updated radiographs prior to ensure there hasn't been any type of hardware failure.      PLAN:  1. Continue to weight bear as tolerated for transfers and mobilization  2. Given failure to thrive would advocate for Clay or another free essential amino acid supplement  3. I would like to get xrays again in 6 weeks. Of  course if there is any concern prior to that time I would be happy to connect sooner.       Sridhar Tsai MD  , Department of Orthopedic Surgery  [facility]

## 2024-01-10 NOTE — PROGRESS NOTES
Vickie is a very pleasant 89 year old female sustained a right intertrochanteric femur fracture s/p cephalomedullary fixation with me on 12/1/2023. Unfortunately her postoperative course was complicated by ischemic stroke in the right M2 distribution s/p endovascular thrombectomy.  Vickie' has had failure to thrive since transitioning to her TCU and has lost over 20 lbs.     Today we reviewed radiographs and I spoke with her niece by telephone. We were unable to connect directly with Wendy at her TCU due to technical difficulty.  She has been struggling to mobilize following her stroke due to hemiparesis.  Per Vickie' niece, Vickie has been oriented and has not endorsed hip pain.      I reviewed with Riya niece that xrays show some shortening along the lag screw but that is acceptable and is what the implant is designed to do; this will enable compression across the fracture and is favorable for healing. There is no evidence of screw cutout in the femoral head.     We also discussed that with the rapid weight loss and failure to thrive it definitely makes it harder for the bone to heal and I would not be at all surprised if she has delayed union. Hopefully her fracture does not go on to nonunion.  It is okay to continue weight bearing for transfers.  If she begins endorsing worsening hip pain I would advocate for getting updated radiographs prior to ensure there hasn't been any type of hardware failure.      PLAN:  1. Continue to weight bear as tolerated for transfers and mobilization  2. Given failure to thrive would advocate for Clay or another free essential amino acid supplement  3. I would like to get xrays again in 6 weeks. Of course if there is any concern prior to that time I would be happy to connect sooner.       Sridhar Tsai MD  , Department of Orthopedic Surgery  [facility]

## 2024-01-12 NOTE — PROGRESS NOTES
Writer faxed provider's dictation from visit on 1/10/24 to the [employer] [phone].     Lucia Johnson LPN

## 2024-01-13 PROBLEM — R62.7 FAILURE TO THRIVE IN ADULT: Status: ACTIVE | Noted: 2024-01-01

## 2024-01-13 PROBLEM — I63.9 ISCHEMIC STROKE (H): Status: ACTIVE | Noted: 2024-01-01

## 2024-01-15 NOTE — TELEPHONE ENCOUNTER
[facility] Geriatrics Inbasket Message     please have staff push 2oz fluids q hour for dehydration concerns     Verbal order/direction given to: Sreedhar  Provider Giving Order:  SANDIP Schwartz CNP, RN

## 2024-01-16 NOTE — LETTER
1/16/2024        RE: Vickie Gonzalez  [address]  [place] [address]        [facility] GERIATRICS DISCHARGE SUMMARY  PATIENT'S NAME: Vickie Gonzalez  YOB: 1925  MEDICAL RECORD NUMBER:  [id number]  Place of Service where encounter took place:  [facility] ([facility]) [[id number]]    PRIMARY CARE PROVIDER AND CLINIC RESPONSIBLE AFTER TRANSFER:   SANDIP Kendall CNP, [address] [facility] [address]    Transferring providers: SANDIP Vasquez CNP, Ben Rosenstein, MD  Recent Hospitalization/ED:  [facility] stay 11/30/2023 to 12/08/2023.  Date of SNF Admission: December 08, 2023  Date of [facility] (anticipated) Discharge:  01/17/2023  Discharged to: [facility]  DME: No DME needed    CODE STATUS/ADVANCE DIRECTIVES DISCUSSION:  DNR  ALLERGIES: Tamoxifen analogues [tamoxifen]    NURSING FACILITY COURSE   Medication Changes/Rationale:   Schedule Tessalon TID for 10 days for cough  Decrease Lisinopril to 10 mg daily for low BP's  Increase Senna-S to 2 tabs BID for ongoing constipation    Summary of nursing facility stay:   HPI:  Vickie Gonzalez is a 89 year old  (12/27/1925), who is being seen today for an episodic care visit at: [facility] ([facility]) [[id number]]. HPI information obtained from: facility chart records, facility staff, patient report and [facility] chart review. PMH SSS s/p PPM, diet controlled DM2, CKD, HTN, and hx of breast CA. Presented to ED after falling resulting in right femur fracture. Ortho consulted and recommended surgical intervention; s/p femoral intramedullary nail. Post-op complications included: R MCA stroke, distal R M2 occlusion, ESUS, thrombectomy not performed c/b small SAH. NIHSS subsequently remained stable. Discharged to this facility for ongoing nursing cares, medical management, and rehabilitation.     Mrs. Gonzalez is seen today for a visit in her room accompanied by her nephew.  "Her pain is \"okay as long as I don't move\"; tolerating transfers, tolerating therapy. She endorses coughing but notes it is less; does not know if the robitussin really helping; does not need it BID. Receptive to trying Tessalon TID for 10 days. Denies SOB. She is sleeping \"okay\" but complains of \"bloating\" causing her stomach to hurt. She thinks it is related to her constipation; reports relief after defecation but \"it's hard to come by\". Agrees to intensify bowel regimen. She continues with urinary incontinence. No concerns with appetite; \"I feel I eat enough\"; \"I am just laying all day so I don't get hungry\". She is trying to stay hydrated; agrees to drink fluids every hour. Denies CP, palpitations, lightheadedness, dizziness, fatigue, fever, chills, nausea/vomiting concerns today.    Assessment/Plan:    Fall, subsequent encounter  Closed displaced intertrochanteric fracture of right femur with routine healing, subsequent encounter  Age-related osteoporosis with current pathological fracture with routine healing, subsequent encounter  New on ASA. Pain controlled.   -continue PT/OT post TCU  -continue ASA 81 mg daily   -continue APAP 650 mg TID   -follow-up with ortho as advised     ABLA (acute blood loss anemia)  Improved. Received 1U PRBCs in hospital. Hgb 9.8 ~> 10.9 (12/29/23).  -monitor bleeding risks  -trend labs periodically     Cerebrovascular accident (CVA) due to embolism of right middle cerebral artery (H)  SAH (subarachnoid hemorrhage) (H)  Left hemiplegia (H)  -Follow-up with neurology as recommended     Pulmonary hypertension (H)  Sick sinus syndrome (H)  Pacemaker. /60 mmHg. HR avg 70's.  -decrease lisinopril 20 mg daily to 10 mg daily  -continue propanolol 40 mg BID  -check BP q shift for 7 days  -follow BPs and adjust medications as needed     Slow transit constipation  Endorses constipation today.   -increase Senna-S to 2 tabs BID, hold for loose stools  -suppository PRN  -monitor BMs   "   Acute cough  Ongoing. COVID and Influenza negative. Today, O2 sats 93% on room air. Chest Xray done on 12/29/23 non-remarkable. Continues to have congested cough today.   -change Robitussin 10 mL BID to BID PRN  -change albuterol sulfate 2.5 mg neb QID to QID PRN  -change Tessalon TID PRN to TID for 10 days  -monitor respiratory status    Oropharyngeal dysphagia  Secondary to stroke. SLP and nutrition following; on modified diet. Significant weight loss. 146# (12/8/23), now 119 # (1/16/24). CMP on 1/12/24 shows dehydration; Urea nitrogen 35.2.   -continue SLP and nutrition services in LTC  -encourage 2 oz of fluids q hour  -trend labs periodically     Physical deconditioning  Discharge to LTC for increased need of cares.  -continue supportive cares    Discharge Medications:  MED REC REQUIRED  Post Medication Reconciliation Status:          Current Outpatient Medications   Medication Sig Dispense Refill     benzonatate (TESSALON) 100 MG capsule Take 100 mg by mouth 3 times daily as needed for cough       acetaminophen (TYLENOL) 325 MG tablet Take 2 tablets (650 mg) by mouth every 4 hours as needed for mild pain or other (and adjunct with moderate or severe pain or per patient request) (Patient taking differently: Take 650 mg by mouth 3 times daily)       aspirin (ASA) 81 MG chewable tablet Take 1 tablet (81 mg) by mouth daily       calcium carbonate-vitamin D3 (CALCIUM 600 + D,3,) 600 mg(1,500mg) -200 unit per tablet [CALCIUM CARBONATE-VITAMIN D3 (CALCIUM 600 + D,3,) 600 MG(1,500MG) -200 UNIT PER TABLET] Take 1 tablet by mouth daily. As directed.       cyanocobalamin (VITAMIN B-12) 2000 MCG tablet [CYANOCOBALAMIN (VITAMIN B-12) 2000 MCG TABLET] Take 2,000 mcg by mouth daily.       lisinopril (ZESTRIL) 20 MG tablet Take 1 tablet (20 mg) by mouth daily 90 tablet 3     methocarbamol (ROBAXIN) 500 MG tablet Take 1 tablet (500 mg) by mouth every 6 hours as needed for muscle spasms       multivitamin, therapeutic  "(THERA-VIT) TABS tablet Take 1 tablet by mouth daily       propranolol (INDERAL) 40 MG tablet Take 1 tablet (40 mg) by mouth 2 times daily 180 tablet 3     senna (SENOKOT) 8.6 MG tablet Take 1 tablet by mouth daily         Controlled medications:   not applicable/none     Past Medical History:   Past Medical History:   Diagnosis Date     Breast cancer (H) right      Chronic kidney disease      Diabetes (H)      Goiter, nontoxic, multinodular      HTN (hypertension)      Hx antineoplastic chemotherapy     breast cancer      Hypertriglyceridemia without hypercholesterolemia      Osteopenia      Physical Exam:   Vitals: /60   Pulse 66   Temp 98.4  F (36.9  C)   Resp 18   Ht 1.626 m (5' 4\")   Wt 56.7 kg (124 lb 14.4 oz)   LMP  (LMP Unknown)   SpO2 93%   BMI 21.44 kg/m    BMI: Body mass index is 21.44 kg/m .  GENERAL APPEARANCE:  Alert, elderly, in no distress, laying in bed  HEENT:  atraumatic, [place], EOM intact moist mucus membranes  RESP:  non-labored breathing, lungs clear to auscultation, no respiratory distress, congested cough  CV:  Rate regular, S1 S2 noted, no edema  ABDOMEN:  soft, non-distended, non-tender, bowel sounds normal  M/S:  limited ROM in L side, arthritic changes noted in hands  SKIN:  warm, dry, thin, fragile; heels with no ulcerations  NEURO:   examination of sensation by touch normal, follows and tracks, slow speech  PSYCH:  sweet, cooperative    SNF labs: Labs reviewed as per Epic and/or Care Everywhere.      DISCHARGE PLAN:  Follow up labs: CMP, CBC  Medical Follow Up:      Follow up with primary care provider in 5-7 days   Follow up with ortho as recommended  Discharge Services: Occupational Therapy, Physical Therapy, Speech Therapy , and Registered Nurse    TOTAL DISCHARGE TIME:   Greater than 30 minutes  Electronically signed by:  Dr. Cheyanne Robles, DNP, APRN, AGNP-BC              Sincerely,        Cheyanne Robles, APRN CNP      "

## 2024-01-16 NOTE — PROGRESS NOTES
"[facility] GERIATRICS DISCHARGE SUMMARY  PATIENT'S NAME: Vickie Gonzalez  YOB: 1925  MEDICAL RECORD NUMBER:  [id number]  Place of Service where encounter took place:  [facility] ([facility]) [[id number]]    PRIMARY CARE PROVIDER AND CLINIC RESPONSIBLE AFTER TRANSFER:   SANDIP Kendall CNP, [address] [facility] [address]    Transferring providers: SANDIP Vasquez CNP, Ben Rosenstein, MD  Recent Hospitalization/ED:  [facility] stay 11/30/2023 to 12/08/2023.  Date of SNF Admission: December 08, 2023  Date of SNF (anticipated) Discharge:  01/17/2023  Discharged to: [facility]  DME: No DME needed    CODE STATUS/ADVANCE DIRECTIVES DISCUSSION:  DNR  ALLERGIES: Tamoxifen analogues [tamoxifen]    NURSING FACILITY COURSE   Medication Changes/Rationale:   Schedule Tessalon TID for 10 days for cough  Decrease Lisinopril to 10 mg daily for low BP's  Increase Senna-S to 2 tabs BID for ongoing constipation    Summary of nursing facility stay:   HPI:  Vickie Gonzalez is a 89 year old  (12/27/1925), who is being seen today for an episodic care visit at: [facility] ([facility]) [[id number]]. HPI information obtained from: facility chart records, facility staff, patient report and [facility] chart review. PMH SSS s/p PPM, diet controlled DM2, CKD, HTN, and hx of breast CA. Presented to ED after falling resulting in right femur fracture. Ortho consulted and recommended surgical intervention; s/p femoral intramedullary nail. Post-op complications included: R MCA stroke, distal R M2 occlusion, ESUS, thrombectomy not performed c/b small SAH. NIHSS subsequently remained stable. Discharged to this facility for ongoing nursing cares, medical management, and rehabilitation.     Mrs. Gonzalez is seen today for a visit in her room accompanied by her nephew. Her pain is \"okay as long as I don't move\"; tolerating transfers, tolerating therapy. She endorses " "coughing but notes it is less; does not know if the robitussin really helping; does not need it BID. Receptive to trying Tessalon TID for 10 days. Denies SOB. She is sleeping \"okay\" but complains of \"bloating\" causing her stomach to hurt. She thinks it is related to her constipation; reports relief after defecation but \"it's hard to come by\". Agrees to intensify bowel regimen. She continues with urinary incontinence. No concerns with appetite; \"I feel I eat enough\"; \"I am just laying all day so I don't get hungry\". She is trying to stay hydrated; agrees to drink fluids every hour. Denies CP, palpitations, lightheadedness, dizziness, fatigue, fever, chills, nausea/vomiting concerns today.    Assessment/Plan:    Fall, subsequent encounter  Closed displaced intertrochanteric fracture of right femur with routine healing, subsequent encounter  Age-related osteoporosis with current pathological fracture with routine healing, subsequent encounter  New on ASA. Pain controlled.   -continue PT/OT post TCU  -continue ASA 81 mg daily   -continue APAP 650 mg TID   -follow-up with ortho as advised     ABLA (acute blood loss anemia)  Improved. Received 1U PRBCs in hospital. Hgb 9.8 ~> 10.9 (12/29/23).  -monitor bleeding risks  -trend labs periodically     Cerebrovascular accident (CVA) due to embolism of right middle cerebral artery (H)  SAH (subarachnoid hemorrhage) (H)  Left hemiplegia (H)  -Follow-up with neurology as recommended     Pulmonary hypertension (H)  Sick sinus syndrome (H)  Pacemaker. /60 mmHg. HR avg 70's.  -decrease lisinopril 20 mg daily to 10 mg daily  -continue propanolol 40 mg BID  -check BP q shift for 7 days  -follow BPs and adjust medications as needed     Slow transit constipation  Endorses constipation today.   -increase Senna-S to 2 tabs BID, hold for loose stools  -suppository PRN  -monitor BMs     Acute cough  Ongoing. COVID and Influenza negative. Today, O2 sats 93% on room air. Chest Xray done " on 12/29/23 non-remarkable. Continues to have congested cough today.   -change Robitussin 10 mL BID to BID PRN  -change albuterol sulfate 2.5 mg neb QID to QID PRN  -change Tessalon TID PRN to TID for 10 days  -monitor respiratory status    Oropharyngeal dysphagia  Secondary to stroke. SLP and nutrition following; on modified diet. Significant weight loss. 146# (12/8/23), now 119 # (1/16/24). CMP on 1/12/24 shows dehydration; Urea nitrogen 35.2.   -continue SLP and nutrition services in LTC  -encourage 2 oz of fluids q hour  -trend labs periodically     Physical deconditioning  Discharge to LTC for increased need of cares.  -continue supportive cares    Discharge Medications:  MED REC REQUIRED  Post Medication Reconciliation Status:          Current Outpatient Medications   Medication Sig Dispense Refill    benzonatate (TESSALON) 100 MG capsule Take 100 mg by mouth 3 times daily as needed for cough      acetaminophen (TYLENOL) 325 MG tablet Take 2 tablets (650 mg) by mouth every 4 hours as needed for mild pain or other (and adjunct with moderate or severe pain or per patient request) (Patient taking differently: Take 650 mg by mouth 3 times daily)      aspirin (ASA) 81 MG chewable tablet Take 1 tablet (81 mg) by mouth daily      calcium carbonate-vitamin D3 (CALCIUM 600 + D,3,) 600 mg(1,500mg) -200 unit per tablet [CALCIUM CARBONATE-VITAMIN D3 (CALCIUM 600 + D,3,) 600 MG(1,500MG) -200 UNIT PER TABLET] Take 1 tablet by mouth daily. As directed.      cyanocobalamin (VITAMIN B-12) 2000 MCG tablet [CYANOCOBALAMIN (VITAMIN B-12) 2000 MCG TABLET] Take 2,000 mcg by mouth daily.      lisinopril (ZESTRIL) 20 MG tablet Take 1 tablet (20 mg) by mouth daily 90 tablet 3    methocarbamol (ROBAXIN) 500 MG tablet Take 1 tablet (500 mg) by mouth every 6 hours as needed for muscle spasms      multivitamin, therapeutic (THERA-VIT) TABS tablet Take 1 tablet by mouth daily      propranolol (INDERAL) 40 MG tablet Take 1 tablet (40 mg) by  "mouth 2 times daily 180 tablet 3    senna (SENOKOT) 8.6 MG tablet Take 1 tablet by mouth daily         Controlled medications:   not applicable/none     Past Medical History:   Past Medical History:   Diagnosis Date    Breast cancer (H) right     Chronic kidney disease     Diabetes (H)     Goiter, nontoxic, multinodular     HTN (hypertension)     Hx antineoplastic chemotherapy     breast cancer     Hypertriglyceridemia without hypercholesterolemia     Osteopenia      Physical Exam:   Vitals: /60   Pulse 66   Temp 98.4  F (36.9  C)   Resp 18   Ht 1.626 m (5' 4\")   Wt 56.7 kg (124 lb 14.4 oz)   LMP  (LMP Unknown)   SpO2 93%   BMI 21.44 kg/m    BMI: Body mass index is 21.44 kg/m .  GENERAL APPEARANCE:  Alert, elderly, in no distress, laying in bed  HEENT:  atraumatic, [place], EOM intact moist mucus membranes  RESP:  non-labored breathing, lungs clear to auscultation, no respiratory distress, congested cough  CV:  Rate regular, S1 S2 noted, no edema  ABDOMEN:  soft, non-distended, non-tender, bowel sounds normal  M/S:  limited ROM in L side, arthritic changes noted in hands  SKIN:  warm, dry, thin, fragile; heels with no ulcerations  NEURO:   examination of sensation by touch normal, follows and tracks, slow speech  PSYCH:  sweet, cooperative    SNF labs: Labs reviewed as per Epic and/or Care Everywhere.      DISCHARGE PLAN:  Follow up labs: CMP, CBC  Medical Follow Up:      Follow up with primary care provider in 5-7 days   Follow up with ortho as recommended   Follow up with neurology as recommended  Discharge Services: Occupational Therapy, Physical Therapy, Speech Therapy , and Registered Nurse    TOTAL DISCHARGE TIME:   Greater than 30 minutes  Electronically signed by:  Dr. Cheyanne Robles, DNP, APRN, AGNP-BC            "

## 2024-01-17 NOTE — TELEPHONE ENCOUNTER
[facility] Geriatrics Triage Nurse Telephone Encounter    Provider: SANDIP Schwartz CNP  Facility: [facility]  Facility Type:  LTC      Call Back Number: [phone]    Allergies:    Allergies   Allergen Reactions    Tamoxifen Analogues [Tamoxifen] Unknown     Annotation: hepatitis          Reason for call: New orders per NP    Verbal Order/Direction given by Provider: Change albuterol nebs to QID PRN.      Provider giving Order:  SANDIP Schwartz CNP    Verbal Order given to: Elinor Bauer RN

## 2024-01-22 NOTE — NURSING NOTE
Symptoms or concerns today: Recovery (when will things be back to normal) vision, swallowing, bladder and bowel incontinence.    Med comments: Medication review    Who the patient is here with today: Minal Arrington

## 2024-01-22 NOTE — LETTER
1/22/2024         RE: Vickie Gonzalez  [address]  [place] [address]        Dear Colleague,    Thank you for referring your patient, Vickie Gonzalez, to the [facility] NEUROLOGY [facility]. Please see a copy of my visit note below.    __________________________________________________________      [facility]   [phone]  __________________________________________________________    Chief Complaint: Patient presents with:  Stroke: Stroke      History of Present Illness: Vickie Gonzalez is a 89 year old female presenting for right MCA stroke. This is a follow-up to a hospitalization that recently happened at [facility] on 11/30-12/8/23. She is here today with her great niece Tona.     Prior to the hospital stay, she had a past medical history of DM2, CKD, SSS s/p pacemaker, breast cancer, HTN.    She initially presented to the [facility] after a fall resulting in right femoral fracture and underwent femoral intramedullary nailing. Stroke code was called on 12/3 for new left sided weakness. CT/CTA showed a distal right M2 occlusion. She was not a TNK candidate due to recent surgery. She was transferred to [place] for mechanical thrombectomy which was, unfortunately, unsuccessful (TICI 0, difficulty anatomy and tenacious clot) and c/b small SAH.     She was started on aspirin 81 mg for recurrent stroke prevention. She was discharged to [facility] for rehab; she is currently residing at [facility]. She recently moved from the TCU to [facility]. She was receiving therapies but they haven't yet restarted since her move to [facility]. Her speech has improved as has her left leg weakness. She hasn't noticed much improvement in her left arm. The pain in her left hip has improved. She isn't able to walk or transfer independently; she is getting from bed to chair via zully lift. She spends the majority of her day in bed because it is  the most comfortable but tries to get up for meals. She initially had trouble identifying when she had to urinate but she reports that this has improved. She does still have some incontinence but mostly because she can't get to the bathroom on time. Her appetite isn't great and she is having a difficult time remembering to drink enough fluids which has resulted in some issues with constipation.     Stroke Evaluation Summarized:    (Studies personally re-reviewed by me today or new results resulted and reviewed by me today are in BOLD below)    MRI/Head CT MRI: late acute/early subacute right frontoparietal and temporal lobe infarcts, acute punctate bilateral occipital and left parietal infarcts, stable small volume right temporal SAH  Post-procedure CT: small amount of SAH in right Sylvian fissure   CTP: ischemic penumbra of 17 ml in the right parietal lobe, no core infarct    Intracranial Vasculature Right M2 occlusion    Cervical Vasculature Calcified plaques in the carotid bifurcations without significant stenosis   I personally reviewed the following neuroimaging studies today and the comments above reflect my own personal interpretation of the images: images: CT head, CTA head/neck, MRI brain, and I also showed MRI to the patient myself today.    Echocardiogram EF > 70%, mild pulmonary HTN, no regional WMA, normal right atrial size, moderate left atrial enlargement    EKG/Telemetry SR w/ 1st degree AVB, LBBB, PACs    Other Testing Cerebral angiogram: Right middle cerebral artery distal M2 segment occlusion s/p aborted mechanical thrombectomy due to futility and high risk of intervention.    CTA A/P: No active extravasation. No pseudoaneurysm from the right common femoral arteriotomy site. No retroperitoneal hematoma. Contrast in the bladder and excreted contrast in the gallbladder, from the previous day's angiography, in the unenhanced scan, may suggest renal dysfunction.    RLE venous US: No deep venous  thrombosis demonstrated in the RIGHT leg      Labs Lab Results   Component Value Date    LDL 73 12/07/2023    A1C 5.8 (H) 12/01/2023    CTROPT 28 (H) 12/03/2023    INR 1.08 12/04/2023    INR 1.01 12/03/2023        Impression/Plan:     1. Right MCA ischemic stroke due to ESUS (embolic stroke of unidentified source)  2. Right M2 occlusion s/p attempted mechanical thrombectomy, aborted due to futility and high risk   3. Residual left hemiparesis (arm weaker than leg), dysarthria, left sided sensory deficit   - long term blood pressure goal 130/80  - continue aspirin 81 mg daily for secondary stroke prevention   - start atorvastatin 10 mg daily; LDL goal 40-70; Rx sent to [facility] pharmacy and paper copy sent with patient   - follow up in stroke clinic in 3 months; Tona (great niece) will call to schedule depending on how Vickie is doing/feeling   - device nurse reports that pt's pacemaker hasn't picked up any afib thus far    Stroke Education provided.  She will call us with any questions.  For any acute neurologic deficits she was advised to  go directly to the hospital rather than call the clinic.    Shawna Dee NP  Neurology  01/22/2024    ___________________________________________________________________    Current Medications  Current Outpatient Medications   Medication Sig     acetaminophen (TYLENOL) 325 MG tablet Take 2 tablets (650 mg) by mouth every 4 hours as needed for mild pain or other (and adjunct with moderate or severe pain or per patient request) (Patient taking differently: Take 650 mg by mouth 3 times daily)     albuterol (PROVENTIL) (2.5 MG/3ML) 0.083% neb solution Take 2.5 mg by nebulization 4 times daily as needed for shortness of breath, wheezing or cough     aspirin (ASA) 81 MG chewable tablet Take 1 tablet (81 mg) by mouth daily     atorvastatin (LIPITOR) 10 MG tablet Take 1 tablet (10 mg) by mouth daily     benzonatate (TESSALON) 100 MG capsule Take 100 mg by mouth 3 times daily as needed  "for cough     calcium carbonate-vitamin D3 (CALCIUM 600 + D,3,) 600 mg(1,500mg) -200 unit per tablet [CALCIUM CARBONATE-VITAMIN D3 (CALCIUM 600 + D,3,) 600 MG(1,500MG) -200 UNIT PER TABLET] Take 1 tablet by mouth daily. As directed.     cyanocobalamin (VITAMIN B-12) 2000 MCG tablet [CYANOCOBALAMIN (VITAMIN B-12) 2000 MCG TABLET] Take 2,000 mcg by mouth daily.     lisinopril (ZESTRIL) 20 MG tablet Take 1 tablet (20 mg) by mouth daily (Patient taking differently: Take 20 mg by mouth daily Patient taking 10mg)     methocarbamol (ROBAXIN) 500 MG tablet Take 1 tablet (500 mg) by mouth every 6 hours as needed for muscle spasms     multivitamin, therapeutic (THERA-VIT) TABS tablet Take 1 tablet by mouth daily     propranolol (INDERAL) 40 MG tablet Take 1 tablet (40 mg) by mouth 2 times daily     senna (SENOKOT) 8.6 MG tablet Take 1 tablet by mouth daily     No current facility-administered medications for this visit.       Physical Exam    Estimated body mass index is 20.56 kg/m  as calculated from the following:    Height as of an earlier encounter on 1/22/24: 1.626 m (5' 4\").    Weight as of an earlier encounter on 1/22/24: 54.3 kg (119 lb 12.8 oz).    /70   Pulse 60   LMP  (LMP Unknown)   SpO2 93%        General Exam  General: Sitting up in wheelchair in no acute distress  Cardio:  RRR  Pulmonary:  no respiratory distress    Neurologic:  Mental Status:  alert, oriented x 3, follows commands, speech mildly dysarthric but fluent, naming and repetition normal  Cranial Nerves:  visual fields intact, PERRL, EOMI with normal smooth pursuit, facial sensation intact and symmetric, facial movements symmetric, hearing not formally tested but intact to conversation, mild dysarthria, weak left shoulder shrug, tongue protrusion midline  Motor:  decreased tone in left arm, decreased muscle mass in left shoulder, chronic essential tremor in right hand, normal strength in right arm and leg, 3/5 in left leg, 1/5 in left arm (no " shoulder abduction, weak elbow flexion/extension, no wrist flexion/extension, no movement noted in hand/fingers)  Reflexes:  1+ and symmetric biceps and patella  Sensory:  light touch sensation decreased along the left side, more significant in her arm; sensory extinction on double simultaneous stimulation in left arm although this doesn't seem consistent   Coordination:   tremor in RUE noted with finger to nose, unable to perform in LUE due to weakness  Station/Gait:  unable to test due to weakness    Modified Pedro Scale  Score: 4-Moderately severe disability; unable to walk without assistance and unable to attend to own bodily    Questionnaires:        1/22/2024     8:44 AM   Stroke Questionnaire   Residual effects: Any residual effects from stroke? Yes, I do   Residual effects: Most bothersome symptom i cant walk or use my left arm   Level of independence: Walking Unable   Level of independence: Eating Dependent   Level of independence: Stairs Dependent   Level of independence: Dressing Dependent   Level of independence: Bathing Dependent   Level of independence: Toileting Unable   Incontinence: Bowel? Yes   Incontinence: Bladder? Yes   Able to: Use electronics No   Able to: Do own shopping, including online No   Able to: Manage own finances No   Current therapy: Physical Therapy Yes   Current therapy: Occupation Therapy Yes   Current therapy: Speech Therapy Yes   Current therapy: Other No   Current services: Home Health Yes: live in assisted living or nursing home   Medication: How do you take your meds Someone helps or supervises me when I take them   Medication: Do you ever miss or forget meds Daily   Risk Factor: Checking blood pressure at home Yes   Risk Factor: Checking blood sugar at home No   Risk Factor: Second-hand smoke at home No   Who completed this questionnaire? Someone on behalf of the patient because of cognitive or language limitation           Billing:    I spent a total of 40 minutes on the  day of the visit.   Time spent by me doing chart review, history and exam, documentation and further activities per the note            Again, thank you for allowing me to participate in the care of your patient.        Sincerely,        Shawna Dee NP

## 2024-01-22 NOTE — PATIENT INSTRUCTIONS
- long term blood pressure goal 130/80  - continue aspirin 81 mg daily for secondary stroke prevention   - start atorvastatin 10 mg daily; LDL goal 40-70  - follow up in stroke clinic in 3 months     Stroke Clinic Phone Number: [phone]  Stroke Prevention Recommendations  High blood pressure, or hypertension, is a leading cause of stroke and the most significant controllable risk factor. You should aim to keep your blood pressure below 130/80.     Smoking cessation: The nicotine and carbon monoxide in cigarette smoke damage the cardiovascular system and pave the way for a stroke. If you use nicotine, please reach out to your primary care provider for assistance with quitting or consider utilizing one of [place]'s free quit support programs (https://www.health.[facility]./communities/tobacco/quitting/index.html)    If you have Type 1 or 2 diabetes, control you blood sugar. You should aim to keep your HGBA1C below 7.0.     Eat an overall health dietary pattern that emphasizes:  - a wide variety of fruits and vegetables   - whole grains and products made up of mostly whole grains   - healthy sources of protein (mostly plants such as legumes and nuts; fish and seafood; low-fat or non-fat dairy; and, if you eat meat and poultry, ensuring it is lean and unprocessed)  - liquid non-tropical vegetable oils  - minimally processed foods   - minimize intake of added sugars  - foods prepared with little or no salt  - limited or preferably no alcohol intake    Aim for being active at least 150 minutes a week, but if you don't want to sweat the numbers, just move more and sit less.    Excess body weight and obesity are linked with an increased risk of high blood pressure, diabetes, heart disease, and stroke. Losing as little as 5 to 10 pounds can make a significant difference in your risks. [facility] has a Comprehensive Weight Management program if you would like assistance/support:  https://[email or web address].[email or web address]/treatments/comprehensive-weight-management    Large amounts of cholesterol in the blood can build up and cause blood clots - leading to a stroke. Aim to keep your LDL cholesterol between 40 and 70.     Recrudescence  Recrudescence of stroke symptoms refers to when a person experiences previously resolved symptoms of a stroke. These symptoms are usually mild and resolve within a few days. The condition is also sometimes referred to as ischemic stroke recrudescence. In simple terms, ischemic stroke recrudescence, or anamnestic syndrome, occurs when someone redevelops symptoms they experienced after having a stroke they had previously recovered from. This may result in the reappearance of neurological or physical symptoms associated with stroke. In most cases, people tend to experience a mild, rather than severe, worsening or return of symptoms. Recognized triggers of ischemic stroke recrudescence include:  - infections  - stress  - conditions that cause metabolic dysregulation, such as diabetes  - insomnia or lack of proper sleep  - hypertension (high blood pressure)  - low sodium levels (hyponatremia)  - use of sedating medications, such as benzodiazepines, and anesthetic drugs, such as midazolam hydrochloride and fentanyl citrate    Call 911 if these signs are present

## 2024-01-26 NOTE — LETTER
2024        RE: Vickie Gonzalez  [address]  [place] [address]        [facility]    Chief Complaint   Patient presents with     RECHECK     HPI:  Vickie Gonzalez is a 89 year old , football fan,  (1925), with pmhx including   recent right M2 territory stroke d/t ESUS, c/b SAH with now left sided deficits, on ASA  Recent fall with right intertrochanteric fracture  Osteoporosis r/t the above  Who  is being seen today for an episodic care visit at: [facility] ([facility] [[id number]].     Known to me from her stay at the TCU.  Recently moved to board and care for ongoing long-term care.    Briefly, per my prior notes, she had been admitted to the hospital 2023 after a fall in which she lost her balance, landed on her right hip.  Was found to have right intertrochanteric fracture.  Underwent surgical intramedullary nailing 2023 with without intraoperative complications.  However, 12/3/2023, she was found to have slurring of words and left-sided weakness.  Stroke code was called, CTA of the head and neck showed right distal MCA occlusion with associated 11 mm right parietal penumbra.  She was transferred and eventually did undergo thrombectomy.  Repeat CTA also showed a small subarachnoid hemorrhage in the right sylvian fissure.  She underwent echocardiogram without evidence of thrombus, noted mild pulmonary hypertension.  She was started on aspirin.    Her stay in the TCU was complicated by COVID-19.  She did have significant coughing and difficulty breathing due to impaired swallow related to her stroke.  However she otherwise recovered.  Given ongoing left-sided deficits, particularly of her left arm, recommendation was for transition to long-term care from prior independent living, she was previously living on campus) and moved to boarding care.    Prior to my visit today, she did have follow-up with stroke neurology with Shawna Dee NP.   "Recommended to continue on daily 81 mg aspirin.  Also started on low-dose atorvastatin at 10 mg daily as LDL already near goal 73.  They will plan to follow-up in approximately 3 months.  She also had follow-up with orthopedic surgery via a virtual encounter 1/10/2024.  During that discussion, reviewed concern for rapid weight loss and the risk of nonunion associated with this.  Will follow-up for repeat imaging and ongoing plan.    Regarding weight loss, notably was admitted to TCU at approximately 142 pounds.  However weight decreased approximately 20 pounds to around 120 pounds, likely related to poor oral intake with dysphagia, COVID-19, as well as likely sarcopenia with deconditioning and impaired mobility.  She was started on Magic cup supplementation and also receiving mealtime feeding assistance.    Recent care conference 1/25/2024 reviewed.    Today, she is generally feeling well.  As no concerns other than she has been having some diarrhea at night.  Possibly this actually was only last night.  Sounds as though had been feeling constipated, was given a bisacodyl suppository, and then had loose stools.  She has no abdominal pain, no nausea or vomiting.  Her appetite is doing okay.  Sounds as though still having about a bowel movement every day.  No fevers or chills.  No chest pain or pressure, no shortness of breath.  She is comfortable in her new place and seems to be acclimating well.    Did discuss concerns with staff.  Sounds as though she has been primarily having concerns of constipation and current regimen seems to be effective.  She is on senna 2 tablets twice a day.  She does have fecal incontinence and staff note that she is unable to feel need to have a bowel movement.      Allergies, and PMH/PSH reviewed in EPIC today.    Objective:   /55   Pulse 82   Temp 98.2  F (36.8  C)   Resp 20   Ht 1.626 m (5' 4\")   Wt 54.7 kg (120 lb 9.6 oz)   LMP  (LMP Unknown)   SpO2 93%   BMI 20.70 kg/m  "   GENERAL APPEARANCE: Laying in bed comfortably, NAD  HENT:  Mild facial atrophy,  mild left facial droop, moderately [place], fair dentition  EYES:  Conjunctiva clear, anicteric, EOMI, PERRL  PULM  Normal WOB on RA, lungs CTAB, no wheezes or crackles  CV:  RRR, II/VI systolic murmurs; no LE edema;  ABDOMEN: Abdomen soft, not tender, not distended, BS normal and active throughout   M/S: Minimal motion of LUE  NEURO: Alert, answering questions appropriately, normal thought processes; CN II-XII grossly intact except left facial droop, 3/5 strength LUE strength  PSYCH: Mood normal with congruent affect, insight/judgment intact    Recent labs in EPIC reviewed by me today.     Assessment/Plan:    (Z74.09,  Z78.9) Impaired mobility and ADLs  (primary encounter diagnosis)  Comment: Related to multiple of the below conditions.  Has now moved to boarding care from prior independent living.  Does have good social contact with friends/neighbors she used to live by, and one of them will be moving into the same unit as well.  Plan:   -Continue supportive cares in the facility    (I63.411) Cerebrovascular accident (CVA) due to embolism (ESUS) of right middle cerebral artery (H)  (G81.94) Left hemiplegia (H)  Comment: Complication of her postoperative course for intertrochanteric fracture.  Embolic stroke of unclear source, with significant MCA territory stroke.  Now with dense hemiplegia of the left arm, less of the left leg.  Also with mild left facial droop.  Overall stable.  Seen by neurology earlier this week, continues on aspirin, started low-dose atorvastatin 10 mg daily  Plan:   -Consider repeat lipid panel in 3 months  -Therapies for mobilization    (R13.12) Oropharyngeal dysphagia  Comment: Related to the above and possibly contributing to weight loss.  On modified diet with minced and moist texture solids.  Has been working with SLP with improvement in function.  Plan:   -Continue SLP therapies  -Continue  supplementation    (R63.4) Weight loss  Comment: Multifactorial related to dysphagia and decreased intake, sarcopenia with decreased mobility, and likely some acute weight loss related to COVID-19 infection when she was in TCU.  Weights have stabilized at approximately 120 pounds.  Continues on meal supplement and on modified diet.  Plan:   -Continue to monitor    (S72.144S) Closed nondisplaced intertrochanteric fracture of right femur, sequela  Comment: Initial reason for her prior hospitalization.  Underwent intramedullary nailing.  Following with orthopedic surgery.  Noted concern for possible nonunion given weight loss.  Plan:   -Follow-up with orthopedic surgery as planned    (M80.00XD) Age-related osteoporosis with current pathological fracture with routine healing, subsequent encounter  Comment: Consistent with the above, prior DEXA scan September 2023 had shown osteopenia, increasing risk of fracture.  Plan:   -Obtain osteoporosis labs: Vitamin D, PTH, CBC with differential, mag, Phos (CMP, TSH previously done)    (K59.01) Slow transit constipation  Comment: Today noting what sounds like loose stools in association with fecal incontinence after receiving a suppository.  Did discuss with staff, seems as though constipation has been more of an issue, will not change current laxatives.  However, given fecal incontinence concerns, will start low-dose fiber supplement.  Plan:   -Psyllium fiber, 2 teaspoons twice daily with meals    (I10) Essential hypertension  Comment: Blood pressure is adequately controlled, goal blood pressures modestly strict given stroke.  Could consider alternative to propranolol though she is tolerated this well.  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  Per neurology note, interrogation without evidence of atrial  fibrillation.    (I27.20) Pulmonary hypertension (H)  Comment: Noted on echocardiogram, possibly contributing to deconditioning.  No significant shortness of breath.    Orders:  [X] osteoporosis labs -- Vitamin D, PTH, CBC w/diff, MAg, Phos.  (DEXA from Sept 2023)  [X] discontinue tessalon and robitussin  [X] BID Metamucil     MED REC REQUIRED  Post Medication Reconciliation Status: discharge medications reconciled and changed, per note/orders    Electronically signed by:     Benjamin Rosenstein, MD, MA  [facility]     This note was completed with the assistance of dictation software. Typos and word substitution-errors are expected and unintended.      46 MINUTES SPENT BY ME on the date of service doing chart review, history, exam, documentation & further activities per the note.          Sincerely,        Benjamin Rosenstein, MD

## 2024-01-26 NOTE — PROGRESS NOTES
[facility] GERIATRICS    Chief Complaint   Patient presents with    RECHECK     HPI:  Vickie Gonzalez is a 89 year old , football fan,  (1925), with pmhx including   recent right M2 territory stroke d/t ESUS, c/b SAH with now left sided deficits, on ASA  Recent fall with right intertrochanteric fracture  Osteoporosis r/t the above  Who  is being seen today for an episodic care visit at: [facility] ([facility]) [[id number]].     Known to me from her stay at the TCU.  Recently moved to board and care for ongoing long-term care.    Briefly, per my prior notes, she had been admitted to the hospital 2023 after a fall in which she lost her balance, landed on her right hip.  Was found to have right intertrochanteric fracture.  Underwent surgical intramedullary nailing 2023 with without intraoperative complications.  However, 12/3/2023, she was found to have slurring of words and left-sided weakness.  Stroke code was called, CTA of the head and neck showed right distal MCA occlusion with associated 11 mm right parietal penumbra.  She was transferred and eventually did undergo thrombectomy.  Repeat CTA also showed a small subarachnoid hemorrhage in the right sylvian fissure.  She underwent echocardiogram without evidence of thrombus, noted mild pulmonary hypertension.  She was started on aspirin.    Her stay in the TCU was complicated by COVID-19.  She did have significant coughing and difficulty breathing due to impaired swallow related to her stroke.  However she otherwise recovered.  Given ongoing left-sided deficits, particularly of her left arm, recommendation was for transition to long-term care from prior independent living, she was previously living on campus) and moved to [facility] care.    Prior to my visit today, she did have follow-up with stroke neurology with Shawna Dee NP.  Recommended to continue on daily 81 mg aspirin.  Also started on low-dose atorvastatin at 10 mg daily as LDL  "already near goal 73.  They will plan to follow-up in approximately 3 months.  She also had follow-up with orthopedic surgery via a virtual encounter 1/10/2024.  During that discussion, reviewed concern for rapid weight loss and the risk of nonunion associated with this.  Will follow-up for repeat imaging and ongoing plan.    Regarding weight loss, notably was admitted to TCU at approximately 142 pounds.  However weight decreased approximately 20 pounds to around 120 pounds, likely related to poor oral intake with dysphagia, COVID-19, as well as likely sarcopenia with deconditioning and impaired mobility.  She was started on Magic cup supplementation and also receiving mealtime feeding assistance.    Recent care conference 1/25/2024 reviewed.    Today, she is generally feeling well.  As no concerns other than she has been having some diarrhea at night.  Possibly this actually was only last night.  Sounds as though had been feeling constipated, was given a bisacodyl suppository, and then had loose stools.  She has no abdominal pain, no nausea or vomiting.  Her appetite is doing okay.  Sounds as though still having about a bowel movement every day.  No fevers or chills.  No chest pain or pressure, no shortness of breath.  She is comfortable in her new place and seems to be acclimating well.    Did discuss concerns with staff.  Sounds as though she has been primarily having concerns of constipation and current regimen seems to be effective.  She is on senna 2 tablets twice a day.  She does have fecal incontinence and staff note that she is unable to feel need to have a bowel movement.      Allergies, and PMH/PSH reviewed in EPIC today.    Objective:   /55   Pulse 82   Temp 98.2  F (36.8  C)   Resp 20   Ht 1.626 m (5' 4\")   Wt 54.7 kg (120 lb 9.6 oz)   LMP  (LMP Unknown)   SpO2 93%   BMI 20.70 kg/m    GENERAL APPEARANCE: Laying in bed comfortably, NAD  HENT:  Mild facial atrophy,  mild left facial droop, " moderately [place], fair dentition  EYES:  Conjunctiva clear, anicteric, EOMI, PERRL  PULM  Normal WOB on RA, lungs CTAB, no wheezes or crackles  CV:  RRR, II/VI systolic murmurs; no LE edema;  ABDOMEN: Abdomen soft, not tender, not distended, BS normal and active throughout   M/S: Minimal motion of LUE  NEURO: Alert, answering questions appropriately, normal thought processes; CN II-XII grossly intact except left facial droop, 3/5 strength LUE strength  PSYCH: Mood normal with congruent affect, insight/judgment intact    Recent labs in EPIC reviewed by me today.     Assessment/Plan:    (Z74.09,  Z78.9) Impaired mobility and ADLs  (primary encounter diagnosis)  Comment: Related to multiple of the below conditions.  Has now moved to boarding care from prior independent living.  Does have good social contact with friends/neighbors she used to live by, and one of them will be moving into the same unit as well.  Plan:   -Continue supportive cares in the facility    (I63.411) Cerebrovascular accident (CVA) due to embolism (ESUS) of right middle cerebral artery (H)  (G81.94) Left hemiplegia (H)  Comment: Complication of her postoperative course for intertrochanteric fracture.  Embolic stroke of unclear source, with significant MCA territory stroke.  Now with dense hemiplegia of the left arm, less of the left leg.  Also with mild left facial droop.  Overall stable.  Seen by neurology earlier this week, continues on aspirin, started low-dose atorvastatin 10 mg daily  Plan:   -Consider repeat lipid panel in 3 months  -Therapies for mobilization    (R13.12) Oropharyngeal dysphagia  Comment: Related to the above and possibly contributing to weight loss.  On modified diet with minced and moist texture solids.  Has been working with SLP with improvement in function.  Plan:   -Continue SLP therapies  -Continue supplementation    (R63.4) Weight loss  Comment: Multifactorial related to dysphagia and decreased intake, sarcopenia with  decreased mobility, and likely some acute weight loss related to COVID-19 infection when she was in TCU.  Weights have stabilized at approximately 120 pounds.  Continues on meal supplement and on modified diet.  Plan:   -Continue to monitor    (S72.144S) Closed nondisplaced intertrochanteric fracture of right femur, sequela  Comment: Initial reason for her prior hospitalization.  Underwent intramedullary nailing.  Following with orthopedic surgery.  Noted concern for possible nonunion given weight loss.  Plan:   -Follow-up with orthopedic surgery as planned    (M80.00XD) Age-related osteoporosis with current pathological fracture with routine healing, subsequent encounter  Comment: Consistent with the above, prior DEXA scan September 2023 had shown osteopenia, increasing risk of fracture.  Plan:   -Obtain osteoporosis labs: Vitamin D, PTH, CBC with differential, mag, Phos (CMP, TSH previously done)    (K59.01) Slow transit constipation  Comment: Today noting what sounds like loose stools in association with fecal incontinence after receiving a suppository.  Did discuss with staff, seems as though constipation has been more of an issue, will not change current laxatives.  However, given fecal incontinence concerns, will start low-dose fiber supplement.  Plan:   -Psyllium fiber, 2 teaspoons twice daily with meals    (I10) Essential hypertension  Comment: Blood pressure is adequately controlled, goal blood pressures modestly strict given stroke.  Could consider alternative to propranolol though she is tolerated this well.  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  Per neurology note, interrogation without evidence of atrial fibrillation.    (I27.20) Pulmonary hypertension (H)  Comment: Noted on echocardiogram, possibly contributing to deconditioning.   No significant shortness of breath.    Orders:  [X] osteoporosis labs -- Vitamin D, PTH, CBC w/diff, MAg, Phos.  (DEXA from Sept 2023)  [X] discontinue tessalon and robitussin  [X] BID Metamucil     MED REC REQUIRED  Post Medication Reconciliation Status: discharge medications reconciled and changed, per note/orders    Electronically signed by:     Benjamin Rosenstein, MD, MA  [facility] Faculty     This note was completed with the assistance of dictation software. Typos and word substitution-errors are expected and unintended.      46 MINUTES SPENT BY ME on the date of service doing chart review, history, exam, documentation & further activities per the note.

## 2024-01-27 PROBLEM — R62.7 FAILURE TO THRIVE IN ADULT: Status: RESOLVED | Noted: 2024-01-01 | Resolved: 2024-01-01

## 2024-01-30 NOTE — TELEPHONE ENCOUNTER
[facility] Geriatrics Lab Note     Provider: SANDIP Schwartz CNP  Facility: [facility]  Facility Type:  AL    Allergies   Allergen Reactions    Tamoxifen Analogues [Tamoxifen] Unknown     Annotation: hepatitis         Labs Reviewed by provider: Parathyroid, CBC, Vit D, Mag     Verbal Order/Direction given by Provider: discontinue Ca/VitD supplement.     Provider giving Order:  Rosenstein, Benjamin MD    Verbal Order given to: Berkley Snyder RN

## 2024-01-31 NOTE — TELEPHONE ENCOUNTER
[facility] Geriatrics Triage Nurse Telephone Encounter    Provider: SANDIP Schwartz CNP  Facility: [facility]  Facility Type:  LTC    Caller: Hiram  Call Back Number: [phone]    Allergies:    Allergies   Allergen Reactions    Tamoxifen Analogues [Tamoxifen] Unknown     Annotation: hepatitis          Reason for call: Pt had emesis x2 today - once after breakfast and another at 3:30pm. Denies nausea, bowel sounds present and active, no sign of impaction - last BM at 5am. C/o upset stomach and was given Maalox this morning. COVID negative. VS stable  /56 HR 70 RR 18 Temp 98.4     Verbal Order/Direction given by Provider:   - Give another dose of Maalox if stomach still upset  - Encourage fluid intake and continue to monitor    Provider giving Order:  SANDIP Schwartz CNP    Verbal Order given to: Hiram Randall RN

## 2024-02-04 NOTE — PATIENT INSTRUCTIONS
CONSIDER TETANUS VACCINE.  CHEAPER AT PHARMACY    2.  MONITOR BLOOD PRESSURE.  REPORT READINGS IN SEVERAL WEEKS.  GOAL IS UNDER 140 SYSTOLIC    3.  SEE IN SIX MONTHS OR IF NEEDED.  
Yes

## 2024-02-07 PROBLEM — R41.82 ALTERED MENTAL STATUS, UNSPECIFIED ALTERED MENTAL STATUS TYPE: Status: ACTIVE | Noted: 2024-01-01

## 2024-02-07 PROBLEM — R79.89 ELEVATED TROPONIN: Status: ACTIVE | Noted: 2024-01-01

## 2024-02-07 PROBLEM — E87.5 HYPERKALEMIA: Status: ACTIVE | Noted: 2024-01-01

## 2024-02-07 PROBLEM — R79.89 ELEVATED PROCALCITONIN: Status: ACTIVE | Noted: 2024-01-01

## 2024-02-07 NOTE — ED TRIAGE NOTES
Pt biba from nursing home, within last day continue to be more confused and altered refusing medications and agitated. Baseline is AO, takes meds normal. VSS.- sys 119. BG 70. Hx L CVA. No hx of UTI. Family on the way.     Triage Assessment (Adult)       Row Name 02/07/24 3904          Triage Assessment    Airway WDL WDL        Respiratory WDL    Respiratory WDL WDL        Skin Circulation/Temperature WDL    Skin Circulation/Temperature WDL WDL        Cardiac WDL    Cardiac WDL WDL        Cognitive/Neuro/Behavioral WDL    Cognitive/Neuro/Behavioral WDL X

## 2024-02-07 NOTE — TELEPHONE ENCOUNTER
[facility] Triage Nurse Telephone Encounter    Provider: SANDIP Schwartz CNP  Facility: [facility]  Facility Type:  LTC    Caller: Lilly  Call Back Number: [phone]    Allergies:    Allergies   Allergen Reactions    Tamoxifen Analogues [Tamoxifen] Unknown     Annotation: hepatitis          Reason for call:     Nursing is calling to report that the patient is very restless, refusing her meds, taking off her brief, /83 pulse 82 temp 98 O2 93% on room air respirations 20. She keeps calling out. She says she wants to die.    Verbal Order/Direction given by Provider:   Lorazepam 0.5 mg q8h PRN   UA/UC    Provider giving Order:  SANDIP Schwartz CNP    Verbal Order given to: Lilly Arango RN

## 2024-02-07 NOTE — TELEPHONE ENCOUNTER
[facility] Geriatrics Triage Nurse Telephone Encounter    Provider: SANDIP Schwartz CNP  Facility: [facility]  Facility Type:  LTC    Caller: Hiram  Call Back Number: [phone]    Allergies:    Allergies   Allergen Reactions    Tamoxifen Analogues [Tamoxifen] Unknown     Annotation: hepatitis          Reason for call: Family requests patient be send in to ER for evaluation due to restlessness and possible UTI.    Verbal Order/Direction given by Provider: Ok to send to ER    Provider giving Order:  SANDIP Schwartz CNP    Verbal Order given to: Hiram Randall RN

## 2024-02-08 PROBLEM — Z51.5 HOSPICE CARE: Status: ACTIVE | Noted: 2024-01-01

## 2024-02-08 NOTE — SUMMARY OF CARE
Reason for admission: Inpatient Hospice  Admitted from:  ED  Report received from: Yang RN  2 RN skin assessment completed by: Ceci RN, Maddison RN      - Findings (add LDA if needed): Blanchable redness to coccyx, scattered scabs to bilateral shins  Bed algorithm reevaluated: Yes  Was airflow pump ordered?: No  Suction set up in room? Yes  Care plan (primary problem) and education initiated: Yes  Pt informed about policy regarding no IV pumps off unit: No  Flu shot ordered? (October-April only): No

## 2024-02-08 NOTE — PHARMACY-VANCOMYCIN DOSING SERVICE
Pharmacy Vancomycin Initial Note  Date of Service 2024  Patient's  1925  89 year old, female    Indication: Sepsis    Current estimated CrCl = Estimated Creatinine Clearance: 2.9 mL/min (A) (based on SCr of 9.2 mg/dL (H)).    Creatinine for last 3 days  2024: 10:47 PM Creatinine 9.20 mg/dL    Recent Vancomycin Level(s) for last 3 days  No results found for requested labs within last 3 days.      Vancomycin IV Administrations (past 72 hours)                     vancomycin (VANCOCIN) 1,000 mg in 200 mL dextrose intermittent infusion ()  Restarted 24 004     1,000 mg New Bag 24 2313                    Nephrotoxins and other renal medications (From now, onward)      Start     Dose/Rate Route Frequency Ordered Stop    24  vancomycin place barboza - receiving intermittent dosing         1 each Intravenous SEE ADMIN INSTRUCTIONS 24              Contrast Orders - past 72 hours (72h ago, onward)      None            Intermittent Vanco        Plan:  Vancomycin  1000 mg IV once given, additional doses to be determined by future levels / renal function  Vancomycin monitoring method: Trough (Method 2 = manual dose calculation)  Vancomycin therapeutic monitoring goal: 15-20 mg/L  Pharmacy will check vancomycin levels as appropriate in 1-3 Days.    Serum creatinine levels will be ordered daily for the first week of therapy and at least twice weekly for subsequent weeks.      Victor Hugo Garza, [facility]  [id number]    
I have personally performed a face to face diagnostic evaluation on this patient. I have reviewed the ACP note and agree with the history, exam and plan of care, except as noted.

## 2024-02-08 NOTE — ED PROVIDER NOTES
ED Provider Note  [facility]      History     Chief Complaint   Patient presents with    Altered Mental Status     HPI  Vickie Gonzalez is a 89 year old female with a past medical history of DM2, CKD, SSS s/p pacemaker, breast cancer, HTN, L CVA and R sided hip fracture who presents to the emergency department with her family from her nursing home seeking evaluation of lower abdominal pain that has been ongoing for the past 3-4 days. She denies any fever. She has been unable to tolerate anything PO for the last few days. She is showing signs of confusion and altered mental status as well.      Past Medical History  Past Medical History:   Diagnosis Date    Breast cancer (H) right     Chronic kidney disease     Diabetes (H)     Goiter, nontoxic, multinodular     HTN (hypertension)     Hx antineoplastic chemotherapy     breast cancer     Hypertriglyceridemia without hypercholesterolemia     Osteopenia      Past Surgical History:   Procedure Laterality Date    BIOPSY BREAST Right     HIP PINNING Right 12/1/2023    Procedure: Insertion intramedullary nail Right femur using the fracture bed;  Surgeon: Sridhar Tsai MD;  Location: [facility] OR    IR CAROTID CEREBRAL ANGIOGRAM BILATERAL  12/3/2023    MASTECTOMY Right      acetaminophen (TYLENOL) 325 MG tablet  albuterol (PROVENTIL) (2.5 MG/3ML) 0.083% neb solution  aspirin (ASA) 81 MG chewable tablet  atorvastatin (LIPITOR) 10 MG tablet  calcium carbonate-vitamin D3 (CALCIUM 600 + D,3,) 600 mg(1,500mg) -200 unit per tablet  cyanocobalamin (VITAMIN B-12) 2000 MCG tablet  lisinopril (ZESTRIL) 10 MG tablet  LORazepam (ATIVAN) 0.5 MG tablet  multivitamin, therapeutic (THERA-VIT) TABS tablet  propranolol (INDERAL) 40 MG tablet  psyllium (METAMUCIL/KONSYL) 58.6 % powder  senna (SENOKOT) 8.6 MG tablet      Allergies   Allergen Reactions    Tamoxifen Analogues [Tamoxifen] Unknown     Annotation: hepatitis       Family History  Family History   Problem  Relation Age of Onset    Pacemaker Sister     CABG Brother 67.00    Breast Cancer No family hx of      Social History   Social History     Tobacco Use    Smoking status: Never    Smokeless tobacco: Never   Substance Use Topics    Alcohol use: Yes    Drug use: No      Past medical history, past surgical history, medications, allergies, family history, and social history were reviewed with the patient. No additional pertinent items.      A medically appropriate review of systems was performed with pertinent positives and negatives noted in the HPI, and all other systems negative.    Physical Exam   BP: 115/70  Pulse: 65  Temp: 98.3  F (36.8  C)  Resp: 18  SpO2: 97 %  Physical Exam  Vitals and nursing note reviewed.   Constitutional:       General: She is not in acute distress.     Appearance: She is ill-appearing. She is not diaphoretic.   HENT:      Head: Normocephalic and atraumatic.   Eyes:      Extraocular Movements: Extraocular movements intact.      Conjunctiva/sclera: Conjunctivae normal.   Cardiovascular:      Rate and Rhythm: Normal rate and regular rhythm.      Heart sounds: Normal heart sounds. No murmur heard.     No friction rub. No gallop.   Pulmonary:      Effort: Pulmonary effort is normal. No respiratory distress.      Breath sounds: Normal breath sounds. No stridor. No wheezing, rhonchi or rales.   Chest:      Chest wall: No tenderness.   Abdominal:      General: There is no distension.      Palpations: Abdomen is soft. There is no mass.      Tenderness: There is no abdominal tenderness. There is no guarding.   Musculoskeletal:         General: No swelling or tenderness. Normal range of motion.      Cervical back: Normal range of motion and neck supple.   Skin:     General: Skin is warm.      Findings: No rash.   Neurological:      General: No focal deficit present.      Cranial Nerves: No cranial nerve deficit.      Comments: Acute delirium, agitation and sleep           ED Course, Procedures, &  Data      Procedures            EKG Interpretation:      Interpreted by Darya Briggs MD, MD  Time reviewed: on arrival  Symptoms at time of EKG: delirium   Rhythm: paced  Rate: Normal  Axis: Normal  Ectopy: none  Conduction: normal  ST Segments/ T Waves: No ST-T wave changes  Q Waves: none  Comparison to prior: Unchanged from 12/3/2023    Clinical Impression: no acute changes and paced rhythm                       Results for orders placed or performed during the hospital encounter of 02/07/24   CT Abdomen Pelvis w/o Contrast     Status: None    Narrative    EXAM: CT ABDOMEN PELVIS W/O CONTRAST  LOCATION: [facility]  DATE: 2/7/2024    INDICATION: ?lower abd pain  COMPARISON: CT abdomen pelvis without contrast  TECHNIQUE: CT scan of the abdomen and pelvis was performed without IV contrast. Multiplanar reformats were obtained. Dose reduction techniques were used.  CONTRAST: None.    FINDINGS:   LOWER CHEST: Motion artifact. Solid pulmonary nodule in the medial right middle lobe has benign pattern of central calcification. Heavy calcification of the mitral annulus. Pacemaker leads in the right heart chambers.    HEPATOBILIARY: Normal.    PANCREAS: Normal.    SPLEEN: Normal.    ADRENAL GLANDS: Normal.    KIDNEYS/BLADDER: Normal.    BOWEL: Normal.    LYMPH NODES: Normal.    VASCULATURE: Moderate patchy atheromatous calcification of the abdominal aorta greatest in the infrarenal segment and lesser atheromatous calcifications in the iliac arteries.    PELVIC ORGANS: Uterus is normal in size. There is parenchymal calcification along the anterior uterine body/fundus consistent with a degenerated fibroid. There is a 18 mm cyst in the left ovary. There is a 9 mm cyst in the right ovary. These are all   stable.    MUSCULOSKELETAL: Moderate disc space narrowing at L4-L5 and L5-S1. Small diffuse low thoracic and lumbar degenerative osteophytes. Previous internal fixation of  comminuted fractures of the proximal right femur with antegrade IM nail and interlocking   blade. The proximal right femur fractures or ununited..      Impression    IMPRESSION:     1.  No focal inflammatory process in the abdomen or pelvis.  2.  Small bilateral ovarian cysts the largest of which is 18 mm. Per guidelines below, no imaging follow-up is necessary.      REFERENCE:  Management of Incidental Adnexal Findings on CT and MRI: A White Paper of the ACR Incidental Findings Committee. J Am Almaz Radiol 2020; 17(2):248-254.    Postmenopausal or equal to or >50 years if status unknown:  Equal to or <3 cm: No further imaging.     Head CT w/o contrast     Status: None    Narrative    EXAM: CT HEAD W/O CONTRAST  LOCATION: [facility]  DATE: 2/7/2024    INDICATION: Confusion.  COMPARISON: Head CT 11/30/2023  TECHNIQUE: Routine CT Head without IV contrast. Multiplanar reformats. Dose reduction techniques were used.    FINDINGS:  INTRACRANIAL CONTENTS: No intracranial hemorrhage, extraaxial collection, or mass effect.  No CT evidence of acute infarct. Mild presumed chronic small vessel ischemic changes. There has been interval development of an area of chronic encephalomalacia at   the posterior lateral aspect of the right frontal lobe likely representing an interval ischemic infarct. Moderate generalized volume loss. No hydrocephalus.     VISUALIZED ORBITS/SINUSES/MASTOIDS: No intraorbital abnormality. No paranasal sinus mucosal disease. No middle ear or mastoid effusion.    BONES/SOFT TISSUES: No acute abnormality.      Impression    IMPRESSION:  1.  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  2.  No CT evidence for acute intracranial process.  3.  Brain atrophy and presumed chronic microvascular ischemic changes as above.   INR     Status: Abnormal   Result Value Ref Range    INR 1.24 (H)  0.85 - 1.15   Comprehensive metabolic panel     Status: Abnormal   Result Value Ref Range    Sodium 134 (L) 135 - 145 mmol/L    Potassium 7.8 (HH) 3.4 - 5.3 mmol/L    Carbon Dioxide (CO2) 13 (L) 22 - 29 mmol/L    Anion Gap 27 (H) 7 - 15 mmol/L    Urea Nitrogen 169.0 (H) 8.0 - 23.0 mg/dL    Creatinine 9.20 (H) 0.51 - 0.95 mg/dL    GFR Estimate 4 (L) >60 mL/min/1.73m2    Calcium 9.9 (H) 8.2 - 9.6 mg/dL    Chloride 94 (L) 98 - 107 mmol/L    Glucose 59 (L) 70 - 99 mg/dL    Alkaline Phosphatase 190 (H) 40 - 150 U/L    AST 20 0 - 45 U/L    ALT 17 0 - 50 U/L    Protein Total 7.7 6.4 - 8.3 g/dL    Albumin 3.3 (L) 3.5 - 5.2 g/dL    Bilirubin Total 0.3 <=1.2 mg/dL   Procalcitonin     Status: Abnormal   Result Value Ref Range    Procalcitonin 2.27 (H) <0.50 ng/mL   Troponin T, High Sensitivity     Status: Abnormal   Result Value Ref Range    Troponin T, High Sensitivity 133 (HH) <=14 ng/L   CRP inflammation     Status: Abnormal   Result Value Ref Range    CRP Inflammation 158.00 (H) <5.00 mg/L   Erythrocyte sedimentation rate auto     Status: Abnormal   Result Value Ref Range    Erythrocyte Sedimentation Rate 61 (H) 0 - 30 mm/hr   CBC with platelets and differential     Status: Abnormal   Result Value Ref Range    WBC Count 13.7 (H) 4.0 - 11.0 10e3/uL    RBC Count 3.70 (L) 3.80 - 5.20 10e6/uL    Hemoglobin 10.9 (L) 11.7 - 15.7 g/dL    Hematocrit 35.2 35.0 - 47.0 %    MCV 95 78 - 100 fL    MCH 29.5 26.5 - 33.0 pg    MCHC 31.0 (L) 31.5 - 36.5 g/dL    RDW 17.0 (H) 10.0 - 15.0 %    Platelet Count 299 150 - 450 10e3/uL    % Neutrophils 79 %    % Lymphocytes 14 %    % Monocytes 5 %    % Eosinophils 0 %    % Basophils 0 %    % Immature Granulocytes 2 %    NRBCs per 100 WBC 0 <1 /100    Absolute Neutrophils 10.7 (H) 1.6 - 8.3 10e3/uL    Absolute Lymphocytes 1.9 0.8 - 5.3 10e3/uL    Absolute Monocytes 0.7 0.0 - 1.3 10e3/uL    Absolute Eosinophils 0.1 0.0 - 0.7 10e3/uL    Absolute Basophils 0.0 0.0 - 0.2 10e3/uL    Absolute Immature  Granulocytes 0.2 <=0.4 10e3/uL    Absolute NRBCs 0.0 10e3/uL   iStat Gases (lactate) venous, POCT     Status: Abnormal   Result Value Ref Range    Lactic Acid POCT 0.8 <=2.0 mmol/L    Bicarbonate Venous POCT 12 (L) 21 - 28 mmol/L    O2 Sat, Venous POCT 64 (L) 70 - 75 %    pCO2 Venous POCT 30 (L) 40 - 50 mm Hg    pH Venous POCT 7.22 (L) 7.32 - 7.43    pO2 Venous POCT 39 25 - 47 mm Hg   Extra Tube ([place] Draw)     Status: None    Narrative    The following orders were created for panel order Extra Tube ([place] Draw).  Procedure                               Abnormality         Status                     ---------                               -----------         ------                     Extra Blue Top Tube[[id number]]                              Final result               Extra Green Top (Lithium...[[id number]]                      Final result               Extra Purple Top Tube[[id number]]                            Final result                 Please view results for these tests on the individual orders.   Extra Blue Top Tube     Status: None   Result Value Ref Range    Hold Specimen JIC    Extra Green Top (Lithium Heparin) Tube     Status: None   Result Value Ref Range    Hold Specimen JIC    Extra Purple Top Tube     Status: None   Result Value Ref Range    Hold Specimen JIC    Extra Tube     Status: None    Narrative    The following orders were created for panel order Extra Tube.  Procedure                               Abnormality         Status                     ---------                               -----------         ------                     Extra Red Top Tube[[id number]]                               Final result                 Please view results for these tests on the individual orders.   Extra Red Top Tube     Status: None   Result Value Ref Range    Hold Specimen JIC    Troponin T, High Sensitivity     Status: Abnormal   Result Value Ref Range    Troponin T, High Sensitivity 143 (HH) <=14 ng/L    iStat Gases Electrolytes ICA Glucose Venous, POCT     Status: Abnormal   Result Value Ref Range    CPB Applied No     Hematocrit POCT 39 35 - 47 %    Calcium, Ionized Whole Blood POCT 5.0 4.4 - 5.2 mg/dL    Glucose Whole Blood POCT 58 (L) 70 - 99 mg/dL    Bicarbonate Venous POCT 15 (L) 21 - 28 mmol/L    Hemoglobin POCT 13.3 11.7 - 15.7 g/dL    Potassium POCT 7.6 (HH) 3.4 - 5.3 mmol/L    Sodium POCT 132 (L) 135 - 145 mmol/L    pCO2 Venous POCT 44 40 - 50 mm Hg    pO2 Venous POCT 25 25 - 47 mm Hg    pH Venous POCT 7.13 (LL) 7.32 - 7.43    O2 Sat, Venous POCT 30 (L) 70 - 75 %   EKG 12-lead, tracing only     Status: None (Preliminary result)   Result Value Ref Range    Systolic Blood Pressure  mmHg    Diastolic Blood Pressure  mmHg    Ventricular Rate 61 BPM    Atrial Rate 60 BPM    FL Interval 238 ms    QRS Duration 214 ms     ms    QTc 521 ms    P Axis 77 degrees    R AXIS 1 degrees    T Axis 140 degrees    Interpretation ECG       AV dual-paced rhythm with prolonged AV conduction  Abnormal ECG     CBC with platelets differential     Status: Abnormal    Narrative    The following orders were created for panel order CBC with platelets differential.  Procedure                               Abnormality         Status                     ---------                               -----------         ------                     CBC with platelets and d...[[id number]]  Abnormal            Final result                 Please view results for these tests on the individual orders.     Medications   oxyCODONE (ROXICODONE) tablet 5 mg (has no administration in time range)   pharmacy alert - intermittent dosing (has no administration in time range)   OLANZapine (zyPREXA) injection 2.5 mg (has no administration in time range)   glucose gel 15-30 g (has no administration in time range)     Or   dextrose 50 % injection 25-50 mL (has no administration in time range)     Or   glucagon injection 1 mg (has no administration in time  range)   dextrose 10% BOLUS 300 mL (300 mLs Intravenous $New Bag 2/8/24 0004)   sodium chloride 0.9% BOLUS 1,000 mL (1,000 mLs Intravenous $New Bag 2/7/24 1822)   HYDROmorphone (DILAUDID) injection 0.2 mg (0.2 mg Intravenous $Given 2/7/24 1821)   HYDROmorphone (PF) (DILAUDID) injection 0.5 mg (0.5 mg Intravenous $Given 2/7/24 1834)   piperacillin-tazobactam (ZOSYN) 3.375 g vial to attach to  mL bag (3.375 g Intravenous $New Bag 2/7/24 2323)   vancomycin (VANCOCIN) 1,000 mg in 200 mL dextrose intermittent infusion (0 mg Intravenous Paused 2/7/24 2328)   HYDROmorphone (PF) (DILAUDID) injection 0.5 mg (0.5 mg Intramuscular $Given 2/7/24 2218)   calcium gluconate 10 % injection 1 g (1 g Intravenous $New Bag 2/7/24 2340)   sodium bicarbonate 8.4 % injection 50 mEq (50 mEq Intravenous $Given 2/8/24 0005)   dextrose 50 % injection 25 g (25 g Intravenous $Given 2/8/24 0016)   insulin regular 1 unit/mL injection 6 Units (6 Units Intravenous $Given 2/8/24 0021)     Labs Ordered and Resulted from Time of ED Arrival to Time of ED Departure   INR - Abnormal       Result Value    INR 1.24 (*)    COMPREHENSIVE METABOLIC PANEL - Abnormal    Sodium 134 (*)     Potassium 7.8 (*)     Carbon Dioxide (CO2) 13 (*)     Anion Gap 27 (*)     Urea Nitrogen 169.0 (*)     Creatinine 9.20 (*)     GFR Estimate 4 (*)     Calcium 9.9 (*)     Chloride 94 (*)     Glucose 59 (*)     Alkaline Phosphatase 190 (*)     AST 20      ALT 17      Protein Total 7.7      Albumin 3.3 (*)     Bilirubin Total 0.3     PROCALCITONIN - Abnormal    Procalcitonin 2.27 (*)    TROPONIN T, HIGH SENSITIVITY - Abnormal    Troponin T, High Sensitivity 133 (*)    CRP INFLAMMATION - Abnormal    CRP Inflammation 158.00 (*)    ERYTHROCYTE SEDIMENTATION RATE AUTO - Abnormal    Erythrocyte Sedimentation Rate 61 (*)    CBC WITH PLATELETS AND DIFFERENTIAL - Abnormal    WBC Count 13.7 (*)     RBC Count 3.70 (*)     Hemoglobin 10.9 (*)     Hematocrit 35.2      MCV 95      MCH  29.5      MCHC 31.0 (*)     RDW 17.0 (*)     Platelet Count 299      % Neutrophils 79      % Lymphocytes 14      % Monocytes 5      % Eosinophils 0      % Basophils 0      % Immature Granulocytes 2      NRBCs per 100 WBC 0      Absolute Neutrophils 10.7 (*)     Absolute Lymphocytes 1.9      Absolute Monocytes 0.7      Absolute Eosinophils 0.1      Absolute Basophils 0.0      Absolute Immature Granulocytes 0.2      Absolute NRBCs 0.0     ISTAT GASES LACTATE VENOUS POCT - Abnormal    Lactic Acid POCT 0.8      Bicarbonate Venous POCT 12 (*)     O2 Sat, Venous POCT 64 (*)     pCO2 Venous POCT 30 (*)     pH Venous POCT 7.22 (*)     pO2 Venous POCT 39     TROPONIN T, HIGH SENSITIVITY - Abnormal    Troponin T, High Sensitivity 143 (*)    ISTAT GASES ELECTROLYTES ICA GLUCOSE VENOUS POCT - Abnormal    CPB Applied No      Hematocrit POCT 39      Calcium, Ionized Whole Blood POCT 5.0      Glucose Whole Blood POCT 58 (*)     Bicarbonate Venous POCT 15 (*)     Hemoglobin POCT 13.3      Potassium POCT 7.6 (*)     Sodium POCT 132 (*)     pCO2 Venous POCT 44      pO2 Venous POCT 25      pH Venous POCT 7.13 (*)     O2 Sat, Venous POCT 30 (*)    ROUTINE UA WITH MICROSCOPIC REFLEX TO CULTURE   INFLUENZA A/B, RSV, & SARS-COV2 PCR   GLUCOSE MONITOR NURSING POCT   POTASSIUM   GLUCOSE MONITOR NURSING POCT   GLUCOSE MONITOR NURSING POCT   BLOOD CULTURE   BLOOD CULTURE     CT Abdomen Pelvis w/o Contrast   Final Result   IMPRESSION:       1.  No focal inflammatory process in the abdomen or pelvis.   2.  Small bilateral ovarian cysts the largest of which is 18 mm. Per guidelines below, no imaging follow-up is necessary.         REFERENCE:   Management of Incidental Adnexal Findings on CT and MRI: A White Paper of the ACR Incidental Findings Committee. J Am Almaz Radiol 2020; 17(2):248-254.      Postmenopausal or equal to or >50 years if status unknown:   Equal to or <3 cm: No further imaging.         Head CT w/o contrast   Final Result    IMPRESSION:   1.  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   2.  No CT evidence for acute intracranial process.   3.  Brain atrophy and presumed chronic microvascular ischemic changes as above.             Critical care was performed.   Critical Care Addendum  My initial assessment, based on my review of prehospital provider report, review of nursing observations, review of vital signs, focused history, physical exam, review of cardiac rhythm monitor, 12 lead ECG analysis, and discussion with MICU Medicine , established a high suspicion that Vickie Gonzalez has sepsis with indication for early goal-directed therapy, severe agitation, altered mental status, a high risk electrolyte abnormality, and severe hypotension, which requires immediate intervention, and therefore she is critically ill.     After the initial assessment, the care team initiated multiple lab tests, initiated IV fluid administration, and initiated medication therapy with NS Ca Gluconate Bicarb D50 and Insulin Zosyn and Vanco  to provide stabilization care. Due to the critical nature of this patient, I reassessed nursing observations, vital signs, physical exam, review of cardiac rhythm monitor, 12 lead ECG analysis, mental status, neurologic status, and respiratory status multiple times prior to her disposition.     Time also spent performing documentation, discussion with family to obtain medical information for decision making, reviewing test results, discussion with consultants, and coordination of care.     Critical care time (excluding teaching time and procedures): 60 minutes.     Assessment & Plan    Acute delirium with rapidly deteriorating metabolic acidosis and hyperkalemia associated with anuric THELMA in the 89 yof with h/o CVA pacer right hip fx, etiology of this mutiorgan failure not clear but sepsis possibly reversible cause-cx'ed and started zosyn  and vanco, also shifting for hyperkalemia, unfortunatelly our lab failed to report first two blood draws for metabolic profile-CG8 done with worsening metabolic acidosis-D/W Family-DNR DNI then D/W MICU-asked me to ask Family if invasive line ok with them or more towards comfort care-Family chose no invasive lines and more toward comfort care, D/W Medicine-admit. Family is aware pt can die any minute from hyperkalemia.    I have reviewed the nursing notes. I have reviewed the findings, diagnosis, plan and need for follow up with the patient.    New Prescriptions    No medications on file       Final diagnoses:   Altered mental status, unspecified altered mental status type   Elevated procalcitonin   Elevated troponin   Hyperkalemia   Acute sepsis (H)   Acute kidney injury (H24)   I, Thor Perales, am serving as a trained medical scribe to document services personally performed by Darya Briggs Md, MD, based on the provider's statements to me.     I, Darya Briggs Md, MD, was physically present and have reviewed and verified the accuracy of this note documented by Thor Perales.      Darya Briggs MD  [facility] EMERGENCY DEPARTMENT  2/7/2024     Darya Briggs MD  02/08/24 0034

## 2024-02-08 NOTE — MEDICATION SCRIBE - ADMISSION MEDICATION HISTORY
Medication Scribe Admission Medication History    Admission medication history is complete. The information provided in this note is only as accurate as the sources available at the time of the update.    Information Source(s): Hospital records and CareEverywhere/SureScripts via in-person    Pertinent Information: Talked to patient in person but patient was confused and not answering assessment questions. Called Niece and no answer. Also called Marietta Louise by [facility] and no answer as well. Writer completed medication hx per CareEverywhere and Dispensary report.     Changes made to PTA medication list:  Added: None  Deleted: None  Changed: None    Allergies reviewed with patient and updates made in EHR: no    Medication History Completed By: Cara Shepherd 2/8/2024 10:32 AM    Prior to Admission medications    Medication Sig Last Dose Taking? Auth Provider Long Term End Date   acetaminophen (TYLENOL) 325 MG tablet Take 2 tablets (650 mg) by mouth 3 times daily Unknown Yes Rosenstein, Benjamin, MD     albuterol (PROVENTIL) (2.5 MG/3ML) 0.083% neb solution Take 2.5 mg by nebulization 4 times daily as needed for shortness of breath, wheezing or cough Unknown Yes Reported, Patient Yes    aspirin (ASA) 81 MG chewable tablet Take 1 tablet (81 mg) by mouth daily Unknown Yes Naveed Candelario MD     atorvastatin (LIPITOR) 10 MG tablet Take 1 tablet (10 mg) by mouth daily Unknown Yes Shawna Dee, NP Yes    calcium carbonate-vitamin D3 (CALCIUM 600 + D,3,) 600 mg(1,500mg) -200 unit per tablet [CALCIUM CARBONATE-VITAMIN D3 (CALCIUM 600 + D,3,) 600 MG(1,500MG) -200 UNIT PER TABLET] Take 1 tablet by mouth daily. As directed. Unknown Yes Jj Boswell MD     cyanocobalamin (VITAMIN B-12) 2000 MCG tablet [CYANOCOBALAMIN (VITAMIN B-12) 2000 MCG TABLET] Take 2,000 mcg by mouth daily. Unknown Yes Jj Boswell MD     lisinopril (ZESTRIL) 10 MG tablet Take 1 tablet (10 mg) by mouth daily Unknown Yes Rosenstein,  MD Donavon Yes    LORazepam (ATIVAN) 0.5 MG tablet Take 1 tablet (0.5 mg) by mouth every 8 hours as needed for anxiety or agitation Unknown Yes Cheyanne Robles APRN CNP     multivitamin, therapeutic (THERA-VIT) TABS tablet Take 1 tablet by mouth daily Unknown Yes Unknown, Entered By History     propranolol (INDERAL) 40 MG tablet Take 1 tablet (40 mg) by mouth 2 times daily Unknown Yes Hermelinda Jacob APRN CNP Yes    psyllium (METAMUCIL/KONSYL) 58.6 % powder Take 6 g (1 teaspoonful) by mouth 2 times daily (with meals) Unknown Yes Rosenstein, Benjamin, MD     senna (SENOKOT) 8.6 MG tablet Take 1 tablet by mouth daily Unknown Yes Reported, Patient

## 2024-02-08 NOTE — ED NOTES
Paged gold      hey pt is admitted and has no orders. wondering if you can put in her orders please

## 2024-02-08 NOTE — H&P
[facility]    History and Physical - Hospitalist Service       Date of Admission: 2/8/2024    Assessment & Plan   Vickie Gonzalez is a 89 year old female who is being transitioned to inpatient hospice on 2/8/24. Please see the discharge summary from the same date for more details of her hospital course.    Hospice cares: Currently resting comfortably. Family present at bedside this morning.    - [facility] following   - IV Dilaudid 0.3 mg PRN   - IV or SL Ativan 1 mg PRN   - Maryece Tona primary contact       Diet:  Regular diet ad edwin  Rothman Catheter: Not present  Lines: None     Code Status:  DNR/DNI  Expected Discharge: working on discharge with hospice    Ivonne Barahona PA-C  Hospitalist Service  [facility]  Securely message with [employer] (more info)  Text page via [employer] Paging/Directory     ______________________________________________________________________    Chief Complaint   Transitioning to inpatient hospice    History of Present Illness   Vickie Gonzalez is a 89 year old female who is being transitioned to inpatient hospice.  Please see the discharge summary from the same date for more details; a brief summary of her current symptoms is included here.    Resting comfortably for the majority of the day.       Physical Exam   Vital Signs:                    Weight: 0 lbs 0 oz    Physical exam deferred given current goals of care    Medical Decision Making       60 MINUTES SPENT BY ME on the date of service doing chart review, history, exam, documentation & further activities per the note.

## 2024-02-08 NOTE — PROGRESS NOTES
Hospice RN Chanell will complete hospice info visit with pt and randolphece around 12 pm today 2.8.24

## 2024-02-08 NOTE — PROGRESS NOTES
Hospice RN Chanell admitted patient onto [facility] hospice on 2.8.24                         Thank you,  Ermelinda Bautista   [facility] Hospice Admissions Coordinator

## 2024-02-08 NOTE — PROGRESS NOTES
Brief Medicine Note    Vickie Gonzalez is a 89 year old woman with a history of DM2, CKD, SSS s/p pacemaker, HTN, cerebrovascular disease, and hip fracture, living in a nursing home, who was admitted overnight with worsening somnolence. Found to have severe hyperkalemia (K 7.8) and presumed sepsis.     Met with patient and family (niece Tona) at bedside. Vickie has been gradually declining functionally over the past months with reduced mobility, decline in her oral intake, weight loss, etc. We reviewed her work-up to date including her severe hyperkalemia, severely reduced GFR, urinalysis. Discussed options going forward including full restorative cares (fluids, antibiotics, etc.) versus comfort cares.    Family would like to proceed with comfort cares. Prognosis likely days. Will consult GIP hospice and plan for patient to remain admitted through end of life.     Please refer to today's H&P by Dr. Cavazos for additional details.       Ivonne Barahona PA-C  Hospitalist Service

## 2024-02-08 NOTE — CONSULTS
[facility]    Consult Note - [facility]  _________________________________________________________________    [facility] 24/7 Contact Number: [phone]    - Providers: Please contact [facility] with changes in orders or clinical plan of care   - Nursing: Please contact [facility] with significant changes in patient condition    Hospice will notify the care team (including the hospitalist) to confirm date of inpatient hospice (GIP) admission.    New Epic encounter will not be created until hospice completes admission.   ______________________________________________________________________        Hospice Diagnosis: to be determined by hospice MD    Indication for Inpatient Hospice: terminal agitation, unmanaged pain    Goals for Hospital Discharge: terminal agitation managed as evidenced by able to tolerate being touched without induced agitation; pain managed as evidenced by no grimace within 24 hours.    Plan of Care Discussed with the Following:   - Nurse: ALANA Soria  - Charge Nurse: ALANA Coronado  - Hospitalist/Rounding Provider: Jonathan Cavazos MD    - Vickie's Family/Preferred Contact: shakeel Carbone   - Hospice Provider: Dr. Mary Langley    Summary of Visit (includes assessment, medications and any new orders):   Assessed Vickie in [facility] ED room 5, will be moved to 7th floor today. Startles with agitation and moving right arm with any touch. Bedside nurse Yang gave PRN ativan. Resting comfortably in bed when not touched. Disoriented x3.   Niece Tona here and signed consents to admit to GIP hospice.   Called Dr. Mary Langley, will update note when I speak to him.   Vocera text paged Dr. Jonathan Cavazos to see if he needs anything.   Will call SOC to start GIP Hospice encounter      Chanell Newton RN

## 2024-02-08 NOTE — PHARMACY-ADMISSION MEDICATION HISTORY
Admission medication history completed at [facility]. Please see Medication Scribe Admission Medication History note from 2/8/2024.     Olamide Pisano, PharmD, BCEMP, BCPS

## 2024-02-08 NOTE — PROGRESS NOTES
[facility] Inpatient [facility]osp[facility]  _________________________________________________________________     [facility] 24/7 Contact Number: [phone]    - Providers: Please contact [facility] with changes in orders or clinical plan of care   - Nursing: Please contact [facility] with significant changes in patient condition     Hospice will notify the care team (including the hospitalist) to confirm date of inpatient hospice (GIP) admission.    New Epic encounter will not be created until hospice completes admission.     Received. Thank you for the referral. We will work on sending someone to meet with pt/family

## 2024-02-08 NOTE — H&P
HISTORY AND PHYSICAL       [facility]    Hospitalist Service, GOLD TEAM     Patient Name: Vickie Gonzalez  YOB: 1925  Age/Gender: 89 year old female  Medical Record Number: [id number]    Date of Admission: 2/7/2024  Primary Care Physician: Cheyanne Robles    HISTORY     Location seen: Emergency Room room 2  Date of service:  2/8/2024    The patient's history is provided by chart review and unobtainable from patient due to mental status and is compromised by altered mental status and somnolence.    I called the patient's niece Tona - no answer this morning.  Family members (? Which ones) were in the ER but had left the patient's bedside at the time I arrived.    CHIEF COMPLAINT: abdominal pain X days    HISTORY OF PRESENT ILLNESS    The patient is a 89 year old female, with h/o diabetes mellitus, chronic kidney disease (CKD), SSS (has pacemaker), breast cancer, essential hypertension, cerebrovascular disease and s/p hip fracture(s).  Patient is not coherent and is markedly somnolent and per ALANA Yen - the patient has been very difficult to assess given her lack of ability of informing providers of her symptom(s).  Per the ER notes, her abdominal pain has been ongoing for 3-4 days, noting that the patient came from SNF with reports that she's unable to eat any food or much fluids for days.  She has been showing signs of confusion.  The patient is not able to answer my questions.  The family members present in the ER had discussed goal(s) of care issues with Dr. Briggs and declined any central line placement and patient is DNR/DNI.  No further history is available from the patient.  I called and left a voice mail message with the patient's niece that I would like to talk with her - I left my cell phone number on the voicemail message.  ---------------------------------------------------------------------------------------------------------------  The  patient's presentation and clinical findings were reviewed and/or discussed with Dr. Briggs. In addition, I reviewed the prior care documentation notes (e.g. ER staff / nursing, clinic, referring hospital, consultants) for this hospitalization.  ---------------------------------------------------------------------------------------------------------------    REVIEW OF SYSTEMS  The remainder of the 14 system / comprehensive review of systems (ROS) was unable to be obtained secondary altered mental status.    Past Medical History:   Diagnosis Date    Breast cancer (H) right     Chronic kidney disease     Diabetes (H)     Goiter, nontoxic, multinodular     HTN (hypertension)     Hx antineoplastic chemotherapy     breast cancer     Hypertriglyceridemia without hypercholesterolemia     Osteopenia      Past Surgical History:   Procedure Laterality Date    BIOPSY BREAST Right     HIP PINNING Right 12/1/2023    Procedure: Insertion intramedullary nail Right femur using the fracture bed;  Surgeon: Sridhar Tsai MD;  Location: [facility] OR    IR CAROTID CEREBRAL ANGIOGRAM BILATERAL  12/3/2023    MASTECTOMY Right      Family History   Problem Relation Age of Onset    Pacemaker Sister     CABG Brother 67.00    Breast Cancer No family hx of      Social History     Social History Narrative    Not on file     HOME MEDICATION(S): Home medication(s) personally reviewed as below.  Prior to Admission medications    Medication Sig Last Dose Taking? Auth Provider Long Term End Date   acetaminophen (TYLENOL) 325 MG tablet Take 2 tablets (650 mg) by mouth 3 times daily   Rosenstein, Benjamin, MD     albuterol (PROVENTIL) (2.5 MG/3ML) 0.083% neb solution Take 2.5 mg by nebulization 4 times daily as needed for shortness of breath, wheezing or cough   Reported, Patient Yes    aspirin (ASA) 81 MG chewable tablet Take 1 tablet (81 mg) by mouth daily   Naveed Candelario MD     atorvastatin (LIPITOR) 10 MG tablet Take 1 tablet (10 mg) by  mouth daily   Shawna Dee, NP Yes    calcium carbonate-vitamin D3 (CALCIUM 600 + D,3,) 600 mg(1,500mg) -200 unit per tablet [CALCIUM CARBONATE-VITAMIN D3 (CALCIUM 600 + D,3,) 600 MG(1,500MG) -200 UNIT PER TABLET] Take 1 tablet by mouth daily. As directed.   Jj Boswell MD     cyanocobalamin (VITAMIN B-12) 2000 MCG tablet [CYANOCOBALAMIN (VITAMIN B-12) 2000 MCG TABLET] Take 2,000 mcg by mouth daily.   Jj Boswell MD     lisinopril (ZESTRIL) 10 MG tablet Take 1 tablet (10 mg) by mouth daily   Rosenstein, Benjamin, MD Yes    LORazepam (ATIVAN) 0.5 MG tablet Take 1 tablet (0.5 mg) by mouth every 8 hours as needed for anxiety or agitation   Cheyanne Robles APRN CNP     multivitamin, therapeutic (THERA-VIT) TABS tablet Take 1 tablet by mouth daily   Unknown, Entered By History     propranolol (INDERAL) 40 MG tablet Take 1 tablet (40 mg) by mouth 2 times daily   Hermelinda Jacob APRN CNP Yes    psyllium (METAMUCIL/KONSYL) 58.6 % powder Take 6 g (1 teaspoonful) by mouth 2 times daily (with meals)   Rosenstein, Benjamin, MD     senna (SENOKOT) 8.6 MG tablet Take 1 tablet by mouth daily   Reported, Patient       ALLERGIES  Tamoxifen analogues [tamoxifen]    PHYSICAL EXAM     Vital signs reviewed as below.    Patient Vitals for the past 6 hrs:   BP Pulse SpO2   02/08/24 0100 93/64 87 (!) 88 %   02/08/24 0115 97/50 63 99 %   02/08/24 0130 120/64 73 98 %   02/08/24 0145 117/48 63 98 %   02/08/24 0200 119/48 60 99 %   02/08/24 0215 116/48 60 99 %   02/08/24 0230 107/49 60 100 %   02/08/24 0245 103/50 60 100 %   02/08/24 0300 103/46 60 100 %   02/08/24 0315 98/44 59 98 %   02/08/24 0337 102/40 71 98 %     The exam is compromised by debilitated state, altered mental status, and somnolence    GENERAL APPEARANCE: somnolent and difficult to arouse, chronically ill and debilitated, and lying on ER gurney  HEENT / EYES / EARS / OROPHARYNX: Normal external appearance of head, eyes, ears, and nose. Oropharynx with  extraordinarily dry mucous membranes. No signs of trauma to head or neck.   NECK: Supple, no palpable lymphadenopathy, no masses, no thyromegaly. Neck veins not visible whatsoever and certainly are not distended. ROM normal.  Trachea midline.  LUNGS: Clear to auscultation with good airflow. No wheezes, rales or rhonchi.  No stridor.  HEART / CV / CHEST: Normal S1S2. RRR without rubs, gallops or clicks. Soft ? midsystolic murmur noted. No edema.   No clubbing or cyanosis.  GI: lack of significant active bowel sounds. Soft and nontender -- patient's assigned nurse confirmed that the patient has not been able to answer any questions and has been agitated somewhat and that earlier in the night, the patient was very tender on her abdomen.  Now she is not showing signs of tender ? As she just received hydromorphone (Dilaudid )..  No hepatosplenomegaly or palpable masses. No hernias appreciated.  HEME / LYMPH / SKIN / EXTREMITIES  SKIN: Exposed areas grossly normal, warm and dry(It does not appear that she is clamping down with decreased LV function.  without unusual signs of bleeding, bruising or petechiae. No rashes. No mottling.  Minimal / minimum scattered scratches and scabs over her extremities - nothing that unusual for her age.  EXTREMITIES / JOINTS / BACK: Grossly unremarkable - she's not following commands.  Joints without active inflammation or findings of synovitis. Back / Buttocks region: not examined.  NEUROLOGIC EXAM  PSYCH / MENTAL STATUS: alert, oriented to person, place, and time. Insight, judgement and interactions with others appears abnormal - very somnolent and almost obtunded when I saw her.  CRANIAL NERVES: Normal symmetric appearing face.  MOTOR: not able to assess given her not following commands.  SENSATION: unable to assess and not indicated - as she will likely be comfort measures only soon this morning  CEREBELLAR: not tested  GAIT: Not tested    LABS / XRAYS / RESULTS     Recent Labs   Lab  02/08/24  0418 02/08/24  0328 02/08/24  0243 02/08/24  0202 02/08/24  0159 02/08/24  0129 02/08/24  0104 02/08/24  0009 02/07/24 2247 02/07/24 2026   0000   WBC  --   --   --   --   --   --   --   --   --  13.7*  --    HGB  --   --   --   --   --   --   --   --  13.3 10.9*  --    MCV  --   --   --   --   --   --   --   --   --  95  --    PLT  --   --   --   --   --   --   --   --   --  299  --    INR  --   --   --   --   --   --   --   --   --  1.24*  --    NA  --   --   --   --   --   --   --   --  134*  132*  --   --    POTASSIUM 6.8*  --   --   --  6.7*  --  7.0*  --  7.6*  7.8*  --    < >   CHLORIDE  --   --   --   --   --   --   --   --  94*  --   --    CO2  --   --   --   --   --   --   --   --  13*  --   --    BUN  --   --   --   --   --   --   --   --  169.0*  --   --    CR  --   --   --   --   --   --   --   --  9.20*  --   --    ANIONGAP  --   --   --   --   --   --   --   --  27*  --   --    WILFRID  --   --   --   --   --   --   --   --  9.9*  --   --    GLC  --  194* 200* 226*  --    < >  --    < > 58*  59*  --   --    ALBUMIN  --   --   --   --   --   --   --   --  3.3*  --   --    PROTTOTAL  --   --   --   --   --   --   --   --  7.7  --   --    BILITOTAL  --   --   --   --   --   --   --   --  0.3  --   --    ALKPHOS  --   --   --   --   --   --   --   --  190*  --   --    ALT  --   --   --   --   --   --   --   --  17  --   --    AST  --   --   --   --   --   --   --   --  20  --   --     < > = values in this interval not displayed.     Lab Results   Component Value Date    INR 1.24 02/07/2024    INR 1.08 12/04/2023    INR 1.01 12/03/2023     Recent Labs   Lab Test 02/07/24 2247 02/07/24 2026 12/03/23  1337   CTROPT 143* 133* 28*     I personally reviewed the XRAY(s) listed below, including radiology images and corresponding radiologist reports if available.    Cardiac echocardiogram within the past few months:  Interpretation Summary  Global and regional left ventricular function is hyperkinetic  with an EF >70%.  Right ventricular function, chamber size, wall motion, and thickness are  normal.  Mild (pulmonary artery systolic pressure<50mmHg) pulmonary hypertension is  present.  IVC diameter >2.1 cm collapsing <50% with sniff suggests a high RA pressure  estimated at 15 mmHg or greater.  No pericardial effusion is present.  There is no prior study for direct comparison.    I have personally reviewed the following data over the past 24 hrs:    13.7 (H)  \   13.3   / 299     134 (L); 132 (L) 94 (L) 169.0 (H) /  194 (H)   6.8 (HH) 13 (L) 9.20 (H) \     ALT: 17 AST: 20 AP: 190 (H) TBILI: 0.3   ALB: 3.3 (L) TOT PROTEIN: 7.7 LIPASE: N/A     Trop: 143 (HH) BNP: N/A     Procal: 2.27 (H) CRP: 158.00 (H) Lactic Acid: 0.8       INR:  1.24 (H) PTT:  N/A   D-dimer:  N/A Fibrinogen:  N/A         Color Urine (no units)   Date Value   02/07/2024 Orange (A)     Appearance Urine (no units)   Date Value   02/07/2024 Cloudy (A)     Glucose Urine (mg/dL)   Date Value   02/07/2024 Negative     Bilirubin Urine (no units)   Date Value   02/07/2024 Negative     Ketones Urine (mg/dL)   Date Value   02/07/2024 Negative     Specific Gravity Urine (no units)   Date Value   02/07/2024 1.014     pH Urine (no units)   Date Value   02/07/2024 6.0     Protein Albumin Urine (mg/dL)   Date Value   02/07/2024 300 (A)     Urobilinogen Urine (no units)   Date Value   04/25/2018 0.2 E.U./dL     Nitrite Urine (no units)   Date Value   02/07/2024 Negative     Leukocyte Esterase Urine (no units)   Date Value   02/07/2024 Large (A)       ASSESSMENT / PROBLEM LIST     PRINCIPAL DIAGNOSIS: Hyperkalemia    Pt is a 89 year old female with profound volume depletion and severe THELMA accordingly.  She is 89yo !!! And despite her remarkable age, she is likely not to survive this process.  She certainly could have any of the following items:  acute MI, CVA, abdomen catastrophe and what were are seeing is consistent with her abdomen symptom(s), CVA, overwhelming  sepsis.  Her prognosis is very poor.    Assessment & Plan   Principal Problem:    Hyperkalemia  Active Problems:    Elevated troponin    Elevated procalcitonin    Altered mental status, unspecified altered mental status type    Additional diagnoses and notable risk factors for complications during hospitalization:  Clinically Significant Risk Factors Present on Admission        # Hyperkalemia: Highest K = 7.8 mmol/L in last 2 days, will monitor as appropriate    # Hypercalcemia: corrected calcium is >10.1, will monitor as appropriate   # Anion Gap Metabolic Acidosis: Highest Anion Gap = 27 mmol/L in last 2 days, will monitor and treat as appropriate  # Hypoalbuminemia: Lowest albumin = 3.3 g/dL at 2/7/2024 10:47 PM, will monitor as appropriate  # Coagulation Defect: INR = 1.24 (Ref range: 0.85 - 1.15) and/or PTT = 22 Seconds (Ref range: 22 - 38 Seconds), will monitor for bleeding  # Drug Induced Platelet Defect: home medication list includes an antiplatelet medication  # Acute Kidney Injury, unspecified: based on a >150% or 0.3 mg/dL increase in last creatinine compared to past 90 day average, will monitor renal function  # Hypertension: Noted on problem list   # Acute Respiratory Failure: Documented O2 saturation < 91%.  Continue supplemental oxygen as needed         # Financial/Environmental Concerns:     # Pacemaker present     PLAN / OTHER ORDERS ENTERED   IV fluid(s) - will initiate bolus and maintenance fluids  Recheck labs tomorrow  I've held many of her medications accordingly to her poor prognosis and with expectations that she will be made comfort measures only soon.    Admitted: Inpatient  Current disposition: Admit to the Adult Internal Medicine Service under Inpatient status with expectation of initiation of comfort measures only orders once we are able to talk with her family.  Family per Dr. Briggs declined treatment for her elevated potassium.  I am not going to continue the antibiotic(s) for now as  management at this stage really is futile  I've not continued any of her essential meds    Scheduled and PRN medication(s):  acetaminophen, 650 mg, Oral, TID  - MEDICATION INSTRUCTIONS -, , Does not apply, See Admin Instructions  multivitamin, therapeutic, 1 tablet, Oral, Daily  piperacillin-tazobactam, 2.25 g, Intravenous, Q6H  propranolol, 40 mg, Oral, BID  sodium chloride (PF), 3 mL, Intracatheter, Q8H  vancomycin place barboza - receiving intermittent dosing, 1 each, Intravenous, See Admin Instructions    albuterol, 2.5 mg, 4x Daily PRN  sore throat, 1 lozenge, Q1H PRN  bisacodyl, 10 mg, Daily PRN  calcium carbonate, 1,000 mg, 4x Daily PRN  glucose, 15-30 g, Q15 Min PRN   Or  dextrose, 25-50 mL, Q15 Min PRN   Or  glucagon, 1 mg, Q15 Min PRN  HYDROmorphone, 0.2 mg, Q1H PRN  lidocaine 4%, , Q1H PRN  lidocaine (buffered or not buffered), 0.1-1 mL, Q1H PRN  melatonin, 1 mg, At Bedtime PRN  miconazole, , BID PRN  OLANZapine, 2.5 mg, BID PRN  ondansetron, 4 mg, Q6H PRN   Or  ondansetron, 4 mg, Q6H PRN  prochlorperazine, 5 mg, Q6H PRN   Or  prochlorperazine, 5 mg, Q6H PRN   Or  prochlorperazine, 12.5 mg, Q12H PRN  sodium chloride (PF), 3 mL, q1 min prn        Restraints: Not indicated  Discharge disposition: To be determined  Diet order:  None  DVT Prophylaxis: Pneumatic Compression Devices  Rothman Catheter: Not present  Lines: None     Cardiac Monitoring: None  Code Status: No CPR- Do NOT Intubate    Patient / Family Communication: I hope to discuss with the patient's niece or other NOK available.    Chester Cavazos MD

## 2024-02-09 NOTE — PLAN OF CARE
Goal Outcome Evaluation:      Plan of Care Reviewed With: patient, family    Overall Patient Progress: no changeOverall Patient Progress: no change    Outcome Evaluation: Comfort cares. Pt not verbally responsive. Occasional moans and moving of arms. PRN dilaudid administered for signs of pain. Opened eyes with repositioning. Not following commands. Incontinent small amt of dark santana urine. Bladder scan <50mL. No BM. Coccyx and sacrum with blanchable redness. Sacral mepilex applied.

## 2024-02-09 NOTE — PROGRESS NOTES
[facility]    Medicine Progress Note - Hospitalist Service, GOLD TEAM 3    Date of Admission:  2/8/2024    Assessment & Plan   Vickie Gonzalez is a 89 year old female who transitioned to inpatient hospice on 2/8/24. Please see the discharge summary from the same date for more details of her hospital course.    Hospice cares  Patient was admitted from her nursing home with 3-4 days of worsening abdominal pain, confusion, altered mental status, and inability to tolerate oral intake. She was hypotensive and somnolent on presentation to ED. Labs notable for K 7.8, GFR 4 (previously ~80), troponin 143, procalcitonin 2.27, WBC 13.7, urinalysis appears infected vs contaminated / dehydrated. CT head unrevealing. CT A/P without contrast was unrevealing for acute intra-abdominal pathology. Her overall clinical picture is concerning for severe sepsis, possible urinary vs unknown intraabdominal source. Severity of her illness was reviewed with family. Family notes several months of general functional decline, decreasing oral intake, weight loss since her hip fracture. Patient transitioned to comfort cares per family's wishes. Currently resting comfortably. Family and family friend present at bedside this morning.    - [facility] following   - IV Dilaudid 0.5 mg Q3H PRN   - IV or SL Ativan 1 mg Q3H PRN   - Evaristo Carbone primary contact        Diet: Regular Diet Adult    Rothman Catheter: Not present  Lines: None     Cardiac Monitoring: None  Code Status: No CPR- Do NOT Intubate      Clinically Significant Risk Factors Present on Admission        # Hyperkalemia: Highest K = 7.8 mmol/L in last 2 days, will monitor as appropriate    # Hypercalcemia: corrected calcium is >10.1, will monitor as appropriate   # Anion Gap Metabolic Acidosis: Highest Anion Gap = 27 mmol/L in last 2 days, will monitor and treat as appropriate  # Hypoalbuminemia: Lowest albumin = 3.3 g/dL at 2/7/2024 10:47 PM,  will monitor as appropriate  # Coagulation Defect: INR = 1.24 (Ref range: 0.85 - 1.15) and/or PTT = 22 Seconds (Ref range: 22 - 38 Seconds), will monitor for bleeding  # Drug Induced Platelet Defect: home medication list includes an antiplatelet medication   # Hypertension: Noted on problem list          # Financial/Environmental Concerns:     # Pacemaker present       Disposition Plan     Expected Discharge Date: 02/10/2024                  The patient's care was discussed with the Attending Physician, Dr. Montague and Patient's Family.    Lupe Reyes PA-C  Hospitalist Service, GOLD TEAM [address] [place] [address]  Securely message with [employer] (more info)  Text page via [facility] Paging/Directory   See signed in provider for up to date coverage information  ______________________________________________________________________    Interval History   Vickie Gonzalez is a 89 year old female on inpatient hospice. Family notes some increased calling out from patient and holding a hand on her abdomen. They state she just received additional pain medication about five minutes ago.     Physical Exam   Vital Signs:                    Weight: 0 lbs 0 oz    Physical exam deferred given current goals of care    Medical Decision Making       30 MINUTES SPENT BY ME on the date of service doing chart review, history, exam, documentation & further activities per the note.

## 2024-02-09 NOTE — PROGRESS NOTES
CLINICAL NUTRITION SERVICES    Chart reviewed due to positive nursing screen for unsure wt loss. Patient transitioned to hospice with comfort cares focus. Nutrition plan deferred to primary team. No nutrition interventions planned at this time. RD to sign off at this time. Will remain available by consult.     RD signing off on 2/9/2024.

## 2024-02-09 NOTE — PROGRESS NOTES
[facility]    Consult Note - [facility]  _________________________________________________________________    [facility] 24/7 Contact Number: [phone]    - Providers: Please contact [facility] with changes in orders or clinical plan of care   - Nursing: Please contact [facility] with significant changes in patient condition    Hospice admitted to inpatient hospice (GIP) 2/8/24.    New Epic encounter will not be created until hospice completes admission.   ______________________________________________________________________        Hospice Diagnosis: CVA    Indication for Inpatient Hospice: Pain management, terminal agitation    Goals for Hospital Discharge: terminal agitation managed as evidenced by able to tolerate being touched without induced agitation; pain managed as evidenced by no grimace within 24 hours.     Plan of Care Discussed with the Following:   - Nurse: Gerardo Schafer RN  - Hospitalist/Rounding Provider: Nneka Montague RN    - Vickie's Family/Preferred Contact: Evaristo Carbone   - Hospice Provider: Dr. Mary Langley    Summary of Visit (includes assessment, medications and any new orders):   Vocera text paged Dr. Montague, OK to schedule dilaudid and ativan. Hospice MD will make schedule recommendation.   Vocera text paged ALANA Carrillo, no needs.   Assessed Vickie in her room with a niece and nephew in the room. She is resting calmly in bed. No response to physical stimuli. O2 83%, R16, P75. Family advocating for dilaudid IV use, excited to have it scheduled.   Spoke to ALANA Carrero, no needs.  Spoke to Hospice Dr. Langley: recommends scheduled dilaudid 0.5mg Q3H scheduled and Q1H PRN pain or short of breath.   Updated Dr. Montague on Vocera Text on hospice recommendations.    Plan: change meds as above    Goal: pain managed as evidenced by no agitation when stimulated within 24 hours while using 3 or fewer PRN medications in 24  hours and maintained for 48 hours    Discharge unlikely due to actively dying.     Chanell Newton RN

## 2024-02-10 NOTE — CONSULTS
[facility]    Consult Note - [facility] Inpatient [facility]  _________________________________________________________________    [facility] 24/7 Contact Number: [phone]    - Providers: Please contact [facility] with changes in orders or clinical plan of care   - Nursing: Please contact [facility] with significant changes in patient condition    Hospice will notify the care team (including the hospitalist) to confirm date of inpatient hospice (GIP) admission.    New Epic encounter will not be created until hospice completes admission.   ______________________________________________________________________        Hospice Diagnosis: CVA    Indication for Inpatient Hospice: pain, agitation    Goals for Hospital Discharge: <3 PRN's in 24 hours for 48 hours    Plan of Care Discussed with the Following:   - Nurse: ALANA Carrero  - Hospitalist/Rounding Provider: Dr. Montague   - Vickie's Family/Preferred Contact: Tona beckford  - Hospice Provider: Dr. Langley    Summary of Visit (includes assessment, medications and any new orders):   Patient in bed with niece and nephew by the bedside. Nephew reports patient has been appearing to have intermittent waves of pain where she becomes restless and they report pain with repositioning. Adult non-verbal pain assessment of 6/10. Educated on the purpose of opioids/benzodiazapine medications. Family was agreeable to recommendations. Family also reports there will be more family members coming to visit Vickie this afternoon and requested another bereavement tray.     New orders:   Lorazepam 1mg BID SL scheduled  Dilaudid 0.5mg Q2hrs IV scheduled      Pati Verderame

## 2024-02-10 NOTE — PROGRESS NOTES
Pt is resting comfortably. Checked once per hour minimum to assess for comfort. Scheduled dilaudid every 3 hours. One extra dose given after patient was repositioned and looked like she was having pain.  Other wise sleeping comfortably.

## 2024-02-10 NOTE — PLAN OF CARE
Goal Outcome Evaluation: [id number]      Plan of Care Reviewed With: patient    Overall Patient Progress: no changeOverall Patient Progress: no change     No acute changes overnight. Comfort cares continued. Pain well controlled with scheduled dilaudid Q3hrs. Oral cares completed overnight. No void or stool. Bladder scanned for 119 ands 112. Repositioning as needed for pt comfort. Continue POC.

## 2024-02-10 NOTE — PLAN OF CARE
Comfort cares continued.  Pain well controlled with scheduled dilaudid Q2hrs. Oral cares completed overnight. No void or stool.  Opens eyes with repositioning, otherwise resting comfortable.

## 2024-02-10 NOTE — PROGRESS NOTES
[facility]    Medicine Progress Note - Hospitalist Service, GOLD TEAM 3    Date of Admission:  2/8/2024    Assessment & Plan   Hospice cares  Patient was admitted from her nursing home with 3-4 days of worsening abdominal pain, confusion, altered mental status, and inability to tolerate oral intake. She was hypotensive and somnolent on presentation to ED. Labs notable for K 7.8, GFR 4 (previously ~80), troponin 143, procalcitonin 2.27, WBC 13.7, urinalysis appears infected vs contaminated / dehydrated. CT head unrevealing. CT A/P without contrast was unrevealing for acute intra-abdominal pathology. Her overall clinical picture is concerning for severe sepsis, possible urinary vs unknown intraabdominal source. Severity of her illness was reviewed with family. Family notes several months of general functional decline, decreasing oral intake, weight loss since her hip fracture. Patient transitioned to comfort cares per family's wishes. Family and family friend present at bedside this morning.    - [facility] following   - IV Dilaudid 0.5 mg Q2H scheduled and 0.5 mg Q1H PRN   - IV Ativan 1 mg BID and IV Ativan 1 mg Q3H PRN   - Evaristo Carbone primary contact        Diet: Regular Diet Adult    DVT Prophylaxis: None, comfort focused care  Rothman Catheter: Not present  Lines: None     Cardiac Monitoring: None  Code Status: No CPR- Do NOT Intubate      Clinically Significant Risk Factors              # Hypoalbuminemia: Lowest albumin = 3.3 g/dL at 2/7/2024 10:47 PM, will monitor as appropriate     # Hypertension: Noted on problem list            # Financial/Environmental Concerns:     # Pacemaker present       Disposition Plan      Expected Discharge Date: 02/10/2024                  The patient's care was discussed with the Attending Physician, Dr. Montague and Patient's Family.    Lupe Reyes PA-C  Hospitalist Service, GOLD TEAM 3  [facility]  [facility]  Securely message with [employer] (more info)  Text page via [employer] Paging/Directory   See signed in provider for up to date coverage information  ______________________________________________________________________    Interval History   Pain appeared to be well-controlled overnight with no acute changes. This morning patient appears to be having more abdominal pain, holding her hand to her abdomen when provider attempts auscultation. Patient moaning, moving slightly side to side. Per family, she just received a PRN medication for pain right before provider stopped in.    Physical Exam   Vital Signs:       Pulse: 80   Resp: 16        Weight: 0 lbs 0 oz    Physical exam deferred given current goals of care    Medical Decision Making       50 MINUTES SPENT BY ME on the date of service doing chart review, history, exam, documentation & further activities per the note.

## 2024-02-11 NOTE — CONSULTS
[facility]    Consult Note - [facility]  _________________________________________________________________    [facility] 24/7 Contact Number: [phone]    - Providers: Please contact [facility] with changes in orders or clinical plan of care   - Nursing: Please contact [facility] with significant changes in patient condition    Hospice will notify the care team (including the hospitalist) to confirm date of inpatient hospice (GIP) admission.    New Epic encounter will not be created until hospice completes admission.   ______________________________________________________________________        Hospice Diagnosis: CVA    Indication for Inpatient Hospice: pain agitation    Goals for Hospital Discharge: <3 PRN's in 24 hours for 48 hours, hemodynamically stable for discharge    Plan of Care Discussed with the Following:   - Nurse: ALANA Schulte  - Hospitalist/Rounding Provider: Dr. Montague   - Vickie's Family/Preferred Contact: shakeel Carbone  - Hospice Provider: Dr. Langley    Summary of Visit (includes assessment, medications and any new orders):   Met with patient, shakeel Carbone and her  at the bedside. RR 12 , P 68, adult non-verbal pain assessment 0. Reassured family that patient is not in pain and that it is likely she should pass within the next 24-48 hours. All questions answered to family's satisfaction.     No new order recommendations at this time.       Pati Rolle

## 2024-02-11 NOTE — PROGRESS NOTES
[facility]    Medicine Progress Note - Hospitalist Service, GOLD TEAM 3    Date of Admission:  2/8/2024    Assessment & Plan   Hospice cares  Patient was admitted from her nursing home with 3-4 days of worsening abdominal pain, confusion, altered mental status, and inability to tolerate oral intake. She was hypotensive and somnolent on presentation to ED. Labs notable for K 7.8, GFR 4 (previously ~80), troponin 143, procalcitonin 2.27, WBC 13.7, urinalysis appears infected vs contaminated / dehydrated. CT head unrevealing. CT A/P without contrast was unrevealing for acute intra-abdominal pathology. Her overall clinical picture is concerning for severe sepsis, possible urinary vs unknown intraabdominal source. Severity of her illness was reviewed with family. Family notes several months of general functional decline, decreasing oral intake, weight loss since her hip fracture. Patient transitioned to comfort cares per family's wishes. Family and family friend present at bedside this morning.    - [facility] following   - IV Dilaudid 0.5 mg Q2H scheduled and 0.5 mg Q1H PRN   - IV Ativan 1 mg BID and IV Ativan 1 mg Q3H PRN   - Evaristo Carbone primary contact        Diet: Regular Diet Adult    DVT Prophylaxis: None, comfort cares  Rothman Catheter: Not present  Lines: None     Cardiac Monitoring: None  Code Status: No CPR- Do NOT Intubate      Clinically Significant Risk Factors              # Hypoalbuminemia: Lowest albumin = 3.3 g/dL at 2/7/2024 10:47 PM, will monitor as appropriate     # Hypertension: Noted on problem list            # Financial/Environmental Concerns:     # Pacemaker present       Disposition Plan           The patient's care was discussed with the Attending Physician, Dr. Montague and Patient's Family.    Lupe Reyes PA-C  Hospitalist Service, GOLD TEAM 3  [facility]  Securely message with [employer] (more  info)  Text page via [facility] Paging/Directory   See signed in provider for up to date coverage information  ______________________________________________________________________    Interval History   Patient appears comfortable, family at bedside this morning. Patient has not been holding her hand to her stomach/moaning as she had been previously yesterday. No concerns per family.    Physical Exam   Vital Signs:                    Weight: 0 lbs 0 oz    Physical exam deferred given current goals of care    Medical Decision Making       30 MINUTES SPENT BY ME on the date of service doing chart review, history, exam, documentation & further activities per the note.      Data         Imaging results reviewed over the past 24 hrs:   No results found for this or any previous visit (from the past 24 hour(s)).

## 2024-02-11 NOTE — PLAN OF CARE
Goal Outcome Evaluation:  Pt appears comfortable with current regimen of medications. Received Iv dilaudid every 2-2.5 hrs on schedule. Opened her eyes 2 times only with cares. Groin area was red and moist possible had a small incontinence of urine versus sweating. Cleansed area and barrier cream applied.

## 2024-02-11 NOTE — PLAN OF CARE
Goal Outcome Evaluation:      Plan of Care Reviewed With: patient    Overall Patient Progress: no changeOverall Patient Progress: no change       No acute changes overnight. Pt appearing comfortable with scheduled regimen overnight. Pain well controlled with scheduled dilaudid Q2hrs. Oral cares completed overnight, pt biting down on sponge and occasional eye opening with repositioning. No void or stool. Repositioning as needed for pt comfort. 1 Hr rounding completed. Continue comfort cares.

## 2024-02-11 NOTE — PLAN OF CARE
No acute changes overnight. Patient does not display non verbal indicators of pain or anxiety. Patient slept throughout shift. Pain and anxiety well controlled with scheduled dilaudid and ativan. No void or stool. Perineal cares completed. Repositioning as needed for patient comfort. 1 hr rounding completed. Family at bedside most of shift. Continue with comfort cares.     Goal Outcome Evaluation:      Plan of Care Reviewed With: family    Overall Patient Progress: no change

## 2024-02-11 NOTE — PROGRESS NOTES
[facility]    Medicine Progress Note - Hospitalist Service, GOLD TEAM 3    Date of Admission:  2/8/2024    Assessment & Plan     Hospice cares:Patient was admitted from her nursing home with 3-4 days of worsening abdominal pain, confusion, altered mental status, and inability to tolerate oral intake. She was hypotensive and somnolent on presentation to ED. Labs notable for K 7.8, GFR 4 (previously ~80), troponin 143, procalcitonin 2.27, WBC 13.7, urinalysis appears infected vs contaminated / dehydrated. CT head unrevealing. CT A/P without contrast was unrevealing for acute intra-abdominal pathology. Her overall clinical picture is concerning for severe sepsis, possible urinary vs unknown intraabdominal source. Severity of her illness was reviewed with family. Family notes several months of general functional decline, decreasing oral intake, weight loss since her hip fracture. Patient transitioned to comfort cares per family's wishes.    - [facility] following   - IV Dilaudid 0.5 mg Q2H scheduled and 0.5 mg Q1H PRN   - IV Ativan 1 mg BID and IV Ativan 1 mg Q3H PRN  - Continue PRNs as needed for dry mouth, secretions,. Sore throat, heartburn, dry eyes, break through pain, anxiety, dry lips, nausea  - Continue excellent supportive nursing care    - Evaristo Carbone primary contact        Diet: Regular Diet Adult    DVT Prophylaxis: None, comfort cares  Rothman Catheter: Not present  Lines: None     Cardiac Monitoring: None  Code Status: No CPR- Do NOT Intubate      Clinically Significant Risk Factors              # Hypoalbuminemia: Lowest albumin = 3.3 g/dL at 2/7/2024 10:47 PM, will monitor as appropriate     # Hypertension: Noted on problem list              # Financial/Environmental Concerns:     # Pacemaker present       Disposition Plan      Expected Discharge Date: 02/12/2024                The patient's care was discussed with the Attending Physician, Dr. Montague and will  attempt to see Patient's Family at bedside or call with an update.     SANDIP Galloway [facility]  Hospitalist Service, GOLD TEAM 3  [facility]  Securely message with [employer] (more info)  Text page via [facility] Paging/Directory   See signed in provider for up to date coverage information    This chart documentation was completed with Dragon voice-recognition software. Even though reviewed, this chart may still contain some grammatical, spelling, and word errors. Please contact the author for any questions or clarifications.     ______________________________________________________________________    Interval History   Nursing/SW/consult/interdisciplinary healthcare worker's notes reviewed.     I reviewed all medications, new labs and imaging results over the last 24 hours. Please see discussion of these results in the A/P.    No acute events overnight. Appears comfortable on pain regimen. Slight increase in secretion overnight. PRNs utilized for dry mouth, secretions. Resting comfortably upon exam.     ROS: 12 point ROS negative other than the symptoms noted here or above in the assessment and plan.     Physical Exam   Vital Signs:                    Weight: 0 lbs 0 oz      General Appearance: In NAD, eyes closed upon exam   Respiratory: LS with some scattered crackles  Cardiovascular: S1, S2, no m/r/g, no peripheral edema   GI: BS+, all 4 quadrants, no masses, non-tender upon palpation  Skin: Intact on face, arms, legs. No wounds, bruising, or lesions noted.  Neuropsych: Not responding to gentle stimuli      Medical Decision Making       30 MINUTES SPENT BY ME on the date of service doing chart review, history, exam, documentation & further activities per the note.      Data         Imaging results reviewed over the past 24 hrs:   No results found for this or any previous visit (from the past 24 hour(s)).

## 2024-02-12 NOTE — CONSULTS
[facility]    Consult Note - [facility] Inpatient Hospice  _________________________________________________________________    [facility] 24/7 Contact Number: [phone]    - Providers: Please contact [facility] with changes in orders or clinical plan of care   - Nursing: Please contact [facility] with significant changes in patient condition    Hospice will notify the care team (including the hospitalist) to confirm date of inpatient hospice (GIP) admission.    New Epic encounter will not be created until hospice completes admission.   ______________________________________________________________________        Hospice Diagnosis: CVA    Indication for Inpatient Hospice: pain, agitation    Goals for Hospital Discharge: <3 PRN's in 254 hours, hemodynamically stable for discharge    Plan of Care Discussed with the Following:   - Nurse: ALANA Noyola  - Hospitalist/Rounding Provider: Dr. Montague   - Vickie's Family/Preferred Contact: Tona beckford  - Hospice Provider: Dr. Langley    Summary of Visit (includes assessment, medications and any new orders):   Patient resting peacefully in bed. RR 14, BP 97/24, P 52. Nephews at the bedside. Atropine given  once in 24 hours. All questions answered to family's satisfaction, declined scheduling atropine to prevent over drying of her mouth.    No new order recommendations at this time.           Pati Verderame

## 2024-02-12 NOTE — PLAN OF CARE
Goal Outcome Evaluation:       3-1130pm  Pt is resting comfortably all shift. Opened eyes once. Given scheduled dilaudid and ativan per plan of care. Oral care done at every 2 hr intervals. Pericare done once. Groin is slightly reddened. Noted to have slight audible congestion at 2315. Will start using medication to dry secretions.

## 2024-02-12 NOTE — PROGRESS NOTES
[facility]    Social Work Progress Note - [facility]    ______________________________________________________________________    [facility] 24/7 Contact Number: [phone]    - Providers: Please contact [facility] with changes in orders or clinical plan of care   - Nursing: Please contact [facility] with significant changes in patient condition  - Social Work: Please contact [facility] for discharge phanning/updates  ______________________________________________________________________        Summary of Visit (includes psychosocial assessment/updates, acceptance of palliative care/hospice philosophy, discharge plans):   Hospice social work evaluation visit completed via phone conversations with hospice RN Pati and patient's shakeel Carbone    Interventions and response to Interventions (short-term counseling, family conference, education/resources offered to the family):  Writer introduced self and explained role of hospice social worker to patient's shakeel Carbone.  Tona confirmed understanding of hospice philosophy and the dying process.  Tona indicated that her goal remains for patient to be as comfortable as possible throughout end of life, that she and her family are supportive of one another/coping well at this time and aware to call hospice number with any needs.    Plan of Care Discussed with the Following:   - Vickie's Family/Preferred Contact: shakeel Carbone   - Hospice nurse: KEITH Schwab

## 2024-02-12 NOTE — PLAN OF CARE
Goal Outcome Evaluation:      Plan of Care Reviewed With: patient    Overall Patient Progress: no changeOverall Patient Progress: no change    Outcome Evaluation: No acute changes overnight. Pt appearing comfortable with scheduled regimen overnight. Pain well controlled with scheduled dilaudid Q2hrs. Oral cares completed overnight, pt biting down on sponge and occasional eye opening with repositioning. No void or stool. Repositioning as needed for pt comfort. 1 Hr rounding completed. Atropine drops utilized for secretions. Continue comfort cares.

## 2024-02-12 NOTE — PLAN OF CARE
Goal Outcome Evaluation:    Hospice/comfort cares patient  NEURO: Obtunded, occasional moan/groan w/ increased pain, not able to make needs known  RESPIRATORY: Pt on RA, agonal respirations increasing throughout shift, minimal secretions- PRN atropine available, oral cares done  VITALS: No VS d/t pt on comfort cares  GI/: No UOP or BM this shift  SKIN: WNL except perineal area blanchable redness  DIET: Regular, no PO intake  PAIN/NAUSEA: Appears comfortable throughout shift w/ scheduled meds  IV ACCESS: R-PIV SL, L-PIV SL  ACTIVITY: Bedrest, turn/repo q2hrs  LAB: N/A  PLAN: Continue w/ POC & comfort cares

## 2024-02-13 NOTE — PLAN OF CARE
Goal Outcome Evaluation:  Pt  . Pts shakeel Carbone notified. Hospice notified. Death cares completed. No patient belongings found in room and Tona stated there were no belongings.

## 2024-02-13 NOTE — DISCHARGE SUMMARY
[facility]  Hospitalist Discharge Summary      Date of Admission:  2024  Date of Discharge:  2024 11:52 PM  Discharging Provider: SANDIP Galloway CNP  Discharge Service: Hospitalist Service, GOLD TEAM 3    Discharge Diagnoses   End of life care     Clinically Significant Risk Factors          Follow-ups Needed After Discharge   N/A    Unresulted Labs Ordered in the Past 30 Days of this Admission       No orders found from 2024 to 2024.        These results will be followed up by N/A    Discharge Disposition   Discharged to  services   Condition at discharge:     Hospital Course   Hospice cares:Patient was admitted from her nursing home with 3-4 days of worsening abdominal pain, confusion, altered mental status, and inability to tolerate oral intake. She was hypotensive and somnolent on presentation to ED. Labs notable for K 7.8, GFR 4 (previously ~80), troponin 143, procalcitonin 2.27, WBC 13.7, urinalysis appears infected vs contaminated / dehydrated. CT head unrevealing. CT A/P without contrast was unrevealing for acute intra-abdominal pathology. Her overall clinical picture is concerning for severe sepsis, possible urinary vs unknown intraabdominal source. Severity of her illness was reviewed with family. Family notes several months of general functional decline, decreasing oral intake, weight loss since her hip fracture. Patient transitioned to comfort cares per family's wishes and  on 24.     Consultations This Hospital Stay   PHARMACY IP CONSULT    Code Status   Prior    Time Spent on this Encounter   I, SANDIP Galloway CNP, personally saw the patient today and spent less than or equal to 30 minutes discharging this patient.       SANDIP Galloway CNP [facility] 7C MED SURG  [address] [address]  Phone:  [phone]  ______________________________________________________________________    Physical Exam   Vital Signs:               O2 Device: None (Room air)    Weight: 0 lbs 0 oz    Unable to conduct exam as pt  the evening after.       Primary Care Physician   Cheyanne Robles    Discharge Orders   No discharge procedures on file.    Significant Results and Procedures   Most Recent 3 CBC's:  Recent Labs   Lab Test 24  2026 24  1032 23  0953   WBC  --  13.7* 6.7 7.4   HGB 13.3 10.9* 11.1* 10.9*   MCV  --  95 94 98   PLT  --  299 186 287     Most Recent 3 BMP's:  Recent Labs   Lab Test 24  0418 24  0328 24  0243 24  0202 24  0159 24  0129 24  0104 24  0009 247 24  1012 23  1032   NA  --   --   --   --   --   --   --   --  134*  132* 139 136   POTASSIUM 6.8*  --   --   --  6.7*  --  7.0*  --  7.6*  7.8* 4.3 4.6   CHLORIDE  --   --   --   --   --   --   --   --  94* 104 101   CO2  --   --   --   --   --   --   --   --  13* 25 24   BUN  --   --   --   --   --   --   --   --  169.0* 35.2* 24.9*   CR  --   --   --   --   --   --   --   --  9.20* 0.62 0.62   ANIONGAP  --   --   --   --   --   --   --   --  27* 10 11   WILFRID  --   --   --   --   --   --   --   --  9.9* 9.7* 8.8   GLC  --  194* 200* 226*  --    < >  --    < > 58*  59* 159* 184*    < > = values in this interval not displayed.     Most Recent 2 LFT's:  Recent Labs   Lab Test 24  2247 24  1012   AST 20 33   ALT 17 18   ALKPHOS 190* 105   BILITOTAL 0.3 0.3   ,   Results for orders placed or performed during the hospital encounter of 24   CT Abdomen Pelvis w/o Contrast    Narrative    EXAM: CT ABDOMEN PELVIS W/O CONTRAST  LOCATION: [facility]  DATE: 2024    INDICATION: ?lower abd pain  COMPARISON: CT abdomen pelvis without contrast  TECHNIQUE: CT scan of the abdomen and pelvis was  performed without IV contrast. Multiplanar reformats were obtained. Dose reduction techniques were used.  CONTRAST: None.    FINDINGS:   LOWER CHEST: Motion artifact. Solid pulmonary nodule in the medial right middle lobe has benign pattern of central calcification. Heavy calcification of the mitral annulus. Pacemaker leads in the right heart chambers.    HEPATOBILIARY: Normal.    PANCREAS: Normal.    SPLEEN: Normal.    ADRENAL GLANDS: Normal.    KIDNEYS/BLADDER: Normal.    BOWEL: Normal.    LYMPH NODES: Normal.    VASCULATURE: Moderate patchy atheromatous calcification of the abdominal aorta greatest in the infrarenal segment and lesser atheromatous calcifications in the iliac arteries.    PELVIC ORGANS: Uterus is normal in size. There is parenchymal calcification along the anterior uterine body/fundus consistent with a degenerated fibroid. There is a 18 mm cyst in the left ovary. There is a 9 mm cyst in the right ovary. These are all   stable.    MUSCULOSKELETAL: Moderate disc space narrowing at L4-L5 and L5-S1. Small diffuse low thoracic and lumbar degenerative osteophytes. Previous internal fixation of comminuted fractures of the proximal right femur with antegrade IM nail and interlocking   blade. The proximal right femur fractures or ununited..      Impression    IMPRESSION:     1.  No focal inflammatory process in the abdomen or pelvis.  2.  Small bilateral ovarian cysts the largest of which is 18 mm. Per guidelines below, no imaging follow-up is necessary.      REFERENCE:  Management of Incidental Adnexal Findings on CT and MRI: A White Paper of the ACR Incidental Findings Committee. J Am Almaz Radiol 2020; 17(2):248-254.    Postmenopausal or equal to or >50 years if status unknown:  Equal to or <3 cm: No further imaging.     Head CT w/o contrast    Narrative    EXAM: CT HEAD W/O CONTRAST  LOCATION: [facility]  DATE: 2/7/2024    INDICATION: Confusion.  COMPARISON:  Head CT 11/30/2023  TECHNIQUE: Routine CT Head without IV contrast. Multiplanar reformats. Dose reduction techniques were used.    FINDINGS:  INTRACRANIAL CONTENTS: No intracranial hemorrhage, extraaxial collection, or mass effect.  No CT evidence of acute infarct. Mild presumed chronic small vessel ischemic changes. There has been interval development of an area of chronic encephalomalacia at   the posterior lateral aspect of the right frontal lobe likely representing an interval ischemic infarct. Moderate generalized volume loss. No hydrocephalus.     VISUALIZED ORBITS/SINUSES/MASTOIDS: No intraorbital abnormality. No paranasal sinus mucosal disease. No middle ear or mastoid effusion.    BONES/SOFT TISSUES: No acute abnormality.      Impression    IMPRESSION:  1.  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  2.  No CT evidence for acute intracranial process.  3.  Brain atrophy and presumed chronic microvascular ischemic changes as above.       Discharge Medications   Discharge Medication List as of 2/12/2024 11:52 PM        CONTINUE these medications which have NOT CHANGED    Details   acetaminophen (TYLENOL) 325 MG tablet Take 2 tablets (650 mg) by mouth 3 times daily, Historical      albuterol (PROVENTIL) (2.5 MG/3ML) 0.083% neb solution Take 2.5 mg by nebulization 4 times daily as needed for shortness of breath, wheezing or cough, Historical      aspirin (ASA) 81 MG chewable tablet Take 1 tablet (81 mg) by mouth daily, TransitionalStroke prevention      atorvastatin (LIPITOR) 10 MG tablet Take 1 tablet (10 mg) by mouth daily, Disp-90 tablet, R-3, E-Prescribe      calcium carbonate-vitamin D3 (CALCIUM 600 + D,3,) 600 mg(1,500mg) -200 unit per tablet [CALCIUM CARBONATE-VITAMIN D3 (CALCIUM 600 + D,3,) 600 MG(1,500MG) -200 UNIT PER TABLET] Take 1 tablet by mouth daily. As directed., Historical      cyanocobalamin (VITAMIN B-12)  2000 MCG tablet [CYANOCOBALAMIN (VITAMIN B-12) 2000 MCG TABLET] Take 2,000 mcg by mouth daily., Historical      lisinopril (ZESTRIL) 10 MG tablet Take 1 tablet (10 mg) by mouth daily, Historical      LORazepam (ATIVAN) 0.5 MG tablet Take 1 tablet (0.5 mg) by mouth every 8 hours as needed for anxiety or agitation, Disp-30 tablet, R-0, E-Prescribe      multivitamin, therapeutic (THERA-VIT) TABS tablet Take 1 tablet by mouth daily, Historical      propranolol (INDERAL) 40 MG tablet Take 1 tablet (40 mg) by mouth 2 times daily, Disp-180 tablet, R-3, E-Prescribe      psyllium (METAMUCIL/KONSYL) 58.6 % powder Take 6 g (1 teaspoonful) by mouth 2 times daily (with meals), Historical      senna (SENOKOT) 8.6 MG tablet Take 1 tablet by mouth daily, Historical           Allergies   Allergies   Allergen Reactions    Tamoxifen Analogues [Tamoxifen] Unknown     Annotation: hepatitis

## 2024-02-13 NOTE — PROGRESS NOTES
While rounding on pt, pt found with no respirations. No heartbeat heard on auscultation. Provider updated and paged to come to bedside to pronounce patient.

## 2024-02-13 NOTE — DEATH PRONOUNCEMENT
MD DEATH PRONOUNCEMENT    Called to pronounce Vickie Gonzaelz dead.    Physical Exam: Unresponsive to noxious stimuli, Spontaneous respirations absent, Breath sounds absent, Carotid pulse absent, Heart sounds absent, Pupillary light reflex absent, and Corneal blink reflex absent    Patient was pronounced dead at 10:00 PM, 2024.      Principal Problem:    Hospice care   # possible severe sepsis  # Dehydration     Infectious disease present?: YES    Communicable disease present? (examples: HIV, chicken pox, TB, Ebola, CJD) :  NO    Multi-drug resistant organism present? (example: MRSA): NO    Please consider an autopsy if any of the following exist:  NO Unexpected or unexplained death during or following any dental, medical, or surgical diagnostic treatment procedures.   NO Death of mother at or up to seven days after delivery.     NO All  and pediatric deaths.     NO Death where the cause is sufficiently obscure to delay completion of the death certificate.   NO Deaths in which autopsy would confirm a suspected illness/condition that would affect surviving family members or recipients of transplanted organs.     The following deaths must be reported to the 's Office:  NO A death that may be due entirely or in part to any factors other than natural disease (recent surgery, recent trauma, suspected abuse/neglect).   NO A death that may be an accident, suicide, or homicide.     YES Any sudden, unexpected death in which there is no prior history of significant heart disease or any other condition associated with sudden death.   NO A death under suspicious, unusual, or unexpected circumstances.    NO Any death which is apparently due to natural causes but in which the  does not have a personal physician familiar with the patient s medical history, social, or environmental situation or the circumstances of the terminal event.   NO Any death apparently due to Sudden Infant Death  Syndrome.     NO Deaths that occur during, in association with, or as consequences of a diagnostic, therapeutic, or anesthetic procedure.   NO Any death in which a fracture of a major bone has occurred within the past (6) six months.   NO A death of persons note seen by their physician within 120 days of demise.     NO Any death in which the  was an inmate of a public institution or was in the custody of Law Enforcement personnel.   NO  All unexpected deaths of children   NO Solid organ donors   NO Unidentified bodies   NO Deaths of persons whose bodies are to be cremated or otherwise disposed of so that the bodies will later be unavailable for examination;   NO Deaths unattended by a physician outside of a licensed healthcare facility or licensed residential hospice program   NO Deaths occurring within 24 hours of arrival to a health care facility if death is unexpected.    NO Deaths associated with the decedent s employment.   NO Deaths attributed to acts of terrorism.   NO Any death in which there is uncertainty as to whether it is a medical examiner s care should be discussed with the medical investigator.        Body disposition: Autopsy was discussed with family member:  Evaristo Ferraro by phone.  Permission for autopsy was declined.

## (undated) DEVICE — DRAPE U-DRAPE 1015NSD NON-STERILE

## (undated) DEVICE — DRSG ABDOMINAL 07 1/2X8" 7197D

## (undated) DEVICE — SU VICRYL 2-0 CT-2 27" UND J269H

## (undated) DEVICE — LINEN ORTHO PACK 5446

## (undated) DEVICE — SU VICRYL 2-0 CT 36" J957H

## (undated) DEVICE — Device

## (undated) DEVICE — GLOVE BIOGEL PI MICRO INDICATOR UNDERGLOVE SZ 8.0 48980

## (undated) DEVICE — CORTICAL OPENER

## (undated) DEVICE — DRAPE C-ARM W/STRAPS 42X72" 07-CA104

## (undated) DEVICE — COVER CAMERA IN-LIGHT DISP LT-C02

## (undated) DEVICE — GLOVE BIOGEL PI SZ 8.0 40880

## (undated) DEVICE — SU VICRYL 1 CT-1 36" UND J947H

## (undated) DEVICE — SPONGE LAP 18X18" X8435

## (undated) DEVICE — SU DERMABOND PRINEO 42CM CLR422US

## (undated) DEVICE — STRAP KNEE/BODY 31143004

## (undated) DEVICE — SOL WATER IRRIG 1000ML BOTTLE 2F7114

## (undated) DEVICE — SU MONOCRYL 3-0 PS-2 18" UND MCP497G

## (undated) DEVICE — SUCTION TIP YANKAUER STR K87

## (undated) DEVICE — BIT DRL 130MM 4.2MM FRHD STRL LF

## (undated) DEVICE — ESU GROUND PAD ADULT W/CORD E7507

## (undated) DEVICE — DRAPE IOBAN ISOLATION VERTICAL 6619

## (undated) DEVICE — PREP CHLORAPREP 26ML TINTED HI-LITE ORANGE 930815

## (undated) DEVICE — DRSG GAUZE 4X8" NON21842

## (undated) DEVICE — SYR BULB IRRIG DOVER 60 ML LATEX FREE 67000

## (undated) DEVICE — GUIDEWIRE BALL TIP 3.0X1000MM 1806-0085S

## (undated) DEVICE — BLADE KNIFE SURG 10 371110

## (undated) DEVICE — DRSG ACTICOAT 4X4.75" 66021770

## (undated) DEVICE — SOL NACL 0.9% IRRIG 1000ML BOTTLE 2F7124

## (undated) DEVICE — TUBING SUCTION MEDI-VAC 1/4"X20' N620A

## (undated) DEVICE — REAMER SHAFT MODIFIED TRINKLE 8X510MM 0227-8510S

## (undated) RX ORDER — HYDROMORPHONE HYDROCHLORIDE 1 MG/ML
INJECTION, SOLUTION INTRAMUSCULAR; INTRAVENOUS; SUBCUTANEOUS
Status: DISPENSED
Start: 2023-01-01

## (undated) RX ORDER — FENTANYL CITRATE 50 UG/ML
INJECTION, SOLUTION INTRAMUSCULAR; INTRAVENOUS
Status: DISPENSED
Start: 2023-01-01

## (undated) RX ORDER — GLYCOPYRROLATE 0.2 MG/ML
INJECTION INTRAMUSCULAR; INTRAVENOUS
Status: DISPENSED
Start: 2023-01-01

## (undated) RX ORDER — PROPOFOL 10 MG/ML
INJECTION, EMULSION INTRAVENOUS
Status: DISPENSED
Start: 2023-01-01

## (undated) RX ORDER — HEPARIN SODIUM 1000 [USP'U]/ML
INJECTION, SOLUTION INTRAVENOUS; SUBCUTANEOUS
Status: DISPENSED
Start: 2023-01-01

## (undated) RX ORDER — LIDOCAINE HYDROCHLORIDE 10 MG/ML
INJECTION, SOLUTION EPIDURAL; INFILTRATION; INTRACAUDAL; PERINEURAL
Status: DISPENSED
Start: 2023-01-01

## (undated) RX ORDER — ACETAMINOPHEN 325 MG/1
TABLET ORAL
Status: DISPENSED
Start: 2023-01-01

## (undated) RX ORDER — HEPARIN SODIUM 200 [USP'U]/100ML
INJECTION, SOLUTION INTRAVENOUS
Status: DISPENSED
Start: 2023-01-01

## (undated) RX ORDER — FENTANYL CITRATE-0.9 % NACL/PF 10 MCG/ML
PLASTIC BAG, INJECTION (ML) INTRAVENOUS
Status: DISPENSED
Start: 2023-01-01

## (undated) RX ORDER — DEXAMETHASONE SODIUM PHOSPHATE 4 MG/ML
INJECTION, SOLUTION INTRA-ARTICULAR; INTRALESIONAL; INTRAMUSCULAR; INTRAVENOUS; SOFT TISSUE
Status: DISPENSED
Start: 2023-01-01